# Patient Record
Sex: FEMALE | Race: BLACK OR AFRICAN AMERICAN | NOT HISPANIC OR LATINO | Employment: OTHER | URBAN - METROPOLITAN AREA
[De-identification: names, ages, dates, MRNs, and addresses within clinical notes are randomized per-mention and may not be internally consistent; named-entity substitution may affect disease eponyms.]

---

## 2017-03-20 ENCOUNTER — CLINICAL SUPPORT (OUTPATIENT)
Dept: OPHTHALMOLOGY | Facility: CLINIC | Age: 77
End: 2017-03-20
Payer: MEDICARE

## 2017-03-20 ENCOUNTER — OFFICE VISIT (OUTPATIENT)
Dept: OPHTHALMOLOGY | Facility: CLINIC | Age: 77
End: 2017-03-20
Payer: MEDICARE

## 2017-03-20 DIAGNOSIS — H52.03 HYPEROPIA WITH ASTIGMATISM AND PRESBYOPIA, BILATERAL: ICD-10-CM

## 2017-03-20 DIAGNOSIS — H52.203 HYPEROPIA WITH ASTIGMATISM AND PRESBYOPIA, BILATERAL: ICD-10-CM

## 2017-03-20 DIAGNOSIS — H52.4 HYPEROPIA WITH ASTIGMATISM AND PRESBYOPIA, BILATERAL: ICD-10-CM

## 2017-03-20 DIAGNOSIS — H40.1113 PRIMARY OPEN ANGLE GLAUCOMA OF RIGHT EYE, SEVERE STAGE: Primary | ICD-10-CM

## 2017-03-20 DIAGNOSIS — I10 ESSENTIAL HYPERTENSION: ICD-10-CM

## 2017-03-20 DIAGNOSIS — H40.1122 PRIMARY OPEN ANGLE GLAUCOMA OF LEFT EYE, MODERATE STAGE: ICD-10-CM

## 2017-03-20 DIAGNOSIS — Z96.1 PSEUDOPHAKIA: ICD-10-CM

## 2017-03-20 DIAGNOSIS — H40.1113 PRIMARY OPEN ANGLE GLAUCOMA OF RIGHT EYE, SEVERE STAGE: ICD-10-CM

## 2017-03-20 PROCEDURE — 92012 INTRM OPH EXAM EST PATIENT: CPT | Mod: S$GLB,,, | Performed by: OPHTHALMOLOGY

## 2017-03-20 PROCEDURE — 92134 CPTRZ OPH DX IMG PST SGM RTA: CPT | Mod: S$GLB,,, | Performed by: OPHTHALMOLOGY

## 2017-03-20 PROCEDURE — 99999 PR PBB SHADOW E&M-EST. PATIENT-LVL II: CPT | Mod: PBBFAC,,, | Performed by: OPHTHALMOLOGY
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
## 2017-07-28 ENCOUNTER — OFFICE VISIT (OUTPATIENT)
Dept: OPHTHALMOLOGY | Facility: CLINIC | Age: 77
End: 2017-07-28
Payer: MEDICARE

## 2017-07-28 ENCOUNTER — CLINICAL SUPPORT (OUTPATIENT)
Dept: OPHTHALMOLOGY | Facility: CLINIC | Age: 77
End: 2017-07-28
Payer: MEDICARE

## 2017-07-28 DIAGNOSIS — I10 ESSENTIAL HYPERTENSION: ICD-10-CM

## 2017-07-28 DIAGNOSIS — Z63.79 STRESS DUE TO ILLNESS OF FAMILY MEMBER: ICD-10-CM

## 2017-07-28 DIAGNOSIS — H40.1113 PRIMARY OPEN ANGLE GLAUCOMA OF RIGHT EYE, SEVERE STAGE: ICD-10-CM

## 2017-07-28 DIAGNOSIS — H40.1122 PRIMARY OPEN ANGLE GLAUCOMA OF LEFT EYE, MODERATE STAGE: ICD-10-CM

## 2017-07-28 DIAGNOSIS — Z96.1 PSEUDOPHAKIA: ICD-10-CM

## 2017-07-28 DIAGNOSIS — H40.1113 PRIMARY OPEN ANGLE GLAUCOMA OF RIGHT EYE, SEVERE STAGE: Primary | ICD-10-CM

## 2017-07-28 PROCEDURE — 99999 PR PBB SHADOW E&M-EST. PATIENT-LVL II: CPT | Mod: PBBFAC,,, | Performed by: OPHTHALMOLOGY

## 2017-07-28 PROCEDURE — 92083 EXTENDED VISUAL FIELD XM: CPT | Mod: S$GLB,,, | Performed by: OPHTHALMOLOGY

## 2017-07-28 PROCEDURE — 92133 CPTRZD OPH DX IMG PST SGM ON: CPT | Mod: S$GLB,,, | Performed by: OPHTHALMOLOGY

## 2017-07-28 PROCEDURE — 92014 COMPRE OPH EXAM EST PT 1/>: CPT | Mod: S$GLB,,, | Performed by: OPHTHALMOLOGY
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
## 2017-12-14 DIAGNOSIS — H40.1113 PRIMARY OPEN ANGLE GLAUCOMA OF RIGHT EYE, SEVERE STAGE: ICD-10-CM

## 2017-12-15 RX ORDER — TIMOLOL MALEATE 5 MG/ML
SOLUTION/ DROPS OPHTHALMIC
Qty: 10 ML | Refills: 0 | Status: SHIPPED | OUTPATIENT
Start: 2017-12-15 | End: 2019-05-31 | Stop reason: SDUPTHER

## 2017-12-18 ENCOUNTER — OFFICE VISIT (OUTPATIENT)
Dept: OPHTHALMOLOGY | Facility: CLINIC | Age: 77
End: 2017-12-18
Payer: MEDICARE

## 2017-12-18 ENCOUNTER — OFFICE VISIT (OUTPATIENT)
Dept: INTERNAL MEDICINE | Facility: CLINIC | Age: 77
End: 2017-12-18
Payer: MEDICARE

## 2017-12-18 ENCOUNTER — TELEPHONE (OUTPATIENT)
Dept: INTERNAL MEDICINE | Facility: CLINIC | Age: 77
End: 2017-12-18

## 2017-12-18 VITALS
TEMPERATURE: 98 F | SYSTOLIC BLOOD PRESSURE: 144 MMHG | WEIGHT: 132.19 LBS | DIASTOLIC BLOOD PRESSURE: 70 MMHG | BODY MASS INDEX: 20.71 KG/M2 | HEART RATE: 60 BPM

## 2017-12-18 DIAGNOSIS — H40.1113 PRIMARY OPEN ANGLE GLAUCOMA OF RIGHT EYE, SEVERE STAGE: ICD-10-CM

## 2017-12-18 DIAGNOSIS — I10 ESSENTIAL HYPERTENSION: ICD-10-CM

## 2017-12-18 DIAGNOSIS — Z96.1 PSEUDOPHAKIA: ICD-10-CM

## 2017-12-18 DIAGNOSIS — H40.1122 PRIMARY OPEN ANGLE GLAUCOMA OF LEFT EYE, MODERATE STAGE: Primary | ICD-10-CM

## 2017-12-18 DIAGNOSIS — Z00.00 WELLNESS EXAMINATION: Primary | ICD-10-CM

## 2017-12-18 PROCEDURE — 99999 PR PBB SHADOW E&M-EST. PATIENT-LVL II: CPT | Mod: PBBFAC,,, | Performed by: OPHTHALMOLOGY

## 2017-12-18 PROCEDURE — 99397 PER PM REEVAL EST PAT 65+ YR: CPT | Mod: S$GLB,,, | Performed by: INTERNAL MEDICINE

## 2017-12-18 PROCEDURE — 99499 UNLISTED E&M SERVICE: CPT | Mod: S$GLB,,, | Performed by: OPHTHALMOLOGY

## 2017-12-18 PROCEDURE — 99499 UNLISTED E&M SERVICE: CPT | Mod: S$GLB,,, | Performed by: INTERNAL MEDICINE

## 2017-12-18 PROCEDURE — 99999 PR PBB SHADOW E&M-EST. PATIENT-LVL III: CPT | Mod: PBBFAC,,, | Performed by: INTERNAL MEDICINE

## 2017-12-18 PROCEDURE — 92012 INTRM OPH EXAM EST PATIENT: CPT | Mod: S$GLB,,, | Performed by: OPHTHALMOLOGY

## 2017-12-18 RX ORDER — LATANOPROST 50 UG/ML
1 SOLUTION/ DROPS OPHTHALMIC NIGHTLY
Qty: 2.5 ML | Refills: 12 | Status: SHIPPED | OUTPATIENT
Start: 2017-12-18 | End: 2019-05-31 | Stop reason: SDUPTHER
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
## 2017-12-18 NOTE — PROGRESS NOTES
Subjective:       Patient ID: Home De Dios is a 75 y.o. female.    Chief Complaint: Annual Exam    HPIPleasant lady from [place] here for her annual wellness exam. Overall doing well and had no specific complaints. She is active physically, maintains healthy diet and weight is unchanged. She is compliant with her medications, but did not take her BP med today.  Review of Systems   Constitutional: Negative for activity change, appetite change, fatigue and unexpected weight change.   HENT: Negative.    Eyes: Negative for visual disturbance.   Respiratory: Negative for cough and shortness of breath.    Cardiovascular: Negative for chest pain, palpitations and leg swelling.   Gastrointestinal: Negative for abdominal distention, abdominal pain and blood in stool.   Endocrine: Negative.    Genitourinary: Negative for difficulty urinating.   Musculoskeletal: Negative for arthralgias, back pain and joint swelling.   Neurological: Negative for dizziness, tremors, weakness and headaches.   Hematological: Negative.        Objective:      Physical Exam   Constitutional: She is oriented to person, place, and time. She appears well-developed and well-nourished. No distress.   HENT:   Head: Normocephalic and atraumatic.   Right Ear: External ear normal.   Left Ear: External ear normal.   Mouth/Throat: Oropharynx is clear and moist. No oropharyngeal exudate.   Eyes: Conjunctivae and EOM are normal. Pupils are equal, round, and reactive to light. Right eye exhibits no discharge. Left eye exhibits no discharge. No scleral icterus.   Arcus senilus bilaterally.   Neck: Normal range of motion. Neck supple. No JVD present. No thyromegaly present.   Cardiovascular: Normal rate, regular rhythm and normal heart sounds.    No murmur heard.  Pulmonary/Chest: Effort normal and breath sounds normal. No respiratory distress. She has no wheezes. She exhibits no tenderness.   Abdominal: Soft. Bowel sounds are normal. She exhibits no  distension and no mass. There is no tenderness.   Musculoskeletal: Normal range of motion. She exhibits no edema or tenderness.   Lymphadenopathy:     She has no cervical adenopathy.   Neurological: She is alert and oriented to person, place, and time. No cranial nerve deficit. Coordination normal.   Skin: Skin is warm and dry. No rash noted. No erythema.   Psychiatric: She has a normal mood and affect. Her behavior is normal. Judgment and thought content normal.   Nursing note and vitals reviewed.      Assessment:       1. Wellness examination     2.     Hypertension.  Plan:   1. Continue with current medications.        2. Monitor BP periodically; keep diary.        3. RTC 3 months or sooner if needed

## 2018-04-03 ENCOUNTER — TELEPHONE (OUTPATIENT)
Dept: OPHTHALMOLOGY | Facility: CLINIC | Age: 78
End: 2018-04-03

## 2018-04-20 ENCOUNTER — CLINICAL SUPPORT (OUTPATIENT)
Dept: OPHTHALMOLOGY | Facility: CLINIC | Age: 78
End: 2018-04-20
Attending: OPHTHALMOLOGY
Payer: MEDICARE

## 2018-04-20 DIAGNOSIS — H40.1122 PRIMARY OPEN ANGLE GLAUCOMA OF LEFT EYE, MODERATE STAGE: Primary | ICD-10-CM

## 2018-04-20 DIAGNOSIS — H52.03 HYPEROPIA WITH ASTIGMATISM AND PRESBYOPIA, BILATERAL: ICD-10-CM

## 2018-04-20 DIAGNOSIS — Z63.79 STRESS DUE TO ILLNESS OF FAMILY MEMBER: ICD-10-CM

## 2018-04-20 DIAGNOSIS — H52.4 HYPEROPIA WITH ASTIGMATISM AND PRESBYOPIA, BILATERAL: ICD-10-CM

## 2018-04-20 DIAGNOSIS — Z96.1 PSEUDOPHAKIA: ICD-10-CM

## 2018-04-20 DIAGNOSIS — H40.1113 PRIMARY OPEN ANGLE GLAUCOMA OF RIGHT EYE, SEVERE STAGE: ICD-10-CM

## 2018-04-20 DIAGNOSIS — I10 ESSENTIAL HYPERTENSION: ICD-10-CM

## 2018-04-20 DIAGNOSIS — H52.203 HYPEROPIA WITH ASTIGMATISM AND PRESBYOPIA, BILATERAL: ICD-10-CM

## 2018-04-20 DIAGNOSIS — H40.1122 PRIMARY OPEN ANGLE GLAUCOMA OF LEFT EYE, MODERATE STAGE: ICD-10-CM

## 2018-04-20 PROCEDURE — 92133 CPTRZD OPH DX IMG PST SGM ON: CPT | Mod: S$GLB,,, | Performed by: OPHTHALMOLOGY

## 2018-04-20 PROCEDURE — 99999 PR PBB SHADOW E&M-EST. PATIENT-LVL II: CPT | Mod: PBBFAC,,, | Performed by: OPHTHALMOLOGY

## 2018-04-20 PROCEDURE — 92012 INTRM OPH EXAM EST PATIENT: CPT | Mod: S$GLB,,, | Performed by: OPHTHALMOLOGY

## 2018-04-20 PROCEDURE — 99499 UNLISTED E&M SERVICE: CPT | Mod: S$GLB,,, | Performed by: OPHTHALMOLOGY
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
## 2018-05-07 DIAGNOSIS — I10 ESSENTIAL HYPERTENSION: ICD-10-CM

## 2018-05-07 RX ORDER — HYDROCHLOROTHIAZIDE 12.5 MG/1
12.5 TABLET ORAL DAILY
Qty: 30 TABLET | Refills: 6 | Status: SHIPPED | OUTPATIENT
Start: 2018-05-07 | End: 2019-05-31 | Stop reason: SDUPTHER

## 2018-06-15 ENCOUNTER — PES CALL (OUTPATIENT)
Dept: ADMINISTRATIVE | Facility: CLINIC | Age: 78
End: 2018-06-15

## 2018-09-28 ENCOUNTER — OFFICE VISIT (OUTPATIENT)
Dept: OPHTHALMOLOGY | Facility: CLINIC | Age: 78
End: 2018-09-28
Attending: OPHTHALMOLOGY
Payer: MEDICARE

## 2018-09-28 DIAGNOSIS — H40.1113 PRIMARY OPEN ANGLE GLAUCOMA OF RIGHT EYE, SEVERE STAGE: ICD-10-CM

## 2018-09-28 DIAGNOSIS — H40.1122 PRIMARY OPEN ANGLE GLAUCOMA OF LEFT EYE, MODERATE STAGE: ICD-10-CM

## 2018-09-28 DIAGNOSIS — I10 ESSENTIAL HYPERTENSION: ICD-10-CM

## 2018-09-28 DIAGNOSIS — Z63.79 STRESS DUE TO ILLNESS OF FAMILY MEMBER: ICD-10-CM

## 2018-09-28 DIAGNOSIS — H52.203 HYPEROPIA WITH ASTIGMATISM AND PRESBYOPIA, BILATERAL: ICD-10-CM

## 2018-09-28 DIAGNOSIS — H52.03 HYPEROPIA WITH ASTIGMATISM AND PRESBYOPIA, BILATERAL: ICD-10-CM

## 2018-09-28 DIAGNOSIS — Z96.1 PSEUDOPHAKIA: ICD-10-CM

## 2018-09-28 DIAGNOSIS — H52.4 HYPEROPIA WITH ASTIGMATISM AND PRESBYOPIA, BILATERAL: ICD-10-CM

## 2018-09-28 DIAGNOSIS — H40.1122 PRIMARY OPEN ANGLE GLAUCOMA OF LEFT EYE, MODERATE STAGE: Primary | ICD-10-CM

## 2018-09-28 PROCEDURE — 92250 FUNDUS PHOTOGRAPHY W/I&R: CPT | Mod: PBBFAC | Performed by: OPHTHALMOLOGY

## 2018-09-28 PROCEDURE — 92014 COMPRE OPH EXAM EST PT 1/>: CPT | Mod: S$PBB,,, | Performed by: OPHTHALMOLOGY

## 2018-09-28 PROCEDURE — 99999 PR PBB SHADOW E&M-EST. PATIENT-LVL II: CPT | Mod: PBBFAC,,, | Performed by: OPHTHALMOLOGY

## 2018-09-28 PROCEDURE — 92083 EXTENDED VISUAL FIELD XM: CPT | Mod: PBBFAC

## 2018-09-28 PROCEDURE — 99212 OFFICE O/P EST SF 10 MIN: CPT | Mod: PBBFAC,25 | Performed by: OPHTHALMOLOGY

## 2018-09-28 PROCEDURE — 92083 EXTENDED VISUAL FIELD XM: CPT | Mod: 26,S$PBB,, | Performed by: OPHTHALMOLOGY
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
## 2018-12-13 ENCOUNTER — OFFICE VISIT (OUTPATIENT)
Dept: INTERNAL MEDICINE | Facility: CLINIC | Age: 78
End: 2018-12-13
Payer: MEDICARE

## 2018-12-13 VITALS
BODY MASS INDEX: 19.26 KG/M2 | HEART RATE: 64 BPM | DIASTOLIC BLOOD PRESSURE: 72 MMHG | WEIGHT: 123 LBS | SYSTOLIC BLOOD PRESSURE: 160 MMHG

## 2018-12-13 DIAGNOSIS — R63.4 WEIGHT LOSS: ICD-10-CM

## 2018-12-13 DIAGNOSIS — I10 HYPERTENSION, UNSPECIFIED TYPE: ICD-10-CM

## 2018-12-13 DIAGNOSIS — Z00.00 ROUTINE GENERAL MEDICAL EXAMINATION AT A HEALTH CARE FACILITY: Primary | ICD-10-CM

## 2018-12-13 PROCEDURE — 1101F PT FALLS ASSESS-DOCD LE1/YR: CPT | Mod: CPTII,S$GLB,, | Performed by: INTERNAL MEDICINE

## 2018-12-13 PROCEDURE — 99214 OFFICE O/P EST MOD 30 MIN: CPT | Mod: S$GLB,,, | Performed by: INTERNAL MEDICINE

## 2018-12-13 PROCEDURE — 3077F SYST BP >= 140 MM HG: CPT | Mod: CPTII,S$GLB,, | Performed by: INTERNAL MEDICINE

## 2018-12-13 PROCEDURE — 3078F DIAST BP <80 MM HG: CPT | Mod: CPTII,S$GLB,, | Performed by: INTERNAL MEDICINE

## 2018-12-13 PROCEDURE — 99999 PR PBB SHADOW E&M-EST. PATIENT-LVL III: CPT | Mod: PBBFAC,,, | Performed by: INTERNAL MEDICINE

## 2018-12-13 NOTE — PROGRESS NOTES
"Subjective:       Patient ID: Home De Dios is a 76 y.o. female.    Chief Complaint: Annual Exam    HPIPleasant lady originally from [place] here for her annual exam and follow up of HTN. Overall doing well and had no specific complaints. We discussed however the fact that her weight has been decreasing over the last few visits. She denies any complaints and is not worried about this fact as she has "always been on the thin side". Nevertheless, she has lost about 10# since her last visit last year and this was discussed. She is refusing any tests at present including blood work or other studies as she feels well and does not want them. We discussed her dietary habits and it appears that she does not consume sufficient calories for the energy she expends at her daily chores and activities. She cares for her disabled  and walks for the most part to the store and other areas. She relates how her life changed after the untimely death of her oldest son a few years ago. She realizes that this may have caused some depression, but has a strong vincent base that she is content with. I discussed cancer screening tests that she has never had at her request including MMG, colonoscopies, etc. She does not want any blood work at present or any other studies. She denies any pain, change in bowel habits, cough, fevers, night sweats,, CP, RAZA, LE edema. She is compliant with her HCTZ and does not want additional treatment due to finances. I respect he desires and offered my services at any time she changes her mind.  Review of Systems   Constitutional: Negative for fatigue.   Eyes: Negative for visual disturbance.   Respiratory: Negative for cough, shortness of breath and wheezing.    Cardiovascular: Negative for chest pain, palpitations and leg swelling.   Gastrointestinal: Negative for abdominal distention, abdominal pain, anal bleeding, constipation and diarrhea.   Genitourinary: Negative for difficulty urinating, " hematuria and pelvic pain.   Musculoskeletal: Negative for arthralgias, back pain and joint swelling.   Skin: Negative.    Neurological: Negative for dizziness, tremors, weakness, light-headedness, numbness and headaches.   Hematological: Negative for adenopathy.       Objective:      Physical Exam   Constitutional: She is oriented to person, place, and time. She appears well-developed and well-nourished. No distress.   HENT:   Head: Normocephalic and atraumatic.   Right Ear: External ear normal.   Left Ear: External ear normal.   Mouth/Throat: Oropharynx is clear and moist. No oropharyngeal exudate.   Eyes: Conjunctivae and EOM are normal. Pupils are equal, round, and reactive to light. Right eye exhibits no discharge. Left eye exhibits no discharge. No scleral icterus.   Neck: Normal range of motion. Neck supple. No JVD present. No thyromegaly present.   Cardiovascular: Normal rate, regular rhythm, normal heart sounds and intact distal pulses.   No murmur heard.  Pulmonary/Chest: Effort normal and breath sounds normal. No respiratory distress. She has no wheezes. She exhibits no tenderness.   Abdominal: Soft. Bowel sounds are normal. She exhibits no distension and no mass. There is no tenderness.   Musculoskeletal: Normal range of motion. She exhibits no edema or tenderness.   Lymphadenopathy:     She has no cervical adenopathy.   Neurological: She is alert and oriented to person, place, and time. No cranial nerve deficit.   Skin: Skin is warm and dry. No rash noted. She is not diaphoretic. No erythema.   Psychiatric: She has a normal mood and affect. Her behavior is normal.   Nursing note and vitals reviewed.      Assessment:       1. Routine general medical examination at a health care facility     2.     Hypertension - not at goal.   3.     Weight loss - likely multifactorial. Counseling provided.  Plan:    1. Continue with current medications.         2. Advised to increase caloric intake; specific examples  were discussed.         3. RTC 6 months or sooner if needed.
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
## 2019-05-31 ENCOUNTER — OFFICE VISIT (OUTPATIENT)
Dept: OPHTHALMOLOGY | Facility: CLINIC | Age: 79
End: 2019-05-31
Payer: MEDICARE

## 2019-05-31 ENCOUNTER — OFFICE VISIT (OUTPATIENT)
Dept: INTERNAL MEDICINE | Facility: CLINIC | Age: 79
End: 2019-05-31
Payer: MEDICARE

## 2019-05-31 VITALS
BODY MASS INDEX: 20.08 KG/M2 | WEIGHT: 128.19 LBS | DIASTOLIC BLOOD PRESSURE: 80 MMHG | SYSTOLIC BLOOD PRESSURE: 170 MMHG | HEART RATE: 60 BPM

## 2019-05-31 DIAGNOSIS — H52.203 HYPEROPIA WITH ASTIGMATISM AND PRESBYOPIA, BILATERAL: ICD-10-CM

## 2019-05-31 DIAGNOSIS — Z96.1 PSEUDOPHAKIA: ICD-10-CM

## 2019-05-31 DIAGNOSIS — H52.03 HYPEROPIA WITH ASTIGMATISM AND PRESBYOPIA, BILATERAL: ICD-10-CM

## 2019-05-31 DIAGNOSIS — I10 ESSENTIAL HYPERTENSION: ICD-10-CM

## 2019-05-31 DIAGNOSIS — H52.4 HYPEROPIA WITH ASTIGMATISM AND PRESBYOPIA, BILATERAL: ICD-10-CM

## 2019-05-31 DIAGNOSIS — H40.1122 PRIMARY OPEN ANGLE GLAUCOMA OF LEFT EYE, MODERATE STAGE: Primary | ICD-10-CM

## 2019-05-31 DIAGNOSIS — H40.1113 PRIMARY OPEN ANGLE GLAUCOMA OF RIGHT EYE, SEVERE STAGE: ICD-10-CM

## 2019-05-31 PROCEDURE — 92020 GONIOSCOPY: CPT | Mod: HCNC,S$GLB,, | Performed by: OPHTHALMOLOGY

## 2019-05-31 PROCEDURE — 1101F PR PT FALLS ASSESS DOC 0-1 FALLS W/OUT INJ PAST YR: ICD-10-PCS | Mod: HCNC,CPTII,S$GLB, | Performed by: INTERNAL MEDICINE

## 2019-05-31 PROCEDURE — 99999 PR PBB SHADOW E&M-EST. PATIENT-LVL II: CPT | Mod: PBBFAC,HCNC,, | Performed by: OPHTHALMOLOGY

## 2019-05-31 PROCEDURE — 99213 PR OFFICE/OUTPT VISIT, EST, LEVL III, 20-29 MIN: ICD-10-PCS | Mod: HCNC,S$GLB,, | Performed by: INTERNAL MEDICINE

## 2019-05-31 PROCEDURE — 3079F DIAST BP 80-89 MM HG: CPT | Mod: HCNC,CPTII,S$GLB, | Performed by: INTERNAL MEDICINE

## 2019-05-31 PROCEDURE — 99499 RISK ADDL DX/OHS AUDIT: ICD-10-PCS | Mod: HCNC,S$GLB,, | Performed by: INTERNAL MEDICINE

## 2019-05-31 PROCEDURE — 99999 PR PBB SHADOW E&M-EST. PATIENT-LVL II: ICD-10-PCS | Mod: PBBFAC,HCNC,, | Performed by: OPHTHALMOLOGY

## 2019-05-31 PROCEDURE — 3079F PR MOST RECENT DIASTOLIC BLOOD PRESSURE 80-89 MM HG: ICD-10-PCS | Mod: HCNC,CPTII,S$GLB, | Performed by: INTERNAL MEDICINE

## 2019-05-31 PROCEDURE — 99213 OFFICE O/P EST LOW 20 MIN: CPT | Mod: HCNC,S$GLB,, | Performed by: INTERNAL MEDICINE

## 2019-05-31 PROCEDURE — 99999 PR PBB SHADOW E&M-EST. PATIENT-LVL III: ICD-10-PCS | Mod: PBBFAC,HCNC,, | Performed by: INTERNAL MEDICINE

## 2019-05-31 PROCEDURE — 92012 INTRM OPH EXAM EST PATIENT: CPT | Mod: HCNC,S$GLB,, | Performed by: OPHTHALMOLOGY

## 2019-05-31 PROCEDURE — 99999 PR PBB SHADOW E&M-EST. PATIENT-LVL III: CPT | Mod: PBBFAC,HCNC,, | Performed by: INTERNAL MEDICINE

## 2019-05-31 PROCEDURE — 99499 UNLISTED E&M SERVICE: CPT | Mod: HCNC,S$GLB,, | Performed by: INTERNAL MEDICINE

## 2019-05-31 PROCEDURE — 1101F PT FALLS ASSESS-DOCD LE1/YR: CPT | Mod: HCNC,CPTII,S$GLB, | Performed by: INTERNAL MEDICINE

## 2019-05-31 PROCEDURE — 3077F SYST BP >= 140 MM HG: CPT | Mod: HCNC,CPTII,S$GLB, | Performed by: INTERNAL MEDICINE

## 2019-05-31 PROCEDURE — 3077F PR MOST RECENT SYSTOLIC BLOOD PRESSURE >= 140 MM HG: ICD-10-PCS | Mod: HCNC,CPTII,S$GLB, | Performed by: INTERNAL MEDICINE

## 2019-05-31 PROCEDURE — 92020 PR SPECIAL EYE EVAL,GONIOSCOPY: ICD-10-PCS | Mod: HCNC,S$GLB,, | Performed by: OPHTHALMOLOGY

## 2019-05-31 PROCEDURE — 92012 PR EYE EXAM, EST PATIENT,INTERMED: ICD-10-PCS | Mod: HCNC,S$GLB,, | Performed by: OPHTHALMOLOGY

## 2019-05-31 RX ORDER — LATANOPROST 50 UG/ML
1 SOLUTION/ DROPS OPHTHALMIC NIGHTLY
Qty: 2.5 ML | Refills: 12 | Status: SHIPPED | OUTPATIENT
Start: 2019-05-31 | End: 2021-05-18 | Stop reason: SDUPTHER

## 2019-05-31 RX ORDER — TIMOLOL MALEATE 5 MG/ML
1 SOLUTION/ DROPS OPHTHALMIC 2 TIMES DAILY
Qty: 5 ML | Refills: 12 | Status: SHIPPED | OUTPATIENT
Start: 2019-05-31 | End: 2021-05-18 | Stop reason: SDUPTHER

## 2019-05-31 RX ORDER — HYDROCHLOROTHIAZIDE 12.5 MG/1
12.5 TABLET ORAL DAILY
Qty: 90 TABLET | Refills: 2 | Status: ON HOLD | OUTPATIENT
Start: 2019-05-31 | End: 2020-05-01 | Stop reason: HOSPADM

## 2019-05-31 NOTE — PROGRESS NOTES
Subjective:       Patient ID: Home De Dios is a 76 y.o. female.    Chief Complaint: Follow-up and Hypertension    HPIMijordan Oliver is tila pleasant lady from [place] here for follow up HTN. Overall doing well and has no complaints. She has gained about 5# since our last visit and feels well about that. She has hx of HTN and is on HCTZ. I have recommended adding meds for better BP control, but has declined 2o finances. She is also not interested in blood work as I have recommended, but does not want this done as well. Overall she reports feeling well, exercises daily has no health issues that bother her. She ran out of her BP med a few days ago and these were refilled by me today.  Review of Systems   Constitutional: Negative for activity change, appetite change, fatigue and unexpected weight change.   Eyes: Negative for visual disturbance.   Respiratory: Negative for cough and shortness of breath.    Cardiovascular: Negative for chest pain, palpitations and leg swelling.   Gastrointestinal: Negative for abdominal distention, abdominal pain and blood in stool.   Genitourinary: Negative for difficulty urinating.   Musculoskeletal: Negative for arthralgias, back pain and joint swelling.   Skin: Negative.    Neurological: Negative for dizziness, weakness, light-headedness and headaches.   Hematological: Negative.        Objective:      Physical Exam   Constitutional: She is oriented to person, place, and time. She appears well-developed and well-nourished. No distress.   Neck: Normal range of motion. Neck supple. No JVD present. No thyromegaly present.   Cardiovascular: Normal rate, regular rhythm, normal heart sounds and intact distal pulses.   Pulmonary/Chest: Effort normal and breath sounds normal. No respiratory distress. She has no wheezes.   Musculoskeletal: Normal range of motion. She exhibits no edema.   Lymphadenopathy:     She has no cervical adenopathy.   Neurological: She is alert and oriented to person,  place, and time. No cranial nerve deficit. Coordination normal.   Skin: Skin is warm and dry. She is not diaphoretic.   Psychiatric: She has a normal mood and affect. Her behavior is normal.   Nursing note and vitals reviewed.      Assessment:       1. Essential hypertension        Plan:    1. Refill HCTZ.         2. Monitor BP.         3. RTC 6 months or sooner if needed.

## 2019-06-25 ENCOUNTER — PES CALL (OUTPATIENT)
Dept: ADMINISTRATIVE | Facility: CLINIC | Age: 79
End: 2019-06-25

## 2019-10-30 ENCOUNTER — PATIENT OUTREACH (OUTPATIENT)
Dept: ADMINISTRATIVE | Facility: OTHER | Age: 79
End: 2019-10-30
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
## 2019-11-01 ENCOUNTER — CLINICAL SUPPORT (OUTPATIENT)
Dept: OPHTHALMOLOGY | Facility: CLINIC | Age: 79
End: 2019-11-01
Payer: MEDICARE

## 2019-11-01 ENCOUNTER — OFFICE VISIT (OUTPATIENT)
Dept: OPHTHALMOLOGY | Facility: CLINIC | Age: 79
End: 2019-11-01
Payer: MEDICARE

## 2019-11-01 DIAGNOSIS — H52.203 HYPEROPIA WITH ASTIGMATISM AND PRESBYOPIA, BILATERAL: ICD-10-CM

## 2019-11-01 DIAGNOSIS — H52.03 HYPEROPIA WITH ASTIGMATISM AND PRESBYOPIA, BILATERAL: ICD-10-CM

## 2019-11-01 DIAGNOSIS — Z96.1 PSEUDOPHAKIA: ICD-10-CM

## 2019-11-01 DIAGNOSIS — H40.1122 PRIMARY OPEN ANGLE GLAUCOMA OF LEFT EYE, MODERATE STAGE: Primary | ICD-10-CM

## 2019-11-01 DIAGNOSIS — H40.1113 PRIMARY OPEN ANGLE GLAUCOMA OF RIGHT EYE, SEVERE STAGE: ICD-10-CM

## 2019-11-01 DIAGNOSIS — H21.561 AFFERENT PUPILLARY DEFECT, RIGHT: ICD-10-CM

## 2019-11-01 DIAGNOSIS — H40.1122 PRIMARY OPEN ANGLE GLAUCOMA OF LEFT EYE, MODERATE STAGE: ICD-10-CM

## 2019-11-01 DIAGNOSIS — I10 ESSENTIAL HYPERTENSION: ICD-10-CM

## 2019-11-01 DIAGNOSIS — H52.4 HYPEROPIA WITH ASTIGMATISM AND PRESBYOPIA, BILATERAL: ICD-10-CM

## 2019-11-01 PROCEDURE — 92014 PR EYE EXAM, EST PATIENT,COMPREHESV: ICD-10-PCS | Mod: HCNC,S$GLB,, | Performed by: OPHTHALMOLOGY

## 2019-11-01 PROCEDURE — 92083 HUMPHREY VISUAL FIELD - OU - BOTH EYES: ICD-10-PCS | Mod: HCNC,S$GLB,, | Performed by: OPHTHALMOLOGY

## 2019-11-01 PROCEDURE — 92083 EXTENDED VISUAL FIELD XM: CPT | Mod: HCNC,S$GLB,, | Performed by: OPHTHALMOLOGY

## 2019-11-01 PROCEDURE — 99999 PR PBB SHADOW E&M-EST. PATIENT-LVL II: ICD-10-PCS | Mod: PBBFAC,HCNC,, | Performed by: OPHTHALMOLOGY

## 2019-11-01 PROCEDURE — 99499 UNLISTED E&M SERVICE: CPT | Mod: S$GLB,,, | Performed by: OPHTHALMOLOGY

## 2019-11-01 PROCEDURE — 99499 RISK ADDL DX/OHS AUDIT: ICD-10-PCS | Mod: S$GLB,,, | Performed by: OPHTHALMOLOGY

## 2019-11-01 PROCEDURE — 92133 POSTERIOR SEGMENT OCT OPTIC NERVE(OCULAR COHERENCE TOMOGRAPHY) - OU - BOTH EYES: ICD-10-PCS | Mod: HCNC,S$GLB,, | Performed by: OPHTHALMOLOGY

## 2019-11-01 PROCEDURE — 99999 PR PBB SHADOW E&M-EST. PATIENT-LVL II: CPT | Mod: PBBFAC,HCNC,, | Performed by: OPHTHALMOLOGY

## 2019-11-01 PROCEDURE — 92014 COMPRE OPH EXAM EST PT 1/>: CPT | Mod: HCNC,S$GLB,, | Performed by: OPHTHALMOLOGY

## 2019-11-01 PROCEDURE — 92133 CPTRZD OPH DX IMG PST SGM ON: CPT | Mod: HCNC,S$GLB,, | Performed by: OPHTHALMOLOGY

## 2020-04-08 ENCOUNTER — TELEPHONE (OUTPATIENT)
Dept: PODIATRY | Facility: CLINIC | Age: 80
End: 2020-04-08

## 2020-04-08 NOTE — TELEPHONE ENCOUNTER
Nurse called pt regarding appt request no answer. No voicemail. Nurse scheduled appt and mailed notice out        ----- Message from Belkis Jerry sent at 4/8/2020  2:49 PM CDT -----  Contact: self  Pt having problems with foot. it goes numb. Asking for an appt. Asking for a call back.        contact info [phone]

## 2020-04-18 ENCOUNTER — NURSE TRIAGE (OUTPATIENT)
Dept: ADMINISTRATIVE | Facility: CLINIC | Age: 80
End: 2020-04-18

## 2020-04-18 NOTE — TELEPHONE ENCOUNTER
Soaked her foot last week and afterward it was itching and she was scratching it a lot  Noticed the toenail began to get black  By the toe looks black  Recommended ED now  She is considering it but she is unsure if she will follow that recommendation  Advised again to consider going because the toe could be infected and she doesn't want to wait for it to get worse.      Reason for Disposition   Purple or black skin on toe  (Exception: simple recalled injury with bruise)    Additional Information   Negative: Followed a toe injury   Negative: Wound looks infected   Negative: Caused by an animal bite   Negative: Caused by frostbite   Negative: Caused by an ingrown toenail   Negative: Athlete's Foot suspected (i.e., itchy red rash in web space between toes)   Negative: Foot pain is main symptom   Negative: Foot is cool or blue in comparison to other foot    Protocols used: TOE PAIN-A-AH

## 2020-04-19 ENCOUNTER — HOSPITAL ENCOUNTER (INPATIENT)
Facility: OTHER | Age: 80
LOS: 12 days | Discharge: HOME HEALTH | DRG: 240 | End: 2020-05-01
Attending: EMERGENCY MEDICINE | Admitting: EMERGENCY MEDICINE
Payer: MEDICARE

## 2020-04-19 ENCOUNTER — NURSE TRIAGE (OUTPATIENT)
Dept: ADMINISTRATIVE | Facility: CLINIC | Age: 80
End: 2020-04-19

## 2020-04-19 DIAGNOSIS — I10 ESSENTIAL HYPERTENSION: Chronic | ICD-10-CM

## 2020-04-19 DIAGNOSIS — I96 TOE NECROSIS: ICD-10-CM

## 2020-04-19 DIAGNOSIS — E87.6 HYPOKALEMIA: ICD-10-CM

## 2020-04-19 DIAGNOSIS — I73.9 PVD (PERIPHERAL VASCULAR DISEASE) WITH CLAUDICATION: ICD-10-CM

## 2020-04-19 DIAGNOSIS — N17.9 AKI (ACUTE KIDNEY INJURY): ICD-10-CM

## 2020-04-19 DIAGNOSIS — I96 GANGRENE OF TOE OF RIGHT FOOT: ICD-10-CM

## 2020-04-19 DIAGNOSIS — Z96.1 PSEUDOPHAKIA: ICD-10-CM

## 2020-04-19 DIAGNOSIS — M79.676 TOE PAIN: ICD-10-CM

## 2020-04-19 DIAGNOSIS — I96 GANGRENE OF TOE OF RIGHT FOOT: Primary | ICD-10-CM

## 2020-04-19 DIAGNOSIS — R63.4 WEIGHT LOSS: ICD-10-CM

## 2020-04-19 LAB
ALBUMIN SERPL BCP-MCNC: 3.7 G/DL (ref 3.5–5.2)
ALP SERPL-CCNC: 72 U/L (ref 55–135)
ALT SERPL W/O P-5'-P-CCNC: 5 U/L (ref 10–44)
ANION GAP SERPL CALC-SCNC: 16 MMOL/L (ref 8–16)
APTT BLDCRRT: 30.7 SEC (ref 21–32)
AST SERPL-CCNC: 12 U/L (ref 10–40)
BASOPHILS # BLD AUTO: 0.04 K/UL (ref 0–0.2)
BASOPHILS NFR BLD: 0.5 % (ref 0–1.9)
BILIRUB SERPL-MCNC: 0.3 MG/DL (ref 0.1–1)
BUN SERPL-MCNC: 44 MG/DL (ref 8–23)
CALCIUM SERPL-MCNC: 9.3 MG/DL (ref 8.7–10.5)
CHLORIDE SERPL-SCNC: 105 MMOL/L (ref 95–110)
CO2 SERPL-SCNC: 23 MMOL/L (ref 23–29)
CREAT SERPL-MCNC: 5.5 MG/DL (ref 0.5–1.4)
CRP SERPL-MCNC: 119 MG/L (ref 0–8.2)
DIFFERENTIAL METHOD: ABNORMAL
EOSINOPHIL # BLD AUTO: 0.3 K/UL (ref 0–0.5)
EOSINOPHIL NFR BLD: 3.4 % (ref 0–8)
ERYTHROCYTE [DISTWIDTH] IN BLOOD BY AUTOMATED COUNT: 12.8 % (ref 11.5–14.5)
EST. GFR  (AFRICAN AMERICAN): 8 ML/MIN/1.73 M^2
EST. GFR  (NON AFRICAN AMERICAN): 7 ML/MIN/1.73 M^2
GLUCOSE SERPL-MCNC: 93 MG/DL (ref 70–110)
HCT VFR BLD AUTO: 32.9 % (ref 37–48.5)
HGB BLD-MCNC: 10.8 G/DL (ref 12–16)
IMM GRANULOCYTES # BLD AUTO: 0.02 K/UL (ref 0–0.04)
IMM GRANULOCYTES NFR BLD AUTO: 0.2 % (ref 0–0.5)
INR PPP: 1 (ref 0.8–1.2)
LYMPHOCYTES # BLD AUTO: 2 K/UL (ref 1–4.8)
LYMPHOCYTES NFR BLD: 24.1 % (ref 18–48)
MCH RBC QN AUTO: 26.8 PG (ref 27–31)
MCHC RBC AUTO-ENTMCNC: 32.8 G/DL (ref 32–36)
MCV RBC AUTO: 82 FL (ref 82–98)
MONOCYTES # BLD AUTO: 0.7 K/UL (ref 0.3–1)
MONOCYTES NFR BLD: 8.1 % (ref 4–15)
NEUTROPHILS # BLD AUTO: 5.2 K/UL (ref 1.8–7.7)
NEUTROPHILS NFR BLD: 63.7 % (ref 38–73)
NRBC BLD-RTO: 0 /100 WBC
PLATELET # BLD AUTO: 340 K/UL (ref 150–350)
PMV BLD AUTO: 10.4 FL (ref 9.2–12.9)
POTASSIUM SERPL-SCNC: 3.2 MMOL/L (ref 3.5–5.1)
PROT SERPL-MCNC: 8 G/DL (ref 6–8.4)
PROTHROMBIN TIME: 11 SEC (ref 9–12.5)
RBC # BLD AUTO: 4.03 M/UL (ref 4–5.4)
SARS-COV-2 RDRP RESP QL NAA+PROBE: NEGATIVE
SODIUM SERPL-SCNC: 144 MMOL/L (ref 136–145)
WBC # BLD AUTO: 8.13 K/UL (ref 3.9–12.7)

## 2020-04-19 PROCEDURE — 93005 ELECTROCARDIOGRAM TRACING: CPT | Mod: HCNC

## 2020-04-19 PROCEDURE — U0002 COVID-19 LAB TEST NON-CDC: HCPCS | Mod: HCNC

## 2020-04-19 PROCEDURE — 99223 1ST HOSP IP/OBS HIGH 75: CPT | Mod: HCNC,AI,, | Performed by: PHYSICIAN ASSISTANT

## 2020-04-19 PROCEDURE — 93010 EKG 12-LEAD: ICD-10-PCS | Mod: HCNC,,, | Performed by: INTERNAL MEDICINE

## 2020-04-19 PROCEDURE — 99291 CRITICAL CARE FIRST HOUR: CPT | Mod: HCNC

## 2020-04-19 PROCEDURE — 85025 COMPLETE CBC W/AUTO DIFF WBC: CPT | Mod: HCNC

## 2020-04-19 PROCEDURE — 80053 COMPREHEN METABOLIC PANEL: CPT | Mod: HCNC

## 2020-04-19 PROCEDURE — 86140 C-REACTIVE PROTEIN: CPT | Mod: HCNC

## 2020-04-19 PROCEDURE — 11000001 HC ACUTE MED/SURG PRIVATE ROOM: Mod: HCNC

## 2020-04-19 PROCEDURE — 25000003 PHARM REV CODE 250: Mod: HCNC | Performed by: PHYSICIAN ASSISTANT

## 2020-04-19 PROCEDURE — 85610 PROTHROMBIN TIME: CPT | Mod: HCNC

## 2020-04-19 PROCEDURE — 99223 PR INITIAL HOSPITAL CARE,LEVL III: ICD-10-PCS | Mod: HCNC,AI,, | Performed by: PHYSICIAN ASSISTANT

## 2020-04-19 PROCEDURE — 85730 THROMBOPLASTIN TIME PARTIAL: CPT | Mod: HCNC

## 2020-04-19 PROCEDURE — 94761 N-INVAS EAR/PLS OXIMETRY MLT: CPT | Mod: HCNC

## 2020-04-19 PROCEDURE — 93010 ELECTROCARDIOGRAM REPORT: CPT | Mod: HCNC,,, | Performed by: INTERNAL MEDICINE

## 2020-04-19 RX ORDER — ONDANSETRON 2 MG/ML
4 INJECTION INTRAMUSCULAR; INTRAVENOUS EVERY 8 HOURS PRN
Status: DISCONTINUED | OUTPATIENT
Start: 2020-04-19 | End: 2020-05-01 | Stop reason: HOSPADM

## 2020-04-19 RX ORDER — SODIUM CHLORIDE 0.9 % (FLUSH) 0.9 %
10 SYRINGE (ML) INJECTION
Status: DISCONTINUED | OUTPATIENT
Start: 2020-04-19 | End: 2020-04-20

## 2020-04-19 RX ORDER — ACETAMINOPHEN 325 MG/1
650 TABLET ORAL EVERY 4 HOURS PRN
Status: DISCONTINUED | OUTPATIENT
Start: 2020-04-19 | End: 2020-05-01 | Stop reason: HOSPADM

## 2020-04-19 RX ORDER — HYDRALAZINE HYDROCHLORIDE 25 MG/1
25 TABLET, FILM COATED ORAL ONCE
Status: COMPLETED | OUTPATIENT
Start: 2020-04-19 | End: 2020-04-19

## 2020-04-19 RX ORDER — HYDRALAZINE HYDROCHLORIDE 25 MG/1
25 TABLET, FILM COATED ORAL ONCE
Status: DISCONTINUED | OUTPATIENT
Start: 2020-04-19 | End: 2020-04-19

## 2020-04-19 RX ORDER — SODIUM CHLORIDE 0.9 % (FLUSH) 0.9 %
10 SYRINGE (ML) INJECTION
Status: DISCONTINUED | OUTPATIENT
Start: 2020-04-19 | End: 2020-05-01 | Stop reason: HOSPADM

## 2020-04-19 RX ORDER — LATANOPROST 50 UG/ML
1 SOLUTION/ DROPS OPHTHALMIC NIGHTLY
Status: DISCONTINUED | OUTPATIENT
Start: 2020-04-19 | End: 2020-05-01 | Stop reason: HOSPADM

## 2020-04-19 RX ORDER — TIMOLOL MALEATE 5 MG/ML
1 SOLUTION/ DROPS OPHTHALMIC 2 TIMES DAILY
Status: DISCONTINUED | OUTPATIENT
Start: 2020-04-19 | End: 2020-05-01 | Stop reason: HOSPADM

## 2020-04-19 RX ADMIN — HYDRALAZINE HYDROCHLORIDE 25 MG: 25 TABLET, FILM COATED ORAL at 07:04

## 2020-04-19 RX ADMIN — LATANOPROST 1 DROP: 50 SOLUTION OPHTHALMIC at 10:04

## 2020-04-19 RX ADMIN — TIMOLOL MALEATE 1 DROP: 5 SOLUTION OPHTHALMIC at 10:04

## 2020-04-19 NOTE — ED PROVIDER NOTES
Encounter Date: 4/19/2020       History     Chief Complaint   Patient presents with    Toe Pain     +right big and second toe discoloration x10 days. foot red/swollen compared to left.      Seen by provider at 4:15PM:      Pt is a 78 y/o F who presents to ED with discoloration and discomfort to R toes.  Pt states that approximately 1-2 weeks ago, she was soaking her feet and cutting her nails. She soaked her foot in a mixture of epsom salts and bleach.  She states afterwards her R foot was very itchy and she was constantly scratching her foot. However for the past 4-5 days, she has now developed numbness along the distal aspect of her foot along with black discoloration of her 1st, 2nd, and 4th toe.  She denies any hx of claudication and she states she can ambulate with no issues. She denies any hx of neuropathy or numbness/tingling previously. She denies trauma. She denies f/c. Denies N/V/D, chest pain, SOB.          Review of patient's allergies indicates:  No Known Allergies  Past Medical History:   Diagnosis Date    Glaucoma     Glaucoma (increased eye pressure)     Hypertension      Past Surgical History:   Procedure Laterality Date    APPENDECTOMY      CATARACT EXTRACTION W/  INTRAOCULAR LENS IMPLANT Left 10/16/2013        CATARACT EXTRACTION W/  INTRAOCULAR LENS IMPLANT Right 12/18/2013        HYSTERECTOMY       Family History   Problem Relation Age of Onset    Heart disease Mother     Cancer Sister     Amblyopia Neg Hx     Blindness Neg Hx     Macular degeneration Neg Hx     Retinal detachment Neg Hx     Stroke Neg Hx     Thyroid disease Neg Hx     Cataracts Neg Hx     Glaucoma Neg Hx      Social History     Tobacco Use    Smoking status: Never Smoker    Smokeless tobacco: Never Used   Substance Use Topics    Alcohol use: No    Drug use: Not on file     Review of Systems   Constitutional: Negative for chills and fever.   HENT: Negative for congestion and  rhinorrhea.    Respiratory: Negative for cough and shortness of breath.    Cardiovascular: Negative for chest pain and palpitations.   Gastrointestinal: Negative for abdominal pain, nausea and vomiting.   Genitourinary: Negative for dysuria and flank pain.   Musculoskeletal: Negative for back pain and joint swelling.   Skin: Positive for color change. Negative for wound.   Neurological: Positive for numbness. Negative for weakness.       Physical Exam     Initial Vitals   BP Pulse Resp Temp SpO2   04/19/20 1605 04/19/20 1605 04/19/20 1606 04/19/20 1606 04/19/20 1605   (!) 178/97 104 18 98.9 °F (37.2 °C) 100 %      MAP       --                Physical Exam    Nursing note and vitals reviewed.  Constitutional: She appears well-developed and well-nourished.   HENT:   Head: Normocephalic and atraumatic.   Eyes: Conjunctivae are normal.   Neck: Normal range of motion. Neck supple.   Cardiovascular: Normal rate, regular rhythm and normal heart sounds.   1+ DP/PT pulses bilaterally   Pulmonary/Chest: Breath sounds normal. No respiratory distress. She has no wheezes. She has no rales.   Abdominal: Soft. Bowel sounds are normal. She exhibits no distension. There is no tenderness. There is no rebound.   Musculoskeletal: Normal range of motion. She exhibits no edema.   Neurological: She is alert and oriented to person, place, and time.   Skin: Capillary refill takes less than 2 seconds.   Black necrotic appearance of 1st, 2nd, and 4th R toe.           ED Course   Critical Care  Date/Time: 4/19/2020 6:28 PM  Performed by: Mildred Huff MD  Authorized by: Mildred Huff MD   Direct patient critical care time: 10 minutes  Additional history critical care time: 5 minutes  Ordering / reviewing critical care time: 5 minutes  Documentation critical care time: 5 minutes  Consulting other physicians critical care time: 5 minutes  Total critical care time (exclusive of procedural time) : 30 minutes  Critical care time was exclusive  of separately billable procedures and treating other patients and teaching time.  Critical care was necessary to treat or prevent imminent or life-threatening deterioration of the following conditions: renal failure.  Critical care was time spent personally by me on the following activities: obtaining history from patient or surrogate, ordering and review of laboratory studies, pulse oximetry, review of old charts, re-evaluation of patient's condition, ordering and performing treatments and interventions, examination of patient and development of treatment plan with patient or surrogate.        Labs Reviewed   COMPREHENSIVE METABOLIC PANEL - Abnormal; Notable for the following components:       Result Value    Potassium 3.2 (*)     BUN, Bld 44 (*)     Creatinine 5.5 (*)     ALT 5 (*)     eGFR if  8 (*)     eGFR if non  7 (*)     All other components within normal limits   CBC W/ AUTO DIFFERENTIAL - Abnormal; Notable for the following components:    Hemoglobin 10.8 (*)     Hematocrit 32.9 (*)     Mean Corpuscular Hemoglobin 26.8 (*)     All other components within normal limits   C-REACTIVE PROTEIN - Abnormal; Notable for the following components:    .0 (*)     All other components within normal limits   PROTIME-INR   APTT   SARS-COV-2 RNA AMPLIFICATION, QUAL   URINALYSIS   SODIUM, URINE, RANDOM   PROTEIN / CREATININE RATIO, URINE     EKG Readings: (Independently Interpreted)   4:53PM:  Rate of 77.  NSR.  Normal axis.  Normal intervals.  T wave flattening. No ST or ischemic changes.         Imaging Results          US Lower Extremity Arteries Right (In process)                X-Ray Foot Complete Right (Final result)  Result time 04/19/20 17:10:46    Final result by Efraín Blanc MD (04/19/20 17:10:46)                 Impression:      No evidence of acute fracture or dislocation of the right foot.  No osseous erosions.  Follow-up with MRI of the right foot, as clinically  warranted.    Advanced vascular calcifications.      Electronically signed by: Efraín Blanc MD  Date:    04/19/2020  Time:    17:10             Narrative:    EXAMINATION:  XR FOOT COMPLETE 3 VIEW RIGHT    CLINICAL HISTORY:  Pain in unspecified toe(s)    TECHNIQUE:  AP, lateral, and oblique views of the right foot were performed.    COMPARISON:  None    FINDINGS:  There is diffuse demineralization of the osseous structures.  There is no evidence of periostitis.  No cortical step-off is identified.    The Lisfranc articulation is maintained.  There is joint space narrowing throughout the right foot.  No erosive changes are identified.    There are advanced vascular calcifications throughout the right lower extremity.  There is diffuse soft tissue swelling of the right leg.  No radiopaque foreign body identified.  No soft tissue gas is present.                              X-Rays:   Independently Interpreted Readings:   Other Readings:  Foot xr: no fracture or evidence of air.      Medical Decision Making:   History:   Old Medical Records: I decided to obtain old medical records.  Old Records Summarized: other records and records from clinic visits.  Initial Assessment:   4:15PM:  Pt is a 78 y/o F who presents to ED with R toe discoloration. Pt's toes appear necrotic, though she denies any pain and did not have the classic presentation of acute limb ischemia. Pt may have had an embolic event vs. A more chronic ischemic event. Will plan for labs, imaging, will continue to follow and reassess.    Independently Interpreted Test(s):   I have ordered and independently interpreted X-rays - see prior notes.  I have ordered and independently interpreted EKG Reading(s) - see prior notes  Clinical Tests:   Lab Tests: Ordered and Reviewed  Radiological Study: Ordered and Reviewed  Medical Tests: Ordered and Reviewed  Other:   I have discussed this case with another health care provider.    5:20 PM:  Pt doing well, remains stable.   Her Cr is 5.5, with no previous labs in the past 7 yrs. She denies any known hx of kidney disease.  Given her Cr, will plan for an arterial duplex for further evaluation though she has visible calcification on xray. Given these findings, will plan to admit for further observation and management.  I updated pt regarding results along with plan for admission.  Agreeable to plan and all questions answered.    5:47 PM:  I discussed pt Dr. Waite, hospitalist, who is agreeable to plan for admission and all questions answered.                                   Clinical Impression:     1. MICKEY (acute kidney injury)    2. Toe pain    3. Toe necrosis                ED Disposition Condition    Admit                           Mildred Huff MD  04/19/20 2500

## 2020-04-19 NOTE — Clinical Note
47 ml injected throughout the case. 3 mL total wasted during the case. 50 mL total used in the case.

## 2020-04-19 NOTE — ED NOTES
"Pt c/o R foot pain "off and on" with skin color changes x 10 days, unsure of specific injury. Pt AAOx4 and appropriate at this time. Respirations even and unlabored. No acute distress noted.  Pt updated on POC. Bed is locked and in lowest position with side rails up x2. Call bell within reach and pt oriented to use of call bell. Pt on continuous pulse ox, and continuous BP cuff. Will continue to monitor.     "

## 2020-04-19 NOTE — HPI
Ms. Home De Dios is a 77 y.o. female, with PMH of HTN, POAG, who presented to [facility] ED on 4/19/20 with right toe pain. She states the toes of her right foot began changing color and turning black about 10 days ago, and have been having worsening numbness x 4-5 days. She states this began after soaking her foot in bleach and Epsom salts and then cutting her toe nails. The right foot subsequently became itchy, and then numb, particularly on the 1st, 2nd and 4th toes. She denies neuropathy or prior numbness/tingling., trauma, fever, chills. ED imaging shows no fracture/dislocaton or bony erosion. An US is pending. Labs showed MICKEY without hyperkalemia, and elevated CRP of 119. She was admitted to inpatient status.

## 2020-04-19 NOTE — TELEPHONE ENCOUNTER
Pt called yesterday and was triaged appropriately. Pt denied disposition yesterday and is calling now to ask if she can still be seen. Address provided for nearest ED to pt and verified.     Reason for Disposition   Caller has already spoken with another triager or PCP AND has further questions AND triager able to answer questions.    Additional Information   Negative: Caller is angry or rude (e.g., hangs up, verbally abusive, yelling)   Negative: Caller hangs up   Negative: Caller has already spoken with another triager and has no further questions.   Negative: Caller has already spoken with the PCP and has no further questions.    Protocols used: NO CONTACT OR DUPLICATE CONTACT CALL-A-

## 2020-04-20 PROBLEM — E87.6 HYPOKALEMIA: Status: ACTIVE | Noted: 2020-04-20

## 2020-04-20 LAB
ANION GAP SERPL CALC-SCNC: 14 MMOL/L (ref 8–16)
BACTERIA #/AREA URNS HPF: ABNORMAL /HPF
BASOPHILS # BLD AUTO: 0.03 K/UL (ref 0–0.2)
BASOPHILS NFR BLD: 0.4 % (ref 0–1.9)
BILIRUB UR QL STRIP: NEGATIVE
BUN SERPL-MCNC: 46 MG/DL (ref 8–23)
CALCIUM SERPL-MCNC: 8.8 MG/DL (ref 8.7–10.5)
CHLORIDE SERPL-SCNC: 107 MMOL/L (ref 95–110)
CK SERPL-CCNC: 94 U/L (ref 20–180)
CLARITY UR: CLEAR
CO2 SERPL-SCNC: 23 MMOL/L (ref 23–29)
COLOR UR: YELLOW
CREAT SERPL-MCNC: 5.1 MG/DL (ref 0.5–1.4)
CREAT UR-MCNC: 116.5 MG/DL (ref 15–325)
CREAT UR-MCNC: 119.9 MG/DL (ref 15–325)
DIFFERENTIAL METHOD: ABNORMAL
EOSINOPHIL # BLD AUTO: 0.3 K/UL (ref 0–0.5)
EOSINOPHIL NFR BLD: 4.7 % (ref 0–8)
EOSINOPHIL URNS QL WRIGHT STN: NORMAL
ERYTHROCYTE [DISTWIDTH] IN BLOOD BY AUTOMATED COUNT: 13 % (ref 11.5–14.5)
EST. GFR  (AFRICAN AMERICAN): 9 ML/MIN/1.73 M^2
EST. GFR  (NON AFRICAN AMERICAN): 8 ML/MIN/1.73 M^2
ESTIMATED AVG GLUCOSE: 108 MG/DL (ref 68–131)
GLUCOSE SERPL-MCNC: 97 MG/DL (ref 70–110)
GLUCOSE UR QL STRIP: NEGATIVE
HBA1C MFR BLD HPLC: 5.4 % (ref 4–5.6)
HCT VFR BLD AUTO: 31.5 % (ref 37–48.5)
HGB BLD-MCNC: 10.2 G/DL (ref 12–16)
HGB UR QL STRIP: NEGATIVE
IMM GRANULOCYTES # BLD AUTO: 0.03 K/UL (ref 0–0.04)
IMM GRANULOCYTES NFR BLD AUTO: 0.4 % (ref 0–0.5)
KETONES UR QL STRIP: NEGATIVE
LEUKOCYTE ESTERASE UR QL STRIP: ABNORMAL
LYMPHOCYTES # BLD AUTO: 1.7 K/UL (ref 1–4.8)
LYMPHOCYTES NFR BLD: 24 % (ref 18–48)
MAGNESIUM SERPL-MCNC: 2.1 MG/DL (ref 1.6–2.6)
MCH RBC QN AUTO: 26.5 PG (ref 27–31)
MCHC RBC AUTO-ENTMCNC: 32.4 G/DL (ref 32–36)
MCV RBC AUTO: 82 FL (ref 82–98)
MICROSCOPIC COMMENT: ABNORMAL
MONOCYTES # BLD AUTO: 0.8 K/UL (ref 0.3–1)
MONOCYTES NFR BLD: 10.7 % (ref 4–15)
NEUTROPHILS # BLD AUTO: 4.2 K/UL (ref 1.8–7.7)
NEUTROPHILS NFR BLD: 59.8 % (ref 38–73)
NITRITE UR QL STRIP: NEGATIVE
NRBC BLD-RTO: 0 /100 WBC
OSMOLALITY UR: 365 MOSM/KG (ref 50–1200)
PH UR STRIP: 6 [PH] (ref 5–8)
PHOSPHATE SERPL-MCNC: 4.2 MG/DL (ref 2.7–4.5)
PLATELET # BLD AUTO: 320 K/UL (ref 150–350)
PMV BLD AUTO: 10.4 FL (ref 9.2–12.9)
POTASSIUM SERPL-SCNC: 3.4 MMOL/L (ref 3.5–5.1)
PROT UR QL STRIP: ABNORMAL
PROT UR-MCNC: 32 MG/DL (ref 0–15)
PROT UR-MCNC: 33 MG/DL (ref 0–15)
PROT/CREAT UR: 0.27 MG/G{CREAT} (ref 0–0.2)
PROT/CREAT UR: 0.28 MG/G{CREAT} (ref 0–0.2)
RBC # BLD AUTO: 3.85 M/UL (ref 4–5.4)
SODIUM SERPL-SCNC: 144 MMOL/L (ref 136–145)
SODIUM UR-SCNC: 68 MMOL/L (ref 20–250)
SP GR UR STRIP: 1.01 (ref 1–1.03)
URATE SERPL-MCNC: 7.3 MG/DL (ref 2.4–5.7)
URATE UR-MCNC: 9.3 MG/DL
URN SPEC COLLECT METH UR: ABNORMAL
UROBILINOGEN UR STRIP-ACNC: NEGATIVE EU/DL
WBC # BLD AUTO: 7.08 K/UL (ref 3.9–12.7)
WBC #/AREA URNS HPF: 8 /HPF (ref 0–5)

## 2020-04-20 PROCEDURE — 82550 ASSAY OF CK (CPK): CPT | Mod: HCNC

## 2020-04-20 PROCEDURE — 99223 1ST HOSP IP/OBS HIGH 75: CPT | Mod: HCNC,,, | Performed by: PODIATRIST

## 2020-04-20 PROCEDURE — 99233 PR SUBSEQUENT HOSPITAL CARE,LEVL III: ICD-10-PCS | Mod: HCNC,,, | Performed by: FAMILY MEDICINE

## 2020-04-20 PROCEDURE — 11000001 HC ACUTE MED/SURG PRIVATE ROOM: Mod: HCNC

## 2020-04-20 PROCEDURE — 85025 COMPLETE CBC W/AUTO DIFF WBC: CPT | Mod: HCNC

## 2020-04-20 PROCEDURE — 94761 N-INVAS EAR/PLS OXIMETRY MLT: CPT | Mod: HCNC

## 2020-04-20 PROCEDURE — 83935 ASSAY OF URINE OSMOLALITY: CPT | Mod: HCNC

## 2020-04-20 PROCEDURE — 81000 URINALYSIS NONAUTO W/SCOPE: CPT | Mod: HCNC

## 2020-04-20 PROCEDURE — 63600175 PHARM REV CODE 636 W HCPCS: Mod: HCNC | Performed by: PHYSICIAN ASSISTANT

## 2020-04-20 PROCEDURE — 83735 ASSAY OF MAGNESIUM: CPT | Mod: HCNC

## 2020-04-20 PROCEDURE — 63700000 PHARM REV CODE 250 ALT 637 W/O HCPCS: Mod: HCNC | Performed by: INTERNAL MEDICINE

## 2020-04-20 PROCEDURE — 84156 ASSAY OF PROTEIN URINE: CPT | Mod: HCNC

## 2020-04-20 PROCEDURE — 87205 SMEAR GRAM STAIN: CPT | Mod: HCNC

## 2020-04-20 PROCEDURE — 99223 PR INITIAL HOSPITAL CARE,LEVL III: ICD-10-PCS | Mod: HCNC,,, | Performed by: PODIATRIST

## 2020-04-20 PROCEDURE — 25000003 PHARM REV CODE 250: Mod: HCNC | Performed by: PHYSICIAN ASSISTANT

## 2020-04-20 PROCEDURE — 84100 ASSAY OF PHOSPHORUS: CPT | Mod: HCNC

## 2020-04-20 PROCEDURE — S0030 INJECTION, METRONIDAZOLE: HCPCS | Mod: HCNC | Performed by: PHYSICIAN ASSISTANT

## 2020-04-20 PROCEDURE — 84550 ASSAY OF BLOOD/URIC ACID: CPT | Mod: HCNC

## 2020-04-20 PROCEDURE — 36415 COLL VENOUS BLD VENIPUNCTURE: CPT | Mod: HCNC

## 2020-04-20 PROCEDURE — 99233 SBSQ HOSP IP/OBS HIGH 50: CPT | Mod: HCNC,,, | Performed by: FAMILY MEDICINE

## 2020-04-20 PROCEDURE — 25000003 PHARM REV CODE 250: Mod: HCNC | Performed by: INTERNAL MEDICINE

## 2020-04-20 PROCEDURE — 63600175 PHARM REV CODE 636 W HCPCS: Mod: HCNC | Performed by: INTERNAL MEDICINE

## 2020-04-20 PROCEDURE — 80048 BASIC METABOLIC PNL TOTAL CA: CPT | Mod: HCNC

## 2020-04-20 PROCEDURE — 83036 HEMOGLOBIN GLYCOSYLATED A1C: CPT | Mod: HCNC

## 2020-04-20 PROCEDURE — 82570 ASSAY OF URINE CREATININE: CPT | Mod: 91,HCNC

## 2020-04-20 PROCEDURE — 84300 ASSAY OF URINE SODIUM: CPT | Mod: HCNC

## 2020-04-20 PROCEDURE — 84560 ASSAY OF URINE/URIC ACID: CPT | Mod: HCNC

## 2020-04-20 RX ORDER — POTASSIUM CHLORIDE 20 MEQ/15ML
40 SOLUTION ORAL ONCE
Status: COMPLETED | OUTPATIENT
Start: 2020-04-20 | End: 2020-04-20

## 2020-04-20 RX ORDER — MEROPENEM AND SODIUM CHLORIDE 1 G/50ML
1 INJECTION, SOLUTION INTRAVENOUS ONCE
Status: DISCONTINUED | OUTPATIENT
Start: 2020-04-20 | End: 2020-04-20

## 2020-04-20 RX ORDER — NIFEDIPINE 30 MG/1
30 TABLET, EXTENDED RELEASE ORAL DAILY
Status: DISCONTINUED | OUTPATIENT
Start: 2020-04-20 | End: 2020-04-21

## 2020-04-20 RX ORDER — POTASSIUM CHLORIDE 20 MEQ/15ML
40 SOLUTION ORAL
Status: DISCONTINUED | OUTPATIENT
Start: 2020-04-20 | End: 2020-04-20

## 2020-04-20 RX ORDER — CEFEPIME HYDROCHLORIDE 1 G/50ML
1 INJECTION, SOLUTION INTRAVENOUS
Status: DISCONTINUED | OUTPATIENT
Start: 2020-04-20 | End: 2020-04-30

## 2020-04-20 RX ORDER — HYDRALAZINE HYDROCHLORIDE 25 MG/1
25 TABLET, FILM COATED ORAL EVERY 8 HOURS PRN
Status: DISCONTINUED | OUTPATIENT
Start: 2020-04-20 | End: 2020-05-01 | Stop reason: HOSPADM

## 2020-04-20 RX ORDER — SODIUM CHLORIDE, SODIUM LACTATE, POTASSIUM CHLORIDE, CALCIUM CHLORIDE 600; 310; 30; 20 MG/100ML; MG/100ML; MG/100ML; MG/100ML
INJECTION, SOLUTION INTRAVENOUS CONTINUOUS
Status: DISCONTINUED | OUTPATIENT
Start: 2020-04-20 | End: 2020-04-24

## 2020-04-20 RX ORDER — METRONIDAZOLE 500 MG/100ML
500 INJECTION, SOLUTION INTRAVENOUS
Status: DISCONTINUED | OUTPATIENT
Start: 2020-04-20 | End: 2020-04-30

## 2020-04-20 RX ADMIN — TIMOLOL MALEATE 1 DROP: 5 SOLUTION OPHTHALMIC at 09:04

## 2020-04-20 RX ADMIN — CEFEPIME 1 G: 1 INJECTION, POWDER, FOR SOLUTION INTRAMUSCULAR; INTRAVENOUS at 02:04

## 2020-04-20 RX ADMIN — HYDRALAZINE HYDROCHLORIDE 25 MG: 25 TABLET, FILM COATED ORAL at 03:04

## 2020-04-20 RX ADMIN — METRONIDAZOLE 500 MG: 500 INJECTION, SOLUTION INTRAVENOUS at 01:04

## 2020-04-20 RX ADMIN — ACETAMINOPHEN 650 MG: 325 TABLET ORAL at 01:04

## 2020-04-20 RX ADMIN — TIMOLOL MALEATE 1 DROP: 5 SOLUTION OPHTHALMIC at 10:04

## 2020-04-20 RX ADMIN — METRONIDAZOLE 500 MG: 500 INJECTION, SOLUTION INTRAVENOUS at 08:04

## 2020-04-20 RX ADMIN — ACETAMINOPHEN 650 MG: 325 TABLET ORAL at 03:04

## 2020-04-20 RX ADMIN — HYDRALAZINE HYDROCHLORIDE 25 MG: 25 TABLET, FILM COATED ORAL at 01:04

## 2020-04-20 RX ADMIN — NIFEDIPINE 30 MG: 30 TABLET, FILM COATED, EXTENDED RELEASE ORAL at 05:04

## 2020-04-20 RX ADMIN — METRONIDAZOLE 500 MG: 500 INJECTION, SOLUTION INTRAVENOUS at 05:04

## 2020-04-20 RX ADMIN — LATANOPROST 1 DROP: 50 SOLUTION OPHTHALMIC at 10:04

## 2020-04-20 RX ADMIN — SODIUM CHLORIDE, SODIUM LACTATE, POTASSIUM CHLORIDE, AND CALCIUM CHLORIDE: .6; .31; .03; .02 INJECTION, SOLUTION INTRAVENOUS at 05:04

## 2020-04-20 RX ADMIN — POTASSIUM CHLORIDE 40 MEQ: 40 SOLUTION ORAL at 08:04

## 2020-04-20 NOTE — ASSESSMENT & PLAN NOTE
- Ms. Home De Dios is admitted to inpatient status   - right great and 2nd toes have circumferential necrosis, right 4th toe with dorsal necrosis  - there is some erythema and increased warmth in the foot proximal to the necrotic areas    - initiate antibiotic treatment for early cellulitis   - imaging without overt osteomyelitis  - suspect poor arterial flow in lower extremity, US pending   - NPO at MN  - Podiatry consulted   -check A1c as none on chart

## 2020-04-20 NOTE — NURSING
Patient awake and alert x4. O2 sats >92% on room air. Hypertensive. Medicated as scheduled and prn. Denies acute distress. Purposeful rounding completed. Bed to lowest position, locked. Call light w/in reach. Encouraged to call for assist.

## 2020-04-20 NOTE — ASSESSMENT & PLAN NOTE
- no associated hyperkalemia   - urine lytes pending   - holding home HCTZ   - suspect 2/2 poorly controlled HTN   - avoid nephrotoxins  - renally dose meds   - Nephrology consulted

## 2020-04-20 NOTE — NURSING
Pt AOX4. Pt insists on being very independent. Standby assist provided. Pt went to bathroom as soon as she arrived to floor and hasnt been since. IV medications infused without complication. Pt has been NPO since midnight in preparation for potential surgery this morning. Pt complained of foot pain, tylenol given. Pt BP remained elevated. Obtained order for hydralazine. Given. Denies CP, SOB, NVD. Bed low and locked. Call bell within reach. Will continue to monitor.

## 2020-04-20 NOTE — CONSULTS
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     No urgent toe amputation at this time.  Recommend Vascular Surg consult.  Continue wound care as ordered. ( betadine swab daily to toes.  Keep toes  with gauze.   darco stiff sole shoe)          Subjective:     History of Present Illness: Home De Dios is a 77 y.o. female , per H&P  "about 1 month ago she was soaking her feet in epsom salt and cutting her toe nails. She felt an itch in her right foot and reports scratching her foot until she accidentally broke skin. Then a few days to weeks later patient noticed that her big toe on the right was changing color. Says her great toe turned black and then the other toes began to slowly change color as well. She believes that her toes are black due to "bruising". She thinks that her toes will heal and return to normal."     Patient reports same history with me.          Scheduled Meds:   ceFEPime (MAXIPIME) IVPB  1 g Intravenous Q24H    latanoprost  1 drop Both Eyes QHS    metronidazole  500 mg Intravenous Q8H    timolol maleate 0.5%  1 drop Both Eyes BID     Continuous Infusions:  PRN Meds:acetaminophen, hydrALAZINE, ondansetron, sodium chloride 0.9%      Review of patient's allergies indicates:  No Known Allergies     Past Medical History:   Diagnosis Date    Glaucoma     Glaucoma (increased eye pressure)     Hypertension      Past Surgical History:   Procedure Laterality Date    APPENDECTOMY      CATARACT EXTRACTION W/  INTRAOCULAR LENS " IMPLANT Left 10/16/2013        CATARACT EXTRACTION W/  INTRAOCULAR LENS IMPLANT Right 12/18/2013        HYSTERECTOMY         Family History     Problem Relation (Age of Onset)    Cancer Sister    Heart disease Mother        Tobacco Use    Smoking status: Never Smoker    Smokeless tobacco: Never Used   Substance and Sexual Activity    Alcohol use: No    Drug use: Not on file    Sexual activity: Not on file          Objective:     Vital Signs (Most Recent):  Temp: 98.2 °F (36.8 °C) (04/20/20 1314)  Pulse: 80 (04/20/20 1314)  Resp: 17 (04/20/20 1314)  BP: (!) 180/79 (04/20/20 1314)  SpO2: 99 % (04/20/20 1314) Vital Signs (24h Range):  Temp:  [97.5 °F (36.4 °C)-98.9 °F (37.2 °C)] 98.2 °F (36.8 °C)  Pulse:  [] 80  Resp:  [16-29] 17  SpO2:  [96 %-100 %] 99 %  BP: (172-194)/() 180/79     Weight: 52 kg (114 lb 10.2 oz)  Body mass index is 17.96 kg/m².        Laboratory:      CRP-    CRP   Date Value Ref Range Status   04/19/2020 119.0 (H) 0.0 - 8.2 mg/L Final     ESR- No results found for: SEDRATE  CBC-   WBC   Date Value Ref Range Status   04/20/2020 7.08 3.90 - 12.70 K/uL Final     A1C-   Hemoglobin A1C   Date Value Ref Range Status   04/20/2020 5.4 4.0 - 5.6 % Final     Comment:     ADA Screening Guidelines:  5.7-6.4%  Consistent with prediabetes  >or=6.5%  Consistent with diabetes  High levels of fetal hemoglobin interfere with the HbA1C  assay. Heterozygous hemoglobin variants (HbS, HgC, etc)do  not significantly interfere with this assay.   However, presence of multiple variants may affect accuracy.       MICRO:  No results found for: LABAERO   No results found for: LABANAE      Diagnostic Results:   4/19/2020 Xray report:   Impression       No evidence of acute fracture or dislocation of the right foot.  No osseous erosions.  Follow-up with MRI of the right foot, as clinically warranted.    Advanced vascular calcifications.     4/19/2020 U/S Right lower arteries Report:     Impression       Monophasic flow in the right peroneal artery.  Low velocities.         Clinical Findings:    Vascular:  right foot non-palpable pulses.  Mild edema. No pedal hair.     Derm: Shiny atrophic, hyperpigmented skin.  Dry stable gangrene of the right hallux, right 2nd toe and distal right 4th toe.  No open wounds with drainage evident.  Dry eschar medial right foot.   Toenails x 10 are mycotic.     MSK:  Rigid hammertoe contractures of the right 2-5 toes.  5/5 muscle strength. Reduced range of motion at all joints.  Severe pes planus .     Neuro:  Diminished [place].  Negative Mulders.  Negative Tinels.        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       Recommend Vascular Surg consult. No urgent toe amputation at this time.   Patient would like to monitor.    Continue wound care as ordered. ( Betadine swab daily to toes.  Keep toes  with gauze.   darco stiff sole shoe)    Follow up with Podiatry within a week of discharge.  Home health for wound care or patient may perform at home if she is comfortable.     Thank you for your consult. Please contact us if you have any additional questions.        Lexis Morgan DPM  Podiatry  [facility]

## 2020-04-20 NOTE — ED NOTES
Report called to [facility] on [address]. Pt stable for transport to floor via wheelchair, belonging bag @ side, S, in [facility].

## 2020-04-20 NOTE — ASSESSMENT & PLAN NOTE
- hypertensive at present  - only home med is HCTZ 12.5 mg QD, holding 2/2 MICKEY   - PRN hydralazine   - monitor

## 2020-04-20 NOTE — CONSULTS
Consult Note  Nephrology    Consult Requested By: GREG Waite MD  Reason for Consult: MICEKY    SUBJECTIVE:     History of Present Illness:  Patient is a 77 y.o. female presents with gangrenous right toe/ foot and found to have MICKEY with Cr 5.5 and down to 5.1 this am. She deines any history of CKD and states that she only takes HCTZ for HTN and no other routine meds. She does repots that when her foot started itching and swelling more approximately 8 days ago she began taking Ibuprofen bid. There are no labs since 2013 because she tells me that she does not like to have her blood drawn.    Past Medical History:   Diagnosis Date    Glaucoma     Glaucoma (increased eye pressure)     Hypertension      Past Surgical History:   Procedure Laterality Date    APPENDECTOMY      CATARACT EXTRACTION W/  INTRAOCULAR LENS IMPLANT Left 10/16/2013        CATARACT EXTRACTION W/  INTRAOCULAR LENS IMPLANT Right 12/18/2013        HYSTERECTOMY       Family History   Problem Relation Age of Onset    Heart disease Mother     Cancer Sister     Amblyopia Neg Hx     Blindness Neg Hx     Macular degeneration Neg Hx     Retinal detachment Neg Hx     Stroke Neg Hx     Thyroid disease Neg Hx     Cataracts Neg Hx     Glaucoma Neg Hx      Social History     Tobacco Use    Smoking status: Never Smoker    Smokeless tobacco: Never Used   Substance Use Topics    Alcohol use: No    Drug use: Not on file       Medications Prior to Admission   Medication Sig Dispense Refill Last Dose    latanoprost (XALATAN) 0.005 % ophthalmic solution Place 1 drop into both eyes every evening. 2.5 mL 12 4/19/2020    timolol maleate 0.5% (TIMOPTIC) 0.5 % Drop Place 1 drop into both eyes 2 (two) times daily. 5 mL 12 4/19/2020    hydroCHLOROthiazide (HYDRODIURIL) 12.5 MG Tab Take 1 tablet (12.5 mg total) by mouth once daily. 90 tablet 2 Unknown       Review of patient's allergies indicates:  No Known Allergies     Review  of Systems:  Constitutional: No fever or chills, no weight changes.  Eyes: No visual changes or photophobia  HEENT: No nasal congestion or sore throat  Respiratory: No cough or shorness of breath  Cardiovascular: No chest pain or palpitations  Gastrointestinal: Good appetite, no nausea or vomiting, no change in bowel habits  Genitourinary: No hematuria or dysuria  Skin: No rash or pruritis, black toe/ red foot, itching  Hematologic/lymphatic: No easy bruising, bleeding or lymphadenopathy  Musculoskeletal: No arthralgias or myalgias  Neurological: No seizures or tremors  Endocrine: No heat/cold intolerance.  No polyuria/polydipsia.  Psychiatric:  No depression or anxiety.     OBJECTIVE:     Vital Signs (Most Recent)  Temp: 98.1 °F (36.7 °C) (04/20/20 0824)  Pulse: 70 (04/20/20 1200)  Resp: 18 (04/20/20 0824)  BP: (!) 188/78 (04/20/20 0824)  SpO2: 99 % (04/20/20 0824)    Vital Signs Range (Last 24H):  Temp:  [97.5 °F (36.4 °C)-98.9 °F (37.2 °C)]   Pulse:  []   Resp:  [16-29]   BP: (172-194)/()   SpO2:  [96 %-100 %]     Physical Exam:    General appearance: Well developed, thin  Head: Normocephalic, atraumatic  Eyes:  Conjunctivae nl. Sclera anicteric. PERRL.  HEENT: Lips, mucosa, and tongue normal; teeth and gums normal and oropharynx clear.  Neck: Supple, trachea midline, thyroid not enlarged,   Lungs: Clear to auscultation bilaterally and normal respiratory effort  Heart: Regular rate and rhythm, S1, S2 normal, no murmur, click, rub or gallop  Abdomen: Soft, non-tender non-distended; bowel sounds normal; no masses,  no organomegaly  Extremities: dry gangrene of right 1st and tip of 2nd toes. No edema  Pulses: nonpalpable  Neurologic: Normal strength and tone. No focal numbness or weakness  Psychiatric:  Alert and oriented times 3.       Diagnostic Results:  Labs: Reviewed  X-Ray: Reviewed  US: Reviewed    ASSESSMENT/PLAN:     1. MICKEY likely due to right foot gangrene and Ibuprofen/ Hypotension  -Last know  Cr 2013 was 0.9  -Current 5.1 with mild acidosis and hypokalemia  -CPK is normal  -FeUA is low, thus will try IVFs with LR for now.  -Check renal US and FU on urine eosin and cx.  -K replaced  -Trend Cr.  -Avoid nephrotoxins, NSAIDs/ PPI and renal dose meds.    2. HTN  -on Hydralazine.  -Start Nifedipine  mg daily now     3. Dry Gangrene of right toes/ foot.  -On cefepime and Flagyl, appears dry at present though  -Discussed with Dr. Morgan.  -She recs vascular surgery consult.  -Renal function may limit evaluation at this time.    4. Anemia  -check Fe B12, folate TSH    Thanks for consultation, will follow along with you.

## 2020-04-20 NOTE — PROGRESS NOTES
"[facility] Medicine  Progress Note    Patient Name: Home De Dios  MRN: [id number]  Patient Class: IP- Inpatient   Admission Date: 4/19/2020  Length of Stay: 1 days  Attending Physician: GREG Waite MD  Primary Care Provider: Quintin Cerda MD    Subjective:     Principal Problem:Gangrene of toe of right foot    HPI:  Ms. Home De Dios is a 77 y.o. female, with PMH of HTN, POAG, who presented to [facility] ED on 4/19/20 with right toe pain. She states the toes of her right foot began changing color and turning black about 10 days ago, and have been having worsening numbness x 4-5 days. She states this began after soaking her foot in bleach and Epsom salts and then cutting her toe nails. The right foot subsequently became itchy, and then numb, particularly on the 1st, 2nd and 4th toes. She denies neuropathy or prior numbness/tingling., trauma, fever, chills. ED imaging shows no fracture/dislocaton or bony erosion. An US is pending. Labs showed MICKEY without hyperkalemia, and elevated CRP of 119. She was admitted to inpatient status.     Overview/Hospital Course:  No notes on file    Interval History: Patient overall feeling well. She mentions some discomfort in certain areas of her right foot. She explains that about 1 month ago she was soaking her feet in epsom salt and cutting her toe nails. She felt an itch in her right foot and reports scratching her foot until she accidentally broke skin. Then a few days to weeks later patient noticed that her big toe on the right was changing color. Says her great toe turned black and then the other toes began to slowly change color as well. She believes that her toes are black due to "bruising". She thinks that her toes will heal and return to normal. She describes herself as healthy and having no medical problems other than hypertension.     Review of Systems   Constitutional: Negative for chills and fever.   HENT: Negative for congestion and " sore throat.    Respiratory: Negative for cough and shortness of breath.    Cardiovascular: Negative for chest pain, palpitations and leg swelling.   Gastrointestinal: Negative for abdominal pain, diarrhea, nausea and vomiting.   Musculoskeletal: Negative for back pain and myalgias.   Skin:        + black toes on right foot   Neurological: Positive for numbness. Negative for dizziness and headaches.     Objective:     Vital Signs (Most Recent):  Temp: 98.1 °F (36.7 °C) (04/20/20 0824)  Pulse: 86 (04/20/20 1000)  Resp: 18 (04/20/20 0824)  BP: (!) 188/78 (04/20/20 0824)  SpO2: 99 % (04/20/20 0824) Vital Signs (24h Range):  Temp:  [97.5 °F (36.4 °C)-98.9 °F (37.2 °C)] 98.1 °F (36.7 °C)  Pulse:  [] 86  Resp:  [16-29] 18  SpO2:  [96 %-100 %] 99 %  BP: (172-194)/() 188/78     Weight: 52 kg (114 lb 10.2 oz)  Body mass index is 17.96 kg/m².    Intake/Output Summary (Last 24 hours) at 4/20/2020 1222  Last data filed at 4/20/2020 1200  Gross per 24 hour   Intake --   Output 300 ml   Net -300 ml      Physical Exam   Constitutional: She is oriented to person, place, and time. She appears well-developed and well-nourished. No distress.   HENT:   Head: Normocephalic and atraumatic.   Eyes: Conjunctivae are normal. No scleral icterus.   Neck: Normal range of motion. Neck supple.   Cardiovascular: Normal rate, regular rhythm and normal heart sounds.   Pulmonary/Chest: Effort normal and breath sounds normal. No respiratory distress.   Abdominal: Soft. Bowel sounds are normal. There is no tenderness.   Neurological: She is alert and oriented to person, place, and time.   Skin:   On right foot: necrotic changes present in great toe, 2nd toe and 4th toe. Toes with black color change on dorsal and plantar surfaces. No sensation to those toes.   Toe nails thickened and discolored bilaterally.   Right foot cooler to touch compared to left.   Dorsal aspect of right foot with mild erythema, no warmth.   Two distinct lesions that  appear to be healed abrasions/wounds on medial aspect of right foot   Nursing note and vitals reviewed.      Significant Labs:   BMP:   Recent Labs   Lab 04/20/20  0342   GLU 97      K 3.4*      CO2 23   BUN 46*   CREATININE 5.1*   CALCIUM 8.8   MG 2.1     CBC:   Recent Labs   Lab 04/19/20  1627 04/20/20  0343   WBC 8.13 7.08   HGB 10.8* 10.2*   HCT 32.9* 31.5*    320       Significant Imaging: I have reviewed all pertinent imaging results/findings within the past 24 hours.      Assessment/Plan:      * Gangrene of toes of right foot  - right great and 2nd toes have circumferential necrosis, right 4th toe with dorsal necrosis  - there is some erythema in the foot proximal to the necrotic areas: currently treated with metronidazole and cefepime for possible cellulitis   - imaging without overt osteomyelitis  - suspect PAD. Unable to perform contrast imaging due to renal function. Arterial US of right LE: Monophasic flow in the right peroneal artery.  Low velocities.  - HbA1c 5.4% - no DM  - Podiatry consulted         MICKEY (acute kidney injury)  - Unknown baseline Cr  - holding home HCTZ   - suspect 2/2 poorly controlled HTN   - avoid nephrotoxins  - renally dose meds   - Nephrology consulted     Hypokalemia  - Replaced. Will monitor.       Essential hypertension  - hypertensive at present  - only home med is HCTZ 12.5 mg QD, holding 2/2 MICKEY   - PRN hydralazine   - monitor         VTE Risk Mitigation (From admission, onward)         Ordered     Place sequential compression device  Until discontinued      04/19/20 2118     IP VTE HIGH RISK PATIENT  Once      04/19/20 2118     Reason for No Pharmacological VTE Prophylaxis  Once     Question:  Reasons:  Answer:  Physician Provided (leave comment)    04/19/20 2118              Alexa Almodovar MD  Department of Hospital Medicine   [facility]

## 2020-04-20 NOTE — ASSESSMENT & PLAN NOTE
- Unknown baseline Cr  - holding home HCTZ   - suspect 2/2 poorly controlled HTN   - avoid nephrotoxins  - renally dose meds   - Nephrology consulted

## 2020-04-20 NOTE — SUBJECTIVE & OBJECTIVE
Past Medical History:   Diagnosis Date    Glaucoma     Glaucoma (increased eye pressure)     Hypertension        Past Surgical History:   Procedure Laterality Date    APPENDECTOMY      CATARACT EXTRACTION W/  INTRAOCULAR LENS IMPLANT Left 10/16/2013        CATARACT EXTRACTION W/  INTRAOCULAR LENS IMPLANT Right 12/18/2013        HYSTERECTOMY         Review of patient's allergies indicates:  No Known Allergies    No current facility-administered medications on file prior to encounter.      Current Outpatient Medications on File Prior to Encounter   Medication Sig    latanoprost (XALATAN) 0.005 % ophthalmic solution Place 1 drop into both eyes every evening.    timolol maleate 0.5% (TIMOPTIC) 0.5 % Drop Place 1 drop into both eyes 2 (two) times daily.    hydroCHLOROthiazide (HYDRODIURIL) 12.5 MG Tab Take 1 tablet (12.5 mg total) by mouth once daily.     Family History     Problem Relation (Age of Onset)    Cancer Sister    Heart disease Mother        Tobacco Use    Smoking status: Never Smoker    Smokeless tobacco: Never Used   Substance and Sexual Activity    Alcohol use: No    Drug use: Not on file    Sexual activity: Not on file     Review of Systems   Constitutional: Negative for chills, diaphoresis, fatigue and fever.   Respiratory: Negative for cough, shortness of breath and wheezing.    Cardiovascular: Negative for chest pain and palpitations.   Gastrointestinal: Negative for abdominal pain, diarrhea, nausea and vomiting.   Musculoskeletal: Negative for arthralgias, gait problem, joint swelling and myalgias.   Skin: Positive for color change. Negative for pallor and wound.   Neurological: Positive for numbness. Negative for dizziness, weakness and headaches.   Psychiatric/Behavioral: Negative for confusion and decreased concentration.     Objective:     Vital Signs (Most Recent):  Temp: 97.9 °F (36.6 °C) (04/19/20 1947)  Pulse: 90 (04/19/20 1947)  Resp: 18 (04/19/20  1947)  BP: (!) 189/90 (04/19/20 1947)  SpO2: 96 % (04/19/20 1947) Vital Signs (24h Range):  Temp:  [97.5 °F (36.4 °C)-98.9 °F (37.2 °C)] 97.9 °F (36.6 °C)  Pulse:  [] 90  Resp:  [16-29] 18  SpO2:  [96 %-100 %] 96 %  BP: (175-194)/() 189/90        There is no height or weight on file to calculate BMI.    Physical Exam   Constitutional: She is oriented to person, place, and time. She appears well-developed and well-nourished. No distress.   HENT:   Head: Normocephalic and atraumatic.   Eyes: Pupils are equal, round, and reactive to light. Conjunctivae and EOM are normal. No scleral icterus.   Neck: Normal range of motion. Neck supple. No tracheal deviation present.   Cardiovascular: Normal rate, regular rhythm, normal heart sounds and intact distal pulses. Exam reveals no gallop and no friction rub.   No murmur heard.  Pulmonary/Chest: Effort normal and breath sounds normal. No stridor. No respiratory distress. She has no wheezes. She has no rales.   Abdominal: Soft. Bowel sounds are normal. She exhibits no distension. There is no tenderness. There is no guarding.   Neurological: She is alert and oriented to person, place, and time. No cranial nerve deficit. She exhibits normal muscle tone.   Sensation lost in distal tip of right great toe, 2nd toe and 4th toes in areas of necrosis.    Skin: Skin is warm and dry. Capillary refill takes more than 3 seconds. No rash noted. She is not diaphoretic. No erythema. No pallor.   Right great toe and 2nd to have circumferential necrotic appearance to the MTP joint. Right 4th toe with dorsal necrotic appearance, pedal surface without necrosis at this time. Right foot appears mildly erythematous when compared to left foot.    Psychiatric: She has a normal mood and affect. Her behavior is normal. Judgment and thought content normal.   Nursing note and vitals reviewed.        CRANIAL NERVES     CN III, IV, VI   Pupils are equal, round, and reactive to light.  Extraocular  motions are normal.        Significant Labs:   BMP:   Recent Labs   Lab 04/19/20  1627   GLU 93      K 3.2*      CO2 23   BUN 44*   CREATININE 5.5*   CALCIUM 9.3     CBC:   Recent Labs   Lab 04/19/20  1627   WBC 8.13   HGB 10.8*   HCT 32.9*        CMP:   Recent Labs   Lab 04/19/20  1627      K 3.2*      CO2 23   GLU 93   BUN 44*   CREATININE 5.5*   CALCIUM 9.3   PROT 8.0   ALBUMIN 3.7   BILITOT 0.3   ALKPHOS 72   AST 12   ALT 5*   ANIONGAP 16   EGFRNONAA 7*     Urine Culture: No results for input(s): LABURIN in the last 48 hours.  Urine Studies: No results for input(s): COLORU, APPEARANCEUA, PHUR, SPECGRAV, PROTEINUA, GLUCUA, KETONESU, BILIRUBINUA, OCCULTUA, NITRITE, UROBILINOGEN, LEUKOCYTESUR, RBCUA, WBCUA, BACTERIA, SQUAMEPITHEL, HYALINECASTS in the last 48 hours.    Invalid input(s): WRIGHTSUR  All pertinent labs within the past 24 hours have been reviewed.    Significant Imaging: I have reviewed all pertinent imaging results/findings within the past 24 hours.   Imaging Results           US Lower Extremity Arteries Right (Final result)  Result time 04/19/20 19:22:54    Final result by Angela Saenz MD (04/19/20 19:22:54)                 Impression:      Monophasic flow in the right peroneal artery.  Low velocities.    This report was flagged in Epic as abnormal.      Electronically signed by: Angela Saenz  Date:    04/19/2020  Time:    19:22             Narrative:    EXAMINATION:  US LOWER EXTREMITY ARTERIES RIGHT    CLINICAL HISTORY:  Gangrene, not elsewhere classified    TECHNIQUE:  Duplex and color flow Doppler evaluation and graded compression of the right lower extremity veins was performed.    COMPARISON:  None    FINDINGS:  The right peak systolic velocities are given in cm/sec:    CFA: 79.5, triphasic    Proximal femoral: 62.3, triphasic    Mid femoral: 63.2, triphasic    Distal femoral: 39.6, triphasic    Popliteal: 57.5, biphasic    Peroneal: Not recorded,  monophasic    PTA: 30.4, biphasic    CRISS: 17.6, biphasic    DPA 11.8: Biphasic                               X-Ray Foot Complete Right (Final result)  Result time 04/19/20 17:10:46    Final result by Efraín Blanc MD (04/19/20 17:10:46)                 Impression:      No evidence of acute fracture or dislocation of the right foot.  No osseous erosions.  Follow-up with MRI of the right foot, as clinically warranted.    Advanced vascular calcifications.      Electronically signed by: Efraín Blanc MD  Date:    04/19/2020  Time:    17:10             Narrative:    EXAMINATION:  XR FOOT COMPLETE 3 VIEW RIGHT    CLINICAL HISTORY:  Pain in unspecified toe(s)    TECHNIQUE:  AP, lateral, and oblique views of the right foot were performed.    COMPARISON:  None    FINDINGS:  There is diffuse demineralization of the osseous structures.  There is no evidence of periostitis.  No cortical step-off is identified.    The Lisfranc articulation is maintained.  There is joint space narrowing throughout the right foot.  No erosive changes are identified.    There are advanced vascular calcifications throughout the right lower extremity.  There is diffuse soft tissue swelling of the right leg.  No radiopaque foreign body identified.  No soft tissue gas is present.

## 2020-04-20 NOTE — H&P
[facility] Medicine  History & Physical    Patient Name: Home De Dios  MRN: [id number]  Admission Date: 4/19/2020  Attending Physician: GREG Waite MD   Primary Care Provider: Quintin Cerda MD         Patient information was obtained from patient, past medical records and ER records.     Subjective:     Principal Problem:Gangrene of toe of right foot    Chief Complaint:   Chief Complaint   Patient presents with    Toe Pain     +right big and second toe discoloration x10 days. foot red/swollen compared to left.         HPI: Ms. Home De Dios is a 77 y.o. female, with PMH of HTN, POAG, who [place] [facility] [place]20 with right toe pain. She states the toes of her right foot began changing color and turning black about 10 days ago, and have been having worsening numbness x 4-5 days. She states this began after soaking her foot in bleach and Epsom salts and then cutting her toe nails. The right foot subsequently became itchy, and then numb, particularly on the 1st, 2nd and 4th toes. She denies neuropathy or prior numbness/tingling., trauma, fever, chills. ED imaging shows no fracture/dislocaton or bony erosion. An US is pending. Labs showed MICKEY without hyperkalemia, and elevated CRP of 119. She was admitted to inpatient status.     Past Medical History:   Diagnosis Date    Glaucoma     Glaucoma (increased eye pressure)     Hypertension        Past Surgical History:   Procedure Laterality Date    APPENDECTOMY      CATARACT EXTRACTION W/  INTRAOCULAR LENS IMPLANT Left 10/16/2013        CATARACT EXTRACTION W/  INTRAOCULAR LENS IMPLANT Right 12/18/2013        HYSTERECTOMY         Review of patient's allergies indicates:  No Known Allergies    No current facility-administered medications on file prior to encounter.      Current Outpatient Medications on File Prior to Encounter   Medication Sig    latanoprost (XALATAN) 0.005 % ophthalmic solution  Place 1 drop into both eyes every evening.    timolol maleate 0.5% (TIMOPTIC) 0.5 % Drop Place 1 drop into both eyes 2 (two) times daily.    hydroCHLOROthiazide (HYDRODIURIL) 12.5 MG Tab Take 1 tablet (12.5 mg total) by mouth once daily.     Family History     Problem Relation (Age of Onset)    Cancer Sister    Heart disease Mother        Tobacco Use    Smoking status: Never Smoker    Smokeless tobacco: Never Used   Substance and Sexual Activity    Alcohol use: No    Drug use: Not on file    Sexual activity: Not on file     Review of Systems   Constitutional: Negative for chills, diaphoresis, fatigue and fever.   Respiratory: Negative for cough, shortness of breath and wheezing.    Cardiovascular: Negative for chest pain and palpitations.   Gastrointestinal: Negative for abdominal pain, diarrhea, nausea and vomiting.   Musculoskeletal: Negative for arthralgias, gait problem, joint swelling and myalgias.   Skin: Positive for color change. Negative for pallor and wound.   Neurological: Positive for numbness. Negative for dizziness, weakness and headaches.   Psychiatric/Behavioral: Negative for confusion and decreased concentration.     Objective:     Vital Signs (Most Recent):  Temp: 97.9 °F (36.6 °C) (04/19/20 1947)  Pulse: 90 (04/19/20 1947)  Resp: 18 (04/19/20 1947)  BP: (!) 189/90 (04/19/20 1947)  SpO2: 96 % (04/19/20 1947) Vital Signs (24h Range):  Temp:  [97.5 °F (36.4 °C)-98.9 °F (37.2 °C)] 97.9 °F (36.6 °C)  Pulse:  [] 90  Resp:  [16-29] 18  SpO2:  [96 %-100 %] 96 %  BP: (175-194)/() 189/90        There is no height or weight on file to calculate BMI.    Physical Exam   Constitutional: She is oriented to person, place, and time. She appears well-developed and well-nourished. No distress.   HENT:   Head: Normocephalic and atraumatic.   Eyes: Pupils are equal, round, and reactive to light. Conjunctivae and EOM are normal. No scleral icterus.   Neck: Normal range of motion. Neck supple. No  tracheal deviation present.   Cardiovascular: Normal rate, regular rhythm, normal heart sounds and intact distal pulses. Exam reveals no gallop and no friction rub.   No murmur heard.  Pulmonary/Chest: Effort normal and breath sounds normal. No stridor. No respiratory distress. She has no wheezes. She has no rales.   Abdominal: Soft. Bowel sounds are normal. She exhibits no distension. There is no tenderness. There is no guarding.   Neurological: She is alert and oriented to person, place, and time. No cranial nerve deficit. She exhibits normal muscle tone.   Sensation lost in distal tip of right great toe, 2nd toe and 4th toes in areas of necrosis.    Skin: Skin is warm and dry. Capillary refill takes more than 3 seconds. No rash noted. She is not diaphoretic. No erythema. No pallor.   Right great toe and 2nd to have circumferential necrotic appearance to the MTP joint. Right 4th toe with dorsal necrotic appearance, pedal surface without necrosis at this time. Right foot appears mildly erythematous when compared to left foot.    Psychiatric: She has a normal mood and affect. Her behavior is normal. Judgment and thought content normal.   Nursing note and vitals reviewed.        CRANIAL NERVES     CN III, IV, VI   Pupils are equal, round, and reactive to light.  Extraocular motions are normal.        Significant Labs:   BMP:   Recent Labs   Lab 04/19/20  1627   GLU 93      K 3.2*      CO2 23   BUN 44*   CREATININE 5.5*   CALCIUM 9.3     CBC:   Recent Labs   Lab 04/19/20  1627   WBC 8.13   HGB 10.8*   HCT 32.9*        CMP:   Recent Labs   Lab 04/19/20  1627      K 3.2*      CO2 23   GLU 93   BUN 44*   CREATININE 5.5*   CALCIUM 9.3   PROT 8.0   ALBUMIN 3.7   BILITOT 0.3   ALKPHOS 72   AST 12   ALT 5*   ANIONGAP 16   EGFRNONAA 7*     Urine Culture: No results for input(s): LABURIN in the last 48 hours.  Urine Studies: No results for input(s): COLORU, APPEARANCEUA, PHUR, SPECGRAV,  PROTEINUA, GLUCUA, KETONESU, BILIRUBINUA, OCCULTUA, NITRITE, UROBILINOGEN, LEUKOCYTESUR, RBCUA, WBCUA, BACTERIA, SQUAMEPITHEL, HYALINECASTS in the last 48 hours.    Invalid input(s): DAX  All pertinent labs within the past 24 hours have been reviewed.    Significant Imaging: I have reviewed all pertinent imaging results/findings within the past 24 hours.   Imaging Results           US Lower Extremity Arteries Right (Final result)  Result time 04/19/20 19:22:54    Final result by Anegla Saenz MD (04/19/20 19:22:54)                 Impression:      Monophasic flow in the right peroneal artery.  Low velocities.    This report was flagged in Epic as abnormal.      Electronically signed by: Angela Saenz  Date:    04/19/2020  Time:    19:22             Narrative:    EXAMINATION:  US LOWER EXTREMITY ARTERIES RIGHT    CLINICAL HISTORY:  Gangrene, not elsewhere classified    TECHNIQUE:  Duplex and color flow Doppler evaluation and graded compression of the right lower extremity veins was performed.    COMPARISON:  None    FINDINGS:  The right peak systolic velocities are given in cm/sec:    CFA: 79.5, triphasic    Proximal femoral: 62.3, triphasic    Mid femoral: 63.2, triphasic    Distal femoral: 39.6, triphasic    Popliteal: 57.5, biphasic    Peroneal: Not recorded, monophasic    PTA: 30.4, biphasic    CRISS: 17.6, biphasic    DPA 11.8: Biphasic                               X-Ray Foot Complete Right (Final result)  Result time 04/19/20 17:10:46    Final result by Efraín Blanc MD (04/19/20 17:10:46)                 Impression:      No evidence of acute fracture or dislocation of the right foot.  No osseous erosions.  Follow-up with MRI of the right foot, as clinically warranted.    Advanced vascular calcifications.      Electronically signed by: Efraín Blanc MD  Date:    04/19/2020  Time:    17:10             Narrative:    EXAMINATION:  XR FOOT COMPLETE 3 VIEW RIGHT    CLINICAL HISTORY:  Pain in unspecified  toe(s)    TECHNIQUE:  AP, lateral, and oblique views of the right foot were performed.    COMPARISON:  None    FINDINGS:  There is diffuse demineralization of the osseous structures.  There is no evidence of periostitis.  No cortical step-off is identified.    The Lisfranc articulation is maintained.  There is joint space narrowing throughout the right foot.  No erosive changes are identified.    There are advanced vascular calcifications throughout the right lower extremity.  There is diffuse soft tissue swelling of the right leg.  No radiopaque foreign body identified.  No soft tissue gas is present.                                 Assessment/Plan:     * Gangrene of toes of right foot  - Ms. Home De Dios is admitted to inpatient status   - right great and 2nd toes have circumferential necrosis, right 4th toe with dorsal necrosis  - there is some erythema and increased warmth in the foot proximal to the necrotic areas    - initiate antibiotic treatment for early cellulitis   - imaging without overt osteomyelitis  - suspect poor arterial flow in lower extremity, US pending   - NPO at [facility]  - Podiatry consulted   -check A1c as none on chart       MICKEY (acute kidney injury)  - no associated hyperkalemia   - urine lytes pending   - holding home HCTZ   - suspect 2/2 poorly controlled HTN   - avoid nephrotoxins  - renally dose meds   - Nephrology consulted     HTN (hypertension)  - hypertensive at present  - only home med is HCTZ 12.5 mg QD, holding 2/2 MICKEY   - PRN hydralazine   - monitor       VTE Risk Mitigation (From admission, onward)         Ordered     Place sequential compression device  Until discontinued      04/19/20 2118     IP VTE HIGH RISK PATIENT  Once      04/19/20 2118     Reason for No Pharmacological VTE Prophylaxis  Once     Question:  Reasons:  Answer:  Physician Provided (leave comment)    04/19/20 2118                   Sushila Antony PA-C  Department of Hospital Medicine   [facility]

## 2020-04-20 NOTE — ASSESSMENT & PLAN NOTE
- right great and 2nd toes have circumferential necrosis, right 4th toe with dorsal necrosis  - there is some erythema in the foot proximal to the necrotic areas: currently treated with metronidazole and cefepime for possible cellulitis   - imaging without overt osteomyelitis  - suspect PAD. Unable to perform contrast imaging due to renal function. Arterial US of right LE: Monophasic flow in the right peroneal artery.  Low velocities.  - HbA1c 5.4% - no DM  - Podiatry consulted

## 2020-04-20 NOTE — SUBJECTIVE & OBJECTIVE
"Interval History: Patient overall feeling well. She mentions some discomfort in certain areas of her right foot. She explains that about 1 month ago she was soaking her feet in epsom salt and cutting her toe nails. She felt an itch in her right foot and reports scratching her foot until she accidentally broke skin. Then a few days to weeks later patient noticed that her big toe on the right was changing color. Says her great toe turned black and then the other toes began to slowly change color as well. She believes that her toes are black due to "bruising". She thinks that her toes will heal and return to normal. She describes herself as healthy and having no medical problems other than hypertension.     Review of Systems   Constitutional: Negative for chills and fever.   HENT: Negative for congestion and sore throat.    Respiratory: Negative for cough and shortness of breath.    Cardiovascular: Negative for chest pain, palpitations and leg swelling.        Denies claudication    Gastrointestinal: Negative for abdominal pain, diarrhea, nausea and vomiting.   Musculoskeletal: Negative for back pain and myalgias.   Skin:        + black toes on right foot   Neurological: Positive for numbness. Negative for dizziness and headaches.     Objective:     Vital Signs (Most Recent):  Temp: 98.1 °F (36.7 °C) (04/20/20 0824)  Pulse: 86 (04/20/20 1000)  Resp: 18 (04/20/20 0824)  BP: (!) 188/78 (04/20/20 0824)  SpO2: 99 % (04/20/20 0824) Vital Signs (24h Range):  Temp:  [97.5 °F (36.4 °C)-98.9 °F (37.2 °C)] 98.1 °F (36.7 °C)  Pulse:  [] 86  Resp:  [16-29] 18  SpO2:  [96 %-100 %] 99 %  BP: (172-194)/() 188/78     Weight: 52 kg (114 lb 10.2 oz)  Body mass index is 17.96 kg/m².    Intake/Output Summary (Last 24 hours) at 4/20/2020 1222  Last data filed at 4/20/2020 1200  Gross per 24 hour   Intake --   Output 300 ml   Net -300 ml      Physical Exam   Constitutional: She is oriented to person, place, and time. She appears " well-developed and well-nourished. No distress.   HENT:   Head: Normocephalic and atraumatic.   Eyes: Conjunctivae are normal. No scleral icterus.   Neck: Normal range of motion. Neck supple.   Cardiovascular: Normal rate, regular rhythm and normal heart sounds.   Pulmonary/Chest: Effort normal and breath sounds normal. No respiratory distress.   Abdominal: Soft. Bowel sounds are normal. There is no tenderness.   Neurological: She is alert and oriented to person, place, and time.   Skin:   On right foot: necrotic changes present in great toe, 2nd toe and 4th toe. Toes with black color change on dorsal and plantar surfaces. No sensation to those toes.   Toe nails thickened and discolored bilaterally.   Right foot cooler to touch compared to left.   Dorsal aspect of right foot with mild erythema, no warmth.   Two distinct lesions that appear to be healed abrasions/wounds on medial aspect of right foot   Nursing note and vitals reviewed.      Significant Labs:   BMP:   Recent Labs   Lab 04/20/20  0342   GLU 97      K 3.4*      CO2 23   BUN 46*   CREATININE 5.1*   CALCIUM 8.8   MG 2.1     CBC:   Recent Labs   Lab 04/19/20  1627 04/20/20  0343   WBC 8.13 7.08   HGB 10.8* 10.2*   HCT 32.9* 31.5*    320       Significant Imaging: I have reviewed all pertinent imaging results/findings within the past 24 hours.

## 2020-04-21 LAB
ANION GAP SERPL CALC-SCNC: 11 MMOL/L (ref 8–16)
BASOPHILS # BLD AUTO: 0.04 K/UL (ref 0–0.2)
BASOPHILS NFR BLD: 0.5 % (ref 0–1.9)
BUN SERPL-MCNC: 42 MG/DL (ref 8–23)
CALCIUM SERPL-MCNC: 9 MG/DL (ref 8.7–10.5)
CHLORIDE SERPL-SCNC: 111 MMOL/L (ref 95–110)
CO2 SERPL-SCNC: 21 MMOL/L (ref 23–29)
CREAT SERPL-MCNC: 4.3 MG/DL (ref 0.5–1.4)
DIFFERENTIAL METHOD: ABNORMAL
EOSINOPHIL # BLD AUTO: 0.3 K/UL (ref 0–0.5)
EOSINOPHIL NFR BLD: 3.2 % (ref 0–8)
ERYTHROCYTE [DISTWIDTH] IN BLOOD BY AUTOMATED COUNT: 13 % (ref 11.5–14.5)
EST. GFR  (AFRICAN AMERICAN): 11 ML/MIN/1.73 M^2
EST. GFR  (NON AFRICAN AMERICAN): 9 ML/MIN/1.73 M^2
FERRITIN SERPL-MCNC: 315 NG/ML (ref 20–300)
FOLATE SERPL-MCNC: 10 NG/ML (ref 4–24)
GLUCOSE SERPL-MCNC: 115 MG/DL (ref 70–110)
HCT VFR BLD AUTO: 32 % (ref 37–48.5)
HGB BLD-MCNC: 10.3 G/DL (ref 12–16)
IMM GRANULOCYTES # BLD AUTO: 0.02 K/UL (ref 0–0.04)
IMM GRANULOCYTES NFR BLD AUTO: 0.2 % (ref 0–0.5)
IRON SERPL-MCNC: 19 UG/DL (ref 30–160)
LYMPHOCYTES # BLD AUTO: 1.6 K/UL (ref 1–4.8)
LYMPHOCYTES NFR BLD: 20.3 % (ref 18–48)
MAGNESIUM SERPL-MCNC: 2 MG/DL (ref 1.6–2.6)
MCH RBC QN AUTO: 26.3 PG (ref 27–31)
MCHC RBC AUTO-ENTMCNC: 32.2 G/DL (ref 32–36)
MCV RBC AUTO: 82 FL (ref 82–98)
MONOCYTES # BLD AUTO: 0.8 K/UL (ref 0.3–1)
MONOCYTES NFR BLD: 10.2 % (ref 4–15)
NEUTROPHILS # BLD AUTO: 5.3 K/UL (ref 1.8–7.7)
NEUTROPHILS NFR BLD: 65.6 % (ref 38–73)
NRBC BLD-RTO: 0 /100 WBC
PHOSPHATE SERPL-MCNC: 3.2 MG/DL (ref 2.7–4.5)
PLATELET # BLD AUTO: 342 K/UL (ref 150–350)
PMV BLD AUTO: 10.6 FL (ref 9.2–12.9)
POTASSIUM SERPL-SCNC: 3.8 MMOL/L (ref 3.5–5.1)
RBC # BLD AUTO: 3.91 M/UL (ref 4–5.4)
SATURATED IRON: 9 % (ref 20–50)
SODIUM SERPL-SCNC: 143 MMOL/L (ref 136–145)
TOTAL IRON BINDING CAPACITY: 216 UG/DL (ref 250–450)
TRANSFERRIN SERPL-MCNC: 146 MG/DL (ref 200–375)
TSH SERPL DL<=0.005 MIU/L-ACNC: 0.91 UIU/ML (ref 0.4–4)
VIT B12 SERPL-MCNC: 328 PG/ML (ref 210–950)
WBC # BLD AUTO: 8.04 K/UL (ref 3.9–12.7)

## 2020-04-21 PROCEDURE — 25000003 PHARM REV CODE 250: Mod: HCNC | Performed by: PHYSICIAN ASSISTANT

## 2020-04-21 PROCEDURE — 99233 SBSQ HOSP IP/OBS HIGH 50: CPT | Mod: HCNC,,, | Performed by: FAMILY MEDICINE

## 2020-04-21 PROCEDURE — 82728 ASSAY OF FERRITIN: CPT | Mod: HCNC

## 2020-04-21 PROCEDURE — 97161 PT EVAL LOW COMPLEX 20 MIN: CPT | Mod: HCNC

## 2020-04-21 PROCEDURE — 11000001 HC ACUTE MED/SURG PRIVATE ROOM: Mod: HCNC

## 2020-04-21 PROCEDURE — 25000003 PHARM REV CODE 250: Mod: HCNC | Performed by: INTERNAL MEDICINE

## 2020-04-21 PROCEDURE — 84100 ASSAY OF PHOSPHORUS: CPT | Mod: HCNC

## 2020-04-21 PROCEDURE — 85025 COMPLETE CBC W/AUTO DIFF WBC: CPT | Mod: HCNC

## 2020-04-21 PROCEDURE — 83735 ASSAY OF MAGNESIUM: CPT | Mod: HCNC

## 2020-04-21 PROCEDURE — S0030 INJECTION, METRONIDAZOLE: HCPCS | Mod: HCNC | Performed by: PHYSICIAN ASSISTANT

## 2020-04-21 PROCEDURE — 36415 COLL VENOUS BLD VENIPUNCTURE: CPT | Mod: HCNC

## 2020-04-21 PROCEDURE — 83540 ASSAY OF IRON: CPT | Mod: HCNC

## 2020-04-21 PROCEDURE — 97116 GAIT TRAINING THERAPY: CPT | Mod: HCNC

## 2020-04-21 PROCEDURE — 82607 VITAMIN B-12: CPT | Mod: HCNC

## 2020-04-21 PROCEDURE — 63600175 PHARM REV CODE 636 W HCPCS: Mod: HCNC | Performed by: PHYSICIAN ASSISTANT

## 2020-04-21 PROCEDURE — 84443 ASSAY THYROID STIM HORMONE: CPT | Mod: HCNC

## 2020-04-21 PROCEDURE — 63600175 PHARM REV CODE 636 W HCPCS: Mod: HCNC | Performed by: INTERNAL MEDICINE

## 2020-04-21 PROCEDURE — 97165 OT EVAL LOW COMPLEX 30 MIN: CPT | Mod: HCNC

## 2020-04-21 PROCEDURE — 94761 N-INVAS EAR/PLS OXIMETRY MLT: CPT | Mod: HCNC

## 2020-04-21 PROCEDURE — 99233 PR SUBSEQUENT HOSPITAL CARE,LEVL III: ICD-10-PCS | Mod: HCNC,,, | Performed by: FAMILY MEDICINE

## 2020-04-21 PROCEDURE — 80048 BASIC METABOLIC PNL TOTAL CA: CPT | Mod: HCNC

## 2020-04-21 PROCEDURE — 82746 ASSAY OF FOLIC ACID SERUM: CPT | Mod: HCNC

## 2020-04-21 RX ORDER — UBIDECARENONE 75 MG
500 CAPSULE ORAL DAILY
Status: DISCONTINUED | OUTPATIENT
Start: 2020-04-21 | End: 2020-05-01 | Stop reason: HOSPADM

## 2020-04-21 RX ORDER — NIFEDIPINE 30 MG/1
60 TABLET, EXTENDED RELEASE ORAL DAILY
Status: DISCONTINUED | OUTPATIENT
Start: 2020-04-22 | End: 2020-04-23

## 2020-04-21 RX ORDER — NIFEDIPINE 30 MG/1
30 TABLET, EXTENDED RELEASE ORAL ONCE
Status: COMPLETED | OUTPATIENT
Start: 2020-04-21 | End: 2020-04-21

## 2020-04-21 RX ORDER — FERROUS SULFATE 325(65) MG
325 TABLET, DELAYED RELEASE (ENTERIC COATED) ORAL EVERY OTHER DAY
Status: DISCONTINUED | OUTPATIENT
Start: 2020-04-21 | End: 2020-05-01 | Stop reason: HOSPADM

## 2020-04-21 RX ADMIN — SODIUM CHLORIDE, SODIUM LACTATE, POTASSIUM CHLORIDE, AND CALCIUM CHLORIDE: .6; .31; .03; .02 INJECTION, SOLUTION INTRAVENOUS at 03:04

## 2020-04-21 RX ADMIN — METRONIDAZOLE 500 MG: 500 INJECTION, SOLUTION INTRAVENOUS at 01:04

## 2020-04-21 RX ADMIN — ACETAMINOPHEN 650 MG: 325 TABLET ORAL at 08:04

## 2020-04-21 RX ADMIN — SODIUM CHLORIDE, SODIUM LACTATE, POTASSIUM CHLORIDE, AND CALCIUM CHLORIDE: .6; .31; .03; .02 INJECTION, SOLUTION INTRAVENOUS at 05:04

## 2020-04-21 RX ADMIN — METRONIDAZOLE 500 MG: 500 INJECTION, SOLUTION INTRAVENOUS at 05:04

## 2020-04-21 RX ADMIN — METRONIDAZOLE 500 MG: 500 INJECTION, SOLUTION INTRAVENOUS at 08:04

## 2020-04-21 RX ADMIN — LATANOPROST 1 DROP: 50 SOLUTION OPHTHALMIC at 09:04

## 2020-04-21 RX ADMIN — CEFEPIME 1 G: 1 INJECTION, POWDER, FOR SOLUTION INTRAMUSCULAR; INTRAVENOUS at 02:04

## 2020-04-21 RX ADMIN — FERROUS SULFATE TAB EC 325 MG (65 MG FE EQUIVALENT) 325 MG: 325 (65 FE) TABLET DELAYED RESPONSE at 08:04

## 2020-04-21 RX ADMIN — TIMOLOL MALEATE 1 DROP: 5 SOLUTION OPHTHALMIC at 09:04

## 2020-04-21 RX ADMIN — CYANOCOBALAMIN TAB 500 MCG 500 MCG: 500 TAB at 08:04

## 2020-04-21 RX ADMIN — HYDRALAZINE HYDROCHLORIDE 25 MG: 25 TABLET, FILM COATED ORAL at 08:04

## 2020-04-21 RX ADMIN — NIFEDIPINE 30 MG: 30 TABLET, FILM COATED, EXTENDED RELEASE ORAL at 08:04

## 2020-04-21 RX ADMIN — ACETAMINOPHEN 650 MG: 325 TABLET ORAL at 02:04

## 2020-04-21 NOTE — NURSING
Pt AOX4. Pt insists on being very independent. Was found at start of shift washing her panties in the sink. Offered mesh panties. Standby assist provided.  IV medications infused without complication.  Pt complained of foot pain, tylenol given.  Denies CP, SOB, NVD. During purposeful rounding, pt was found with foot dressing completely off. States it slipped off when she was sleeping. Pt was touching and maneuvering foot. Asked pt to call next time so foot can be redressed immediately and reminded pt to leave dressing alone. Foot redressed and secured with cobain.  Also had to change IV dressing. It was almost off too. Bed low and locked. Call bell within reach. Will continue to monitor.

## 2020-04-21 NOTE — PLAN OF CARE
provided a compassionate presence, reflective listening, prayer support, life review and assessed vincent resources for patient as well as explored patient's concerns.

## 2020-04-21 NOTE — PLAN OF CARE
Problem: Occupational Therapy Goal  Goal: Occupational Therapy Goal  Description  Goals to be met by: 4/28/2020     Patient will increase functional independence with ADLs by performing:    UE Dressing with [place].  LE Dressing with Supervision.  Grooming while standing at sink with Supervision.  Toileting from toilet with Supervision for hygiene and clothing management.   Toilet transfer to toilet with Supervision.     Outcome: Ongoing, Progressing     OT evaluation complete and POC established.  PTA pt reports being (I) with ADLs and mobility.  Home health is recommended upon d/c from acute care to further address deficits and help pt improve overall functional independence.     LOC Bowling  4/21/2020

## 2020-04-21 NOTE — CONSULTS
Vascular surgery consult    SUBJECTIVE:     Chief Complaint/Reason for Admission:  Gangrene right foot    History of Present Illness:  Patient is a 77 y.o. female presents with gangrenous changes of the 1st, 2nd, and 4th toes of the right foot.  Patient has no history of diabetes mellitus or peripheral vascular.  Patient's only previous hospitalizations were for childbirth.  The patient states that in order to treat corns on her feet she routinely soaks them in bleach and Epsom salts.  She then mechanically removes the corns with her fingers.  The patient states that she recently used this routine however subsequently developed discomfort and itching of the foot.  This resulted in substantial scratching.  The patient now has areas of numbness in her distal foot and complains of discoloration of these areas.  Patient presented to the emergency room for evaluation.      PTA Medications   Medication Sig    latanoprost (XALATAN) 0.005 % ophthalmic solution Place 1 drop into both eyes every evening.    timolol maleate 0.5% (TIMOPTIC) 0.5 % Drop Place 1 drop into both eyes 2 (two) times daily.    hydroCHLOROthiazide (HYDRODIURIL) 12.5 MG Tab Take 1 tablet (12.5 mg total) by mouth once daily.       Review of patient's allergies indicates:  No Known Allergies    Past Medical History:   Diagnosis Date    Glaucoma     Glaucoma (increased eye pressure)     Hypertension      Past Surgical History:   Procedure Laterality Date    APPENDECTOMY      CATARACT EXTRACTION W/  INTRAOCULAR LENS IMPLANT Left 10/16/2013        CATARACT EXTRACTION W/  INTRAOCULAR LENS IMPLANT Right 12/18/2013        HYSTERECTOMY       Family History   Problem Relation Age of Onset    Heart disease Mother     Cancer Sister     Amblyopia Neg Hx     Blindness Neg Hx     Macular degeneration Neg Hx     Retinal detachment Neg Hx     Stroke Neg Hx     Thyroid disease Neg Hx     Cataracts Neg Hx     Glaucoma Neg Hx       Social History     Tobacco Use    Smoking status: Never Smoker    Smokeless tobacco: Never Used   Substance Use Topics    Alcohol use: No    Drug use: Not on file        Review of Systems   Constitutional: Negative for appetite change, chills, diaphoresis, fever and unexpected weight change.   HENT: Negative.    Eyes: Negative for pain, discharge and visual disturbance.   Respiratory: Negative for cough, chest tightness, shortness of breath, wheezing and stridor.    Cardiovascular: Negative for chest pain, palpitations and leg swelling.        No history of claudication or rest pain in either lower extremity   Gastrointestinal: Negative for abdominal distention, abdominal pain, constipation, diarrhea, nausea and vomiting.   Endocrine: Negative for cold intolerance, heat intolerance, polydipsia, polyphagia and polyuria.   Genitourinary: Positive for urgency. Negative for difficulty urinating, dysuria, flank pain, frequency and hematuria.   Musculoskeletal: Negative for back pain, gait problem, joint swelling and myalgias.   Skin: Positive for wound. Negative for color change, pallor and rash.   Neurological: Negative for dizziness, tremors, seizures, syncope, facial asymmetry, speech difficulty, weakness and light-headedness.   Psychiatric/Behavioral: Negative for agitation, behavioral problems and confusion.     OBJECTIVE:     Vital Signs (Most Recent)  Temp: 98.4 °F (36.9 °C) (04/21/20 1217)  Pulse: 88 (04/21/20 1217)  Resp: 20 (04/21/20 1217)  BP: (!) 183/79 (04/21/20 1217)  SpO2: 99 % (04/21/20 1217)    Physical Exam   Constitutional: She is oriented to person, place, and time. She appears well-developed and well-nourished. No distress.   HENT:   Head: Normocephalic and atraumatic.   Eyes: EOM are normal. Right eye exhibits no discharge. Left eye exhibits no discharge. No scleral icterus.   Neck: Normal range of motion. Neck supple. No JVD present. No tracheal deviation present.   No carotid bruits    Cardiovascular: Normal rate, regular rhythm and normal heart sounds. Exam reveals no gallop and no friction rub.   No murmur heard.  Pulses palpable in both groins, no palpable pedal pulses either lower extremity however , there are easily audible Doppler pulses   Pulmonary/Chest: Effort normal and breath sounds normal. No respiratory distress. She has no wheezes. She has no rales.   Abdominal: Soft. She exhibits no distension and no mass. There is no tenderness. There is no rebound and no guarding. No hernia.   Musculoskeletal: She exhibits no tenderness.   Right lower extremity with dry gangrenous changes to the 1st, 2nd, and 4th toes.  The proximal foot is pink and warm.   Neurological: She is alert and oriented to person, place, and time. She exhibits normal muscle tone. Coordination normal.   Skin: Skin is warm and dry. Capillary refill takes more than 3 seconds. Rash noted.   Psychiatric: She has a normal mood and affect. Her behavior is normal.     Laboratory  CBC:   Recent Labs   Lab 04/21/20  0354   WBC 8.04   RBC 3.91*   HGB 10.3*   HCT 32.0*      MCV 82   MCH 26.3*   MCHC 32.2     CMP:   Recent Labs   Lab 04/19/20  1627  04/21/20  0354   GLU 93   < > 115*   CALCIUM 9.3   < > 9.0   ALBUMIN 3.7  --   --    PROT 8.0  --   --       < > 143   K 3.2*   < > 3.8   CO2 23   < > 21*      < > 111*   BUN 44*   < > 42*   CREATININE 5.5*   < > 4.3*   ALKPHOS 72  --   --    ALT 5*  --   --    AST 12  --   --    BILITOT 0.3  --   --     < > = values in this interval not displayed.     Recent Labs   Lab 04/20/20  1100   COLORU Yellow   SPECGRAV 1.015   PHUR 6.0   PROTEINUA Trace*   BACTERIA Few*   NITRITE Negative   LEUKOCYTESUR Trace*   UROBILINOGEN Negative       Diagnostic Results:  X-Ray: Reviewed  No evidence of fracture osteomyelitis of the right foot     Arterial ultrasound-significant infrapopliteal PVD    ASSESSMENT/PLAN:     Gangrene of the toes of the right foot with infra inguinal  atherosclerotic vascular disease  Kidney injury    Planned  Patient does not appear to have wet gangrene or sepsis.  The patient will most likely lose her right lower extremity if blood flow cannot be increased.  Discussed with nephrology Dr Wharton, at this time she is not sure of the degree of reversibility of the patient's renal failure.  Aortogram with runoff to fully delineate the patient's vascular status can wait for now until questions regarding renal function resolved.

## 2020-04-21 NOTE — SUBJECTIVE & OBJECTIVE
Interval History: Patient reports still having some right foot pain - overall mild. She is otherwise feeling ok. No interval changes.     Review of Systems   Constitutional: Negative for chills and fever.   HENT: Negative for congestion and rhinorrhea.    Respiratory: Negative for cough and shortness of breath.    Cardiovascular: Negative for chest pain, palpitations and leg swelling.   Gastrointestinal: Negative for abdominal pain, diarrhea, nausea and vomiting.   Musculoskeletal: Negative for arthralgias and myalgias.        + right foot pain   Skin:        + black color change to right toes   Neurological: Negative for dizziness and headaches.     Objective:     Vital Signs (Most Recent):  Temp: 97.2 °F (36.2 °C) (04/21/20 0758)  Pulse: 80 (04/21/20 1000)  Resp: 18 (04/21/20 0758)  BP: (!) 182/83 (04/21/20 0758)  SpO2: 97 % (04/21/20 0758) Vital Signs (24h Range):  Temp:  [97.2 °F (36.2 °C)-98.3 °F (36.8 °C)] 97.2 °F (36.2 °C)  Pulse:  [] 80  Resp:  [14-24] 18  SpO2:  [96 %-99 %] 97 %  BP: (147-190)/(72-97) 182/83     Weight: 52 kg (114 lb 10.2 oz)  Body mass index is 17.96 kg/m².    Intake/Output Summary (Last 24 hours) at 4/21/2020 1207  Last data filed at 4/21/2020 0807  Gross per 24 hour   Intake 720 ml   Output 500 ml   Net 220 ml      Physical Exam   Constitutional: She is oriented to person, place, and time. She appears well-developed and well-nourished. No distress.   HENT:   Head: Normocephalic and atraumatic.   Eyes: Conjunctivae are normal. No scleral icterus.   Neck: Normal range of motion. Neck supple.   Cardiovascular: Normal rate, regular rhythm and normal heart sounds.   Pulmonary/Chest: Effort normal and breath sounds normal. No respiratory distress.   Abdominal: Soft. Bowel sounds are normal. There is no tenderness.   Neurological: She is alert and oriented to person, place, and time.   Skin: Skin is warm and dry.   On right foot: necrotic changes present in great toe, 2nd toe and 4th toe.  Toes with black color change on dorsal and plantar surfaces. No sensation to those toes.   Toe nails thickened and discolored bilaterally, mycotic appearance      Nursing note and vitals reviewed.      Significant Labs:   BMP:   Recent Labs   Lab 04/21/20  0354   *      K 3.8   *   CO2 21*   BUN 42*   CREATININE 4.3*   CALCIUM 9.0   MG 2.0     CBC:   Recent Labs   Lab 04/19/20  1627 04/20/20  0343 04/21/20  0354   WBC 8.13 7.08 8.04   HGB 10.8* 10.2* 10.3*   HCT 32.9* 31.5* 32.0*    320 342

## 2020-04-21 NOTE — PT/OT/SLP EVAL
Occupational Therapy   Evaluation    Name: Home De Dios  MRN: [id number]  Admitting Diagnosis:  Gangrene of toe of right foot      Recommendations:     Discharge Recommendations: home with home health  Discharge Equipment Recommendations:  walker, rolling(pt already owns shower chair)  Barriers to discharge:  Inaccessible home environment; 6 MARY[address].  Decreased caregiver support.      Assessment:     Home De Dios is a 77 y.o. female with a medical diagnosis of Gangrene of toe of right foot.  She presents with pleasant affect and pain in toes on R foot. Performance deficits affecting function: impaired self care skills, impaired functional mobilty, impaired balance, gait instability, pain.  Pt demonstrates strength and ROM in (B) UE needed for ADLs that is WNL, and is able to perform sit <> stand transfer and ambulate with SBA and RW.  Pt displayed one instance of LOB when standing up from toilet requiring CGA to correct.  Pt able to perform toileting from toilet and grooming task standing at sink with SBA and RW with no signs of fatigue noted.  Decreased safety awareness observed when utilizing RW for ADLs and mobility.      PTA pt reports being (I) with ADLs and mobility.  Pt would benefit from skilled OT services to address problems listed above and maximzie independence and safety with ADLs.  Pt is very motivated and wants to be able to resume PLOF as soon as possible.  Home health is recommended upon d/c from acute care to further address deficits and help pt improve overall functional independence.           Rehab Prognosis: Good; patient would benefit from acute skilled OT services to address these deficits and reach maximum level of function.       Plan:     Patient to be seen 4 x/week to address the above listed problems via self-care/home management, therapeutic activities, therapeutic exercises  · Plan of Care Expires: 05/21/20  · Plan of Care Reviewed with: patient    Subjective     Chief Complaint: Pain in  R toes  Patient/Family Comments/goals: Return home; resume PLOF    Occupational Profile:  Living Environment: Pt lives with  in 2S duplex, 6STE, BHR present.  Bathroom has tub/shower with shower chair (for ).  Bedroom is on 1st floor.  Son and grandson from [place] currently staying at pt's home 2* came to [place] during spring break then quarantine orders started.    Previous level of function: Pt reports being (I) with ADLs, IADLs, and mobility  Roles and Routines: Wife, mother, grandmother, does not drive (stopped 2 years ago), writes poetry often, very involved at [employer], enjoys being active and independent, sometimes assists  with bathing  Equipment Used at Home:  none  Assistance upon Discharge: Family able to provide assist    Pain/Comfort:  · Pain Rating 1: 10/10  · Location - Side 1: Right  · Location - Orientation 1: (toes)  · Pain Rating Post-Intervention 1: 10/10    Patients cultural, spiritual, Anabaptism conflicts given the current situation: no    Objective:     Communicated with: RN (Nery) prior to session.  Patient found right sidelying with peripheral IV(bandage on R foot) upon OT entry to room.    General Precautions: Standard, fall   Orthopedic Precautions:No orthopedic precautions noted in orders; cleared for WB with DARCO shoe for R foot.   Braces: N/A     Occupational Performance:    Bed Mobility:    · Patient completed Scooting/Bridging with supervision  · Patient completed Supine to Sit with supervision  · Patient completed Sit to Supine with supervision    Functional Mobility/Transfers:  · Sit <> Stand:  SBA and RW x 1 trial from EOB  · Toilet:  SBA and RW with use of grab bars.    · -When standing up from toilet after toileting 1 instance of posterior LOB noted requiring CGA to correct.  · Functional Mobility: Pt ambulated ~20 ft within room with SBA and RW (bed <> bathroom <> bed).  DARCO shoe not present during time of evaluation so pt ambulated NWB through RLE utilizing  RW.     Activities of Daily Living:  · Grooming: stand by assistance for washing/drying hands while standing at sink with RW.  Cues provided to place RW directly in front of sink.   · Lower Body Dressing: Independent for donning sock on left foot with LLE propped up onto bed.  · Toileting: SBA from toilet.  Pt able to manage underwear and gown during task.    Cognitive/Visual Perceptual:  Cognitive/Psychosocial Skills:    -       Oriented to: Person, Place, Time and Situation   -       Follows Commands/attention:Follows multistep  commands  -       Communication: clear/fluent  -       Memory: No Deficits noted  -       Safety awareness/insight to disability: decreased safety awareness when ambulating with RW and performing ADLs in standing; pt very used to being independent    -       Mood/Affect/Coping skills/emotional control: Pleasant, cooperative, motivated, positive    Physical Exam:  Postural examination/scapula alignment:   -       Rounded shoulders  Skin integrity: Visible skin intact for (B) UE; Stable dry gangrene right hallux, 2nd toe, and 4th toe per podiatry note (MD present during portion of evaluation and discussed status of toes with pt)  Edema:  None noted  Sensation: -       Intact for (B) UE; reports numbness in R foot  Motor Planning: WNL  Dominant hand: Right, but has certain activities she utilizes L for  Upper Extremity Range of Motion:    -       Right Upper Extremity: WNL  -       Left Upper Extremity: WNL  Upper Extremity Strength:   -       Right Upper Extremity: WNL; grossly 5/5 all muscle groups  -       Left Upper Extremity: WNL; grossly 5/5 all muscle groups   Strength: 5/5 both hands  Fine Motor Coordination: Intact  Gross motor coordination: WFL  Balance:  Sitting- Independent; Standing; SBA  Vision: Intact    AMPAC 6 Click ADL:  AMPAC Total Score: 20    Treatment & Education:  *Pt educated on role of OT and POC discussed  Education:    Patient left HOB elevated with all lines  intact, call button in reach, bed alarm on and RN notified    GOALS:   Multidisciplinary Problems     Occupational Therapy Goals        Problem: Occupational Therapy Goal    Goal Priority Disciplines Outcome Interventions   Occupational Therapy Goal     OT, PT/OT Ongoing, Progressing    Description:  Goals to be met by: 4/28/2020     Patient will increase functional independence with ADLs by performing:    UE Dressing with [place].  LE Dressing with Supervision.  Grooming while standing at sink with Supervision.  Toileting from toilet with Supervision for hygiene and clothing management.   Toilet transfer to toilet with Supervision.                      History:     Past Medical History:   Diagnosis Date    Glaucoma     Glaucoma (increased eye pressure)     Hypertension        Past Surgical History:   Procedure Laterality Date    APPENDECTOMY      CATARACT EXTRACTION W/  INTRAOCULAR LENS IMPLANT Left 10/16/2013        CATARACT EXTRACTION W/  INTRAOCULAR LENS IMPLANT Right 12/18/2013        HYSTERECTOMY         Time Tracking:     OT Date of Treatment: 04/21/20  OT Start Time: 1128  OT Stop Time: 1154  OT Total Time (min): 26 min    Billable Minutes:Evaluation 26    LOC Bowling  4/21/2020

## 2020-04-21 NOTE — ASSESSMENT & PLAN NOTE
- BP have been elevated during admission  - only home med is HCTZ 12.5 mg QD, holding 2/2 MICKEY   - Nifedipine 30 mg added yesterday by nephrology   - PRN hydralazine   - monitor

## 2020-04-21 NOTE — PROGRESS NOTES
[facility] Hospital Medicine  Progress Note    Patient Name: Home De Dios  MRN: [id number]  Patient Class: IP- Inpatient   Admission Date: 4/19/2020  Length of Stay: 2 days  Attending Physician: Jeanna Beckman MD  Primary Care Provider: Quintin Cerda MD    Subjective:     Principal Problem:Gangrene of toe of right foot    HPI:  Ms. Home De Dios is a 77 y.o. female, with PMH of HTN, POAG, who presented to [facility] ED on 4/19/20 with right toe pain. She states the toes of her right foot began changing color and turning black about 10 days ago, and have been having worsening numbness x 4-5 days. She states this began after soaking her foot in bleach and Epsom salts and then cutting her toe nails. The right foot subsequently became itchy, and then numb, particularly on the 1st, 2nd and 4th toes. She denies neuropathy or prior numbness/tingling., trauma, fever, chills. ED imaging shows no fracture/dislocaton or bony erosion. An US is pending. Labs showed MICKEY without hyperkalemia, and elevated CRP of 119. She was admitted to inpatient status.     Overview/Hospital Course:  No notes on file    Interval History: Patient reports still having some right foot pain - overall mild. She is otherwise feeling ok. No interval changes.     Review of Systems   Constitutional: Negative for chills and fever.   HENT: Negative for congestion and rhinorrhea.    Respiratory: Negative for cough and shortness of breath.    Cardiovascular: Negative for chest pain, palpitations and leg swelling.   Gastrointestinal: Negative for abdominal pain, diarrhea, nausea and vomiting.   Musculoskeletal: Negative for arthralgias and myalgias.        + right foot pain   Skin:        + black color change to right toes   Neurological: Negative for dizziness and headaches.     Objective:     Vital Signs (Most Recent):  Temp: 97.2 °F (36.2 °C) (04/21/20 0758)  Pulse: 80 (04/21/20 1000)  Resp: 18 (04/21/20 0758)  BP: (!) 182/83  (04/21/20 0758)  SpO2: 97 % (04/21/20 0758) Vital Signs (24h Range):  Temp:  [97.2 °F (36.2 °C)-98.3 °F (36.8 °C)] 97.2 °F (36.2 °C)  Pulse:  [] 80  Resp:  [14-24] 18  SpO2:  [96 %-99 %] 97 %  BP: (147-190)/(72-97) 182/83     Weight: 52 kg (114 lb 10.2 oz)  Body mass index is 17.96 kg/m².    Intake/Output Summary (Last 24 hours) at 4/21/2020 1207  Last data filed at 4/21/2020 0807  Gross per 24 hour   Intake 720 ml   Output 500 ml   Net 220 ml      Physical Exam   Constitutional: She is oriented to person, place, and time. She appears well-developed and well-nourished. No distress.   HENT:   Head: Normocephalic and atraumatic.   Eyes: Conjunctivae are normal. No scleral icterus.   Neck: Normal range of motion. Neck supple.   Cardiovascular: Normal rate, regular rhythm and normal heart sounds.   Pulmonary/Chest: Effort normal and breath sounds normal. No respiratory distress.   Abdominal: Soft. Bowel sounds are normal. There is no tenderness.   Neurological: She is alert and oriented to person, place, and time.   Skin: Skin is warm and dry.   On right foot: necrotic changes present in great toe, 2nd toe and 4th toe. Toes with black color change on dorsal and plantar surfaces. No sensation to those toes.   Toe nails thickened and discolored bilaterally, mycotic appearance      Nursing note and vitals reviewed.      Significant Labs:   BMP:   Recent Labs   Lab 04/21/20  0354   *      K 3.8   *   CO2 21*   BUN 42*   CREATININE 4.3*   CALCIUM 9.0   MG 2.0     CBC:   Recent Labs   Lab 04/19/20  1627 04/20/20  0343 04/21/20  0354   WBC 8.13 7.08 8.04   HGB 10.8* 10.2* 10.3*   HCT 32.9* 31.5* 32.0*    320 342           Assessment/Plan:      * Gangrene of toes of right foot  - right great and 2nd toes have circumferential necrosis, right 4th toe with dorsal necrosis  - there is some erythema in the foot proximal to the necrotic areas: currently treated with metronidazole and cefepime for  possible cellulitis   - imaging without overt osteomyelitis  - suspect PAD. Unable to perform contrast imaging due to renal function. Arterial US of right LE: Monophasic flow in the right peroneal artery.  Low velocities.  - HbA1c 5.4% - no DM  - Appreciate podiatry assistance  - Vascular surgery consulted as well        MICKEY (acute kidney injury)  - Unknown baseline Cr  - holding home HCTZ   - suspect 2/2 poorly controlled HTN vs infection/gangrene   - avoid nephrotoxins  - renally dose meds   - Nephrology consulted   - LR at 100 cc/hr started yesterday     Hypokalemia  - Now resolved. Will monitor.       Essential hypertension  - BP have been elevated during admission  - only home med is HCTZ 12.5 mg QD, holding 2/2 MICKEY   - Nifedipine 30 mg added yesterday by nephrology   - PRN hydralazine   - monitor       VTE Risk Mitigation (From admission, onward)         Ordered     Place sequential compression device  Until discontinued      04/19/20 2118     IP VTE HIGH RISK PATIENT  Once      04/19/20 2118     Reason for No Pharmacological VTE Prophylaxis  Once     Question:  Reasons:  Answer:  Physician Provided (leave comment)    04/19/20 2118              Alexa Almodovar MD  Department of Hospital Medicine   [facility]

## 2020-04-21 NOTE — PLAN OF CARE
Initial Discharge Planning Assessment:    Patient admitted on 4/19/20  PCP updated in Epic: Dr. Cerda   Pharmacy, updated in [facility]:  [facility] bedside Delivery : yes   DME at home: none   Current dispo: Pt states she lives at home with her  and son.   Transportation: Family to drive home  Power of  or Living Will: none  Hospital Readmission: none      Case management  to follow.             04/21/20 0924   Discharge Assessment   Assessment Type Discharge Planning Assessment   Confirmed/corrected address and phone number on facesheet? Yes   Assessment information obtained from? Patient   Prior to hospitilization cognitive status: Alert/Oriented   Prior to hospitalization functional status: Assistive Equipment   Current cognitive status: Alert/Oriented   Current Functional Status: Assistive Equipment   Lives With child(rj), adult;spouse   Able to Return to Prior Arrangements yes   Is patient able to care for self after discharge? No   Patient's perception of discharge disposition home health   Readmission Within the Last 30 Days no previous admission in last 30 days   Patient currently being followed by outpatient case management? No   Patient currently receives any other outside agency services? No   Equipment Currently Used at Home none   Do you have any problems affording any of your prescribed medications? No   Is the patient taking medications as prescribed? yes   Does the patient have transportation home? Yes   Transportation Anticipated family or friend will provide   Does the patient receive services at the [facility]? No   Discharge Plan A Home Health;Home with family   DME Needed Upon Discharge    (TBD)   Patient/Family in Agreement with Plan no

## 2020-04-21 NOTE — PT/OT/SLP EVAL
"Physical Therapy Evaluation and Treatment    Patient Name:  Home De Dios   MRN:  [id number]    Recommendations:     Discharge Recommendations:  home health PT, home health OT   Discharge Equipment Recommendations: walker, rolling   Barriers to discharge: Inaccessible home    Assessment:     Home De Dios is a 77 y.o. female admitted with a medical diagnosis of Gangrene of toe of right foot.  She presents with the following impairments/functional limitations:  impaired sensation, impaired self care skills, impaired functional mobilty, gait instability, decreased safety awareness, pain, impaired skin.    Patient evaluated by PT and goals established. Due to the above impairments, the patient is limited in her ability to independently ambulate, transfer, and participate in her chosen activities, including doing the housekeeping and cooking at her home. Patient most limited in her safety with mobility due to her lack of safety awareness and general misunderstanding of medical condition, reporting to PT her toes were "bruised" and with donning of DARCO she felt they were "healing already."    No DARCO shoe present in room with patient having been ambulating in room WBAT without protective footwear, PT collected open toed DARCO shoe for pt and instructed in donning/doffing. Amb x120 ft with CGA with RW with continual cueing for safe use of RW. PT will continue to follow and progress as tolerated. Rec for d/c to home with HH PT/OT and assistance as needed.    Rehab Prognosis: Good; patient would benefit from acute skilled PT services to address these deficits and reach maximum level of function.    Recent Surgery: * No surgery found *      Plan:     During this hospitalization, patient to be seen 5 x/week to address the identified rehab impairments via gait training, therapeutic activities, therapeutic exercises and progress toward the following goals:    · Plan of Care Expires:  05/05/20    Subjective     Chief " "Complaint: Pain in foot with WB, improved with donning of DARCO  Patient/Family Comments/goals: To return home to her PLOF; Patient agreeable to evaluation.  Pain/Comfort:  · Pain Rating 1: 0/10  · Pain Rating Post-Intervention 1: 0/10    Patients cultural, spiritual, Yazidi conflicts given the current situation: no    Living Environment:  · Pt lives with her  (who uses a w/c) and her son (who is temporarily in [facility] but planning return to [place] after quarantine) in a 2 story home with 6 steps to enter and (B) handrail(s).   · Upon discharge, patient will have assistance from her  who can assist in limited ways due to use of w/c and son for temporary time.  Prior level of function:  · Ambulation: Indep  · ADL's: Indep  · IADLs: Indep  ·  drives  · Falls: Denies  · Equipment used at home: none.  DME owned (not currently used): shower chair.     Objective:     Communicated with MJ Gabriel prior to session.  Patient found seated EOB with peripheral IV  upon PT entry to room.    General Precautions: Standard, fall   Orthopedic Precautions:(Per podiatry, ok for forefoot WB but requires DARCO shoe for mobility)   Braces: (Dressing to R foot, pt has removed bandages from between toes)     Patient donned yellow sock, DARCO shoe, and gait belt for OOB mobility.    Exams:  · Cognition:   · Patient is oriented to person, , place, general situation.  · Pt follows approximately 100% of one step commands.    · Mood: Pleasant and cooperative, eager for mobility.  · Safety Awareness: Impaired, lack of accurate medical understanding of condition (states her toes are "bruised" and will heal soon) despite repeat consults from MDs  · Musculoskeletal:  · Posture:  Forward head, increased thoracic jyphosis  · LE ROM/Strength:   · R ROM: WFL  · Except toes 1,2,4 gangrenous and maintained in extension   · L ROM: WFL  · R Strength:   · Hip flexion: 5/5  · Knee extension: 5/5  · Dorsiflexion: Deferred due to " condition of toes  · L Strength:   · Hip flexion: 5/5  · Knee extension: 5/5  · Dorsiflexion: 5/5   · Neuromuscular:  · Coordination/Tone/Reflexes: No impairments identified with functional mobility. No formal testing performed.   · Balance:   · Static sitting: Normal  · Static standing: Fair, BUE support  · Dynamic standing: Fair, BUE support  · Visual-vestibular: No impairments identified with functional mobility. No formal testing performed.  · Integument: See podiatry notes for decription of gangrene of R 1st,2nd, and 4th toes  · Cardiopulmonary:  · Edema: None noted      Functional Mobility:  · Bed Mobility:     · Supine to Sit: independence  · Sit to Supine: independence  · Transfers:     · Sit to Stand:  stand by assistance with rolling walker  · Gait: x120 ft with RW and CGA.   · Pt with 3-pt gait pattern with step to pattern with reduced stride length of LLE.   · Pt requiring continual cueing for safe RW management and posture with forward lurch of trunk with R stance  · Several LOB due to mismanagement of RW without fall      Therapeutic Activities and Exercises:  ·  Gait training as above  · Demo of how to don/doff DARCO with pt demo-ing for immediate teach-back  · Encouraged pt to always have DARCO shoe on when performing transfers or OOB mobility  PT educated patient re:   PT plan of care/role of PT  Safety with OOB mobility  Use of DARCO shoe and RW  Discharge disposition    Pt verbalized understanding but needs reinforcement      AM-PAC 6 CLICK MOBILITY  Total Score:20     Patient left seated EOB with all lines intact, call button in reach and RN Nery notified.    GOALS:   Multidisciplinary Problems     Physical Therapy Goals        Problem: Physical Therapy Goal    Goal Priority Disciplines Outcome Goal Variances Interventions   Physical Therapy Goal     PT, PT/OT Ongoing, Progressing     Description:  Goals to be met by: 5/5/20    Patient will increase functional independence with mobility by  performin. Don/doff DARCO shoe independently without cueing for need for shoe prior to OOB mobility.   2. Gait x 100 feet with mod(I) with RW with WBAT RLE with DARCO shoe.  3. Ascend/descend 6 step(s) with least restrictive assistive device and unilateral HR with SBA.                     History:     Past Medical History:   Diagnosis Date    Glaucoma     Glaucoma (increased eye pressure)     Hypertension        Past Surgical History:   Procedure Laterality Date    APPENDECTOMY      CATARACT EXTRACTION W/  INTRAOCULAR LENS IMPLANT Left 10/16/2013        CATARACT EXTRACTION W/  INTRAOCULAR LENS IMPLANT Right 2013        HYSTERECTOMY         Time Tracking:     PT Received On: 20  PT Start Time: 1329     PT Stop Time: 1358  PT Total Time (min): 29 min     Billable Minutes: Evaluation 15 and Gait Training 14      Melody Waters, PT  2020

## 2020-04-21 NOTE — ASSESSMENT & PLAN NOTE
- right great and 2nd toes have circumferential necrosis, right 4th toe with dorsal necrosis  - there is some erythema in the foot proximal to the necrotic areas: currently treated with metronidazole and cefepime for possible cellulitis   - imaging without overt osteomyelitis  - suspect PAD. Unable to perform contrast imaging due to renal function. Arterial US of right LE: Monophasic flow in the right peroneal artery.  Low velocities.  - HbA1c 5.4% - no DM  - Appreciate podiatry assistance  - Vascular surgery consulted as well

## 2020-04-21 NOTE — PLAN OF CARE
Problem: Physical Therapy Goal  Goal: Physical Therapy Goal  Description  Goals to be met by: 20    Patient will increase functional independence with mobility by performin. Don/doff DARCO shoe independently without cueing for need for shoe prior to OOB mobility.   2. Gait x 100 feet with mod(I) with RW with WBAT RLE with DARCO shoe.  3. Ascend/descend 6 step(s) with least restrictive assistive device and unilateral HR with SBA.    Outcome: Ongoing, Progressing     Patient evaluated by PT and goals established. No DARCO shoe present in room with patient having been ambulating in room WBAT without protective footwear, PT collected open toed DARCO shoe for pt and instructed in donning/doffing. Amb x120 ft with CGA with RW with continual cueing for safe use of RW. PT will continue to follow and progress as tolerated. Rec for d/c to home with HH PT/OT and assistance as needed. Please see progress note for detailed plan of care and recommendations.

## 2020-04-21 NOTE — ASSESSMENT & PLAN NOTE
- Unknown baseline Cr  - holding home HCTZ   - suspect 2/2 poorly controlled HTN vs infection/gangrene   - avoid nephrotoxins  - renally dose meds   - Nephrology consulted   - LR at 100 cc/hr started yesterday

## 2020-04-21 NOTE — PROGRESS NOTES
Consult Note  Nephrology    Consult Requested By: Jeanna Beckman MD  Reason for Consult: MICKEY    SUBJECTIVE:     History of Present Illness:  Patient is a 77 y.o. female presents with gangrenous right toe/ foot and found to have MICKEY with Cr 5.5 and down to 5.1 this am. She deines any history of CKD and states that she only takes HCTZ for HTN and no other routine meds. She does repots that when her foot started itching and swelling more approximately 8 days ago she began taking Ibuprofen bid. There are no labs since 2013 because she tells me that she does not like to have her blood drawn.  Interval History:  She feels well today. Long discussion about kidney failure and gangrene and that she will loose her foot most likely. Her insight into this is very poor.  Denies CP, SOB, Palps N/V.      Past Medical History:   Diagnosis Date    Glaucoma     Glaucoma (increased eye pressure)     Hypertension      Past Surgical History:   Procedure Laterality Date    APPENDECTOMY      CATARACT EXTRACTION W/  INTRAOCULAR LENS IMPLANT Left 10/16/2013        CATARACT EXTRACTION W/  INTRAOCULAR LENS IMPLANT Right 12/18/2013        HYSTERECTOMY       Family History   Problem Relation Age of Onset    Heart disease Mother     Cancer Sister     Amblyopia Neg Hx     Blindness Neg Hx     Macular degeneration Neg Hx     Retinal detachment Neg Hx     Stroke Neg Hx     Thyroid disease Neg Hx     Cataracts Neg Hx     Glaucoma Neg Hx      Social History     Tobacco Use    Smoking status: Never Smoker    Smokeless tobacco: Never Used   Substance Use Topics    Alcohol use: No    Drug use: Not on file       Medications Prior to Admission   Medication Sig Dispense Refill Last Dose    latanoprost (XALATAN) 0.005 % ophthalmic solution Place 1 drop into both eyes every evening. 2.5 mL 12 4/19/2020    timolol maleate 0.5% (TIMOPTIC) 0.5 % Drop Place 1 drop into both eyes 2 (two) times daily. 5 mL 12  4/19/2020    hydroCHLOROthiazide (HYDRODIURIL) 12.5 MG Tab Take 1 tablet (12.5 mg total) by mouth once daily. 90 tablet 2 Unknown       Review of patient's allergies indicates:  No Known Allergies     Review of Systems:  Constitutional: No fever or chills, no weight changes.  Eyes: No visual changes or photophobia  HEENT: No nasal congestion or sore throat  Respiratory: No cough or shorness of breath  Cardiovascular: No chest pain or palpitations  Gastrointestinal: Good appetite, no nausea or vomiting, no change in bowel habits  Genitourinary: No hematuria or dysuria  Skin: No rash or pruritis, black toe/ red foot, itching  Hematologic/lymphatic: No easy bruising, bleeding or lymphadenopathy  Musculoskeletal: No arthralgias or myalgias  Neurological: No seizures or tremors  Endocrine: No heat/cold intolerance.  No polyuria/polydipsia.  Psychiatric:  No depression or anxiety. +Poor insight    OBJECTIVE:     Vital Signs (Most Recent)  Temp: 98.4 °F (36.9 °C) (04/21/20 1217)  Pulse: 88 (04/21/20 1217)  Resp: 20 (04/21/20 1217)  BP: (!) 183/79 (04/21/20 1217)  SpO2: 99 % (04/21/20 1217)    Vital Signs Range (Last 24H):  Temp:  [97.2 °F (36.2 °C)-98.4 °F (36.9 °C)]   Pulse:  []   Resp:  [14-24]   BP: (147-190)/(72-97)   SpO2:  [96 %-99 %]     Physical Exam:    General appearance: Well developed, thin  Head: Normocephalic, atraumatic  Eyes:  Conjunctivae nl. Sclera anicteric. PERRL.  HEENT: Lips, mucosa, and tongue normal; teeth and gums normal and oropharynx clear.  Neck: Supple, trachea midline, thyroid not enlarged,   Lungs: Clear to auscultation bilaterally and normal respiratory effort  Heart: Regular rate and rhythm, S1, S2 normal, no murmur, click, rub or gallop  Abdomen: Soft, non-tender non-distended; bowel sounds normal; no masses,  no organomegaly  Extremities: dry gangrene of right 1st and tip of 2nd toes and now 4th. No edema  Pulses: nonpalpable  Neurologic: Normal strength and tone. No focal numbness  or weakness  Psychiatric:  Alert and oriented times 3.       Diagnostic Results:  Labs: Reviewed  X-Ray: Reviewed  US: Reviewed    ASSESSMENT/PLAN:     1. MICKEY likely due to right foot gangrene and Ibuprofen/ Hypotension  -Last know Cr 2013 was 0.9  -Current 5.1 with mild acidosis and hypokalemia  -CPK is normal  -FeUA is low, continue IVFs with LR for now.  -Renal US no obstruction, 2 right non-obstructing calculi and neg urine eosin.  -K replaced  -Trend Cr improving  -Avoid nephrotoxins, NSAIDs/ PPI and renal dose meds.    2. HTN  -on Hydralazine.  -Started Nifedipine XL 30 mg daily, but will increase dose to 60 mg daily     3. Dry Gangrene of right toes/ foot.  -On cefepime and Flagyl, appears dry at present though  -Discussed with Dr. Drummond.  -Renal function may limit evaluation at this time.    4. Fe Defic Anemia/ Relative B12 Defic  -Replace Fe po for now  -Start 500mcg daily  B12,  -Normal folate and TSH    Thanks for consultation, will follow along with you.

## 2020-04-21 NOTE — PLAN OF CARE
Patient expressed to RACHEAL that she would like walking aid when discharged.  RACHEAL contacted Dr. Almodovar to put in PT/OT orders. MD will add orders.

## 2020-04-22 LAB
ANION GAP SERPL CALC-SCNC: 9 MMOL/L (ref 8–16)
BASOPHILS # BLD AUTO: 0.04 K/UL (ref 0–0.2)
BASOPHILS NFR BLD: 0.6 % (ref 0–1.9)
BUN SERPL-MCNC: 27 MG/DL (ref 8–23)
CALCIUM SERPL-MCNC: 8.8 MG/DL (ref 8.7–10.5)
CHLORIDE SERPL-SCNC: 113 MMOL/L (ref 95–110)
CO2 SERPL-SCNC: 23 MMOL/L (ref 23–29)
CREAT SERPL-MCNC: 2.6 MG/DL (ref 0.5–1.4)
DIFFERENTIAL METHOD: ABNORMAL
EOSINOPHIL # BLD AUTO: 0.2 K/UL (ref 0–0.5)
EOSINOPHIL NFR BLD: 2.4 % (ref 0–8)
ERYTHROCYTE [DISTWIDTH] IN BLOOD BY AUTOMATED COUNT: 12.9 % (ref 11.5–14.5)
EST. GFR  (AFRICAN AMERICAN): 20 ML/MIN/1.73 M^2
EST. GFR  (NON AFRICAN AMERICAN): 17 ML/MIN/1.73 M^2
GLUCOSE SERPL-MCNC: 103 MG/DL (ref 70–110)
HCT VFR BLD AUTO: 28.9 % (ref 37–48.5)
HGB BLD-MCNC: 9.4 G/DL (ref 12–16)
IMM GRANULOCYTES # BLD AUTO: 0.02 K/UL (ref 0–0.04)
IMM GRANULOCYTES NFR BLD AUTO: 0.3 % (ref 0–0.5)
LYMPHOCYTES # BLD AUTO: 1.5 K/UL (ref 1–4.8)
LYMPHOCYTES NFR BLD: 21.5 % (ref 18–48)
MAGNESIUM SERPL-MCNC: 1.6 MG/DL (ref 1.6–2.6)
MCH RBC QN AUTO: 26.1 PG (ref 27–31)
MCHC RBC AUTO-ENTMCNC: 32.5 G/DL (ref 32–36)
MCV RBC AUTO: 80 FL (ref 82–98)
MONOCYTES # BLD AUTO: 0.6 K/UL (ref 0.3–1)
MONOCYTES NFR BLD: 8.7 % (ref 4–15)
NEUTROPHILS # BLD AUTO: 4.7 K/UL (ref 1.8–7.7)
NEUTROPHILS NFR BLD: 66.5 % (ref 38–73)
NRBC BLD-RTO: 0 /100 WBC
PHOSPHATE SERPL-MCNC: 3.2 MG/DL (ref 2.7–4.5)
PLATELET # BLD AUTO: 305 K/UL (ref 150–350)
PMV BLD AUTO: 10.4 FL (ref 9.2–12.9)
POTASSIUM SERPL-SCNC: 3.2 MMOL/L (ref 3.5–5.1)
RBC # BLD AUTO: 3.6 M/UL (ref 4–5.4)
SODIUM SERPL-SCNC: 145 MMOL/L (ref 136–145)
WBC # BLD AUTO: 7.11 K/UL (ref 3.9–12.7)

## 2020-04-22 PROCEDURE — S0030 INJECTION, METRONIDAZOLE: HCPCS | Mod: HCNC | Performed by: PHYSICIAN ASSISTANT

## 2020-04-22 PROCEDURE — 99233 SBSQ HOSP IP/OBS HIGH 50: CPT | Mod: HCNC,,, | Performed by: FAMILY MEDICINE

## 2020-04-22 PROCEDURE — 85025 COMPLETE CBC W/AUTO DIFF WBC: CPT | Mod: HCNC

## 2020-04-22 PROCEDURE — 63600175 PHARM REV CODE 636 W HCPCS: Mod: HCNC | Performed by: PHYSICIAN ASSISTANT

## 2020-04-22 PROCEDURE — 63600175 PHARM REV CODE 636 W HCPCS: Mod: HCNC | Performed by: INTERNAL MEDICINE

## 2020-04-22 PROCEDURE — 97535 SELF CARE MNGMENT TRAINING: CPT | Mod: HCNC

## 2020-04-22 PROCEDURE — 83735 ASSAY OF MAGNESIUM: CPT | Mod: HCNC

## 2020-04-22 PROCEDURE — 36415 COLL VENOUS BLD VENIPUNCTURE: CPT | Mod: HCNC

## 2020-04-22 PROCEDURE — 80048 BASIC METABOLIC PNL TOTAL CA: CPT | Mod: HCNC

## 2020-04-22 PROCEDURE — 94761 N-INVAS EAR/PLS OXIMETRY MLT: CPT | Mod: HCNC

## 2020-04-22 PROCEDURE — 99233 PR SUBSEQUENT HOSPITAL CARE,LEVL III: ICD-10-PCS | Mod: HCNC,,, | Performed by: FAMILY MEDICINE

## 2020-04-22 PROCEDURE — 25000003 PHARM REV CODE 250: Mod: HCNC | Performed by: INTERNAL MEDICINE

## 2020-04-22 PROCEDURE — 84100 ASSAY OF PHOSPHORUS: CPT | Mod: HCNC

## 2020-04-22 PROCEDURE — 11000001 HC ACUTE MED/SURG PRIVATE ROOM: Mod: HCNC

## 2020-04-22 PROCEDURE — 25000003 PHARM REV CODE 250: Mod: HCNC | Performed by: PHYSICIAN ASSISTANT

## 2020-04-22 RX ORDER — CLOPIDOGREL BISULFATE 75 MG/1
300 TABLET ORAL ONCE
Status: COMPLETED | OUTPATIENT
Start: 2020-04-22 | End: 2020-04-22

## 2020-04-22 RX ORDER — CLOPIDOGREL BISULFATE 75 MG/1
75 TABLET ORAL DAILY
Status: DISCONTINUED | OUTPATIENT
Start: 2020-04-23 | End: 2020-05-01 | Stop reason: HOSPADM

## 2020-04-22 RX ORDER — ASPIRIN 81 MG/1
81 TABLET ORAL DAILY
Status: DISCONTINUED | OUTPATIENT
Start: 2020-04-22 | End: 2020-05-01 | Stop reason: HOSPADM

## 2020-04-22 RX ORDER — MAGNESIUM SULFATE 1 G/100ML
1 INJECTION INTRAVENOUS ONCE
Status: COMPLETED | OUTPATIENT
Start: 2020-04-22 | End: 2020-04-22

## 2020-04-22 RX ORDER — HEPARIN SODIUM 5000 [USP'U]/ML
5000 INJECTION, SOLUTION INTRAVENOUS; SUBCUTANEOUS EVERY 12 HOURS
Status: DISCONTINUED | OUTPATIENT
Start: 2020-04-22 | End: 2020-04-24

## 2020-04-22 RX ORDER — POTASSIUM CHLORIDE 20 MEQ/1
40 TABLET, EXTENDED RELEASE ORAL ONCE
Status: COMPLETED | OUTPATIENT
Start: 2020-04-22 | End: 2020-04-22

## 2020-04-22 RX ADMIN — NIFEDIPINE 60 MG: 30 TABLET, FILM COATED, EXTENDED RELEASE ORAL at 08:04

## 2020-04-22 RX ADMIN — HEPARIN SODIUM 5000 UNITS: 5000 INJECTION, SOLUTION INTRAVENOUS; SUBCUTANEOUS at 05:04

## 2020-04-22 RX ADMIN — MAGNESIUM SULFATE 1 G: 1 INJECTION INTRAVENOUS at 08:04

## 2020-04-22 RX ADMIN — TIMOLOL MALEATE 1 DROP: 5 SOLUTION OPHTHALMIC at 09:04

## 2020-04-22 RX ADMIN — ASPIRIN 81 MG: 81 TABLET, COATED ORAL at 05:04

## 2020-04-22 RX ADMIN — TIMOLOL MALEATE 1 DROP: 5 SOLUTION OPHTHALMIC at 08:04

## 2020-04-22 RX ADMIN — METRONIDAZOLE 500 MG: 500 INJECTION, SOLUTION INTRAVENOUS at 12:04

## 2020-04-22 RX ADMIN — CYANOCOBALAMIN TAB 500 MCG 500 MCG: 500 TAB at 08:04

## 2020-04-22 RX ADMIN — CLOPIDOGREL BISULFATE 300 MG: 75 TABLET ORAL at 05:04

## 2020-04-22 RX ADMIN — CEFEPIME 1 G: 1 INJECTION, POWDER, FOR SOLUTION INTRAMUSCULAR; INTRAVENOUS at 02:04

## 2020-04-22 RX ADMIN — METRONIDAZOLE 500 MG: 500 INJECTION, SOLUTION INTRAVENOUS at 05:04

## 2020-04-22 RX ADMIN — METRONIDAZOLE 500 MG: 500 INJECTION, SOLUTION INTRAVENOUS at 10:04

## 2020-04-22 RX ADMIN — SODIUM CHLORIDE, SODIUM LACTATE, POTASSIUM CHLORIDE, AND CALCIUM CHLORIDE: .6; .31; .03; .02 INJECTION, SOLUTION INTRAVENOUS at 08:04

## 2020-04-22 RX ADMIN — POTASSIUM CHLORIDE 40 MEQ: 20 TABLET, EXTENDED RELEASE ORAL at 08:04

## 2020-04-22 RX ADMIN — LATANOPROST 1 DROP: 50 SOLUTION OPHTHALMIC at 08:04

## 2020-04-22 NOTE — PLAN OF CARE
Problem: Fall Injury Risk  Goal: Absence of Fall and Fall-Related Injury  Outcome: Ongoing, Progressing     Pt safety education provided to pt. Bed in low position. Wheels locked. Call bell within reach. Pt can walk to BR.     Problem: Adult Inpatient Plan of Care  Goal: Plan of Care Review  Outcome: Ongoing, Progressing    Pt able to verbalize some material of POC, other needs to be reinforced. Pt believes toes are getting better.      Problem: Electrolyte Imbalance (Acute Kidney Injury/Impairment)  Goal: Serum Electrolyte Balance  Outcome: Ongoing, Progressing    LR at 100 mL/hr continues to run. Nephrotoxic meds held.       Problem: Oral Intake Inadequate (Acute Kidney Injury/Impairment)  Goal: Optimal Nutrition Intake  Outcome: Ongoing, Progressing     PO intake encouraged.     Problem: Renal Function Impairment (Acute Kidney Injury/Impairment)  Goal: Effective Renal Function  Outcome: Ongoing, Progressing  VSS during shift. 's-160's. BP medication administered per MAR.      Problem: Skin Injury Risk Increased  Goal: Skin Health and Integrity  Outcome: Ongoing, Progressing     Pt able to turn self in bed. Encouragement provided.     Pt A&Ox 4, NSR tele with one time bigeminal PVC's reported per tele - please see chart. Pt able to sleep minimal. Pt changed own dressing prior to RN being able to return. Pt verbalized understanding of cleaning and wrapping wound, demonstrated correctly.

## 2020-04-22 NOTE — NURSING
Patient alert and oriented x4. O2 sats >92% on room air. Up w/ standby assist. Safety maintained. Redressed right foot wound. Pt continues to pick at dressing. Educated to leave dressing be.

## 2020-04-22 NOTE — PROGRESS NOTES
HD 4.  Diagnosis-dry gangrene right foot, peripheral vascular disease, acute renal failure    Subjective  Less pain right foot/toes  Tolerating diet  Voiding well    PE  Afebrile  Vital signs stable  HEENT-anicteric, atraumatic  Neck-supple, trachea midline, no bruits  Heart-regular rate and rhythm  Chest-clear  Abdomen-soft, nontender, no masses, no guarding  Extremities-left lower extremity pink and warm, right lower extremity with gangrenous changes of 1st through 4 toes right foot-dry gangrene    Imaging     Contains abnormal data US Lower Extremity Arteries Right   Order: [id number]   Status:  Final result   Visible to patient:  No (Not Released) Next appt:  04/27/2020 at 09:45 AM in Podiatry (Angela Huizar DPM) Dx:  Toe necrosis   Details     Reading Physician Reading Date Result Priority   Angela Saenz MD 4/19/2020 STAT      Narrative     EXAMINATION:  US LOWER EXTREMITY ARTERIES RIGHT    CLINICAL HISTORY:  Gangrene, not elsewhere classified    TECHNIQUE:  Duplex and color flow Doppler evaluation and graded compression of the right lower extremity veins was performed.    COMPARISON:  None    FINDINGS:  The right peak systolic velocities are given in cm/sec:    CFA: 79.5, triphasic    Proximal femoral: 62.3, triphasic    Mid femoral: 63.2, triphasic    Distal femoral: 39.6, triphasic    Popliteal: 57.5, biphasic    Peroneal: Not recorded, monophasic    PTA: 30.4, biphasic    CRISS: 17.6, biphasic    DPA 11.8: Biphasic      Impression       Monophasic flow in the right peroneal artery.  Low velocities.    This report was flagged in Epic as abnormal.      Electronically signed by: Angela Saenz  Date: 04/19/2020  Time: 19:22             Last Resulted: 04/19/20 19:22 Order Details View Encounter Lab and Collection Details            Lab  Serum creatinine down to 2.6, markedly improved  White blood cell count normal    Impression  Peripheral vascular disease with gangrenous changes of the right foot,  significant peripheral vascular disease of the right lower extremity, patient will likely lose lower extremity if not revascularized  Acute renal failure, markedly improved    Plan  Discussed with Dr. Wharton, patient continues to improve and will likely return to her normal baseline.  Will ask Cardiology to evaluate for aortogram with runoff during this admission

## 2020-04-22 NOTE — ASSESSMENT & PLAN NOTE
- BP have been elevated during admission, but better today  - only home med is HCTZ 12.5 mg QD, holding 2/2 MICKEY   - Nifedipine increased to 60 mg daily  - PRN hydralazine   - monitor

## 2020-04-22 NOTE — PROGRESS NOTES
"[facility] Hospital Medicine  Progress Note    Patient Name: Home De Dios  MRN: [id number]  Patient Class: IP- Inpatient   Admission Date: 4/19/2020  Length of Stay: 3 days  Attending Physician: Jeanna Beckman MD  Primary Care Provider: Quintin Cerda MD    Subjective:     Principal Problem:Gangrene of toe of right foot    HPI:  Ms. Home De Dios is a 77 y.o. female, with PMH of HTN, POAG, who presented to [facility] ED on 4/19/20 with right toe pain. She states the toes of her right foot began changing color and turning black about 10 days ago, and have been having worsening numbness x 4-5 days. She states this began after soaking her foot in bleach and Epsom salts and then cutting her toe nails. The right foot subsequently became itchy, and then numb, particularly on the 1st, 2nd and 4th toes. She denies neuropathy or prior numbness/tingling., trauma, fever, chills. ED imaging shows no fracture/dislocaton or bony erosion. An US is pending. Labs showed MICKEY without hyperkalemia, and elevated CRP of 119. She was admitted to inpatient status.     Overview/Hospital Course:  No notes on file    Interval History: Patient reports that she is overall feeling well. She has been taking off the dressing for her R foot. Today she did not have any dressing on - her nurse was informed. Also, patient has poor insight into her condition. She continues to say that her foot is the way it is because she scratched too hard. She believes that her toes are getting better and that the black is due to "bruising". She has been informed of her current illness multiple times, but she does not seem to grasp it.     Review of Systems   Constitutional: Negative for chills and fever.   HENT: Negative for congestion and rhinorrhea.    Respiratory: Negative for cough and shortness of breath.    Cardiovascular: Negative for chest pain, palpitations and leg swelling.   Gastrointestinal: Negative for abdominal pain, diarrhea, " nausea and vomiting.   Musculoskeletal: Negative for arthralgias and myalgias.        + right foot pain   Skin:        + black color change to right toes   Neurological: Negative for dizziness and headaches.     Objective:     Vital Signs (Most Recent):  Temp: 98.8 °F (37.1 °C) (04/22/20 0850)  Pulse: 93 (04/22/20 0850)  Resp: 18 (04/22/20 0850)  BP: 137/67 (04/22/20 0850)  SpO2: 97 % (04/22/20 0850) Vital Signs (24h Range):  Temp:  [98.1 °F (36.7 °C)-99.4 °F (37.4 °C)] 98.8 °F (37.1 °C)  Pulse:  [] 93  Resp:  [16-20] 18  SpO2:  [97 %-99 %] 97 %  BP: (137-184)/(67-81) 137/67     Weight: 52 kg (114 lb 10.2 oz)  Body mass index is 17.96 kg/m².    Intake/Output Summary (Last 24 hours) at 4/22/2020 1054  Last data filed at 4/22/2020 0500  Gross per 24 hour   Intake 4388.33 ml   Output 200 ml   Net 4188.33 ml      Physical Exam   Constitutional: She is oriented to person, place, and time. She appears well-developed and well-nourished. No distress.   HENT:   Head: Normocephalic and atraumatic.   Eyes: Conjunctivae are normal. No scleral icterus.   Neck: Normal range of motion. Neck supple.   Cardiovascular: Normal rate, regular rhythm and normal heart sounds.   Pulmonary/Chest: Effort normal and breath sounds normal. No respiratory distress.   Abdominal: Soft. Bowel sounds are normal. There is no tenderness.   Neurological: She is alert and oriented to person, place, and time.   Skin: Skin is warm and dry.   On right foot: necrotic changes present in great toe, 2nd toe and 4th toe. Toes with black color change on dorsal and plantar surfaces. No sensation to those toes.   Toe nails thickened and discolored bilaterally, mycotic appearance      Nursing note and vitals reviewed.      Significant Labs:   BMP:   Recent Labs   Lab 04/22/20  0344         K 3.2*   *   CO2 23   BUN 27*   CREATININE 2.6*   CALCIUM 8.8   MG 1.6     CBC:   Recent Labs   Lab 04/21/20  0354 04/22/20  0344   WBC 8.04 7.11   HGB  10.3* 9.4*   HCT 32.0* 28.9*    305       Assessment/Plan:      * Gangrene of toes of right foot  - right great and 2nd toes have circumferential necrosis, right 4th toe with dorsal necrosis  - there is some erythema in the foot proximal to the necrotic areas: currently treated with metronidazole and cefepime for possible cellulitis   - imaging without overt osteomyelitis  - suspect PAD. Unable to perform contrast imaging due to renal function. Arterial US of right LE: Monophasic flow in the right peroneal artery.  Low velocities.  - HbA1c 5.4% - no DM  - Appreciate podiatry assistance  - Vascular surgery consulted as well  - Continue dressings per podiatry.   - waiting for renal function to improve to perform further imaging of vessels         MICKEY (acute kidney injury)  - Unknown baseline Cr. Cr is slowly trending down.   - holding home HCTZ   - suspect 2/2 poorly controlled HTN vs infection/gangrene   - avoid nephrotoxins  - renally dose meds   - Nephrology consulted   - Continue IVF - LR at 100 cc/hr     Hypokalemia  - Replaced today.  - Will monitor       Essential hypertension  - BP have been elevated during admission, but better today  - only home med is HCTZ 12.5 mg QD, holding 2/2 MICKEY   - Nifedipine increased to 60 mg daily  - PRN hydralazine   - monitor       VTE Risk Mitigation (From admission, onward)         Ordered     Place sequential compression device  Until discontinued      04/19/20 2118     IP VTE HIGH RISK PATIENT  Once      04/19/20 2118     Reason for No Pharmacological VTE Prophylaxis  Once     Question:  Reasons:  Answer:  Physician Provided (leave comment)    04/19/20 2118              Alexa Almodovar MD  Department of Hospital Medicine   [facility]

## 2020-04-22 NOTE — PT/OT/SLP PROGRESS
Occupational Therapy   Treatment    Name: Home De Dios  MRN: [id number]  Admitting Diagnosis:  Gangrene of toe of right foot       Recommendations:     Discharge Recommendations: home health PT, home health OT  Discharge Equipment Recommendations:  walker, rolling  Barriers to discharge:  None    Assessment:     Home De Dios is a 77 y.o. female with a medical diagnosis of Gangrene of toe of right foot.  She presents in bed with nurse just finishing re-dressing right foot wound.  Pt receptive to teaching and training in safety strategies to reduce fall risk during ADLs and ADL mobility adhering to right foot precautions and safe use of RW.  Pt needing assist to adhere to precautions and safety strategies, including safe navigation of RW. Performance deficits affecting function are impaired sensation, impaired self care skills, impaired functional mobilty, impaired balance, gait instability, decreased safety awareness, impaired skin.  Recommend discharge to home with assist from family and Home Health OT and PT.     Rehab Prognosis:  Good; patient would benefit from acute skilled OT services to address these deficits and reach maximum level of function.       Plan:     Patient to be seen 4 x/week to address the above listed problems via self-care/home management, therapeutic activities, therapeutic exercises  · Plan of Care Expires: 05/21/20  · Plan of Care Reviewed with: patient    Subjective     Pain/Comfort:  · Pain Rating 1: 0/10  · Pain Rating Post-Intervention 1: 0/10    Objective:     Communicated with: nurse prior to session.  Patient found HOB elevated with peripheral IV(Dressing to right foot ) upon OT entry to room.    General Precautions: Standard, fall   Orthopedic Precautions:(Right forefoot weight bearing only with Darco shoe donned.)   Braces: N/A     Occupational Performance:     Bed Mobility:    · Patient completed Supine to Sit with supervision  · Patient completed Sit to Supine with supervision      Functional Mobility/Transfers:  · Patient completed Sit <> Stand Transfer with CGA using RW with verbal and tactile cues for correct hand placement using RW.  · Patient completed Bed <> Toilet Transfer with CGA/Min A using RW with verbal and tactile cues for safety, navigation of RW and turning technique while adhering to right foot precautions.  · Functional Mobility: Decreased safety awareness, assist for safe navigation of RW and to slow pace to decrease fall risk.    Activities of Daily Living:  · Grooming: CGA/Min A for balance while grooming standing at sink with RW.    · Bathing: Sponge bathing - UB: Set up LB: Min A  · Upper Body Dressing: minimum assistance    · Lower Body Dressing: minimum assistance and education on correct donning of Darco shoe    · Toileting: minimum assistance for balance in standing for clothing managment and hygiene.      [facility] 6 Click ADL: 19    Treatment & Education:  Role of OT, POC, safety with ADLs and ADL mobility, use of RW during ADLs, correct donning of Darco shoe to prepare for ADL transfers    Patient left HOB elevated with all lines intact, call button in reach, bed alarm on and nuse notifiedEducation:      GOALS:   Multidisciplinary Problems     Occupational Therapy Goals        Problem: Occupational Therapy Goal    Goal Priority Disciplines Outcome Interventions   Occupational Therapy Goal     OT, PT/OT Ongoing, Progressing    Description:  Goals to be met by: 4/28/2020     Patient will increase functional independence with ADLs by performing:    UE Dressing with Braxton.  LE Dressing with Supervision.  Grooming while standing at sink with Supervision.  Toileting from toilet with Supervision for hygiene and clothing management.   Toilet transfer to toilet with Supervision.                      Time Tracking:     OT Date of Treatment: 04/22/20  OT Start Time: 1400  OT Stop Time: 1438  OT Total Time (min): 38 min    Billable Minutes:Self Care/Home Management  38    Mindy Soria, LOTR  4/22/2020

## 2020-04-22 NOTE — PLAN OF CARE
Problem: Occupational Therapy Goal  Goal: Occupational Therapy Goal  Description  Goals to be met by: 4/28/2020     Patient will increase functional independence with ADLs by performing:    UE Dressing with Barton.  LE Dressing with Supervision.  Grooming while standing at sink with Supervision.  Toileting from toilet with Supervision for hygiene and clothing management.   Toilet transfer to toilet with Supervision.     Outcome: Ongoing, Progressing   Progressing towards goals.  Pt to benefit from continued acute care OT services to increase independence in self-care/functional transfers.  Continue POC.  Recommend discharge to home with assist and Home Health OT and PT.

## 2020-04-22 NOTE — ASSESSMENT & PLAN NOTE
- right great and 2nd toes have circumferential necrosis, right 4th toe with dorsal necrosis  - there is some erythema in the foot proximal to the necrotic areas: currently treated with metronidazole and cefepime for possible cellulitis   - imaging without overt osteomyelitis  - suspect PAD. Unable to perform contrast imaging due to renal function. Arterial US of right LE: Monophasic flow in the right peroneal artery.  Low velocities.  - HbA1c 5.4% - no DM  - Appreciate podiatry assistance  - Vascular surgery consulted as well  - Continue dressings per podiatry.   - waiting for renal function to improve to perform further imaging of vessels

## 2020-04-22 NOTE — PROGRESS NOTES
Consult Note  Nephrology    Consult Requested By: Jeanna Beckman MD  Reason for Consult: MICKEY    SUBJECTIVE:     History of Present Illness:  Patient is a 77 y.o. female presents with gangrenous right toe/ foot and found to have MICKEY with Cr 5.5 and down to 5.1 this am. She deines any history of CKD and states that she only takes HCTZ for HTN and no other routine meds. She does repots that when her foot started itching and swelling more approximately 8 days ago she began taking Ibuprofen bid. There are no labs since 2013 because she tells me that she does not like to have her blood drawn.    Interval History:  She feels well today. Her insight into this is remains very poor.  Denies CP, SOB, Palps N/V.      Past Medical History:   Diagnosis Date    Glaucoma     Glaucoma (increased eye pressure)     Hypertension      Past Surgical History:   Procedure Laterality Date    APPENDECTOMY      CATARACT EXTRACTION W/  INTRAOCULAR LENS IMPLANT Left 10/16/2013        CATARACT EXTRACTION W/  INTRAOCULAR LENS IMPLANT Right 12/18/2013        HYSTERECTOMY       Family History   Problem Relation Age of Onset    Heart disease Mother     Cancer Sister     Amblyopia Neg Hx     Blindness Neg Hx     Macular degeneration Neg Hx     Retinal detachment Neg Hx     Stroke Neg Hx     Thyroid disease Neg Hx     Cataracts Neg Hx     Glaucoma Neg Hx      Social History     Tobacco Use    Smoking status: Never Smoker    Smokeless tobacco: Never Used   Substance Use Topics    Alcohol use: No    Drug use: Not on file       Medications Prior to Admission   Medication Sig Dispense Refill Last Dose    latanoprost (XALATAN) 0.005 % ophthalmic solution Place 1 drop into both eyes every evening. 2.5 mL 12 4/19/2020    timolol maleate 0.5% (TIMOPTIC) 0.5 % Drop Place 1 drop into both eyes 2 (two) times daily. 5 mL 12 4/19/2020    hydroCHLOROthiazide (HYDRODIURIL) 12.5 MG Tab Take 1 tablet (12.5 mg total) by  mouth once daily. 90 tablet 2 Unknown       Review of patient's allergies indicates:  No Known Allergies     Review of Systems:  Constitutional: No fever or chills, no weight changes.  Eyes: No visual changes or photophobia  HEENT: No nasal congestion or sore throat  Respiratory: No cough or shorness of breath  Cardiovascular: No chest pain or palpitations  Gastrointestinal: Good appetite, no nausea or vomiting, no change in bowel habits  Genitourinary: No hematuria or dysuria  Skin: No rash or pruritis, black toe/ red foot, itching  Hematologic/lymphatic: No easy bruising, bleeding or lymphadenopathy  Musculoskeletal: No arthralgias or myalgias  Neurological: No seizures or tremors  Endocrine: No heat/cold intolerance.  No polyuria/polydipsia.  Psychiatric:  No depression or anxiety. +Poor insight    OBJECTIVE:     Vital Signs (Most Recent)  Temp: 99.1 °F (37.3 °C) (04/22/20 1132)  Pulse: 78 (04/22/20 1132)  Resp: 20 (04/22/20 1132)  BP: (!) 177/77 (04/22/20 1132)  SpO2: 99 % (04/22/20 1132)    Vital Signs Range (Last 24H):  Temp:  [98.1 °F (36.7 °C)-99.4 °F (37.4 °C)]   Pulse:  []   Resp:  [16-20]   BP: (137-184)/(67-81)   SpO2:  [97 %-99 %]     Physical Exam:    General appearance: Well developed, thin  Head: Normocephalic, atraumatic  Eyes:  Conjunctivae nl. Sclera anicteric. PERRL.  HEENT: Lips, mucosa, and tongue normal; teeth and gums normal and oropharynx clear.  Neck: Supple, trachea midline, thyroid not enlarged,   Lungs: Clear to auscultation bilaterally and normal respiratory effort  Heart: Regular rate and rhythm, S1, S2 normal, no murmur, click, rub or gallop  Abdomen: Soft, non-tender non-distended; bowel sounds normal; no masses,  no organomegaly  Extremities: dry gangrene of right 1st and tip of 2nd toes and now 4th. No edema  Pulses: nonpalpable  Neurologic: Normal strength and tone. No focal numbness or weakness  Psychiatric:  Alert and oriented times 3.       Diagnostic Results:  Labs:  Reviewed  X-Ray: Reviewed  US: Reviewed    ASSESSMENT/PLAN:     1. MICKEY likely due to right foot gangrene and Ibuprofen/ Hypotension/ Hypokalemia  -Last know Cr 2013 was 0.9  -Current Cr 2.6 with mild acidosis and hypokalemia  -CPK is normal  -FeUA is low  -Renal US no obstruction, 2 right non-obstructing calculi and neg urine eosin.  -K replaced  -Trend Cr improving  -Continued current IVFs, but will decrease rate to 75.  -Avoid nephrotoxins, NSAIDs/ PPI and renal dose meds.    2. HTN  -on Hydralazine.  -Increased Nifedipine XL to 60 mg daily 4/21, watch trend    3. Dry Gangrene of right toes/ foot.  -On cefepime and Flagyl, appears dry at present though  -Discussed with Dr. Drummond.  -Renal function improving and may be better candidate for evaluation in a few days.    4. Fe Defic Anemia/ Relative B12 Defic  -Started oral Fe for now  -Started 500mcg daily  B12,  -Normal folate and TSH    Thanks for consultation, will follow along with you.

## 2020-04-22 NOTE — SUBJECTIVE & OBJECTIVE
"Interval History: Patient reports that she is overall feeling well. She has been taking off the dressing for her R foot. Today she did not have any dressing on - her nurse was informed. Also, patient has poor insight into her condition. She continues to say that her foot is the way it is because she scratched too hard. She believes that her toes are getting better and that the black is due to "bruising". She has been informed of her current illness multiple times, but she does not seem to grasp it.     Review of Systems   Constitutional: Negative for chills and fever.   HENT: Negative for congestion and rhinorrhea.    Respiratory: Negative for cough and shortness of breath.    Cardiovascular: Negative for chest pain, palpitations and leg swelling.   Gastrointestinal: Negative for abdominal pain, diarrhea, nausea and vomiting.   Musculoskeletal: Negative for arthralgias and myalgias.        + right foot pain   Skin:        + black color change to right toes   Neurological: Negative for dizziness and headaches.     Objective:     Vital Signs (Most Recent):  Temp: 98.8 °F (37.1 °C) (04/22/20 0850)  Pulse: 93 (04/22/20 0850)  Resp: 18 (04/22/20 0850)  BP: 137/67 (04/22/20 0850)  SpO2: 97 % (04/22/20 0850) Vital Signs (24h Range):  Temp:  [98.1 °F (36.7 °C)-99.4 °F (37.4 °C)] 98.8 °F (37.1 °C)  Pulse:  [] 93  Resp:  [16-20] 18  SpO2:  [97 %-99 %] 97 %  BP: (137-184)/(67-81) 137/67     Weight: 52 kg (114 lb 10.2 oz)  Body mass index is 17.96 kg/m².    Intake/Output Summary (Last 24 hours) at 4/22/2020 1054  Last data filed at 4/22/2020 0500  Gross per 24 hour   Intake 4388.33 ml   Output 200 ml   Net 4188.33 ml      Physical Exam   Constitutional: She is oriented to person, place, and time. She appears well-developed and well-nourished. No distress.   HENT:   Head: Normocephalic and atraumatic.   Eyes: Conjunctivae are normal. No scleral icterus.   Neck: Normal range of motion. Neck supple.   Cardiovascular: Normal " rate, regular rhythm and normal heart sounds.   Pulmonary/Chest: Effort normal and breath sounds normal. No respiratory distress.   Abdominal: Soft. Bowel sounds are normal. There is no tenderness.   Neurological: She is alert and oriented to person, place, and time.   Skin: Skin is warm and dry.   On right foot: necrotic changes present in great toe, 2nd toe and 4th toe. Toes with black color change on dorsal and plantar surfaces. No sensation to those toes.   Toe nails thickened and discolored bilaterally, mycotic appearance      Nursing note and vitals reviewed.      Significant Labs:   BMP:   Recent Labs   Lab 04/22/20  0344         K 3.2*   *   CO2 23   BUN 27*   CREATININE 2.6*   CALCIUM 8.8   MG 1.6     CBC:   Recent Labs   Lab 04/21/20  0354 04/22/20  0344   WBC 8.04 7.11   HGB 10.3* 9.4*   HCT 32.0* 28.9*    305

## 2020-04-22 NOTE — CONSULTS
[facility]  Cardiology  Consult Note    Patient Name: Home De Dios  MRN: [id number]  Admission Date: 4/19/2020  Hospital Length of Stay: 3 days  Code Status: Full Code   Attending Provider: Jeanna Beckman MD   Consulting Provider: Coral Baron MD  Primary Care Physician: Quintin Cerda MD  Principal Problem:Gangrene of toe of right foot    Patient information was obtained from patient, past medical records and ER records.     Inpatient consult to Cardiology  Consult performed by: Coral Baron MD  Consult ordered by: Lewis Drummond Jr., MD  Reason for consult: Gangrene of toes right foot         Subjective:     Chief Complaint:  Gangrene of toes right foot.     HPI:    Home De Dios is a 77 y.o.female with hypertension. She had pain in the left foot and soaked her feet in water on about 4/1/2020. She noted discoloration of several toes on the right foot. She developed rest pain. She was afraid of going to the hospital because of the COVID situation. She presented on 4/19/2020 with gangrenous toes and acute renal failure. She was admitted.        Past Medical History:   Diagnosis Date    Glaucoma     Glaucoma (increased eye pressure)     Hypertension        Past Surgical History:   Procedure Laterality Date    APPENDECTOMY      CATARACT EXTRACTION W/  INTRAOCULAR LENS IMPLANT Left 10/16/2013        CATARACT EXTRACTION W/  INTRAOCULAR LENS IMPLANT Right 12/18/2013        HYSTERECTOMY         Review of patient's allergies indicates:  No Known Allergies    No current facility-administered medications on file prior to encounter.      Current Outpatient Medications on File Prior to Encounter   Medication Sig    latanoprost (XALATAN) 0.005 % ophthalmic solution Place 1 drop into both eyes every evening.    timolol maleate 0.5% (TIMOPTIC) 0.5 % Drop Place 1 drop into both eyes 2 (two) times daily.    hydroCHLOROthiazide (HYDRODIURIL) 12.5 MG Tab Take 1 tablet  (12.5 mg total) by mouth once daily.     Family History     Problem Relation (Age of Onset)    Cancer Sister    Heart disease Mother        Tobacco Use    Smoking status: Never Smoker    Smokeless tobacco: Never Used   Substance and Sexual Activity    Alcohol use: No    Drug use: Not on file    Sexual activity: Not on file     Review of Systems   Constitution: Negative for chills, fever and malaise/fatigue.   HENT: Negative for nosebleeds.    Eyes: Negative for vision loss in left eye and vision loss in right eye.   Cardiovascular: Negative for chest pain, leg swelling, orthopnea, palpitations and paroxysmal nocturnal dyspnea.   Respiratory: Negative for cough, hemoptysis, shortness of breath, sputum production and wheezing.    Hematologic/Lymphatic: Negative for bleeding problem.   Skin: Positive for color change (Gangrenous toes right foot). Negative for rash.   Musculoskeletal: Negative for myalgias.   Gastrointestinal: Negative for abdominal pain, heartburn, hematemesis, hematochezia, jaundice, melena, nausea and vomiting.   Genitourinary: Negative for hematuria.   Neurological: Negative for dizziness, headaches, light-headedness, vertigo and weakness.   Psychiatric/Behavioral: Negative for altered mental status. The patient is not nervous/anxious.    Allergic/Immunologic: Negative for persistent infections.     Objective:     Vital Signs (Most Recent):  Temp: 98.8 °F (37.1 °C) (04/22/20 1606)  Pulse: 85 (04/22/20 1606)  Resp: 20 (04/22/20 1606)  BP: 128/60 (04/22/20 1606)  SpO2: 98 % (04/22/20 1606) Vital Signs (24h Range):  Temp:  [98.7 °F (37.1 °C)-99.4 °F (37.4 °C)] 98.8 °F (37.1 °C)  Pulse:  [] 85  Resp:  [16-20] 20  SpO2:  [97 %-99 %] 98 %  BP: (128-177)/(60-79) 128/60     Weight: 52 kg (114 lb 10.2 oz)  Body mass index is 17.96 kg/m².    SpO2: 98 %  O2 Device (Oxygen Therapy): room air      Intake/Output Summary (Last 24 hours) at 4/22/2020 1716  Last data filed at 4/22/2020 1400  Gross per 24  hour   Intake 1628.33 ml   Output --   Net 1628.33 ml       Lines/Drains/Airways     Peripheral Intravenous Line                 Peripheral IV - Single Lumen 04/19/20 1613 20 G Left Forearm 3 days                Physical Exam   Constitutional: She is oriented to person, place, and time. She appears well-developed and well-nourished.  Non-toxic appearance. No distress.   HENT:   Head: Normocephalic and atraumatic.   Nose: Nose normal.   Eyes: Right eye exhibits no discharge. Left eye exhibits no discharge. Right conjunctiva is not injected. Left conjunctiva is not injected. Right pupil is round. Left pupil is round. Pupils are equal.   Neck: Neck supple. No JVD present. Carotid bruit is not present. No thyromegaly present.   Cardiovascular: Normal rate, regular rhythm, S1 normal and S2 normal.  No extrasystoles are present. PMI is not displaced. Exam reveals gallop and S4.   Pulses:       Radial pulses are 2+ on the right side, and 2+ on the left side.        Femoral pulses are 2+ on the right side, and 2+ on the left side.       Popliteal pulses are 1+ on the right side, and 1+ on the left side.        Dorsalis pedis pulses are 0 on the right side, and 0 on the left side.        Posterior tibial pulses are 0 on the right side, and 0 on the left side.   Complete gangrene of toes 1-4 of right foot.   Pulmonary/Chest: Effort normal and breath sounds normal.   Abdominal: Soft. Normal appearance. There is no hepatosplenomegaly. There is no tenderness.   Musculoskeletal:        Right ankle: She exhibits no swelling, no ecchymosis and no deformity.        Left ankle: She exhibits no swelling, no ecchymosis and no deformity.   Lymphadenopathy:        Head (right side): No submandibular adenopathy present.        Head (left side): No submandibular adenopathy present.     She has no cervical adenopathy.   Neurological: She is alert and oriented to person, place, and time. She is not disoriented. No cranial nerve deficit.    Skin: Skin is warm, dry and intact. No rash noted. She is not diaphoretic. No cyanosis. Nails show no clubbing.   Psychiatric: She has a normal mood and affect. Her speech is normal and behavior is normal. Judgment and thought content normal. Cognition and memory are normal.       Current Medications:     ceFEPime (MAXIPIME) IVPB  1 g Intravenous Q24H    cyanocobalamin  500 mcg Oral Daily    ferrous sulfate  325 mg Oral Every other day    latanoprost  1 drop Both Eyes QHS    metronidazole  500 mg Intravenous Q8H    NIFEdipine  60 mg Oral Daily    timolol maleate 0.5%  1 drop Both Eyes BID     Current Laboratory Results:    Recent Results (from the past 24 hour(s))   Basic Metabolic Panel (BMP)    Collection Time: 04/22/20  3:44 AM   Result Value Ref Range    Sodium 145 136 - 145 mmol/L    Potassium 3.2 (L) 3.5 - 5.1 mmol/L    Chloride 113 (H) 95 - 110 mmol/L    CO2 23 23 - 29 mmol/L    Glucose 103 70 - 110 mg/dL    BUN, Bld 27 (H) 8 - 23 mg/dL    Creatinine 2.6 (H) 0.5 - 1.4 mg/dL    Calcium 8.8 8.7 - 10.5 mg/dL    Anion Gap 9 8 - 16 mmol/L    eGFR if African American 20 (A) >60 mL/min/1.73 m^2    eGFR if non African American 17 (A) >60 mL/min/1.73 m^2   Magnesium    Collection Time: 04/22/20  3:44 AM   Result Value Ref Range    Magnesium 1.6 1.6 - 2.6 mg/dL   Phosphorus    Collection Time: 04/22/20  3:44 AM   Result Value Ref Range    Phosphorus 3.2 2.7 - 4.5 mg/dL   CBC with Automated Differential    Collection Time: 04/22/20  3:44 AM   Result Value Ref Range    WBC 7.11 3.90 - 12.70 K/uL    RBC 3.60 (L) 4.00 - 5.40 M/uL    Hemoglobin 9.4 (L) 12.0 - 16.0 g/dL    Hematocrit 28.9 (L) 37.0 - 48.5 %    Mean Corpuscular Volume 80 (L) 82 - 98 fL    Mean Corpuscular Hemoglobin 26.1 (L) 27.0 - 31.0 pg    Mean Corpuscular Hemoglobin Conc 32.5 32.0 - 36.0 g/dL    RDW 12.9 11.5 - 14.5 %    Platelets 305 150 - 350 K/uL    MPV 10.4 9.2 - 12.9 fL    Immature Granulocytes 0.3 0.0 - 0.5 %    Gran # (ANC) 4.7 1.8 - 7.7  K/uL    Immature Grans (Abs) 0.02 0.00 - 0.04 K/uL    Lymph # 1.5 1.0 - 4.8 K/uL    Mono # 0.6 0.3 - 1.0 K/uL    Eos # 0.2 0.0 - 0.5 K/uL    Baso # 0.04 0.00 - 0.20 K/uL    nRBC 0 0 /100 WBC    Gran% 66.5 38.0 - 73.0 %    Lymph% 21.5 18.0 - 48.0 %    Mono% 8.7 4.0 - 15.0 %    Eosinophil% 2.4 0.0 - 8.0 %    Basophil% 0.6 0.0 - 1.9 %    Differential Method Automated      Current Imaging Results:    US Retroperitoneal Complete (Kidney and   Final Result      Findings suggestive of medical renal disease.      Two nonobstructing renal stones in the right kidney, both measuring 3 mm.         Electronically signed by: Johnny Sams MD   Date:    04/20/2020   Time:    15:58      US Lower Extremity Arteries Right   Final Result   Abnormal      Monophasic flow in the right peroneal artery.  Low velocities.      This report was flagged in Epic as abnormal.         Electronically signed by: Angela Saenz   Date:    04/19/2020   Time:    19:22      X-Ray Foot Complete Right   Final Result      No evidence of acute fracture or dislocation of the right foot.  No osseous erosions.  Follow-up with MRI of the right foot, as clinically warranted.      Advanced vascular calcifications.         Electronically signed by: Efraín Blanc MD   Date:    04/19/2020   Time:    17:10        4/19/2020: Arterial Duplex:    EXAMINATION:  US LOWER EXTREMITY ARTERIES RIGHT    CLINICAL HISTORY:  Gangrene, not elsewhere classified    TECHNIQUE:  Duplex and color flow Doppler evaluation and graded compression of the right lower extremity veins was performed.    COMPARISON:  None    FINDINGS:  The right peak systolic velocities are given in cm/sec:    CFA: 79.5, triphasic    Proximal femoral: 62.3, triphasic    Mid femoral: 63.2, triphasic    Distal femoral: 39.6, triphasic    Popliteal: 57.5, biphasic    Peroneal: Not recorded, monophasic    PTA: 30.4, biphasic    CRISS: 17.6, biphasic    DPA 11.8: Biphasic      Impression       Monophasic flow in the  right peroneal artery.  Low velocities.    This report was flagged in Epic as abnormal.      Electronically signed by: Angela Saenz  Date: 04/19/2020  Time: 19:22         Assessment and Plan:     Active Diagnoses:    Diagnosis Date Noted POA    PRINCIPAL PROBLEM:  Gangrene of toes of right foot [I96] 04/19/2020 Yes    Hypokalemia [E87.6] 04/20/2020 Yes    MICKEY (acute kidney injury) [N17.9] 04/19/2020 Yes    Essential hypertension [I10] 06/18/2013 Yes     Chronic      Problems Resolved During this Admission:     Assessment and Plan:    1. Peripheral Artery Disease   4/1/2020: Began noticing discoloration of toes right foot.   4/19/2020: Presents with gangrene of toes right foot.   4/19/2020: Arterial Duplex: Slow distal runoff.   Probably has severe distal disease. Fairly unlikely she can be helped by revascularization.   Appears she will need TMA or BKA.   4/24/2020: Plan selective angio of right leg. Wait another day for renal function to improve.    2. Hypertension    3. Acute Kidney Disease   4/19/2020: BUN/crea 44/5.5.   4/22/2020: BUN/crea 27/2.6.   Dr. Gisela Wharton.   Hydration.   Angiogram obviously of concern with AKD. Selective raghav, hydration and minimize dye load.          VTE Risk Mitigation (From admission, onward)         Ordered     Place sequential compression device  Until discontinued      04/19/20 2118     IP VTE HIGH RISK PATIENT  Once      04/19/20 2118     Reason for No Pharmacological VTE Prophylaxis  Once     Question:  Reasons:  Answer:  Physician Provided (leave comment)    04/19/20 2118                Thank you for your consult.    I will follow-up with patient. Please contact us if you have any additional questions.    Coral Baron MD  Cardiology   [facility]

## 2020-04-22 NOTE — ASSESSMENT & PLAN NOTE
- Unknown baseline Cr. Cr is slowly trending down.   - holding home HCTZ   - suspect 2/2 poorly controlled HTN vs infection/gangrene   - avoid nephrotoxins  - renally dose meds   - Nephrology consulted   - Continue IVF - LR at 100 cc/hr

## 2020-04-23 ENCOUNTER — PATIENT OUTREACH (OUTPATIENT)
Dept: ADMINISTRATIVE | Facility: OTHER | Age: 80
End: 2020-04-23

## 2020-04-23 LAB
ANION GAP SERPL CALC-SCNC: 9 MMOL/L (ref 8–16)
BUN SERPL-MCNC: 18 MG/DL (ref 8–23)
CALCIUM SERPL-MCNC: 8.8 MG/DL (ref 8.7–10.5)
CHLORIDE SERPL-SCNC: 111 MMOL/L (ref 95–110)
CO2 SERPL-SCNC: 25 MMOL/L (ref 23–29)
CREAT SERPL-MCNC: 1.9 MG/DL (ref 0.5–1.4)
ERYTHROCYTE [DISTWIDTH] IN BLOOD BY AUTOMATED COUNT: 13 % (ref 11.5–14.5)
EST. GFR  (AFRICAN AMERICAN): 29 ML/MIN/1.73 M^2
EST. GFR  (NON AFRICAN AMERICAN): 25 ML/MIN/1.73 M^2
GLUCOSE SERPL-MCNC: 100 MG/DL (ref 70–110)
HCT VFR BLD AUTO: 29.3 % (ref 37–48.5)
HGB BLD-MCNC: 9.4 G/DL (ref 12–16)
MAGNESIUM SERPL-MCNC: 1.6 MG/DL (ref 1.6–2.6)
MCH RBC QN AUTO: 26.1 PG (ref 27–31)
MCHC RBC AUTO-ENTMCNC: 32.1 G/DL (ref 32–36)
MCV RBC AUTO: 81 FL (ref 82–98)
PHOSPHATE SERPL-MCNC: 2.3 MG/DL (ref 2.7–4.5)
PLATELET # BLD AUTO: 322 K/UL (ref 150–350)
PMV BLD AUTO: 10.6 FL (ref 9.2–12.9)
POTASSIUM SERPL-SCNC: 3.5 MMOL/L (ref 3.5–5.1)
RBC # BLD AUTO: 3.6 M/UL (ref 4–5.4)
SODIUM SERPL-SCNC: 145 MMOL/L (ref 136–145)
WBC # BLD AUTO: 7.36 K/UL (ref 3.9–12.7)

## 2020-04-23 PROCEDURE — S0030 INJECTION, METRONIDAZOLE: HCPCS | Mod: HCNC | Performed by: PHYSICIAN ASSISTANT

## 2020-04-23 PROCEDURE — 25000003 PHARM REV CODE 250: Mod: HCNC | Performed by: PHYSICIAN ASSISTANT

## 2020-04-23 PROCEDURE — 63600175 PHARM REV CODE 636 W HCPCS: Mod: HCNC | Performed by: INTERNAL MEDICINE

## 2020-04-23 PROCEDURE — 25000003 PHARM REV CODE 250: Mod: HCNC | Performed by: INTERNAL MEDICINE

## 2020-04-23 PROCEDURE — 99233 PR SUBSEQUENT HOSPITAL CARE,LEVL III: ICD-10-PCS | Mod: HCNC,,, | Performed by: FAMILY MEDICINE

## 2020-04-23 PROCEDURE — 97530 THERAPEUTIC ACTIVITIES: CPT | Mod: HCNC,CQ

## 2020-04-23 PROCEDURE — 97530 THERAPEUTIC ACTIVITIES: CPT | Mod: HCNC

## 2020-04-23 PROCEDURE — 80048 BASIC METABOLIC PNL TOTAL CA: CPT | Mod: HCNC

## 2020-04-23 PROCEDURE — 83735 ASSAY OF MAGNESIUM: CPT | Mod: HCNC

## 2020-04-23 PROCEDURE — 99232 SBSQ HOSP IP/OBS MODERATE 35: CPT | Mod: HCNC,,, | Performed by: PODIATRIST

## 2020-04-23 PROCEDURE — 85027 COMPLETE CBC AUTOMATED: CPT | Mod: HCNC

## 2020-04-23 PROCEDURE — 63600175 PHARM REV CODE 636 W HCPCS: Mod: HCNC | Performed by: FAMILY MEDICINE

## 2020-04-23 PROCEDURE — 63600175 PHARM REV CODE 636 W HCPCS: Mod: HCNC | Performed by: PHYSICIAN ASSISTANT

## 2020-04-23 PROCEDURE — 36415 COLL VENOUS BLD VENIPUNCTURE: CPT | Mod: HCNC

## 2020-04-23 PROCEDURE — 97116 GAIT TRAINING THERAPY: CPT | Mod: HCNC,CQ

## 2020-04-23 PROCEDURE — 84100 ASSAY OF PHOSPHORUS: CPT | Mod: HCNC

## 2020-04-23 PROCEDURE — 25000003 PHARM REV CODE 250: Mod: HCNC | Performed by: FAMILY MEDICINE

## 2020-04-23 PROCEDURE — 11000001 HC ACUTE MED/SURG PRIVATE ROOM: Mod: HCNC

## 2020-04-23 PROCEDURE — 99232 PR SUBSEQUENT HOSPITAL CARE,LEVL II: ICD-10-PCS | Mod: HCNC,,, | Performed by: PODIATRIST

## 2020-04-23 PROCEDURE — 99233 SBSQ HOSP IP/OBS HIGH 50: CPT | Mod: HCNC,,, | Performed by: FAMILY MEDICINE

## 2020-04-23 PROCEDURE — 94761 N-INVAS EAR/PLS OXIMETRY MLT: CPT | Mod: HCNC

## 2020-04-23 RX ORDER — NIFEDIPINE 30 MG/1
30 TABLET, EXTENDED RELEASE ORAL ONCE
Status: COMPLETED | OUTPATIENT
Start: 2020-04-23 | End: 2020-04-23

## 2020-04-23 RX ORDER — NIFEDIPINE 30 MG/1
90 TABLET, EXTENDED RELEASE ORAL DAILY
Status: DISCONTINUED | OUTPATIENT
Start: 2020-04-24 | End: 2020-04-30

## 2020-04-23 RX ORDER — SODIUM,POTASSIUM PHOSPHATES 280-250MG
1 POWDER IN PACKET (EA) ORAL ONCE
Status: COMPLETED | OUTPATIENT
Start: 2020-04-23 | End: 2020-04-23

## 2020-04-23 RX ORDER — HYDROCODONE BITARTRATE AND ACETAMINOPHEN 5; 325 MG/1; MG/1
1 TABLET ORAL EVERY 6 HOURS PRN
Status: DISCONTINUED | OUTPATIENT
Start: 2020-04-23 | End: 2020-04-30

## 2020-04-23 RX ORDER — TRAMADOL HYDROCHLORIDE 50 MG/1
50 TABLET ORAL EVERY 12 HOURS PRN
Status: DISCONTINUED | OUTPATIENT
Start: 2020-04-23 | End: 2020-05-01 | Stop reason: HOSPADM

## 2020-04-23 RX ADMIN — TIMOLOL MALEATE 1 DROP: 5 SOLUTION OPHTHALMIC at 10:04

## 2020-04-23 RX ADMIN — POTASSIUM & SODIUM PHOSPHATES POWDER PACK 280-160-250 MG 1 PACKET: 280-160-250 PACK at 01:04

## 2020-04-23 RX ADMIN — NIFEDIPINE 60 MG: 30 TABLET, FILM COATED, EXTENDED RELEASE ORAL at 08:04

## 2020-04-23 RX ADMIN — METRONIDAZOLE 500 MG: 500 INJECTION, SOLUTION INTRAVENOUS at 06:04

## 2020-04-23 RX ADMIN — LATANOPROST 1 DROP: 50 SOLUTION OPHTHALMIC at 08:04

## 2020-04-23 RX ADMIN — METRONIDAZOLE 500 MG: 500 INJECTION, SOLUTION INTRAVENOUS at 03:04

## 2020-04-23 RX ADMIN — ASPIRIN 81 MG: 81 TABLET, COATED ORAL at 08:04

## 2020-04-23 RX ADMIN — NIFEDIPINE 30 MG: 30 TABLET, FILM COATED, EXTENDED RELEASE ORAL at 01:04

## 2020-04-23 RX ADMIN — CEFEPIME 1 G: 1 INJECTION, POWDER, FOR SOLUTION INTRAMUSCULAR; INTRAVENOUS at 02:04

## 2020-04-23 RX ADMIN — TIMOLOL MALEATE 1 DROP: 5 SOLUTION OPHTHALMIC at 08:04

## 2020-04-23 RX ADMIN — HYDRALAZINE HYDROCHLORIDE 25 MG: 25 TABLET, FILM COATED ORAL at 08:04

## 2020-04-23 RX ADMIN — SODIUM CHLORIDE, SODIUM LACTATE, POTASSIUM CHLORIDE, AND CALCIUM CHLORIDE: .6; .31; .03; .02 INJECTION, SOLUTION INTRAVENOUS at 02:04

## 2020-04-23 RX ADMIN — FERROUS SULFATE TAB EC 325 MG (65 MG FE EQUIVALENT) 325 MG: 325 (65 FE) TABLET DELAYED RESPONSE at 08:04

## 2020-04-23 RX ADMIN — HEPARIN SODIUM 5000 UNITS: 5000 INJECTION, SOLUTION INTRAVENOUS; SUBCUTANEOUS at 08:04

## 2020-04-23 RX ADMIN — ACETAMINOPHEN 650 MG: 325 TABLET ORAL at 05:04

## 2020-04-23 RX ADMIN — METRONIDAZOLE 500 MG: 500 INJECTION, SOLUTION INTRAVENOUS at 10:04

## 2020-04-23 RX ADMIN — HYDROCODONE BITARTRATE AND ACETAMINOPHEN 1 TABLET: 5; 325 TABLET ORAL at 01:04

## 2020-04-23 RX ADMIN — HYDROCODONE BITARTRATE AND ACETAMINOPHEN 1 TABLET: 5; 325 TABLET ORAL at 08:04

## 2020-04-23 RX ADMIN — CYANOCOBALAMIN TAB 500 MCG 500 MCG: 500 TAB at 08:04

## 2020-04-23 RX ADMIN — CLOPIDOGREL BISULFATE 75 MG: 75 TABLET ORAL at 08:04

## 2020-04-23 NOTE — PLAN OF CARE
Pt in room watching tv at moment. AAO x 4. VSS. PRN pain medications given as needed. Pt tolerated

## 2020-04-23 NOTE — PT/OT/SLP PROGRESS
Occupational Therapy   Treatment    Name: Home De Dios  MRN: [id number]  Admitting Diagnosis:  Gangrene of toe of right foot       Recommendations:     Discharge Recommendations: home health OT, home health PT  Discharge Equipment Recommendations:  walker, rolling  Barriers to discharge:  None    Assessment:     Home De Dios is a 77 y.o. female with a medical diagnosis of Gangrene of toe of right foot.  She presents with significant pain in toes on R foot. Performance deficits affecting function are weakness, impaired endurance, impaired functional mobilty, impaired self care skills, impaired balance, decreased coordination, decreased lower extremity function, decreased upper extremity function, decreased ROM, pain, decreased safety awareness.  Pt agreeable to participating in therapy upon arrival to room.  Pt able to ambulate with RW and perform grooming task while standing at sink with SBA.  Cues required for RW management during transfers to promote increased safety and stability 2* poor safety awareness noted.  Pt would continue to benefit from skilled OT services to address problems listed above and increase independence with ADLs.  Home health is recommended upon d/c from acute care to further address deficits and help pt improve overall functional independence.     Rehab Prognosis:  Good; patient would benefit from acute skilled OT services to address these deficits and reach maximum level of function.       Plan:     Patient to be seen 4 x/week to address the above listed problems via self-care/home management, therapeutic exercises, therapeutic activities  · Plan of Care Expires: 05/21/20  · Plan of Care Reviewed with: patient    Subjective     Pain/Comfort:  · Pain Rating 1: 10/10  · Location - Side 1: Right  · Location - Orientation 1: (toes)  · Pain Addressed 2: Cessation of Activity, Nurse notified  · Pain Rating Post-Intervention 2: 10/10    Objective:     Communicated with: RN Clemente) prior to  session.  Patient found up in chair with peripheral IV upon OT entry to room.    General Precautions: Standard, fall   Orthopedic Precautions:(OK for weight bearing while wearing DARCO shoe on R foot)   Braces: N/A     Occupational Performance:     Bed Mobility:    · Patient completed Scooting/Bridging with modified independence  · Patient completed Sit to Supine with modified independence     Functional Mobility/Transfers:  · Sit <> Stand:  SBA and RW x 2 trials from chair.  · Step transfer from chair <> bed: SBA and RW; poor safety awareness observed with pt placing right foot outside RW and trying to balance herself with one hand on bed and other hand on armrest of chair.  Cues required for proper positioning of both feet inside RW and for hand placement.  · Functional Mobility: Pt ambulated ~30 ft within room with SBA and RW.  Pt able to maneuver RW safely during task.    Activities of Daily Living:  · Grooming: stand by assistance for standing at sink with RW and washing hands.  Pt remained in hunched position during task leaning over RW and resting both forearms on sink.  · Lower Body Dressing: Supervision for doffing DARCO shoe on right foot while seated on EOB.      [facility] 6 Click ADL: 20    Treatment & Education:  *Pt ambulated to bathroom to address functional mobility and to perform grooming task  *Pt performed 2 UB exercises to promote increased endurance needed for ADLs:   -Forward circular rows: 2 sets x 10 reps seated up in chair  -Horizontal shoulder abduction/adduction: 2 sets x 10 reps while seated at EOB.  Pt lifted both feet off ground during exercise  *POC reviewed with pt    Patient left HOB elevated with all lines intact, call button in reach, bed alarm on and RN notifiedEducation:      GOALS:   Multidisciplinary Problems     Occupational Therapy Goals        Problem: Occupational Therapy Goal    Goal Priority Disciplines Outcome Interventions   Occupational Therapy Goal     OT, PT/OT Ongoing,  Progressing    Description:  Goals to be met by: 4/28/2020     Patient will increase functional independence with ADLs by performing:    UE Dressing with [place].  LE Dressing with Supervision.  Grooming while standing at sink with Supervision.  Toileting from toilet with Supervision for hygiene and clothing management.   Toilet transfer to toilet with Supervision.                      Time Tracking:     OT Date of Treatment: 04/23/20  OT Start Time: 1237  OT Stop Time: 1252  OT Total Time (min): 15 min    Billable Minutes:Therapeutic Activity 15    LOC Bowling  4/23/2020

## 2020-04-23 NOTE — PT/OT/SLP PROGRESS
Physical Therapy Treatment    Patient Name:  Home De Dios   MRN:  [id number]    Recommendations:     Discharge Recommendations:  home health PT, home health OT   Discharge Equipment Recommendations: walker, rolling   Barriers to discharge: None (pt will have assistance from  and son (temporarily))    Assessment:     Home De Dios is a 77 y.o. female admitted with a medical diagnosis of Gangrene of toe of right foot.  She presents with the following impairments/functional limitations:  weakness, impaired endurance, impaired self care skills, impaired functional mobilty, gait instability, impaired balance, decreased coordination, decreased lower extremity function, decreased safety awareness, pain, edema, decreased ROM, impaired skin ;pt with fair mobility today, initially keeping RLE NWB and having some balance issues, but pt eventually placed foot on floor and able to amb. Better. Req'd lots of cueing for safety throughout session, pt tends to be impulsive..    Rehab Prognosis: Good; patient would benefit from acute skilled PT services to address these deficits and reach maximum level of function.    Recent Surgery: Procedure(s) (LRB):  AORTOGRAM WITH RUNOFF (Right)      Plan:     During this hospitalization, patient to be seen 5 x/week to address the identified rehab impairments via gait training, therapeutic activities, therapeutic exercises and progress toward the following goals:    · Plan of Care Expires:  05/05/20    Subjective     Chief Complaint: foot pain  Patient/Family Comments/goals: pt agreeable to session  Pain/Comfort:  · Pain Rating 1: 10/10(at rest)  · Location - Side 1: Right  · Location 1: foot(toes)  · Pain Addressed 1: Reposition, Distraction  · Pain Rating Post-Intervention 1: 10/10(with amb)      Objective:     Communicated with nurse prior to session.  Patient found supine with peripheral IV upon PT entry to room.     General Precautions: Standard, fall   Orthopedic Precautions:(OK to  be weight bearing in DARCO shoe)   Braces: N/A     Functional Mobility:  · Bed Mobility:     · Supine to Sit: supervision  · Transfers:     · Sit to Stand:  stand by assistance with rolling walker  · Gait: amb'd 150' with RW and CGA,  WBAT on RLE in DARCO shoe.   · Stairs: pt asc/desc 4 steps with 2 rails and CGA, cueing for technique.      AM-PAC 6 CLICK MOBILITY  Turning over in bed (including adjusting bedclothes, sheets and blankets)?: 4  Sitting down on and standing up from a chair with arms (e.g., wheelchair, bedside commode, etc.): 3  Moving from lying on back to sitting on the side of the bed?: 4  Moving to and from a bed to a chair (including a wheelchair)?: 3  Need to walk in hospital room?: 3  Climbing 3-5 steps with a railing?: 3  Basic Mobility Total Score: 20       Therapeutic Activities and Exercises:   pt donned sock and DARCO shoe with SBA,  perf'd commode t/f's in bathroom with CGA/SBA and cueing for safety, perf'd hand hygiene at sink with SBA.     Patient left up in chair with all lines intact, call button in reach, nurse notified and lunch tray present..    GOALS:   Multidisciplinary Problems     Physical Therapy Goals        Problem: Physical Therapy Goal    Goal Priority Disciplines Outcome Goal Variances Interventions   Physical Therapy Goal     PT, PT/OT Ongoing, Progressing     Description:  Goals to be met by: 20    Patient will increase functional independence with mobility by performin. Don/doff DARCO shoe independently without cueing for need for shoe prior to OOB mobility.   2. Gait x 100 feet with mod(I) with RW with WBAT RLE with DARCO shoe.  3. Ascend/descend 6 step(s) with least restrictive assistive device and unilateral HR with SBA.                     Time Tracking:     PT Received On: 20  PT Start Time: 1155     PT Stop Time: 1223  PT Total Time (min): 28 min     Billable Minutes: Gait Training 18 and Therapeutic Activity 10    Treatment Type:  Treatment  PT/PTA: PTA     PTA Visit Number: 1     Darcy Sullivan, PTA  04/23/2020

## 2020-04-23 NOTE — PLAN OF CARE
Problem: Occupational Therapy Goal  Goal: Occupational Therapy Goal  Description  Goals to be met by: 4/28/2020     Patient will increase functional independence with ADLs by performing:    UE Dressing with [place].  LE Dressing with Supervision.  Grooming while standing at sink with Supervision.  Toileting from toilet with Supervision for hygiene and clothing management.   Toilet transfer to toilet with Supervision.     Outcome: Ongoing, Progressing     Pt is making progress towards goals.  Home health is recommended upon d/c from acute care to further address deficits and help pt improve overall functional independence.     LOC Bowling  4/23/2020

## 2020-04-23 NOTE — PLAN OF CARE
Pt's v/s were monitored throughout the shift. The pt ambulated to the restroom with walker. The pt remained free from falls and trauma. The pt denied any dizziness,n/v, or SOB. The pt c/o pain to her right foot and received PO tylenol. The pt's foot was redressed after old dressing came loose. The pt received IV antibiotics and tolerated well. Purposeful rounding was performed. The pt's bed remained in the lowest position with the bed wheels locked. Call light within reach. NAD noted. Will continue to monitor.

## 2020-04-23 NOTE — PROGRESS NOTES
[facility] Hospital Medicine  Progress Note    Patient Name: Home De Dios  MRN: [id number]  Patient Class: IP- Inpatient   Admission Date: 4/19/2020  Length of Stay: 4 days  Attending Physician: Jeanna Beckman MD  Primary Care Provider: Quintin Cerda MD    Subjective:     Principal Problem:Gangrene of toe of right foot    HPI:  Ms. Home De Dios is a 77 y.o. female, with PMH of HTN, POAG, who presented to [facility] ED on 4/19/20 with right toe pain. She states the toes of her right foot began changing color and turning black about 10 days ago, and have been having worsening numbness x 4-5 days. She states this began after soaking her foot in bleach and Epsom salts and then cutting her toe nails. The right foot subsequently became itchy, and then numb, particularly on the 1st, 2nd and 4th toes. She denies neuropathy or prior numbness/tingling., trauma, fever, chills. ED imaging shows no fracture/dislocaton or bony erosion. An US is pending. Labs showed MICKEY without hyperkalemia, and elevated CRP of 119. She was admitted to inpatient status.     Overview/Hospital Course:  No notes on file    Interval History: Patient overall feeling well this morning. She reported R foot pain last night. She was given Tylenol, which helped.     Review of Systems   Constitutional: Negative for chills and fever.   HENT: Negative for congestion and rhinorrhea.    Respiratory: Negative for cough and shortness of breath.    Cardiovascular: Negative for chest pain, palpitations and leg swelling.   Gastrointestinal: Negative for abdominal pain, diarrhea, nausea and vomiting.   Musculoskeletal: Negative for arthralgias and myalgias.        + right foot pain   Skin:        + black color change to right toes   Neurological: Negative for dizziness and headaches.     Objective:     Vital Signs (Most Recent):  Temp: 98.7 °F (37.1 °C) (04/23/20 0831)  Pulse: 91 (04/23/20 0831)  Resp: 18 (04/23/20 0831)  BP: (!) 185/84  (04/23/20 0831)  SpO2: 98 % (04/23/20 0831) Vital Signs (24h Range):  Temp:  [98.1 °F (36.7 °C)-99.4 °F (37.4 °C)] 98.7 °F (37.1 °C)  Pulse:  [] 91  Resp:  [17-20] 18  SpO2:  [96 %-100 %] 98 %  BP: (128-185)/(60-84) 185/84     Weight: 52 kg (114 lb 10.2 oz)  Body mass index is 17.96 kg/m².    Intake/Output Summary (Last 24 hours) at 4/23/2020 1026  Last data filed at 4/23/2020 0500  Gross per 24 hour   Intake 600 ml   Output 350 ml   Net 250 ml      Physical Exam   Constitutional: She is oriented to person, place, and time. She appears well-developed and well-nourished. No distress.   HENT:   Head: Normocephalic and atraumatic.   Eyes: Conjunctivae are normal. No scleral icterus.   Neck: Normal range of motion. Neck supple.   Cardiovascular: Normal rate, regular rhythm and normal heart sounds.   Pulmonary/Chest: Effort normal and breath sounds normal. No respiratory distress.   Abdominal: Soft. Bowel sounds are normal. There is no tenderness.   Neurological: She is alert and oriented to person, place, and time.   Skin: Skin is warm and dry.   R foot wrapped in gauze today. On right foot: necrotic changes present in great toe, 2nd toe and 4th toe. Toes with black color change on dorsal and plantar surfaces. No sensation to those toes.   Toe nails thickened and discolored bilaterally, mycotic appearance      Nursing note and vitals reviewed.      Significant Labs:   BMP:   Recent Labs   Lab 04/23/20  0330         K 3.5   *   CO2 25   BUN 18   CREATININE 1.9*   CALCIUM 8.8   MG 1.6     CBC:   Recent Labs   Lab 04/22/20  0344 04/23/20  0330   WBC 7.11 7.36   HGB 9.4* 9.4*   HCT 28.9* 29.3*    322     Lab Results   Component Value Date    CALCIUM 8.8 04/23/2020    PHOS 2.3 (L) 04/23/2020         Assessment/Plan:      * Gangrene of toes of right foot  - right great and 2nd toes have circumferential necrosis, right 4th toe with dorsal necrosis  - there was some erythema in the foot proximal  to the necrotic areas: currently treated with metronidazole and cefepime for possible cellulitis   - imaging without overt osteomyelitis  - suspect PAD. Unable to perform contrast imaging due to renal function. Arterial US of right LE: Monophasic flow in the right peroneal artery.  Low velocities.  - HbA1c 5.4% - no DM  - Appreciate podiatry assistance  - Vascular surgery and cardiology consulted as well  - Continue dressings per podiatry.   - waiting for renal function to improve to perform further imaging of vessels         MICKEY (acute kidney injury)  - Unknown baseline Cr. Cr is slowly trending down.   - holding home HCTZ   - suspect 2/2 poorly controlled HTN vs infection/gangrene   - avoid nephrotoxins  - renally dose meds   - Nephrology consulted   - Continue IVF - LR at 100 cc/hr     Hypokalemia  - Resolved   - Will monitor       Essential hypertension  - BP have been elevated during admission  - only home med is HCTZ 12.5 mg QD, holding 2/2 MICKEY   - Continue nifedipine 60 mg daily  - PRN hydralazine   - monitor         VTE Risk Mitigation (From admission, onward)         Ordered     heparin (porcine) injection 5,000 Units  Every 12 hours      04/22/20 1735     Place sequential compression device  Until discontinued      04/19/20 2118     IP VTE HIGH RISK PATIENT  Once      04/19/20 2118     Reason for No Pharmacological VTE Prophylaxis  Once     Question:  Reasons:  Answer:  Physician Provided (leave comment)    04/19/20 2118              Alexa Almodovar MD  Department of Hospital Medicine   [facility]

## 2020-04-23 NOTE — ASSESSMENT & PLAN NOTE
- BP have been elevated during admission  - only home med is HCTZ 12.5 mg QD, holding 2/2 MICKEY   - Continue nifedipine 60 mg daily  - PRN hydralazine   - monitor

## 2020-04-23 NOTE — ASSESSMENT & PLAN NOTE
- right great and 2nd toes have circumferential necrosis, right 4th toe with dorsal necrosis  - there was some erythema in the foot proximal to the necrotic areas: currently treated with metronidazole and cefepime for possible cellulitis   - imaging without overt osteomyelitis  - suspect PAD. Unable to perform contrast imaging due to renal function. Arterial US of right LE: Monophasic flow in the right peroneal artery.  Low velocities.  - HbA1c 5.4% - no DM  - Appreciate podiatry assistance  - Vascular surgery and cardiology consulted as well  - Continue dressings per podiatry.   - waiting for renal function to improve to perform further imaging of vessels

## 2020-04-23 NOTE — PROGRESS NOTES
[facility]  Podiatry  Progress Note    Patient Name: Home De Dios  MRN: [id number]  Admission Date: 4/19/2020  Hospital Length of Stay: 4 days  Attending Physician: Jeanna Beckman MD  Primary Care Provider: Quintin Cerda MD     Subjective:     Interval History: black dry gangrene toes 1-5 right foot with pain same.  Dressing CDI.  Patient pain managed on current meds.  Not ambulatory presently.  Awaiting revascularization RLE which she says may happen Monday.  No change in foot status according to patient.    Follow-up For: Procedure(s) (LRB):  AORTOGRAM WITH RUNOFF (Right)    Post-Operative Day:      Scheduled Meds:   aspirin  81 mg Oral Daily    ceFEPime (MAXIPIME) IVPB  1 g Intravenous Q24H    clopidogreL  75 mg Oral Daily    cyanocobalamin  500 mcg Oral Daily    ferrous sulfate  325 mg Oral Every other day    heparin (porcine)  5,000 Units Subcutaneous Q12H    latanoprost  1 drop Both Eyes QHS    metronidazole  500 mg Intravenous Q8H    [START ON 4/24/2020] NIFEdipine  90 mg Oral Daily    timolol maleate 0.5%  1 drop Both Eyes BID     Continuous Infusions:   lactated ringers 75 mL/hr at 04/23/20 0200     PRN Meds:acetaminophen, hydrALAZINE, HYDROcodone-acetaminophen, ondansetron, sodium chloride 0.9%, traMADoL    Review of Systems  Objective:     Vital Signs (Most Recent):  Temp: 98.5 °F (36.9 °C) (04/23/20 1531)  Pulse: 73 (04/23/20 1531)  Resp: 20 (04/23/20 1531)  BP: 129/67 (04/23/20 1531)  SpO2: 97 % (04/23/20 1531) Vital Signs (24h Range):  Temp:  [98.1 °F (36.7 °C)-99.4 °F (37.4 °C)] 98.5 °F (36.9 °C)  Pulse:  [] 73  Resp:  [17-20] 20  SpO2:  [96 %-100 %] 97 %  BP: (128-185)/(60-84) 129/67     Weight: 52 kg (114 lb 10.2 oz)  Body mass index is 17.96 kg/m².    Foot Exam    Laboratory:  A1C:   Recent Labs   Lab 04/20/20  0342   HGBA1C 5.4       Diagnostic Results:  I have reviewed all pertinent imaging results/findings within the past 24 hours. - none.    Clinical  Findings:  Dry stable gangrene of all toes right foot in varrying degrees with ulceration of uncertain/unstageable depth  without drainage, pus, tracking, fluctuance, malodor, or cardinal signs infection.     Otherwise, Skin thin, shiny, atrophic, with decreased density and distribution of pedal hair bilateral, but without hyperpigmentation, zully discoloration,  ulcers, masses, nodules or cords palpated bilateral feet and legs.     Pulses not palpable RLE, LLE trace pulses at DP and PT.  Toes tepid left, cold right, cap fill about 5 seconds left toes, none right toes.    Negative tinel sign to percussion sural, superficial peroneal, deep peroneal, saphenous, and posterior tibial nerves right and left ankles and feet.    Decreased/absent vibratory sensation bilateral feet to 128Hz tuning fork.  Diminished/loss of protective sensation all toes right (not left) to 10 gram monofilament.    No ambulation due to painful CLI. Otherwise, No pain to palpation bilateral lower extremities.  Range of motion, stability, muscle strength, and muscle tone normal bilateral feet and legs.       Assessment/Plan:     Active Diagnoses:    Diagnosis Date Noted POA    PRINCIPAL PROBLEM:  Gangrene of toes of right foot [I96] 04/19/2020 Yes    Hypokalemia [E87.6] 04/20/2020 Yes    MICKEY (acute kidney injury) [N17.9] 04/19/2020 Yes    Essential hypertension [I10] 06/18/2013 Yes     Chronic      Problems Resolved During this Admission:       Patient expresses understanding of condition well.    Will await renal recovery, revascularization, and then demarcation.  After that, we can choose appropriate amputation level right foot/ankle/leg.    Continue current dressings of beadine swab, dry kerlix changed every day or every other day.  No pressure or ambulation RLE.    Will follow.      Appoint in clinic 1 week after discharge if discharged prior to foot amputation procedure.    Ha Munguia DPM  Podiatry  [facility]

## 2020-04-23 NOTE — PROGRESS NOTES
HD #5  Dx-gangrene Rt 1-4 toes  ARF    Subjective  Denies pain Rt foot    PE  T-max--99.4  vss  Heent--anicteric  Neck--supple, no JVD, no bruits  Chest--clear  Cor--rrr  Abd--soft, no tenderness, no masses  Ext--gangrenous changes 1-4 toes RLE    Lab  hct--29  Cr--1.9    Imaging    US LOWER EXTREMITY ARTERIES RIGHT    CLINICAL HISTORY:  Gangrene, not elsewhere classified    TECHNIQUE:  Duplex and color flow Doppler evaluation and graded compression of the right lower extremity veins was performed.    COMPARISON:  None    FINDINGS:  The right peak systolic velocities are given in cm/sec:    CFA: 79.5, triphasic    Proximal femoral: 62.3, triphasic    Mid femoral: 63.2, triphasic    Distal femoral: 39.6, triphasic    Popliteal: 57.5, biphasic    Peroneal: Not recorded, monophasic    PTA: 30.4, biphasic    CRISS: 17.6, biphasic    DPA 11.8: Biphasic       Impression         Monophasic flow in the right peroneal artery.  Low velocities.    This report was flagged in Epic as abnormal.      Electronically signed by: Angela Saenz  Date: 04/19/2020  Time: 19:22                Last Resulted: 04/19/20 19:22 Order Details View Encounter Lab and         IMP  Severe PVD with gangrene toes  ARF--improved    Plan  AFRO p resolution ARF  D/w Cardiology Dr Baron

## 2020-04-23 NOTE — PLAN OF CARE
Problem: Physical Therapy Goal  Goal: Physical Therapy Goal  Description  Goals to be met by: 20    Patient will increase functional independence with mobility by performin. Don/doff DARCO shoe independently without cueing for need for shoe prior to OOB mobility.   2. Gait x 100 feet with mod(I) with RW with WBAT RLE with DARCO shoe.  3. Ascend/descend 6 step(s) with least restrictive assistive device and unilateral HR with SBA.    Outcome: Ongoing, Progressing   Pt donned shock and DARCO shoe with SBA, amb'd 150' with RW and CGA, WBAT on RLE in DARCO shoe, asc/desc 4 steps with 2 rails and CGA. Recommend HHPT

## 2020-04-23 NOTE — PROGRESS NOTES
Progress Note  Nephrology    Consult Requested By: Jeanna Beckman MD  Reason for Consult: MICKEY    SUBJECTIVE:     History of Present Illness:  Patient is a 77 y.o. female presents with gangrenous right toe/ foot and found to have MICKEY with Cr 5.5. She deines any history of CKD and states that she only takes HCTZ for HTN and no other routine meds. She does repots that when her foot started itching and swelling more approximately 8 days ago she began taking Ibuprofen bid. There are no labs since 2013 because she tells me that she does not like to have her blood drawn.    Interval History:  She feels well today. Her insight into this is remains very poor.  Denies CP, SOB, Palps N/V.      Past Medical History:   Diagnosis Date    Glaucoma     Glaucoma (increased eye pressure)     Hypertension      Past Surgical History:   Procedure Laterality Date    APPENDECTOMY      CATARACT EXTRACTION W/  INTRAOCULAR LENS IMPLANT Left 10/16/2013        CATARACT EXTRACTION W/  INTRAOCULAR LENS IMPLANT Right 12/18/2013        HYSTERECTOMY       Family History   Problem Relation Age of Onset    Heart disease Mother     Cancer Sister     Amblyopia Neg Hx     Blindness Neg Hx     Macular degeneration Neg Hx     Retinal detachment Neg Hx     Stroke Neg Hx     Thyroid disease Neg Hx     Cataracts Neg Hx     Glaucoma Neg Hx      Social History     Tobacco Use    Smoking status: Never Smoker    Smokeless tobacco: Never Used   Substance Use Topics    Alcohol use: No    Drug use: Not on file       Medications Prior to Admission   Medication Sig Dispense Refill Last Dose    latanoprost (XALATAN) 0.005 % ophthalmic solution Place 1 drop into both eyes every evening. 2.5 mL 12 4/19/2020    timolol maleate 0.5% (TIMOPTIC) 0.5 % Drop Place 1 drop into both eyes 2 (two) times daily. 5 mL 12 4/19/2020    hydroCHLOROthiazide (HYDRODIURIL) 12.5 MG Tab Take 1 tablet (12.5 mg total) by mouth once daily. 90  tablet 2 Unknown       Review of patient's allergies indicates:  No Known Allergies     Review of Systems:  Constitutional: No fever or chills, no weight changes.  Eyes: No visual changes or photophobia  HEENT: No nasal congestion or sore throat  Respiratory: No cough or shorness of breath  Cardiovascular: No chest pain or palpitations  Gastrointestinal: Good appetite, no nausea or vomiting, no change in bowel habits  Genitourinary: No hematuria or dysuria  Skin: No rash or pruritis, black toe/ red foot, itching  Hematologic/lymphatic: No easy bruising, bleeding or lymphadenopathy  Musculoskeletal: No arthralgias or myalgias  Neurological: No seizures or tremors  Endocrine: No heat/cold intolerance.  No polyuria/polydipsia.  Psychiatric:  No depression or anxiety. +Poor insight    OBJECTIVE:     Vital Signs (Most Recent)  Temp: 98.7 °F (37.1 °C) (04/23/20 0831)  Pulse: (!) 57 (04/23/20 1000)  Resp: 18 (04/23/20 0831)  BP: (!) 185/84 (04/23/20 0831)  SpO2: 98 % (04/23/20 0831)    Vital Signs Range (Last 24H):  Temp:  [98.1 °F (36.7 °C)-99.4 °F (37.4 °C)]   Pulse:  []   Resp:  [17-20]   BP: (128-185)/(60-84)   SpO2:  [96 %-100 %]     Physical Exam:    General appearance: Well developed, thin  Head: Normocephalic, atraumatic  Eyes:  Conjunctivae nl. Sclera anicteric. PERRL.  HEENT: Lips, mucosa, and tongue normal; teeth and gums normal and oropharynx clear.  Neck: Supple, trachea midline, thyroid not enlarged,   Lungs: Clear to auscultation bilaterally and normal respiratory effort  Heart: Regular rate and rhythm, S1, S2 normal, no murmur, click, rub or gallop  Abdomen: Soft, non-tender non-distended; bowel sounds normal; no masses,  no organomegaly  Extremities: dry gangrene of right 1st and tip of 2nd toes and now 4th. No edema  Pulses: nonpalpable  Neurologic: Normal strength and tone. No focal numbness or weakness  Psychiatric:  Alert and oriented times 3.       Diagnostic Results:  Labs: Reviewed  X-Ray:  Reviewed  US: Reviewed    ASSESSMENT/PLAN:     1. MICKEY likely due to right foot gangrene and Ibuprofen/ Hypotension/ Hypokalemia  -Last known Cr 2013 was 0.9  -Minimal proteinuria 280 by ratio  -Current Cr down to 1.9 with resolution of acidosis and hypokalemia  -600 in only 350 UOP reported and I suspect not accurate   -CPK is normal  -FeUA is low  -Renal US no obstruction, 2 right non-obstructing calculi and neg urine eosin.  -K replaced  -Trend Cr improving  -Continued current IVFs  -Avoid nephrotoxins, NSAIDs/ PPI and renal dose meds.    2. HTN  -Increased Nifedipine XL to 60 mg daily 4/21 but still poorly controlled  -Will increase Nifedipine XL to 90 mg daily and supplement today's dose  -Continue the prn hydralazine  -Was on HCTZ monotherapy at home but no diuretics at present.    3. Dry Gangrene of right toes/ foot, severe PAD  -On cefepime and Flagyl, appears dry at present though  -Dr. Baron planning selective angio assuming renal function continues to improve  -He suspects she will need BKA or TMA  -Renal function improving and may be better candidate for evaluation in a few days.    4. Fe Defic Anemia/ Relative B12 Defic  -Started oral Fe for now  -Started 500mcg daily  B12,  -Normal folate and TSH  -H/H fairly stable    Thanks for consultation, will follow along with you.

## 2020-04-23 NOTE — PROGRESS NOTES
[facility]  Cardiology  Progress Note    Patient Name: Home De Dios  MRN: [id number]  Admission Date: 4/19/2020  Hospital Length of Stay: 4 days  Code Status: Full Code   Attending Physician: Jeanna Beckman MD   Primary Care Physician: Quintin Cerda MD  Expected Discharge Date:   Principal Problem:Gangrene of toe of right foot    Subjective:     Brief HPI:    Home De Dios is a 77 y.o.female with hypertension. She had pain in the left foot and soaked her feet in water on about 4/1/2020. She noted discoloration of several toes on the right foot. She developed rest pain. She was afraid of going to the hospital because of the COVID situation. She presented on 4/19/2020 with gangrenous toes and acute renal failure. She was admitted.       Hospital Course:    Hydration.    Interval History:    Rest pain in foot. No CP or SOB.    Review of Systems   Constitution: Positive for malaise/fatigue. Negative for chills and fever.   HENT: Negative for nosebleeds.    Eyes: Negative for vision loss in left eye and vision loss in right eye.   Cardiovascular: Negative for chest pain, leg swelling, orthopnea, palpitations and paroxysmal nocturnal dyspnea.   Respiratory: Negative for cough, hemoptysis, shortness of breath, sputum production and wheezing.    Hematologic/Lymphatic: Negative for bleeding problem.   Skin:        Right toes gangrenous.   Musculoskeletal: Negative for myalgias.   Gastrointestinal: Negative for abdominal pain, heartburn, hematemesis, hematochezia, jaundice, melena, nausea and vomiting.   Genitourinary: Negative for hematuria.   Neurological: Negative for dizziness, headaches, light-headedness, vertigo and weakness.   Psychiatric/Behavioral: Negative for altered mental status. The patient is not nervous/anxious.    Allergic/Immunologic: Negative for persistent infections.     Objective:     Vital Signs (Most Recent):  Temp: 98.5 °F (36.9 °C) (04/23/20 1531)  Pulse: 87 (04/23/20  1800)  Resp: 20 (04/23/20 1531)  BP: 129/67 (04/23/20 1531)  SpO2: 97 % (04/23/20 1531) Vital Signs (24h Range):  Temp:  [98.1 °F (36.7 °C)-99.4 °F (37.4 °C)] 98.5 °F (36.9 °C)  Pulse:  [] 87  Resp:  [17-20] 20  SpO2:  [96 %-100 %] 97 %  BP: (129-185)/(67-84) 129/67     Weight: 52 kg (114 lb 10.2 oz)  Body mass index is 17.96 kg/m².    SpO2: 97 %  O2 Device (Oxygen Therapy): room air      Intake/Output Summary (Last 24 hours) at 4/23/2020 1828  Last data filed at 4/23/2020 0500  Gross per 24 hour   Intake --   Output 350 ml   Net -350 ml       Lines/Drains/Airways     Peripheral Intravenous Line                 Peripheral IV - Single Lumen 04/22/20 1100 20 G Right Forearm 1 day                Physical Exam   Constitutional: She appears well-developed and well-nourished.   Cardiovascular: Normal rate, regular rhythm, S1 normal and S2 normal.   Pulses:       Femoral pulses are 2+ on the right side, and 2+ on the left side.       Popliteal pulses are 1+ on the right side, and 1+ on the left side.        Dorsalis pedis pulses are 0 on the right side, and 0 on the left side.        Posterior tibial pulses are 0 on the right side, and 0 on the left side.   Gangrene of right toes.   Pulmonary/Chest: Effort normal and breath sounds normal.       Current Medications:     aspirin  81 mg Oral Daily    ceFEPime (MAXIPIME) IVPB  1 g Intravenous Q24H    clopidogreL  75 mg Oral Daily    cyanocobalamin  500 mcg Oral Daily    ferrous sulfate  325 mg Oral Every other day    heparin (porcine)  5,000 Units Subcutaneous Q12H    latanoprost  1 drop Both Eyes QHS    metronidazole  500 mg Intravenous Q8H    [START ON 4/24/2020] NIFEdipine  90 mg Oral Daily    timolol maleate 0.5%  1 drop Both Eyes BID     Current Laboratory Results:    Recent Results (from the past 24 hour(s))   Basic Metabolic Panel (BMP)    Collection Time: 04/23/20  3:30 AM   Result Value Ref Range    Sodium 145 136 - 145 mmol/L    Potassium 3.5 3.5 -  5.1 mmol/L    Chloride 111 (H) 95 - 110 mmol/L    CO2 25 23 - 29 mmol/L    Glucose 100 70 - 110 mg/dL    BUN, Bld 18 8 - 23 mg/dL    Creatinine 1.9 (H) 0.5 - 1.4 mg/dL    Calcium 8.8 8.7 - 10.5 mg/dL    Anion Gap 9 8 - 16 mmol/L    eGFR if African American 29 (A) >60 mL/min/1.73 m^2    eGFR if non African American 25 (A) >60 mL/min/1.73 m^2   Magnesium    Collection Time: 04/23/20  3:30 AM   Result Value Ref Range    Magnesium 1.6 1.6 - 2.6 mg/dL   Phosphorus    Collection Time: 04/23/20  3:30 AM   Result Value Ref Range    Phosphorus 2.3 (L) 2.7 - 4.5 mg/dL   CBC Without Differential    Collection Time: 04/23/20  3:30 AM   Result Value Ref Range    WBC 7.36 3.90 - 12.70 K/uL    RBC 3.60 (L) 4.00 - 5.40 M/uL    Hemoglobin 9.4 (L) 12.0 - 16.0 g/dL    Hematocrit 29.3 (L) 37.0 - 48.5 %    Mean Corpuscular Volume 81 (L) 82 - 98 fL    Mean Corpuscular Hemoglobin 26.1 (L) 27.0 - 31.0 pg    Mean Corpuscular Hemoglobin Conc 32.1 32.0 - 36.0 g/dL    RDW 13.0 11.5 - 14.5 %    Platelets 322 150 - 350 K/uL    MPV 10.6 9.2 - 12.9 fL     Current Imaging Results:    US Retroperitoneal Complete (Kidney and   Final Result      Findings suggestive of medical renal disease.      Two nonobstructing renal stones in the right kidney, both measuring 3 mm.         Electronically signed by: Johnny Sams MD   Date:    04/20/2020   Time:    15:58      US Lower Extremity Arteries Right   Final Result   Abnormal      Monophasic flow in the right peroneal artery.  Low velocities.      This report was flagged in Epic as abnormal.         Electronically signed by: Angela Saenz   Date:    04/19/2020   Time:    19:22      X-Ray Foot Complete Right   Final Result      No evidence of acute fracture or dislocation of the right foot.  No osseous erosions.  Follow-up with MRI of the right foot, as clinically warranted.      Advanced vascular calcifications.         Electronically signed by: Efraín Blanc MD   Date:    04/19/2020   Time:    17:10           Assessment and Plan:     Problem List:    Active Diagnoses:    Diagnosis Date Noted POA    PRINCIPAL PROBLEM:  Gangrene of toes of right foot [I96] 04/19/2020 Yes    Hypokalemia [E87.6] 04/20/2020 Yes    MICKEY (acute kidney injury) [N17.9] 04/19/2020 Yes    Essential hypertension [I10] 06/18/2013 Yes     Chronic      Problems Resolved During this Admission:     Assessment and Plan:     1. Peripheral Artery Disease              4/1/2020: Began noticing discoloration of toes right foot.              4/19/2020: Presents with gangrene of toes right foot.              4/19/2020: Arterial Duplex: Slow distal runoff.              Probably has severe distal disease. Fairly unlikely she can be helped by revascularization.              Appears she will need TMA or BKA.              4/24/2020: Plan selective angio of right leg. Wait another day for renal function to improve.     2. Hypertension     3. Acute Kidney Disease              4/19/2020: BUN/crea 44/5.5.              4/22/2020: BUN/crea 27/2.6.   4/23/2020: BUN/crea 18/1.9.              Dr. Gisela Wharton.              Hydration.              Angiogram obviously of concern with AKD. Selective angio, hydration and minimize dye load.    The planned procedure was discussed in detail with the patient and the family members present. Risk, benefit and alternatives were reviewed. All questions answered. Consent was then signed.    If further questions or concerns arise the patient was encouraged to contact me prior to the planned procedure.       VTE Risk Mitigation (From admission, onward)         Ordered     heparin (porcine) injection 5,000 Units  Every 12 hours      04/22/20 1735     Place sequential compression device  Until discontinued      04/19/20 2118     IP VTE HIGH RISK PATIENT  Once      04/19/20 2118     Reason for No Pharmacological VTE Prophylaxis  Once     Question:  Reasons:  Answer:  Physician Provided (leave comment)    04/19/20 2118                 Coral Baron MD  Cardiology  [facility]

## 2020-04-23 NOTE — SUBJECTIVE & OBJECTIVE
Interval History: Patient overall feeling well this morning. She reported R foot pain last night. She was given Tylenol, which helped.     Review of Systems   Constitutional: Negative for chills and fever.   HENT: Negative for congestion and rhinorrhea.    Respiratory: Negative for cough and shortness of breath.    Cardiovascular: Negative for chest pain, palpitations and leg swelling.   Gastrointestinal: Negative for abdominal pain, diarrhea, nausea and vomiting.   Musculoskeletal: Negative for arthralgias and myalgias.        + right foot pain   Skin:        + black color change to right toes   Neurological: Negative for dizziness and headaches.     Objective:     Vital Signs (Most Recent):  Temp: 98.7 °F (37.1 °C) (04/23/20 0831)  Pulse: 91 (04/23/20 0831)  Resp: 18 (04/23/20 0831)  BP: (!) 185/84 (04/23/20 0831)  SpO2: 98 % (04/23/20 0831) Vital Signs (24h Range):  Temp:  [98.1 °F (36.7 °C)-99.4 °F (37.4 °C)] 98.7 °F (37.1 °C)  Pulse:  [] 91  Resp:  [17-20] 18  SpO2:  [96 %-100 %] 98 %  BP: (128-185)/(60-84) 185/84     Weight: 52 kg (114 lb 10.2 oz)  Body mass index is 17.96 kg/m².    Intake/Output Summary (Last 24 hours) at 4/23/2020 1026  Last data filed at 4/23/2020 0500  Gross per 24 hour   Intake 600 ml   Output 350 ml   Net 250 ml      Physical Exam   Constitutional: She is oriented to person, place, and time. She appears well-developed and well-nourished. No distress.   HENT:   Head: Normocephalic and atraumatic.   Eyes: Conjunctivae are normal. No scleral icterus.   Neck: Normal range of motion. Neck supple.   Cardiovascular: Normal rate, regular rhythm and normal heart sounds.   Pulmonary/Chest: Effort normal and breath sounds normal. No respiratory distress.   Abdominal: Soft. Bowel sounds are normal. There is no tenderness.   Neurological: She is alert and oriented to person, place, and time.   Skin: Skin is warm and dry.   R foot wrapped in gauze today. On right foot: necrotic changes present  in great toe, 2nd toe and 4th toe. Toes with black color change on dorsal and plantar surfaces. No sensation to those toes.   Toe nails thickened and discolored bilaterally, mycotic appearance      Nursing note and vitals reviewed.      Significant Labs:   BMP:   Recent Labs   Lab 04/23/20  0330         K 3.5   *   CO2 25   BUN 18   CREATININE 1.9*   CALCIUM 8.8   MG 1.6     CBC:   Recent Labs   Lab 04/22/20  0344 04/23/20  0330   WBC 7.11 7.36   HGB 9.4* 9.4*   HCT 28.9* 29.3*    322     Lab Results   Component Value Date    CALCIUM 8.8 04/23/2020    PHOS 2.3 (L) 04/23/2020

## 2020-04-24 LAB
ANION GAP SERPL CALC-SCNC: 9 MMOL/L (ref 8–16)
BASOPHILS # BLD AUTO: 0.04 K/UL (ref 0–0.2)
BASOPHILS NFR BLD: 0.5 % (ref 0–1.9)
BUN SERPL-MCNC: 14 MG/DL (ref 8–23)
CALCIUM SERPL-MCNC: 8.1 MG/DL (ref 8.7–10.5)
CHLORIDE SERPL-SCNC: 111 MMOL/L (ref 95–110)
CO2 SERPL-SCNC: 25 MMOL/L (ref 23–29)
CREAT SERPL-MCNC: 1.8 MG/DL (ref 0.5–1.4)
DIFFERENTIAL METHOD: ABNORMAL
EOSINOPHIL # BLD AUTO: 0.3 K/UL (ref 0–0.5)
EOSINOPHIL NFR BLD: 3.7 % (ref 0–8)
ERYTHROCYTE [DISTWIDTH] IN BLOOD BY AUTOMATED COUNT: 13.2 % (ref 11.5–14.5)
ERYTHROCYTE [DISTWIDTH] IN BLOOD BY AUTOMATED COUNT: 13.2 % (ref 11.5–14.5)
EST. GFR  (AFRICAN AMERICAN): 31 ML/MIN/1.73 M^2
EST. GFR  (NON AFRICAN AMERICAN): 27 ML/MIN/1.73 M^2
GLUCOSE SERPL-MCNC: 97 MG/DL (ref 70–110)
HCT VFR BLD AUTO: 26.6 % (ref 37–48.5)
HCT VFR BLD AUTO: 28.7 % (ref 37–48.5)
HGB BLD-MCNC: 8.7 G/DL (ref 12–16)
HGB BLD-MCNC: 9.3 G/DL (ref 12–16)
IMM GRANULOCYTES # BLD AUTO: 0.03 K/UL (ref 0–0.04)
IMM GRANULOCYTES NFR BLD AUTO: 0.4 % (ref 0–0.5)
LYMPHOCYTES # BLD AUTO: 1.4 K/UL (ref 1–4.8)
LYMPHOCYTES NFR BLD: 18.5 % (ref 18–48)
MAGNESIUM SERPL-MCNC: 1.4 MG/DL (ref 1.6–2.6)
MCH RBC QN AUTO: 26.4 PG (ref 27–31)
MCH RBC QN AUTO: 26.6 PG (ref 27–31)
MCHC RBC AUTO-ENTMCNC: 32.4 G/DL (ref 32–36)
MCHC RBC AUTO-ENTMCNC: 32.7 G/DL (ref 32–36)
MCV RBC AUTO: 81 FL (ref 82–98)
MCV RBC AUTO: 82 FL (ref 82–98)
MONOCYTES # BLD AUTO: 0.6 K/UL (ref 0.3–1)
MONOCYTES NFR BLD: 8.5 % (ref 4–15)
NEUTROPHILS # BLD AUTO: 5 K/UL (ref 1.8–7.7)
NEUTROPHILS NFR BLD: 68.4 % (ref 38–73)
NRBC BLD-RTO: 0 /100 WBC
PHOSPHATE SERPL-MCNC: 3 MG/DL (ref 2.7–4.5)
PLATELET # BLD AUTO: 289 K/UL (ref 150–350)
PLATELET # BLD AUTO: 296 K/UL (ref 150–350)
PMV BLD AUTO: 10 FL (ref 9.2–12.9)
PMV BLD AUTO: 9.9 FL (ref 9.2–12.9)
POC ACTIVATED CLOTTING TIME K: 142 SEC (ref 74–137)
POTASSIUM SERPL-SCNC: 3.5 MMOL/L (ref 3.5–5.1)
RBC # BLD AUTO: 3.3 M/UL (ref 4–5.4)
RBC # BLD AUTO: 3.5 M/UL (ref 4–5.4)
SAMPLE: ABNORMAL
SODIUM SERPL-SCNC: 145 MMOL/L (ref 136–145)
WBC # BLD AUTO: 6.31 K/UL (ref 3.9–12.7)
WBC # BLD AUTO: 7.3 K/UL (ref 3.9–12.7)

## 2020-04-24 PROCEDURE — 99232 SBSQ HOSP IP/OBS MODERATE 35: CPT | Mod: HCNC,,, | Performed by: PODIATRIST

## 2020-04-24 PROCEDURE — 20000000 HC ICU ROOM: Mod: HCNC

## 2020-04-24 PROCEDURE — 85027 COMPLETE CBC AUTOMATED: CPT | Mod: HCNC

## 2020-04-24 PROCEDURE — 84100 ASSAY OF PHOSPHORUS: CPT | Mod: HCNC

## 2020-04-24 PROCEDURE — 25000003 PHARM REV CODE 250: Mod: HCNC | Performed by: INTERNAL MEDICINE

## 2020-04-24 PROCEDURE — 99233 PR SUBSEQUENT HOSPITAL CARE,LEVL III: ICD-10-PCS | Mod: HCNC,,, | Performed by: FAMILY MEDICINE

## 2020-04-24 PROCEDURE — 37224 HC FEM/POPL REVAS W/TLA: CPT | Mod: HCNC,RT | Performed by: INTERNAL MEDICINE

## 2020-04-24 PROCEDURE — 99152 MOD SED SAME PHYS/QHP 5/>YRS: CPT | Mod: HCNC | Performed by: INTERNAL MEDICINE

## 2020-04-24 PROCEDURE — C1887 CATHETER, GUIDING: HCPCS | Mod: HCNC | Performed by: INTERNAL MEDICINE

## 2020-04-24 PROCEDURE — 63600175 PHARM REV CODE 636 W HCPCS: Mod: HCNC | Performed by: INTERNAL MEDICINE

## 2020-04-24 PROCEDURE — 99153 MOD SED SAME PHYS/QHP EA: CPT | Mod: HCNC | Performed by: INTERNAL MEDICINE

## 2020-04-24 PROCEDURE — S0030 INJECTION, METRONIDAZOLE: HCPCS | Mod: HCNC | Performed by: PHYSICIAN ASSISTANT

## 2020-04-24 PROCEDURE — 36415 COLL VENOUS BLD VENIPUNCTURE: CPT | Mod: HCNC

## 2020-04-24 PROCEDURE — C1894 INTRO/SHEATH, NON-LASER: HCPCS | Mod: HCNC | Performed by: INTERNAL MEDICINE

## 2020-04-24 PROCEDURE — 80048 BASIC METABOLIC PNL TOTAL CA: CPT | Mod: HCNC

## 2020-04-24 PROCEDURE — 99233 SBSQ HOSP IP/OBS HIGH 50: CPT | Mod: HCNC,,, | Performed by: FAMILY MEDICINE

## 2020-04-24 PROCEDURE — 63600175 PHARM REV CODE 636 W HCPCS: Mod: HCNC | Performed by: FAMILY MEDICINE

## 2020-04-24 PROCEDURE — 85025 COMPLETE CBC W/AUTO DIFF WBC: CPT | Mod: HCNC

## 2020-04-24 PROCEDURE — 63600175 PHARM REV CODE 636 W HCPCS: Mod: HCNC | Performed by: PHYSICIAN ASSISTANT

## 2020-04-24 PROCEDURE — 25500020 PHARM REV CODE 255: Mod: HCNC | Performed by: INTERNAL MEDICINE

## 2020-04-24 PROCEDURE — 25000003 PHARM REV CODE 250: Mod: HCNC | Performed by: FAMILY MEDICINE

## 2020-04-24 PROCEDURE — 25000003 PHARM REV CODE 250: Mod: HCNC | Performed by: PHYSICIAN ASSISTANT

## 2020-04-24 PROCEDURE — C1769 GUIDE WIRE: HCPCS | Mod: HCNC | Performed by: INTERNAL MEDICINE

## 2020-04-24 PROCEDURE — C1725 CATH, TRANSLUMIN NON-LASER: HCPCS | Mod: HCNC | Performed by: INTERNAL MEDICINE

## 2020-04-24 PROCEDURE — 99232 PR SUBSEQUENT HOSPITAL CARE,LEVL II: ICD-10-PCS | Mod: HCNC,,, | Performed by: PODIATRIST

## 2020-04-24 PROCEDURE — 83735 ASSAY OF MAGNESIUM: CPT | Mod: HCNC

## 2020-04-24 PROCEDURE — 37228 HC TIB/PER REVASC W/TLA: CPT | Mod: HCNC,RT | Performed by: INTERNAL MEDICINE

## 2020-04-24 PROCEDURE — 75710 ARTERY X-RAYS ARM/LEG: CPT | Mod: 59,HCNC,RT | Performed by: INTERNAL MEDICINE

## 2020-04-24 RX ORDER — HYDROCODONE BITARTRATE AND ACETAMINOPHEN 5; 325 MG/1; MG/1
1 TABLET ORAL EVERY 4 HOURS PRN
Status: DISCONTINUED | OUTPATIENT
Start: 2020-04-24 | End: 2020-04-25

## 2020-04-24 RX ORDER — DIPHENHYDRAMINE HCL 25 MG
25 CAPSULE ORAL
Status: DISCONTINUED | OUTPATIENT
Start: 2020-04-24 | End: 2020-04-24 | Stop reason: HOSPADM

## 2020-04-24 RX ORDER — SODIUM CHLORIDE 9 MG/ML
INJECTION, SOLUTION INTRAVENOUS CONTINUOUS
Status: DISCONTINUED | OUTPATIENT
Start: 2020-04-24 | End: 2020-04-24

## 2020-04-24 RX ORDER — LABETALOL HYDROCHLORIDE 5 MG/ML
10 INJECTION, SOLUTION INTRAVENOUS EVERY 4 HOURS PRN
Status: DISCONTINUED | OUTPATIENT
Start: 2020-04-24 | End: 2020-04-26

## 2020-04-24 RX ORDER — HEPARIN SOD,PORCINE/0.9 % NACL 1000/500ML
INTRAVENOUS SOLUTION INTRAVENOUS
Status: DISCONTINUED | OUTPATIENT
Start: 2020-04-24 | End: 2020-04-24 | Stop reason: HOSPADM

## 2020-04-24 RX ORDER — ACETAMINOPHEN 325 MG/1
650 TABLET ORAL EVERY 4 HOURS PRN
Status: DISCONTINUED | OUTPATIENT
Start: 2020-04-24 | End: 2020-04-25

## 2020-04-24 RX ORDER — ATROPINE SULFATE 0.1 MG/ML
0.5 INJECTION INTRAVENOUS
Status: DISCONTINUED | OUTPATIENT
Start: 2020-04-24 | End: 2020-04-26

## 2020-04-24 RX ORDER — LIDOCAINE HYDROCHLORIDE 10 MG/ML
INJECTION INFILTRATION; PERINEURAL
Status: DISCONTINUED | OUTPATIENT
Start: 2020-04-24 | End: 2020-04-24 | Stop reason: HOSPADM

## 2020-04-24 RX ORDER — MIDAZOLAM HYDROCHLORIDE 1 MG/ML
INJECTION, SOLUTION INTRAMUSCULAR; INTRAVENOUS
Status: DISCONTINUED | OUTPATIENT
Start: 2020-04-24 | End: 2020-04-24 | Stop reason: HOSPADM

## 2020-04-24 RX ORDER — HEPARIN SODIUM 5000 [USP'U]/ML
5000 INJECTION, SOLUTION INTRAVENOUS; SUBCUTANEOUS EVERY 12 HOURS
Status: COMPLETED | OUTPATIENT
Start: 2020-04-25 | End: 2020-04-27

## 2020-04-24 RX ORDER — HEPARIN SODIUM 1000 [USP'U]/ML
INJECTION, SOLUTION INTRAVENOUS; SUBCUTANEOUS
Status: DISCONTINUED | OUTPATIENT
Start: 2020-04-24 | End: 2020-04-24 | Stop reason: HOSPADM

## 2020-04-24 RX ORDER — SODIUM CHLORIDE 9 MG/ML
INJECTION, SOLUTION INTRAVENOUS CONTINUOUS
Status: ACTIVE | OUTPATIENT
Start: 2020-04-24 | End: 2020-04-24

## 2020-04-24 RX ORDER — FENTANYL CITRATE 50 UG/ML
INJECTION, SOLUTION INTRAMUSCULAR; INTRAVENOUS
Status: DISCONTINUED | OUTPATIENT
Start: 2020-04-24 | End: 2020-04-24 | Stop reason: HOSPADM

## 2020-04-24 RX ORDER — SODIUM CHLORIDE, SODIUM LACTATE, POTASSIUM CHLORIDE, CALCIUM CHLORIDE 600; 310; 30; 20 MG/100ML; MG/100ML; MG/100ML; MG/100ML
INJECTION, SOLUTION INTRAVENOUS CONTINUOUS
Status: DISCONTINUED | OUTPATIENT
Start: 2020-04-24 | End: 2020-04-25

## 2020-04-24 RX ADMIN — METRONIDAZOLE 500 MG: 500 INJECTION, SOLUTION INTRAVENOUS at 03:04

## 2020-04-24 RX ADMIN — SODIUM CHLORIDE: 0.9 INJECTION, SOLUTION INTRAVENOUS at 06:04

## 2020-04-24 RX ADMIN — TIMOLOL MALEATE 1 DROP: 5 SOLUTION OPHTHALMIC at 08:04

## 2020-04-24 RX ADMIN — METRONIDAZOLE 500 MG: 500 INJECTION, SOLUTION INTRAVENOUS at 05:04

## 2020-04-24 RX ADMIN — SODIUM CHLORIDE, SODIUM LACTATE, POTASSIUM CHLORIDE, AND CALCIUM CHLORIDE: .6; .31; .03; .02 INJECTION, SOLUTION INTRAVENOUS at 05:04

## 2020-04-24 RX ADMIN — TIMOLOL MALEATE 1 DROP: 5 SOLUTION OPHTHALMIC at 10:04

## 2020-04-24 RX ADMIN — ASPIRIN 81 MG: 81 TABLET, COATED ORAL at 10:04

## 2020-04-24 RX ADMIN — LABETALOL HYDROCHLORIDE 10 MG: 5 INJECTION INTRAVENOUS at 02:04

## 2020-04-24 RX ADMIN — NIFEDIPINE 90 MG: 30 TABLET, FILM COATED, EXTENDED RELEASE ORAL at 10:04

## 2020-04-24 RX ADMIN — CLOPIDOGREL BISULFATE 75 MG: 75 TABLET ORAL at 10:04

## 2020-04-24 RX ADMIN — HYDROCODONE BITARTRATE AND ACETAMINOPHEN 1 TABLET: 5; 325 TABLET ORAL at 02:04

## 2020-04-24 RX ADMIN — CEFEPIME 1 G: 1 INJECTION, POWDER, FOR SOLUTION INTRAMUSCULAR; INTRAVENOUS at 01:04

## 2020-04-24 RX ADMIN — LABETALOL HYDROCHLORIDE 10 MG: 5 INJECTION INTRAVENOUS at 08:04

## 2020-04-24 RX ADMIN — DIPHENHYDRAMINE HYDROCHLORIDE 25 MG: 25 CAPSULE ORAL at 06:04

## 2020-04-24 RX ADMIN — HYDROCODONE BITARTRATE AND ACETAMINOPHEN 1 TABLET: 5; 325 TABLET ORAL at 03:04

## 2020-04-24 RX ADMIN — HYDROCODONE BITARTRATE AND ACETAMINOPHEN 1 TABLET: 5; 325 TABLET ORAL at 10:04

## 2020-04-24 RX ADMIN — HYDROCODONE BITARTRATE AND ACETAMINOPHEN 1 TABLET: 5; 325 TABLET ORAL at 09:04

## 2020-04-24 RX ADMIN — CYANOCOBALAMIN TAB 500 MCG 500 MCG: 500 TAB at 10:04

## 2020-04-24 RX ADMIN — LATANOPROST 1 DROP: 50 SOLUTION OPHTHALMIC at 08:04

## 2020-04-24 RX ADMIN — SODIUM CHLORIDE: 0.9 INJECTION, SOLUTION INTRAVENOUS at 10:04

## 2020-04-24 RX ADMIN — METRONIDAZOLE 500 MG: 500 INJECTION, SOLUTION INTRAVENOUS at 10:04

## 2020-04-24 NOTE — PROGRESS NOTES
"[facility] Hospital Medicine  Progress Note    Patient Name: Home De Dios  MRN: [id number]  Patient Class: IP- Inpatient   Admission Date: 4/19/2020  Length of Stay: 5 days  Attending Physician: Jeanna Beckman MD  Primary Care Provider: Quintin Cerda MD    Subjective:     Principal Problem:Gangrene of toe of right foot    HPI:  Ms. Home De Dios is a 77 y.o. female, with PMH of HTN, POAG, who presented to [facility] ED on 4/19/20 with right toe pain. She states the toes of her right foot began changing color and turning black about 10 days ago, and have been having worsening numbness x 4-5 days. She states this began after soaking her foot in bleach and Epsom salts and then cutting her toe nails. The right foot subsequently became itchy, and then numb, particularly on the 1st, 2nd and 4th toes. She denies neuropathy or prior numbness/tingling., trauma, fever, chills. ED imaging shows no fracture/dislocaton or bony erosion. An US is pending. Labs showed MICKEY without hyperkalemia, and elevated CRP of 119. She was admitted to inpatient status.     Overview/Hospital Course:  Patient admitted with gangrene of R toes and MICKEY. IV antibiotics were initiated for possible cellulitis of R foot. She was started on IV fluids and renal function improved each day. Nephrology following. Xray of R foot did not suggest osteomyelitis. US arteries R LE revealed, "Monophasic flow in the right peroneal artery.  Low velocities." Podiatry, vascular surgery, and cardiology have been following the patient. On 4/24/2020 patient underwent successful angioplasty of distal superficial femoral and popliteal arteries.     Interval History: Patient seen in ICU, as she is s/p angioplasty of distal superficial femoral and popliteal arteries. She denies any specific concerns at this time.     Review of Systems   Constitutional: Negative for chills and fever.   HENT: Negative for congestion and rhinorrhea.    Respiratory: Negative " for cough and shortness of breath.    Cardiovascular: Negative for chest pain, palpitations and leg swelling.   Gastrointestinal: Negative for abdominal pain, diarrhea, nausea and vomiting.   Musculoskeletal: Negative for arthralgias and myalgias.        + right foot pain   Skin:        + black color change to right toes   Neurological: Negative for dizziness and headaches.     Objective:     Vital Signs (Most Recent):  Temp: 98 °F (36.7 °C) (04/24/20 1502)  Pulse: 60 (04/24/20 1502)  Resp: 20 (04/24/20 1502)  BP: (!) 156/70 (04/24/20 1502)  SpO2: 96 % (04/24/20 1502) Vital Signs (24h Range):  Temp:  [97.5 °F (36.4 °C)-98.5 °F (36.9 °C)] 98 °F (36.7 °C)  Pulse:  [59-96] 60  Resp:  [17-28] 20  SpO2:  [95 %-99 %] 96 %  BP: (129-191)/(61-89) 156/70  Arterial Line BP: (178-200)/(74-82) 184/76     Weight: 52 kg (114 lb 10.2 oz)  Body mass index is 17.96 kg/m².    Intake/Output Summary (Last 24 hours) at 4/24/2020 1519  Last data filed at 4/24/2020 1400  Gross per 24 hour   Intake 840 ml   Output 960 ml   Net -120 ml      Physical Exam   Constitutional: She is oriented to person, place, and time. She appears well-developed and well-nourished. No distress.   HENT:   Head: Normocephalic and atraumatic.   Eyes: Conjunctivae are normal. No scleral icterus.   Neck: Normal range of motion. Neck supple.   Cardiovascular: Normal rate, regular rhythm and normal heart sounds.   Pulmonary/Chest: Effort normal and breath sounds normal. No respiratory distress.   Abdominal: Soft. Bowel sounds are normal. There is no tenderness.   Neurological: She is alert and oriented to person, place, and time.   Skin: Skin is warm and dry.   R foot wrapped in gauze - did not remove to examine toes today. No LE edema bilaterally.      Nursing note and vitals reviewed.      Significant Labs:   BMP:   Recent Labs   Lab 04/24/20  0325   GLU 97      K 3.5   *   CO2 25   BUN 14   CREATININE 1.8*   CALCIUM 8.1*   MG 1.4*     CBC:   Recent Labs    Lab 04/23/20  0330 04/24/20  0325   WBC 7.36 6.31   HGB 9.4* 8.7*   HCT 29.3* 26.6*    296           Assessment/Plan:      * Gangrene of toes of right foot  - All toes of R foot involved.   - Initially, there was some erythema in the foot proximal to the necrotic areas: continue treatment with metronidazole and cefepime for possible cellulitis   - Imaging without overt osteomyelitis  - HbA1c 5.4% - no DM  - Arterial US of right LE: Monophasic flow in the right peroneal artery.  Low velocities.  - Vascular surgery and cardiology consulted. Now s/p R lower extremity angioplasty of distal superficial femoral and popliteal arteries.   - Clopidogrel and heparin started per cardiology   - Appreciate podiatry assistance - plan for TMA next week  - Continue dressings per podiatry.           MICKEY (acute kidney injury)  - Unknown baseline Cr. Renal function improving each day.    - Holding home HCTZ   - suspect 2/2 poorly controlled HTN vs infection/gangrene   - avoid nephrotoxins  - renally dose meds   - Continue IVF - LR at 100 cc/hr   - Appreciate nephrology assistance    Hypokalemia  - Resolved   - Will monitor       Essential hypertension  - BP have been elevated during admission  - only home med is HCTZ 12.5 mg QD, holding 2/2 MICKEY   - Continue nifedipine 90 mg daily  - PRN hydralazine   - monitor       VTE Risk Mitigation (From admission, onward)         Ordered     heparin (porcine) injection 5,000 Units  Every 12 hours      04/22/20 1735     Place sequential compression device  Until discontinued      04/19/20 2118     IP VTE HIGH RISK PATIENT  Once      04/19/20 2118     Reason for No Pharmacological VTE Prophylaxis  Once     Question:  Reasons:  Answer:  Physician Provided (leave comment)    04/19/20 2118              Alexa Almodovar MD  Department of Hospital Medicine   [facility]

## 2020-04-24 NOTE — SUBJECTIVE & OBJECTIVE
Interval History: Patient seen in ICU, as she is s/p angioplasty of distal superficial femoral and popliteal arteries. She denies any specific concerns at this time.     Review of Systems   Constitutional: Negative for chills and fever.   HENT: Negative for congestion and rhinorrhea.    Respiratory: Negative for cough and shortness of breath.    Cardiovascular: Negative for chest pain, palpitations and leg swelling.   Gastrointestinal: Negative for abdominal pain, diarrhea, nausea and vomiting.   Musculoskeletal: Negative for arthralgias and myalgias.        + right foot pain   Skin:        + black color change to right toes   Neurological: Negative for dizziness and headaches.     Objective:     Vital Signs (Most Recent):  Temp: 98 °F (36.7 °C) (04/24/20 1502)  Pulse: 60 (04/24/20 1502)  Resp: 20 (04/24/20 1502)  BP: (!) 156/70 (04/24/20 1502)  SpO2: 96 % (04/24/20 1502) Vital Signs (24h Range):  Temp:  [97.5 °F (36.4 °C)-98.5 °F (36.9 °C)] 98 °F (36.7 °C)  Pulse:  [59-96] 60  Resp:  [17-28] 20  SpO2:  [95 %-99 %] 96 %  BP: (129-191)/(61-89) 156/70  Arterial Line BP: (178-200)/(74-82) 184/76     Weight: 52 kg (114 lb 10.2 oz)  Body mass index is 17.96 kg/m².    Intake/Output Summary (Last 24 hours) at 4/24/2020 1519  Last data filed at 4/24/2020 1400  Gross per 24 hour   Intake 840 ml   Output 960 ml   Net -120 ml      Physical Exam   Constitutional: She is oriented to person, place, and time. She appears well-developed and well-nourished. No distress.   HENT:   Head: Normocephalic and atraumatic.   Eyes: Conjunctivae are normal. No scleral icterus.   Neck: Normal range of motion. Neck supple.   Cardiovascular: Normal rate, regular rhythm and normal heart sounds.   Pulmonary/Chest: Effort normal and breath sounds normal. No respiratory distress.   Abdominal: Soft. Bowel sounds are normal. There is no tenderness.   Neurological: She is alert and oriented to person, place, and time.   Skin: Skin is warm and dry.   R  foot wrapped in gauze - did not remove to examine toes today. No LE edema bilaterally.      Nursing note and vitals reviewed.      Significant Labs:   BMP:   Recent Labs   Lab 04/24/20  0325   GLU 97      K 3.5   *   CO2 25   BUN 14   CREATININE 1.8*   CALCIUM 8.1*   MG 1.4*     CBC:   Recent Labs   Lab 04/23/20  0330 04/24/20  0325   WBC 7.36 6.31   HGB 9.4* 8.7*   HCT 29.3* 26.6*    296

## 2020-04-24 NOTE — ASSESSMENT & PLAN NOTE
- All toes of R foot involved.   - Initially, there was some erythema in the foot proximal to the necrotic areas: continue treatment with metronidazole and cefepime for possible cellulitis   - Imaging without overt osteomyelitis  - HbA1c 5.4% - no DM  - Arterial US of right LE: Monophasic flow in the right peroneal artery.  Low velocities.  - Vascular surgery and cardiology consulted. Now s/p R lower extremity angioplasty of distal superficial femoral and popliteal arteries.   - Clopidogrel and heparin started per cardiology   - Appreciate podiatry assistance - plan for TMA next week  - Continue dressings per podiatry.

## 2020-04-24 NOTE — PROGRESS NOTES
Progress Note  Nephrology    Consult Requested By: Jeanna Beckman MD  Reason for Consult: MICKEY    SUBJECTIVE:     History of Present Illness:  Patient is a 77 y.o. female presents with gangrenous right toe/ foot and found to have MICKEY with Cr 5.5. She deines any history of CKD and states that she only takes HCTZ for HTN and no other routine meds. She does repots that when her foot started itching and swelling more approximately 8 days ago she began taking Ibuprofen bid. There are no labs since 2013 because she tells me that she does not like to have her blood drawn.    Interval History:  She feels well today. S/P R leg 4/24/2020: O[facility]BC: Right leg: SFA: Distal 80%. Popliteal: 100%. AT & PT: Occluded. PER: Mid and distal occlusions. SFA: & POP: PTA 6 mm balloon. Mild residual. PER: Mid PTA.      Past Medical History:   Diagnosis Date    Glaucoma     Glaucoma (increased eye pressure)     Hypertension      Past Surgical History:   Procedure Laterality Date    APPENDECTOMY      CATARACT EXTRACTION W/  INTRAOCULAR LENS IMPLANT Left 10/16/2013        CATARACT EXTRACTION W/  INTRAOCULAR LENS IMPLANT Right 12/18/2013        HYSTERECTOMY       Family History   Problem Relation Age of Onset    Heart disease Mother     Cancer Sister     Amblyopia Neg Hx     Blindness Neg Hx     Macular degeneration Neg Hx     Retinal detachment Neg Hx     Stroke Neg Hx     Thyroid disease Neg Hx     Cataracts Neg Hx     Glaucoma Neg Hx      Social History     Tobacco Use    Smoking status: Never Smoker    Smokeless tobacco: Never Used   Substance Use Topics    Alcohol use: No    Drug use: Not on file       Medications Prior to Admission   Medication Sig Dispense Refill Last Dose    latanoprost (XALATAN) 0.005 % ophthalmic solution Place 1 drop into both eyes every evening. 2.5 mL 12 4/19/2020    timolol maleate 0.5% (TIMOPTIC) 0.5 % Drop Place 1 drop into both eyes 2 (two) times daily. 5 mL 12  4/19/2020    hydroCHLOROthiazide (HYDRODIURIL) 12.5 MG Tab Take 1 tablet (12.5 mg total) by mouth once daily. 90 tablet 2 Unknown       Review of patient's allergies indicates:  No Known Allergies     Review of Systems:  Constitutional: No fever or chills, no weight changes.  Eyes: No visual changes or photophobia  HEENT: No nasal congestion or sore throat  Respiratory: No cough or shorness of breath  Cardiovascular: No chest pain or palpitations  Gastrointestinal: Good appetite, no nausea or vomiting, no change in bowel habits  Genitourinary: No hematuria or dysuria  Skin: No rash or pruritis, black toe/ red foot, itching  Hematologic/lymphatic: No easy bruising, bleeding or lymphadenopathy  Musculoskeletal: No arthralgias or myalgias  Neurological: No seizures or tremors  Endocrine: No heat/cold intolerance.  No polyuria/polydipsia.  Psychiatric:  No depression or anxiety. +Poor insight    OBJECTIVE:     Vital Signs (Most Recent)  Temp: 98 °F (36.7 °C) (04/24/20 1130)  Pulse: 92 (04/24/20 1100)  Resp: (!) 26 (04/24/20 1100)  BP: (!) 165/81 (04/24/20 1100)  SpO2: 95 % (04/24/20 1100)    Vital Signs Range (Last 24H):  Temp:  [97.5 °F (36.4 °C)-98.5 °F (36.9 °C)]   Pulse:  [57-96]   Resp:  [17-28]   BP: (129-191)/(61-89)   SpO2:  [95 %-99 %]   Arterial Line BP: (186-200)/(74-82)     Physical Exam:    General appearance: Well developed, thin  Head: Normocephalic, atraumatic  Eyes:  Conjunctivae nl. Sclera anicteric. PERRL.  HEENT: Lips, mucosa, and tongue normal; teeth and gums normal and oropharynx clear.  Neck: Supple, trachea midline, thyroid not enlarged,   Lungs: Clear to auscultation bilaterally and normal respiratory effort  Heart: Regular rate and rhythm, S1, S2 normal, no murmur, click, rub or gallop  Abdomen: Soft, non-tender non-distended; bowel sounds normal; no masses,  no organomegaly  Extremities: dry gangrene of right 1st and tip of 2nd toes and now 4th. No edema  Pulses: nonpalpable  Neurologic:  Normal strength and tone. No focal numbness or weakness  Psychiatric:  Alert and oriented times 3.       Diagnostic Results:  Labs: Reviewed  X-Ray: Reviewed  US: Reviewed    ASSESSMENT/PLAN:     1. MICKEY likely due to right foot gangrene and Ibuprofen/ Hypotension/ Hypokalemia  -Last known Cr 2013 was 0.9  -Minimal proteinuria 280 by ratio  -Current Cr down to 1.8 with resolution of acidosis and hypokalemia  -700 in only 360 UOP reported and I suspect not accurate   -CPK is normal  -FeUA is low  -Renal US no obstruction, 2 right non-obstructing calculi and neg urine eosin.  -K replaced  -Trend Cr improving  -Continued current IVFs  -Avoid nephrotoxins, NSAIDs/ PPI and renal dose meds.    2. HTN  -Increased Nifedipine XL to 60 mg daily 4/21 but still poorly controlled  -Increased Nifedipine XL to 90 mg daily   -Continue the prn hydralazine  -Was on HCTZ monotherapy at home but no diuretics at present.    3. Dry Gangrene of right toes/ foot, severe PAD  -On cefepime and Flagyl, appears dry at present though  -Dr. Baron performed angio 4/24/2020: O[facility]BC: Right leg: SFA: Distal 80%. Popliteal: 100%. AT & PT: Occluded. PER: Mid and distal occlusions. SFA: & POP: PTA 6 mm balloon. Mild residual. PER: Mid PTA.  -He suspects she will need BKA or TMA    4. Fe Defic Anemia/ Relative B12 Defic  -Started oral Fe for now  -Started 500mcg daily  B12  -Normal folate and TSH  -H/H trending down monitor

## 2020-04-24 NOTE — PT/OT/SLP PROGRESS
Physical Therapy      Patient Name:  Home De Dios   MRN:  [id number]    Patient not seen today secondary to transfer to ICU & bed rest orders MD hold (Comment). Will follow-up after bed rest orders  & as schedule permits.    Louisa Foley, PTA

## 2020-04-24 NOTE — PROGRESS NOTES
[facility]  Cardiology  Progress Note    Patient Name: Home De Dios  MRN: [id number]  Admission Date: 4/19/2020  Hospital Length of Stay: 5 days  Code Status: Full Code   Attending Physician: Jeanna Beckman MD   Primary Care Physician: Quintin Cerda MD  Expected Discharge Date:   Principal Problem:Gangrene of toe of right foot    Subjective:     Brief HPI:    Home De Dios is a 77 y.o.female with hypertension. She had pain in the left foot and soaked her feet in water on about 4/1/2020. She noted discoloration of several toes on the right foot. She developed rest pain. She was afraid of going to the hospital because of the COVID situation. She presented on 4/19/2020 with gangrenous toes and acute renal failure. She was admitted.       Hospital Course:    Hydration.    4/24/2020: OMCBC: Right leg: SFA: Distal 80%. Popliteal: 100%. AT & PT: Occluded. PER: Mid and distal occlusions. SFA: & POP: PTA 6 mm balloon. Mild residual. PER: Mid PTA.    Interval History:    Right ankle now warm. No CP or SOB.    Review of Systems   Constitution: Positive for malaise/fatigue. Negative for chills and fever.   HENT: Negative for nosebleeds.    Eyes: Negative for vision loss in left eye and vision loss in right eye.   Cardiovascular: Negative for chest pain, leg swelling, orthopnea, palpitations and paroxysmal nocturnal dyspnea.   Respiratory: Negative for cough, hemoptysis, shortness of breath, sputum production and wheezing.    Hematologic/Lymphatic: Negative for bleeding problem.   Skin:        Right toes gangrenous.   Musculoskeletal: Negative for myalgias.   Gastrointestinal: Negative for abdominal pain, heartburn, hematemesis, hematochezia, jaundice, melena, nausea and vomiting.   Genitourinary: Negative for hematuria.   Neurological: Negative for dizziness, headaches, light-headedness, vertigo and weakness.   Psychiatric/Behavioral: Negative for altered mental status. The patient is not  nervous/anxious.    Allergic/Immunologic: Negative for persistent infections.     Objective:     Vital Signs (Most Recent):  Temp: 98 °F (36.7 °C) (04/24/20 1502)  Pulse: 60 (04/24/20 1502)  Resp: 20 (04/24/20 1502)  BP: (!) 156/70 (04/24/20 1502)  SpO2: 96 % (04/24/20 1502) Vital Signs (24h Range):  Temp:  [97.5 °F (36.4 °C)-98.5 °F (36.9 °C)] 98 °F (36.7 °C)  Pulse:  [60-96] 60  Resp:  [17-28] 20  SpO2:  [95 %-99 %] 96 %  BP: (130-191)/(61-89) 156/70  Arterial Line BP: (178-200)/(74-82) 184/76     Weight: 52 kg (114 lb 10.2 oz)  Body mass index is 17.96 kg/m².    SpO2: 96 %  O2 Device (Oxygen Therapy): room air      Intake/Output Summary (Last 24 hours) at 4/24/2020 1655  Last data filed at 4/24/2020 1400  Gross per 24 hour   Intake 840 ml   Output 960 ml   Net -120 ml       Lines/Drains/Airways     Drain                 Sheath 04/24/20 1112 Left less than 1 day          Peripheral Intravenous Line                 Peripheral IV - Single Lumen 04/22/20 1100 20 G Right Forearm 2 days                Physical Exam   Constitutional: She appears well-developed and well-nourished.   Cardiovascular: Normal rate, regular rhythm, S1 normal and S2 normal.   Pulses:       Femoral pulses are 2+ on the right side, and 2+ on the left side.       Popliteal pulses are 1+ on the right side, and 1+ on the left side.        Dorsalis pedis pulses are 0 on the right side, and 0 on the left side.        Posterior tibial pulses are 0 on the right side, and 0 on the left side.   Gangrene of right toes.   Pulmonary/Chest: Effort normal and breath sounds normal.       Current Medications:     aspirin  81 mg Oral Daily    ceFEPime (MAXIPIME) IVPB  1 g Intravenous Q24H    clopidogreL  75 mg Oral Daily    cyanocobalamin  500 mcg Oral Daily    ferrous sulfate  325 mg Oral Every other day    [START ON 4/25/2020] heparin (porcine)  5,000 Units Subcutaneous Q12H    latanoprost  1 drop Both Eyes QHS    metronidazole  500 mg Intravenous Q8H     NIFEdipine  90 mg Oral Daily    timolol maleate 0.5%  1 drop Both Eyes BID     Current Laboratory Results:    Recent Results (from the past 24 hour(s))   Magnesium    Collection Time: 04/24/20  3:25 AM   Result Value Ref Range    Magnesium 1.4 (L) 1.6 - 2.6 mg/dL   Phosphorus    Collection Time: 04/24/20  3:25 AM   Result Value Ref Range    Phosphorus 3.0 2.7 - 4.5 mg/dL   CBC Without Differential    Collection Time: 04/24/20  3:25 AM   Result Value Ref Range    WBC 6.31 3.90 - 12.70 K/uL    RBC 3.30 (L) 4.00 - 5.40 M/uL    Hemoglobin 8.7 (L) 12.0 - 16.0 g/dL    Hematocrit 26.6 (L) 37.0 - 48.5 %    Mean Corpuscular Volume 81 (L) 82 - 98 fL    Mean Corpuscular Hemoglobin 26.4 (L) 27.0 - 31.0 pg    Mean Corpuscular Hemoglobin Conc 32.7 32.0 - 36.0 g/dL    RDW 13.2 11.5 - 14.5 %    Platelets 296 150 - 350 K/uL    MPV 9.9 9.2 - 12.9 fL   Basic Metabolic Panel (BMP)    Collection Time: 04/24/20  3:25 AM   Result Value Ref Range    Sodium 145 136 - 145 mmol/L    Potassium 3.5 3.5 - 5.1 mmol/L    Chloride 111 (H) 95 - 110 mmol/L    CO2 25 23 - 29 mmol/L    Glucose 97 70 - 110 mg/dL    BUN, Bld 14 8 - 23 mg/dL    Creatinine 1.8 (H) 0.5 - 1.4 mg/dL    Calcium 8.1 (L) 8.7 - 10.5 mg/dL    Anion Gap 9 8 - 16 mmol/L    eGFR if African American 31 (A) >60 mL/min/1.73 m^2    eGFR if non African American 27 (A) >60 mL/min/1.73 m^2   ISTAT ACT-K    Collection Time: 04/24/20  1:58 PM   Result Value Ref Range    POC ACTIVATED CLOTTING TIME K 142 (H) 74 - 137 sec    Sample unknown      Current Imaging Results:    US Retroperitoneal Complete (Kidney and   Final Result      Findings suggestive of medical renal disease.      Two nonobstructing renal stones in the right kidney, both measuring 3 mm.         Electronically signed by: Johnny Sams MD   Date:    04/20/2020   Time:    15:58      US Lower Extremity Arteries Right   Final Result   Abnormal      Monophasic flow in the right peroneal artery.  Low velocities.      This  report was flagged in Epic as abnormal.         Electronically signed by: Angela Saenz   Date:    04/19/2020   Time:    19:22      X-Ray Foot Complete Right   Final Result      No evidence of acute fracture or dislocation of the right foot.  No osseous erosions.  Follow-up with MRI of the right foot, as clinically warranted.      Advanced vascular calcifications.         Electronically signed by: Efraín Blanc MD   Date:    04/19/2020   Time:    17:10          Assessment and Plan:     Problem List:    Active Diagnoses:    Diagnosis Date Noted POA    PRINCIPAL PROBLEM:  Gangrene of toes of right foot [I96] 04/19/2020 Yes    Hypokalemia [E87.6] 04/20/2020 Yes    MICKEY (acute kidney injury) [N17.9] 04/19/2020 Yes    Essential hypertension [I10] 06/18/2013 Yes     Chronic      Problems Resolved During this Admission:     Assessment and Plan:     1. Peripheral Artery Disease              4/1/2020: Began noticing discoloration of toes right foot.              4/19/2020: Presents with gangrene of toes right foot.              4/19/2020: Arterial Duplex: Slow distal runoff.   4/24/2020: O[facility]BC: Right leg: SFA: Distal 80%. Popliteal: 100%. AT & PT: Occluded. PER: Mid and distal occlusions. SFA: & POP: PTA 6 mm balloon. Mild residual. PER: Mid PTA.              Foot much warmer after recanalization of popliteal.   On aspirin 81 mg Q24 and clopidogrel 75 mg Q24.              Some hope she may heal a right TMA.               2. Hypertension     3. Acute Kidney Disease              4/19/2020: BUN/crea 44/5.5.              4/22/2020: BUN/crea 27/2.6.   4/23/2020: BUN/crea 18/1.9.              Dr. Gisela Wharton.              Hydration.                 VTE Risk Mitigation (From admission, onward)         Ordered     heparin (porcine) injection 5,000 Units  Every 12 hours      04/24/20 1651     Place sequential compression device  Until discontinued      04/19/20 2118     IP VTE HIGH RISK PATIENT  Once      04/19/20 2118      Reason for No Pharmacological VTE Prophylaxis  Once     Question:  Reasons:  Answer:  Physician Provided (leave comment)    04/19/20 9161                Coral Baron MD  Cardiology  [facility]

## 2020-04-24 NOTE — ASSESSMENT & PLAN NOTE
- BP have been elevated during admission  - only home med is HCTZ 12.5 mg QD, holding 2/2 MICKEY   - Continue nifedipine 90 mg daily  - PRN hydralazine   - monitor

## 2020-04-24 NOTE — PLAN OF CARE
Provided f/u visit. Pt relying on her vincent for strength.  provided reflective listening, prayer support upon request, and spiritual assessment. Pt is [employer] and mentioned involvement in her community and with her vincent group. No further spiritual needs expressed. Pt aware of 's presence as needed.

## 2020-04-24 NOTE — PROGRESS NOTES
0700 - Night nurse, KAREN Barcenas, RN prepared pt for transfer to cath lab procedural area.  0935 - Received call from Gabrielle in cath lab stating that pt would be transferred to ICU Bed 7. Pt handoff report called to receiving nurse, MJ Garner in ICU. Verbalized understanding and denies any further questions regarding pt. Pt specific bin medications sent to ICU and pt belongings brought to ICU by Dorene SWIFT.

## 2020-04-24 NOTE — PROGRESS NOTES
[facility]  Podiatry  Progress Note    Patient Name: Home De Dios  MRN: [id number]  Admission Date: 4/19/2020  Hospital Length of Stay: 5 days  Attending Physician: Jeanna Beckman MD  Primary Care Provider: Quintin Cerda MD     Subjective:     Interval History: dry gangrene toes right forefoot.  Dressing dry, clean, intact.  Resting well in bed.  S/p aortogram right with revascularization.      Follow-up For: Procedure(s) (LRB):  AORTOGRAM WITH RUNOFF (Right)    Post-Operative Day: Day of Surgery    Scheduled Meds:   aspirin  81 mg Oral Daily    ceFEPime (MAXIPIME) IVPB  1 g Intravenous Q24H    clopidogreL  75 mg Oral Daily    cyanocobalamin  500 mcg Oral Daily    ferrous sulfate  325 mg Oral Every other day    heparin (porcine)  5,000 Units Subcutaneous Q12H    latanoprost  1 drop Both Eyes QHS    metronidazole  500 mg Intravenous Q8H    NIFEdipine  90 mg Oral Daily    timolol maleate 0.5%  1 drop Both Eyes BID     Continuous Infusions:   sodium chloride 0.9% 150 mL/hr (04/24/20 0737)    sodium chloride 0.9% 100 mL/hr at 04/24/20 1042    lactated ringers 75 mL/hr at 04/23/20 0200     PRN Meds:acetaminophen, acetaminophen, atropine, hydrALAZINE, HYDROcodone-acetaminophen, HYDROcodone-acetaminophen, labetalol, ondansetron, sodium chloride 0.9%, traMADoL    Review of Systems  Objective:     Vital Signs (Most Recent):  Temp: 98 °F (36.7 °C) (04/24/20 1130)  Pulse: 68 (04/24/20 1131)  Resp: (!) 25 (04/24/20 1131)  BP: (!) 175/77 (04/24/20 1131)  SpO2: 98 % (04/24/20 1131) Vital Signs (24h Range):  Temp:  [97.5 °F (36.4 °C)-98.5 °F (36.9 °C)] 98 °F (36.7 °C)  Pulse:  [59-96] 68  Resp:  [17-28] 25  SpO2:  [95 %-99 %] 98 %  BP: (129-191)/(61-89) 175/77  Arterial Line BP: (178-200)/(74-82) 184/76     Weight: 52 kg (114 lb 10.2 oz)  Body mass index is 17.96 kg/m².    Foot Exam    Laboratory:  A1C:   Recent Labs   Lab 04/20/20  0342   HGBA1C 5.4     Blood Cultures: No results for  input(s): LABBLOO in the last 48 hours.  BMP:   Recent Labs   Lab 04/24/20  0325   GLU 97      K 3.5   *   CO2 25   BUN 14   CREATININE 1.8*   CALCIUM 8.1*   MG 1.4*     CBC:   Recent Labs   Lab 04/24/20  0325   WBC 6.31   RBC 3.30*   HGB 8.7*   HCT 26.6*      MCV 81*   MCH 26.4*   MCHC 32.7     CMP:   Recent Labs   Lab 04/19/20  1627  04/24/20  0325   GLU 93   < > 97   CALCIUM 9.3   < > 8.1*   ALBUMIN 3.7  --   --    PROT 8.0  --   --       < > 145   K 3.2*   < > 3.5   CO2 23   < > 25      < > 111*   BUN 44*   < > 14   CREATININE 5.5*   < > 1.8*   ALKPHOS 72  --   --    ALT 5*  --   --    AST 12  --   --    BILITOT 0.3  --   --     < > = values in this interval not displayed.     Coagulation:   Recent Labs   Lab 04/19/20  1627   INR 1.0   APTT 30.7     CRP:   Recent Labs   Lab 04/19/20  1627   .0*     ESR: No results for input(s): SEDRATE in the last 168 hours.  Prealbumin: No results for input(s): PREALBUMIN in the last 48 hours.  Wound Cultures: No results for input(s): LABAERO in the last 4320 hours.  Microbiology Results (last 7 days)     ** No results found for the last 168 hours. **        Specimen (12h ago, onward)    None          Diagnostic Results:  I have reviewed all pertinent imaging results/findings within the past 24 hours.    Clinical Findings:  Black stable dry gangrene with ulceration all toes right forefoot  without drainage, pus, tracking, fluctuance, malodor, or cardinal signs infection.     Otherwise, Skin thin, shiny, atrophic, with decreased density and distribution of pedal hair bilateral, but without hyperpigmentation, zully discoloration,  ulcers, masses, nodules or cords palpated bilateral feet and legs.    Pulse palpable DP/PT 1/4 weak  All toes dry, hard, cold right        Assessment/Plan:     Active Diagnoses:    Diagnosis Date Noted POA    PRINCIPAL PROBLEM:  Gangrene of toes of right foot [I96] 04/19/2020 Yes    Hypokalemia [E87.6] 04/20/2020  Yes    MICKEY (acute kidney injury) [N17.9] 04/19/2020 Yes    Essential hypertension [I10] 06/18/2013 Yes     Chronic      Problems Resolved During this Admission:       Plan TMA Tuesday afternoon 2/28/2020 following 1pm case.    COVID-19 test Sunday, pre op requirement.     Hold heparin Monday afternoon dose until after surgery.    Case request in.      JYOTI BucioM  Podiatry  [facility]

## 2020-04-24 NOTE — BRIEF OP NOTE
4/24/2020: OMCBC: Right leg: SFA: Distal 80%. Popliteal: 100%. AT & PT: Occluded. PER: Mid and distal occlusions. SFA: & POP: PTA 6 mm balloon. Mild residual. PER: Mid PTA.                Coral Baron M.D.

## 2020-04-24 NOTE — ASSESSMENT & PLAN NOTE
- Unknown baseline Cr. Renal function improving each day.    - Holding home HCTZ   - suspect 2/2 poorly controlled HTN vs infection/gangrene   - avoid nephrotoxins  - renally dose meds   - Continue IVF - LR at 100 cc/hr   - Appreciate nephrology assistance

## 2020-04-24 NOTE — HOSPITAL COURSE
"Patient admitted with gangrene of R toes and MICKEY. IV antibiotics were initiated for possible cellulitis of R foot. She was started on IV fluids and renal function improved each day. Nephrology following. Xray of R foot did not suggest osteomyelitis. US arteries R LE revealed, "Monophasic flow in the right peroneal artery.  Low velocities." Podiatry, vascular surgery, and cardiology have been following the patient. On 4/24/2020 patient underwent successful angioplasty of distal superficial femoral and popliteal arteries. She is scheduled for TMA on 4/28/20  "

## 2020-04-24 NOTE — PT/OT/SLP PROGRESS
Occupational Therapy      Patient Name:  Home De Dios   MRN:  [id number]    10:10 AM Patient not seen today secondary to (Nsg hold; Pt has bed rest orders and will need to remain flat at this time.  Bed Rest orders  9:45 AM 2020). OT orders reconciled as needed. WB clarification sent to MDs via secure chat, will update if needed. OT will follow-up as medically appropriate.    10:35 AM  Per secure chat with Dr. Allen, NWB RLE per podiatry note entered 2020.     Jyoti Flynn, OT  2020

## 2020-04-24 NOTE — PROGRESS NOTES
HD 6  Diagnosis-PVD/are LE with dry gangrene 1st through 4th toes  SP angioplasty distal SFA and popliteal artery this a.m.    Subjective  No complaints  Denies pain right lower extremity      PE  HEENT-atraumatic/anicteric  Neck-supple  Chest-clear  Heart-regular rate and rhythm  Extremities-right lower extremity with dry gangrene of 1st through 4th toes, no evidence cellulitis, biphasic Doppler of dorsalis pedis artery, audible Doppler posterior tibial artery.  Motor and sensation intact both lower extremities    Imaging  Angiogram of right lower extremity reviewed, apparent excellent result from superficial femoral and popliteal angioplasties.  Poor runoff to foot    Lab  White blood cell count 6.31    Impression/plan  Successful angioplasty of distal superficial femoral and popliteal arteries.  Dry gangrene of the right foot, no apparent need for amputation at this time  Audible Dopplers but no palpable pulses of the right foot at this time, rule of thumb being digital and transmetatarsal amputations need pulsatile flow to heal  Will need follow-up arterial Dopplers to more accurately a assess healing potential

## 2020-04-24 NOTE — PLAN OF CARE
Pt's v/s were monitored throughout the shift. The pt used bedside commode due to foot hurting. The pt remained free from falls and trauma. The pt denied any dizziness,n/v, or SOB. The pt c/o pain to her right foot and received Hydrocodone .The pt's dressing to right foot remained clean, dry, and intact. The pt received IV antibiotics and tolerated well. Purposeful rounding was performed. The pt's bed remained in the lowest position with the bed wheels locked. Call light within reach. NAD noted. Will continue to monitor.

## 2020-04-25 LAB
ANION GAP SERPL CALC-SCNC: 11 MMOL/L (ref 8–16)
ANION GAP SERPL CALC-SCNC: 11 MMOL/L (ref 8–16)
BUN SERPL-MCNC: 10 MG/DL (ref 8–23)
BUN SERPL-MCNC: 10 MG/DL (ref 8–23)
CALCIUM SERPL-MCNC: 7.7 MG/DL (ref 8.7–10.5)
CALCIUM SERPL-MCNC: 7.7 MG/DL (ref 8.7–10.5)
CHLORIDE SERPL-SCNC: 109 MMOL/L (ref 95–110)
CHLORIDE SERPL-SCNC: 109 MMOL/L (ref 95–110)
CO2 SERPL-SCNC: 26 MMOL/L (ref 23–29)
CO2 SERPL-SCNC: 26 MMOL/L (ref 23–29)
CREAT SERPL-MCNC: 1.5 MG/DL (ref 0.5–1.4)
CREAT SERPL-MCNC: 1.5 MG/DL (ref 0.5–1.4)
ERYTHROCYTE [DISTWIDTH] IN BLOOD BY AUTOMATED COUNT: 13.2 % (ref 11.5–14.5)
EST. GFR  (AFRICAN AMERICAN): 38 ML/MIN/1.73 M^2
EST. GFR  (AFRICAN AMERICAN): 38 ML/MIN/1.73 M^2
EST. GFR  (NON AFRICAN AMERICAN): 33 ML/MIN/1.73 M^2
EST. GFR  (NON AFRICAN AMERICAN): 33 ML/MIN/1.73 M^2
GLUCOSE SERPL-MCNC: 95 MG/DL (ref 70–110)
GLUCOSE SERPL-MCNC: 95 MG/DL (ref 70–110)
HCT VFR BLD AUTO: 27.1 % (ref 37–48.5)
HGB BLD-MCNC: 8.7 G/DL (ref 12–16)
MCH RBC QN AUTO: 26.1 PG (ref 27–31)
MCHC RBC AUTO-ENTMCNC: 32.1 G/DL (ref 32–36)
MCV RBC AUTO: 81 FL (ref 82–98)
PLATELET # BLD AUTO: 289 K/UL (ref 150–350)
PMV BLD AUTO: 9.8 FL (ref 9.2–12.9)
POTASSIUM SERPL-SCNC: 3.1 MMOL/L (ref 3.5–5.1)
POTASSIUM SERPL-SCNC: 3.1 MMOL/L (ref 3.5–5.1)
RBC # BLD AUTO: 3.33 M/UL (ref 4–5.4)
SODIUM SERPL-SCNC: 146 MMOL/L (ref 136–145)
SODIUM SERPL-SCNC: 146 MMOL/L (ref 136–145)
WBC # BLD AUTO: 7.43 K/UL (ref 3.9–12.7)

## 2020-04-25 PROCEDURE — 63600175 PHARM REV CODE 636 W HCPCS: Mod: HCNC | Performed by: INTERNAL MEDICINE

## 2020-04-25 PROCEDURE — 25000003 PHARM REV CODE 250: Mod: HCNC | Performed by: INTERNAL MEDICINE

## 2020-04-25 PROCEDURE — 85027 COMPLETE CBC AUTOMATED: CPT | Mod: HCNC

## 2020-04-25 PROCEDURE — S0030 INJECTION, METRONIDAZOLE: HCPCS | Mod: HCNC | Performed by: INTERNAL MEDICINE

## 2020-04-25 PROCEDURE — 99232 PR SUBSEQUENT HOSPITAL CARE,LEVL II: ICD-10-PCS | Mod: HCNC,,, | Performed by: INTERNAL MEDICINE

## 2020-04-25 PROCEDURE — 11000001 HC ACUTE MED/SURG PRIVATE ROOM: Mod: HCNC

## 2020-04-25 PROCEDURE — 94761 N-INVAS EAR/PLS OXIMETRY MLT: CPT | Mod: HCNC

## 2020-04-25 PROCEDURE — 99232 SBSQ HOSP IP/OBS MODERATE 35: CPT | Mod: HCNC,,, | Performed by: INTERNAL MEDICINE

## 2020-04-25 PROCEDURE — 36415 COLL VENOUS BLD VENIPUNCTURE: CPT | Mod: HCNC

## 2020-04-25 PROCEDURE — 80048 BASIC METABOLIC PNL TOTAL CA: CPT | Mod: HCNC

## 2020-04-25 PROCEDURE — 97164 PT RE-EVAL EST PLAN CARE: CPT | Mod: HCNC

## 2020-04-25 PROCEDURE — 97530 THERAPEUTIC ACTIVITIES: CPT | Mod: HCNC

## 2020-04-25 PROCEDURE — 63600175 PHARM REV CODE 636 W HCPCS: Mod: HCNC | Performed by: FAMILY MEDICINE

## 2020-04-25 RX ORDER — POTASSIUM CHLORIDE 20 MEQ/1
40 TABLET, EXTENDED RELEASE ORAL ONCE
Status: COMPLETED | OUTPATIENT
Start: 2020-04-25 | End: 2020-04-25

## 2020-04-25 RX ORDER — DEXTROSE MONOHYDRATE 50 MG/ML
INJECTION, SOLUTION INTRAVENOUS CONTINUOUS
Status: DISCONTINUED | OUTPATIENT
Start: 2020-04-25 | End: 2020-04-26

## 2020-04-25 RX ADMIN — HEPARIN SODIUM 5000 UNITS: 5000 INJECTION, SOLUTION INTRAVENOUS; SUBCUTANEOUS at 08:04

## 2020-04-25 RX ADMIN — FERROUS SULFATE TAB EC 325 MG (65 MG FE EQUIVALENT) 325 MG: 325 (65 FE) TABLET DELAYED RESPONSE at 08:04

## 2020-04-25 RX ADMIN — METRONIDAZOLE 500 MG: 500 INJECTION, SOLUTION INTRAVENOUS at 09:04

## 2020-04-25 RX ADMIN — METRONIDAZOLE 500 MG: 500 INJECTION, SOLUTION INTRAVENOUS at 02:04

## 2020-04-25 RX ADMIN — HYDROCODONE BITARTRATE AND ACETAMINOPHEN 1 TABLET: 5; 325 TABLET ORAL at 09:04

## 2020-04-25 RX ADMIN — CYANOCOBALAMIN TAB 500 MCG 500 MCG: 500 TAB at 08:04

## 2020-04-25 RX ADMIN — TIMOLOL MALEATE 1 DROP: 5 SOLUTION OPHTHALMIC at 08:04

## 2020-04-25 RX ADMIN — SODIUM CHLORIDE, SODIUM LACTATE, POTASSIUM CHLORIDE, AND CALCIUM CHLORIDE: .6; .31; .03; .02 INJECTION, SOLUTION INTRAVENOUS at 08:04

## 2020-04-25 RX ADMIN — HYDROCODONE BITARTRATE AND ACETAMINOPHEN 1 TABLET: 5; 325 TABLET ORAL at 05:04

## 2020-04-25 RX ADMIN — CEFEPIME 1 G: 1 INJECTION, POWDER, FOR SOLUTION INTRAMUSCULAR; INTRAVENOUS at 01:04

## 2020-04-25 RX ADMIN — NIFEDIPINE 90 MG: 30 TABLET, FILM COATED, EXTENDED RELEASE ORAL at 08:04

## 2020-04-25 RX ADMIN — ASPIRIN 81 MG: 81 TABLET, COATED ORAL at 08:04

## 2020-04-25 RX ADMIN — DEXTROSE: 5 SOLUTION INTRAVENOUS at 04:04

## 2020-04-25 RX ADMIN — METRONIDAZOLE 500 MG: 500 INJECTION, SOLUTION INTRAVENOUS at 08:04

## 2020-04-25 RX ADMIN — POTASSIUM CHLORIDE 40 MEQ: 1500 TABLET, EXTENDED RELEASE ORAL at 08:04

## 2020-04-25 RX ADMIN — LATANOPROST 1 DROP: 50 SOLUTION OPHTHALMIC at 08:04

## 2020-04-25 RX ADMIN — CLOPIDOGREL BISULFATE 75 MG: 75 TABLET ORAL at 08:04

## 2020-04-25 NOTE — PT/OT/SLP RE-EVAL
"Physical Therapy Re-evaluation    Patient Name:  Home De Dios   MRN:  [id number]    Recommendations:     Discharge Recommendations:  home, home health PT   Discharge Equipment Recommendations: walker, rolling, wheelchair   Barriers to discharge: None    Assessment:     Home De Dios is a 77 y.o. female admitted with a medical diagnosis of Gangrene of toe of right foot.  She presents with the following impairments/functional limitations:  weakness, impaired endurance, impaired self care skills, impaired functional mobilty, gait instability, impaired balance, decreased lower extremity function, edema, impaired cardiopulmonary response to activity, orthopedic precautions.    Pt underwent re-vascularization of RLE, then transferred to ICU on 4/24. PT orders reconciled s/p procedure and transfer. Re-eval performed this date. Per podiatry note 4/23 "no pressure or ambulation RLE." Maintained NWB RLE throughout re-evaluation. Pt tolerated re-evaluation well with no adverse reactions. Pt performed supine <> sit with SBA, sit <> stand with CGA and rolling walker. Pt able to ambulate 5 ft with RW and CGA with NWB RLE. Pt will benefit from skilled PT services to address impairments and functional limitations. Recommend discharge to home with HHPT.     Rehab Prognosis:  Good; patient would benefit from acute skilled PT services to address these deficits and reach maximum level of function.      Recent Surgery: Procedure(s) (LRB):  AORTOGRAM WITH RUNOFF (Right) 1 Day Post-Op    Plan:     During this hospitalization, patient to be seen 5 x/week to address the above listed problems via gait training, therapeutic activities, therapeutic exercises  · Plan of Care Expires:  05/05/20   Plan of Care Reviewed with: patient    Subjective     Communicated with RN (Tess) prior to session.  Patient found supine with bed alarm, blood pressure cuff, peripheral IV, pulse ox (continuous), telemetry upon PT entry to room, agreeable to " re-evaluation.      Chief Complaint: R foot pain  Patient comments/goals: None stated.  Pain/Comfort:  · Pain Rating 1: 5/10  · Location - Side 1: Right  · Location 1: foot  · Pain Addressed 1: Reposition, Distraction, Cessation of Activity  · Pain Rating Post-Intervention 1: 6/10    Patients cultural, spiritual, Temple conflicts given the current situation: (None stated)      Objective:     Patient found with: bed alarm, blood pressure cuff, peripheral IV, pulse ox (continuous), telemetry.    General Precautions: Standard, fall   Orthopedic Precautions:RLE non weight bearing(Per podiatry note 4/23/2020. Darco shoe RLE )   Braces: N/A     Exams:  · Cognition:   · Patient is oriented to person, place, time, and situation.  · Pt follows approximately 100% of multiple step commands.    · Mood: Pleasant and cooperative.  · Safety Awareness: Fair  · Musculoskeletal:  · Posture:  WNL  · LE ROM/Strength:   · R ROM: WNL  · L ROM: WNL  · R Strength:   · Hip flexion: 5/5  · Knee extension: 5/5  · Dorsiflexion: 5/5   · L Strength:   · Hip flexion: 5/5  · Knee extension: 5/5  · Dorsiflexion: 5/5   · Neuromuscular:  · Sensation: Intact to light touch bilateral LEs.   · Tone/Reflexes: No impairments identified with functional mobility. No formal testing performed.   · Coordination:  · Toe tapping: intact  · Balance:   · Static sitting: SBA  · Dynamic sitting: SBA  · Static standing: CGA  · Dynamic standing: CGA  · Visual-vestibular: No impairments identified with functional mobility. No formal testing performed.  · Integument: Visible skin intact  · Cardiopulmonary:  · Vital signs: While supine at rest: /71, HR 92 bpm, SpO2 92%  · Edema: None noted.    Functional Mobility:  · Bed Mobility:     · Supine to Sit: stand by assistance  · Sit to Supine: stand by assistance  · Transfers:     · Sit to Stand:  contact guard assistance with rolling walker  · Gait: 5 ft with rolling walker and contact guard assistance.   · Used  swing-to pattern.   · Maintained RLE in NWB.    AM-PAC 6 CLICK MOBILITY  Total Score:20       Therapeutic Activities and Exercises:   Bed mobility, transfer, and gait training as described above.    PT educated patient re:   · PT plan of care/role of PT  · Use of rolling walker  · NWB status  · Fall and safety precautions  · Gait deviations  · Discharge recommendations   · Use of call light (don't get up without assistance)  Pt verbalized understanding     The patient is safe to transfer with RN assist, whiteboard updated.   Patient encouraged to sit up in chair throughout the day to prevent deconditioning.     Patient left supine with all lines intact and call button in reach.    GOALS:   Multidisciplinary Problems     Physical Therapy Goals        Problem: Physical Therapy Goal    Goal Priority Disciplines Outcome Goal Variances Interventions   Physical Therapy Goal     PT, PT/OT Ongoing, Progressing     Description:  Goals to be met by: 20    Patient will increase functional independence with mobility by performin. Don/doff DARCO shoe independently without cueing for need for shoe prior to OOB mobility.   2. Gait x 100 feet with mod(I) with RW with NWB RLE with DARCO shoe.  3. Bed <> chair transfer with mod I and least restrictive assistive device with NWB RLE.                     History:     Past Medical History:   Diagnosis Date    Glaucoma     Glaucoma (increased eye pressure)     Hypertension        Past Surgical History:   Procedure Laterality Date    APPENDECTOMY      CATARACT EXTRACTION W/  INTRAOCULAR LENS IMPLANT Left 10/16/2013        CATARACT EXTRACTION W/  INTRAOCULAR LENS IMPLANT Right 2013        HYSTERECTOMY         Time Tracking:     PT Received On: 20  PT Start Time: 1100     PT Stop Time: 1124  PT Total Time (min): 24 min     Billable Minutes: Re-eval 14 and Therapeutic Activity 10      Mireille Morales, PT  2020

## 2020-04-25 NOTE — PROGRESS NOTES
"[facility] Hospital Medicine  Progress Note    Patient Name: Home De Dios  MRN: [id number]  Patient Class: IP- Inpatient   Admission Date: 4/19/2020  Length of Stay: 6 days  Attending Physician: Jeanna Beckman MD  Primary Care Provider: Quintin Cerda MD        Subjective:     Principal Problem:Gangrene of toe of right foot        HPI:  Ms. Home De Dios is a 77 y.o. female, with PMH of HTN, POAG, who [place] [facility] [place] with right toe pain. She states the toes of her right foot began changing color and turning black about 10 days ago, and have been having worsening numbness x 4-5 days. She states this began after soaking her foot in bleach and Epsom salts and then cutting her toe nails. The right foot subsequently became itchy, and then numb, particularly on the 1st, 2nd and 4th toes. She denies neuropathy or prior numbness/tingling., trauma, fever, chills. ED imaging shows no fracture/dislocaton or bony erosion. An US is pending. Labs showed MICKEY without hyperkalemia, and elevated CRP of 119. She was admitted to inpatient status.     Overview/Hospital Course:  Patient admitted with gangrene of R toes and MICKEY. IV antibiotics were initiated for possible cellulitis of R foot. She was started on IV fluids and renal function improved each day. Nephrology following. Xray of R foot did not suggest osteomyelitis. US arteries R LE revealed, "Monophasic flow in the right peroneal artery.  Low velocities." Podiatry, vascular surgery, and cardiology have been following the patient. On 4/24/2020 patient underwent successful angioplasty of distal superficial femoral and popliteal arteries.     Interval History: Patient seen in ICU, as she is s/p angioplasty of distal superficial femoral and popliteal arteries. She denies any specific concerns at this time.     Review of Systems   Constitutional: Negative for chills and fever.   Respiratory: Negative for cough and shortness of breath.  "   Cardiovascular: Negative for chest pain and leg swelling.   Gastrointestinal: Negative for abdominal pain, nausea and vomiting.   Musculoskeletal:        + right foot pain   Skin:        + black color change to right toes     Objective:     Vital Signs (Most Recent):  Temp: 99.5 °F (37.5 °C) (04/25/20 0715)  Pulse: 78 (04/25/20 0900)  Resp: 20 (04/25/20 0900)  BP: (!) 144/70 (04/25/20 0900)  SpO2: 97 % (04/25/20 0900) Vital Signs (24h Range):  Temp:  [97.5 °F (36.4 °C)-99.5 °F (37.5 °C)] 99.5 °F (37.5 °C)  Pulse:  [60-92] 78  Resp:  [7-45] 20  SpO2:  [66 %-100 %] 97 %  BP: (115-191)/(56-89) 144/70  Arterial Line BP: (178-200)/(70-82) 180/72     Weight: 52 kg (114 lb 10.2 oz)  Body mass index is 17.96 kg/m².    Intake/Output Summary (Last 24 hours) at 4/25/2020 0935  Last data filed at 4/25/2020 0558  Gross per 24 hour   Intake 2173.75 ml   Output 1800 ml   Net 373.75 ml      Physical Exam   Constitutional: She is oriented to person, place, and time. She appears well-developed and well-nourished. No distress.   HENT:   Head: Normocephalic and atraumatic.   Eyes: Conjunctivae are normal. No scleral icterus.   Neck: Normal range of motion. Neck supple.   Cardiovascular: Normal rate, regular rhythm and normal heart sounds.   Pulmonary/Chest: Effort normal and breath sounds normal. No respiratory distress.   Abdominal: Soft. Bowel sounds are normal. There is no tenderness.   Neurological: She is alert and oriented to person, place, and time.   Skin: Skin is warm and dry.   R foot wrapped in gauze - did not remove to examine toes today. No LE edema bilaterally.      Nursing note and vitals reviewed.      Significant Labs:   BMP:   Recent Labs   Lab 04/24/20  0325 04/25/20  0333   GLU 97 95  95    146*  146*   K 3.5 3.1*  3.1*   * 109  109   CO2 25 26  26   BUN 14 10  10   CREATININE 1.8* 1.5*  1.5*   CALCIUM 8.1* 7.7*  7.7*   MG 1.4*  --      CBC:   Recent Labs   Lab 04/24/20  0325 04/24/20  8242  04/25/20  0333   WBC 6.31 7.30 7.43   HGB 8.7* 9.3* 8.7*   HCT 26.6* 28.7* 27.1*    289 289           Assessment/Plan:      * Gangrene of toes of right foot  - All toes of R foot involved.   - Initially, there was some erythema in the foot proximal to the necrotic areas: continue treatment with metronidazole and cefepime for possible cellulitis   - Imaging without overt osteomyelitis  - HbA1c 5.4% - no DM  - Arterial US of right LE: Monophasic flow in the right peroneal artery.  Low velocities.  - Vascular surgery and cardiology following   - s/p R lower extremity angioplasty of distal superficial femoral and popliteal arteries on 4/24  - Continue Clopidogrel and Aspirin  - Appreciate podiatry assistance - plan for TMA on 4/28  - Continue dressings per podiatry.           Hypokalemia  - K 3.1. Given KCl 40 meq PO x 1  - Recheck in am      MICKEY (acute kidney injury)  - Unknown baseline Cr. Renal function continues to improve  - Holding home HCTZ   - suspect 2/2 poorly controlled HTN vs infection/gangrene   - avoid nephrotoxins  - renally dose meds   - Continue IVF - LR at 100 cc/hr   - Appreciate nephrology assistance    Essential hypertension  - Adequately controlled  - only home med is HCTZ 12.5 mg QD, holding 2/2 MICKEY   - Continue nifedipine 90 mg daily  - PRN hydralazine   - monitor         VTE Risk Mitigation (From admission, onward)         Ordered     heparin (porcine) injection 5,000 Units  Every 12 hours      04/24/20 1651     Place sequential compression device  Until discontinued      04/19/20 2118     IP VTE HIGH RISK PATIENT  Once      04/19/20 2118     Reason for No Pharmacological VTE Prophylaxis  Once     Question:  Reasons:  Answer:  Physician Provided (leave comment)    04/19/20 2118                      Jeanna Beckman MD  Department of Hospital Medicine   [facility]

## 2020-04-25 NOTE — PROGRESS NOTES
Progress Note  Nephrology    Consult Requested By: Jeanna Beckman MD  Reason for Consult: MICKEY    SUBJECTIVE:     History of Present Illness:  Patient is a 77 y.o. female presents with gangrenous right toe/ foot and found to have MICKEY with Cr 5.5. She deines any history of CKD and states that she only takes HCTZ for HTN and no other routine meds. She does repots that when her foot started itching and swelling more approximately 8 days ago she began taking Ibuprofen bid. There are no labs since 2013 because she tells me that she does not like to have her blood drawn.    Interval History:  She feels well today but no appetite.. S/P R leg 4/24/2020: [facility]: Right leg: SFA: Distal 80%. Popliteal: 100%. AT & PT: Occluded. PER: Mid and distal occlusions. SFA: & POP: PTA 6 mm balloon. Mild residual. PER: Mid PTA. Being transferred to the floor this afternoon.        Past Medical History:   Diagnosis Date    Glaucoma     Glaucoma (increased eye pressure)     Hypertension      Past Surgical History:   Procedure Laterality Date    APPENDECTOMY      CATARACT EXTRACTION W/  INTRAOCULAR LENS IMPLANT Left 10/16/2013        CATARACT EXTRACTION W/  INTRAOCULAR LENS IMPLANT Right 12/18/2013        HYSTERECTOMY       Family History   Problem Relation Age of Onset    Heart disease Mother     Cancer Sister     Amblyopia Neg Hx     Blindness Neg Hx     Macular degeneration Neg Hx     Retinal detachment Neg Hx     Stroke Neg Hx     Thyroid disease Neg Hx     Cataracts Neg Hx     Glaucoma Neg Hx      Social History     Tobacco Use    Smoking status: Never Smoker    Smokeless tobacco: Never Used   Substance Use Topics    Alcohol use: No    Drug use: Not on file       Medications Prior to Admission   Medication Sig Dispense Refill Last Dose    latanoprost (XALATAN) 0.005 % ophthalmic solution Place 1 drop into both eyes every evening. 2.5 mL 12 4/19/2020    timolol maleate 0.5% (TIMOPTIC) 0.5 %  Drop Place 1 drop into both eyes 2 (two) times daily. 5 mL 12 4/19/2020    hydroCHLOROthiazide (HYDRODIURIL) 12.5 MG Tab Take 1 tablet (12.5 mg total) by mouth once daily. 90 tablet 2 Unknown       Review of patient's allergies indicates:  No Known Allergies     Review of Systems:  Constitutional: No fever or chills, no weight changes.  Eyes: No visual changes or photophobia  HEENT: No nasal congestion or sore throat  Respiratory: No cough or shorness of breath  Cardiovascular: No chest pain or palpitations  Gastrointestinal: Poor appetite, no nausea or vomiting, no change in bowel habits  Genitourinary: No hematuria or dysuria  Skin: No rash or pruritis, black toe/ red foot, itching  Hematologic/lymphatic: No easy bruising, bleeding or lymphadenopathy  Musculoskeletal: No arthralgias or myalgias  Neurological: No seizures or tremors  Endocrine: No heat/cold intolerance.  No polyuria/polydipsia.  Psychiatric:  No depression or anxiety. +Poor insight    OBJECTIVE:     Vital Signs (Most Recent)  Temp: 99.2 °F (37.3 °C) (04/25/20 1105)  Pulse: 77 (04/25/20 1214)  Resp: 20 (04/25/20 1105)  BP: (!) 141/66 (04/25/20 1214)  SpO2: 98 % (04/25/20 1214)    Vital Signs Range (Last 24H):  Temp:  [98 °F (36.7 °C)-99.5 °F (37.5 °C)]   Pulse:  [60-90]   Resp:  [7-45]   BP: (115-182)/(56-80)   SpO2:  [66 %-100 %]     Physical Exam:    General appearance: Well developed, thin  Head: Normocephalic, atraumatic  Eyes:  Conjunctivae nl. Sclera anicteric. PERRL.  HEENT: Lips, mucosa, and tongue normal; teeth and gums normal and oropharynx clear.  Neck: Supple, trachea midline, thyroid not enlarged,   Lungs: Clear to auscultation bilaterally and normal respiratory effort  Heart: Regular rate and rhythm, S1, S2 normal, no murmur, click, rub or gallop  Abdomen: Soft, non-tender non-distended; bowel sounds normal; no masses,  no organomegaly  Extremities: dry gangrene of right 1st and tip of 2nd toes and now 4th. No edema  Pulses:  nonpalpable  Neurologic: Normal strength and tone. No focal numbness or weakness  Psychiatric:  Alert and oriented times 3.       Diagnostic Results:  Labs: Reviewed  X-Ray: Reviewed  US: Reviewed    ASSESSMENT/PLAN:     1. MICKEY likely due to right foot gangrene and Ibuprofen/ Hypotension/ Hypokalemia  -Last known Cr 2013 was 0.9  -Minimal proteinuria 280 by ratio  -Current Cr down to 1.5 with resolution of acidosis   -2170  in only 1800 UOP   -Replace K again today  -CPK is normal  -FeUA is low  -Renal US no obstruction, 2 right non-obstructing calculi and neg urine eosin.  -Continue IVF's but switch to D5W given hypernatremia  -Avoid nephrotoxins, NSAIDs/ PPI and renal dose meds.    2. HTN  -Increased Nifedipine XL to 60 mg daily 4/21 but still poorly controlled  -Increased Nifedipine XL to 90 mg daily yesterday  -Continue the prn hydralazine  -Was on HCTZ monotherapy at home but no diuretics at present.    3. Dry Gangrene of right toes/ foot, severe PAD  -On cefepime and Flagyl, appears dry at present though  -Dr. Baron performed angio 4/24/2020: [facility]: Right leg: SFA: Distal 80%. Popliteal: 100%. AT & PT: Occluded. PER: Mid and distal occlusions. SFA: & POP: PTA 6 mm balloon. Mild residual. PER: Mid PTA.  -He suspects she will need BKA or TMA    4. Fe Defic Anemia/ Relative B12 Defic  -Started oral Fe for now  -Started 500mcg daily  B12  -Normal folate and TSH  -H/H trending down monitor

## 2020-04-25 NOTE — SUBJECTIVE & OBJECTIVE
Interval History: Patient seen in ICU, as she is s/p angioplasty of distal superficial femoral and popliteal arteries. She denies any specific concerns at this time.     Review of Systems   Constitutional: Negative for chills and fever.   Respiratory: Negative for cough and shortness of breath.    Cardiovascular: Negative for chest pain and leg swelling.   Gastrointestinal: Negative for abdominal pain, nausea and vomiting.   Musculoskeletal:        + right foot pain   Skin:        + black color change to right toes     Objective:     Vital Signs (Most Recent):  Temp: 99.5 °F (37.5 °C) (04/25/20 0715)  Pulse: 78 (04/25/20 0900)  Resp: 20 (04/25/20 0900)  BP: (!) 144/70 (04/25/20 0900)  SpO2: 97 % (04/25/20 0900) Vital Signs (24h Range):  Temp:  [97.5 °F (36.4 °C)-99.5 °F (37.5 °C)] 99.5 °F (37.5 °C)  Pulse:  [60-92] 78  Resp:  [7-45] 20  SpO2:  [66 %-100 %] 97 %  BP: (115-191)/(56-89) 144/70  Arterial Line BP: (178-200)/(70-82) 180/72     Weight: 52 kg (114 lb 10.2 oz)  Body mass index is 17.96 kg/m².    Intake/Output Summary (Last 24 hours) at 4/25/2020 0935  Last data filed at 4/25/2020 0558  Gross per 24 hour   Intake 2173.75 ml   Output 1800 ml   Net 373.75 ml      Physical Exam   Constitutional: She is oriented to person, place, and time. She appears well-developed and well-nourished. No distress.   HENT:   Head: Normocephalic and atraumatic.   Eyes: Conjunctivae are normal. No scleral icterus.   Neck: Normal range of motion. Neck supple.   Cardiovascular: Normal rate, regular rhythm and normal heart sounds.   Pulmonary/Chest: Effort normal and breath sounds normal. No respiratory distress.   Abdominal: Soft. Bowel sounds are normal. There is no tenderness.   Neurological: She is alert and oriented to person, place, and time.   Skin: Skin is warm and dry.   R foot wrapped in gauze - did not remove to examine toes today. No LE edema bilaterally.      Nursing note and vitals reviewed.      Significant Labs:   BMP:    Recent Labs   Lab 04/24/20  0325 04/25/20  0333   GLU 97 95  95    146*  146*   K 3.5 3.1*  3.1*   * 109  109   CO2 25 26  26   BUN 14 10  10   CREATININE 1.8* 1.5*  1.5*   CALCIUM 8.1* 7.7*  7.7*   MG 1.4*  --      CBC:   Recent Labs   Lab 04/24/20  0325 04/24/20  2221 04/25/20  0333   WBC 6.31 7.30 7.43   HGB 8.7* 9.3* 8.7*   HCT 26.6* 28.7* 27.1*    289 289

## 2020-04-25 NOTE — ASSESSMENT & PLAN NOTE
- All toes of R foot involved.   - Initially, there was some erythema in the foot proximal to the necrotic areas: continue treatment with metronidazole and cefepime for possible cellulitis   - Imaging without overt osteomyelitis  - HbA1c 5.4% - no DM  - Arterial US of right LE: Monophasic flow in the right peroneal artery.  Low velocities.  - Vascular surgery and cardiology following   - s/p R lower extremity angioplasty of distal superficial femoral and popliteal arteries on 4/24  - Continue Clopidogrel and Aspirin  - Appreciate podiatry assistance - plan for TMA on 4/28  - Continue dressings per podiatry.

## 2020-04-25 NOTE — PLAN OF CARE
"  Problem: Physical Therapy Goal  Goal: Physical Therapy Goal  Description  Goals to be met by: 20    Patient will increase functional independence with mobility by performin. Don/doff DARCO shoe independently without cueing for need for shoe prior to OOB mobility.   2. Gait x 100 feet with mod(I) with RW with NWB RLE with DARCO shoe.  3. Bed <> chair transfer with mod I and least restrictive assistive device with NWB RLE.    Outcome: Ongoing, Progressing     Pt underwent re-vascularization of RLE, then transferred to ICU on . PT orders reconciled s/p procedure and transfer. Re-eval performed this date. Per podiatry note  "no pressure or ambulation RLE." Maintained NWB RLE throughout re-evaluation. Pt tolerated re-evaluation well with no adverse reactions. Pt performed supine <> sit with SBA, sit <> stand with CGA and rolling walker. Pt able to ambulate 5 ft with RW and CGA with NWB RLE. Pt will benefit from skilled PT services to address impairments and functional limitations. Recommend discharge to home with HHPT.   "

## 2020-04-25 NOTE — NURSING TRANSFER
Nursing Transfer Note      4/25/2020     Transfer To: Rm [id number]    Transfer via bed    Transfer with cardiac monitoring    Transported by RN and transport staff    Medicines sent: Yes    Chart send with patient: Yes    Notified: spouse per pt    Patient reassessed at: 1135 4/25/2020    Upon arrival to floor: cardiac monitor applied, patient oriented to room, call bell in reach and bed in lowest position    Dr Baron removed R foot bandage prior to transfer, said it was OK to leave open to air. Pedal pulses audible via doppler. VSS, oriented x4, on room air. Poor appetite, MD aware. Safely transferred to new room. Nurse at bedside for handoff.

## 2020-04-25 NOTE — ASSESSMENT & PLAN NOTE
- Unknown baseline Cr. Renal function continues to improve  - Holding home HCTZ   - suspect 2/2 poorly controlled HTN vs infection/gangrene   - avoid nephrotoxins  - renally dose meds   - Continue IVF - LR at 100 cc/hr   - Appreciate nephrology assistance

## 2020-04-25 NOTE — PROGRESS NOTES
[facility]  Cardiology  Progress Note    Patient Name: Home De Dios  MRN: [id number]  Admission Date: 4/19/2020  Hospital Length of Stay: 6 days  Code Status: Full Code   Attending Physician: Jeanna Beckman MD   Primary Care Physician: Quintin Cerda MD  Expected Discharge Date:   Principal Problem:Gangrene of toe of right foot    Subjective:     Brief HPI:    Home De Dios is a 77 y.o.female with hypertension. She had pain in the left foot and soaked her feet in water on about 4/1/2020. She noted discoloration of several toes on the right foot. She developed rest pain. She was afraid of going to the hospital because of the COVID situation. She presented on 4/19/2020 with gangrenous toes and acute renal failure. She was admitted.       Hospital Course:    Hydration.    4/24/2020: [facility]: Right leg: SFA: Distal 80%. Popliteal: 100%. AT & PT: Occluded. PER: Mid and distal occlusions. SFA: & POP: PTA 6 mm balloon. Mild residual. PER: Mid PTA.    Interval History:    Right ankle warm. No CP or SOB. Not eating much.    Review of Systems   Constitution: Positive for malaise/fatigue. Negative for chills and fever.   HENT: Negative for nosebleeds.    Eyes: Negative for vision loss in left eye and vision loss in right eye.   Cardiovascular: Negative for chest pain, leg swelling, orthopnea, palpitations and paroxysmal nocturnal dyspnea.   Respiratory: Negative for cough, hemoptysis, shortness of breath, sputum production and wheezing.    Hematologic/Lymphatic: Negative for bleeding problem.   Skin:        Right toes gangrenous.   Musculoskeletal: Negative for myalgias.   Gastrointestinal: Negative for abdominal pain, heartburn, hematemesis, hematochezia, jaundice, melena, nausea and vomiting.   Genitourinary: Negative for hematuria.   Neurological: Negative for dizziness, headaches, light-headedness, vertigo and weakness.   Psychiatric/Behavioral: Negative for altered mental status. The patient is  not nervous/anxious.    Allergic/Immunologic: Negative for persistent infections.     Objective:     Vital Signs (Most Recent):  Temp: 99.5 °F (37.5 °C) (04/25/20 0715)  Pulse: 78 (04/25/20 1000)  Resp: 18 (04/25/20 1000)  BP: (!) 149/67 (04/25/20 1000)  SpO2: 100 % (04/25/20 1000) Vital Signs (24h Range):  Temp:  [98 °F (36.7 °C)-99.5 °F (37.5 °C)] 99.5 °F (37.5 °C)  Pulse:  [60-90] 78  Resp:  [7-45] 18  SpO2:  [66 %-100 %] 100 %  BP: (115-182)/(56-80) 149/67  Arterial Line BP: (178-184)/(70-76) 180/72     Weight: 52 kg (114 lb 10.2 oz)  Body mass index is 17.96 kg/m².    SpO2: 100 %  O2 Device (Oxygen Therapy): room air      Intake/Output Summary (Last 24 hours) at 4/25/2020 1109  Last data filed at 4/25/2020 1000  Gross per 24 hour   Intake 1473.75 ml   Output 2200 ml   Net -726.25 ml       Lines/Drains/Airways     Drain                 Sheath 04/24/20 1112 Left less than 1 day          Peripheral Intravenous Line                 Peripheral IV - Single Lumen 04/22/20 1100 20 G Right Forearm 3 days                Physical Exam   Constitutional: She appears well-developed and well-nourished.   Cardiovascular: Normal rate, regular rhythm, S1 normal and S2 normal.   Pulses:       Femoral pulses are 2+ on the right side, and 2+ on the left side.       Popliteal pulses are 1+ on the right side, and 1+ on the left side.        Dorsalis pedis pulses are 0 on the right side, and 0 on the left side.        Posterior tibial pulses are 0 on the right side, and 0 on the left side.   Dry gangrene of right toes.  Foot warm.   Pulmonary/Chest: Effort normal and breath sounds normal.       Current Medications:     aspirin  81 mg Oral Daily    ceFEPime (MAXIPIME) IVPB  1 g Intravenous Q24H    clopidogreL  75 mg Oral Daily    cyanocobalamin  500 mcg Oral Daily    ferrous sulfate  325 mg Oral Every other day    heparin (porcine)  5,000 Units Subcutaneous Q12H    latanoprost  1 drop Both Eyes QHS    metronidazole  500 mg  Intravenous Q8H    NIFEdipine  90 mg Oral Daily    timolol maleate 0.5%  1 drop Both Eyes BID     Current Laboratory Results:    Recent Results (from the past 24 hour(s))   ISTAT ACT-K    Collection Time: 04/24/20  1:58 PM   Result Value Ref Range    POC ACTIVATED CLOTTING TIME K 142 (H) 74 - 137 sec    Sample unknown    CBC auto differential    Collection Time: 04/24/20 10:21 PM   Result Value Ref Range    WBC 7.30 3.90 - 12.70 K/uL    RBC 3.50 (L) 4.00 - 5.40 M/uL    Hemoglobin 9.3 (L) 12.0 - 16.0 g/dL    Hematocrit 28.7 (L) 37.0 - 48.5 %    Mean Corpuscular Volume 82 82 - 98 fL    Mean Corpuscular Hemoglobin 26.6 (L) 27.0 - 31.0 pg    Mean Corpuscular Hemoglobin Conc 32.4 32.0 - 36.0 g/dL    RDW 13.2 11.5 - 14.5 %    Platelets 289 150 - 350 K/uL    MPV 10.0 9.2 - 12.9 fL    Immature Granulocytes 0.4 0.0 - 0.5 %    Gran # (ANC) 5.0 1.8 - 7.7 K/uL    Immature Grans (Abs) 0.03 0.00 - 0.04 K/uL    Lymph # 1.4 1.0 - 4.8 K/uL    Mono # 0.6 0.3 - 1.0 K/uL    Eos # 0.3 0.0 - 0.5 K/uL    Baso # 0.04 0.00 - 0.20 K/uL    nRBC 0 0 /100 WBC    Gran% 68.4 38.0 - 73.0 %    Lymph% 18.5 18.0 - 48.0 %    Mono% 8.5 4.0 - 15.0 %    Eosinophil% 3.7 0.0 - 8.0 %    Basophil% 0.5 0.0 - 1.9 %    Differential Method Automated    CBC Without Differential    Collection Time: 04/25/20  3:33 AM   Result Value Ref Range    WBC 7.43 3.90 - 12.70 K/uL    RBC 3.33 (L) 4.00 - 5.40 M/uL    Hemoglobin 8.7 (L) 12.0 - 16.0 g/dL    Hematocrit 27.1 (L) 37.0 - 48.5 %    Mean Corpuscular Volume 81 (L) 82 - 98 fL    Mean Corpuscular Hemoglobin 26.1 (L) 27.0 - 31.0 pg    Mean Corpuscular Hemoglobin Conc 32.1 32.0 - 36.0 g/dL    RDW 13.2 11.5 - 14.5 %    Platelets 289 150 - 350 K/uL    MPV 9.8 9.2 - 12.9 fL   Basic Metabolic Panel (BMP)    Collection Time: 04/25/20  3:33 AM   Result Value Ref Range    Sodium 146 (H) 136 - 145 mmol/L    Potassium 3.1 (L) 3.5 - 5.1 mmol/L    Chloride 109 95 - 110 mmol/L    CO2 26 23 - 29 mmol/L    Glucose 95 70 - 110 mg/dL     BUN, Bld 10 8 - 23 mg/dL    Creatinine 1.5 (H) 0.5 - 1.4 mg/dL    Calcium 7.7 (L) 8.7 - 10.5 mg/dL    Anion Gap 11 8 - 16 mmol/L    eGFR if African American 38 (A) >60 mL/min/1.73 m^2    eGFR if non African American 33 (A) >60 mL/min/1.73 m^2   Basic metabolic panel    Collection Time: 04/25/20  3:33 AM   Result Value Ref Range    Sodium 146 (H) 136 - 145 mmol/L    Potassium 3.1 (L) 3.5 - 5.1 mmol/L    Chloride 109 95 - 110 mmol/L    CO2 26 23 - 29 mmol/L    Glucose 95 70 - 110 mg/dL    BUN, Bld 10 8 - 23 mg/dL    Creatinine 1.5 (H) 0.5 - 1.4 mg/dL    Calcium 7.7 (L) 8.7 - 10.5 mg/dL    Anion Gap 11 8 - 16 mmol/L    eGFR if African American 38 (A) >60 mL/min/1.73 m^2    eGFR if non African American 33 (A) >60 mL/min/1.73 m^2     Current Imaging Results:    US Retroperitoneal Complete (Kidney and   Final Result      Findings suggestive of medical renal disease.      Two nonobstructing renal stones in the right kidney, both measuring 3 mm.         Electronically signed by: Johnny Sams MD   Date:    04/20/2020   Time:    15:58      US Lower Extremity Arteries Right   Final Result   Abnormal      Monophasic flow in the right peroneal artery.  Low velocities.      This report was flagged in Epic as abnormal.         Electronically signed by: Angela Saenz   Date:    04/19/2020   Time:    19:22      X-Ray Foot Complete Right   Final Result      No evidence of acute fracture or dislocation of the right foot.  No osseous erosions.  Follow-up with MRI of the right foot, as clinically warranted.      Advanced vascular calcifications.         Electronically signed by: Efraín Blanc MD   Date:    04/19/2020   Time:    17:10          Assessment and Plan:     Problem List:    Active Diagnoses:    Diagnosis Date Noted POA    PRINCIPAL PROBLEM:  Gangrene of toes of right foot [I96] 04/19/2020 Yes    Hypokalemia [E87.6] 04/20/2020 Yes    MICKEY (acute kidney injury) [N17.9] 04/19/2020 Yes    Essential hypertension [I10]  06/18/2013 Yes     Chronic      Problems Resolved During this Admission:     Assessment and Plan:     1. Peripheral Artery Disease              4/1/2020: Began noticing discoloration of toes right foot.              4/19/2020: Presents with gangrene of toes right foot.              4/19/2020: Arterial Duplex: Slow distal runoff.   4/24/2020: [facility]: Right leg: SFA: Distal 80%. Popliteal: 100%. AT & PT: Occluded. PER: Mid and distal occlusions. SFA: & POP: PTA 6 mm balloon. Mild residual. PER: Mid PTA.   Dry gangrene of toes right foot.              Foot much warmer after recanalization of popliteal.   On aspirin 81 mg Q24 and clopidogrel 75 mg Q24.              Some hope she may only lose forefoot.   She needs to eat.               2. Hypertension   On nifedipine 90 mg Q24.      3. Acute Kidney Disease              4/19/2020: BUN/crea 44/5.5.              4/22/2020: BUN/crea 27/2.6.   4/23/2020: BUN/crea 18/1.9.   4/25/2020: BUN/crea 10/1.5. CrCl 38.              Dr. Gisela Wharton/Dr. Jessi Nguyen.             VTE Risk Mitigation (From admission, onward)         Ordered     heparin (porcine) injection 5,000 Units  Every 12 hours      04/24/20 1651     Place sequential compression device  Until discontinued      04/19/20 2118     IP VTE HIGH RISK PATIENT  Once      04/19/20 2118     Reason for No Pharmacological VTE Prophylaxis  Once     Question:  Reasons:  Answer:  Physician Provided (leave comment)    04/19/20 2118                Coral Baron MD  Cardiology  [facility]

## 2020-04-25 NOTE — ASSESSMENT & PLAN NOTE
- Adequately controlled  - only home med is HCTZ 12.5 mg QD, holding 2/2 MICKEY   - Continue nifedipine 90 mg daily  - PRN hydralazine   - monitor

## 2020-04-25 NOTE — NURSING TRANSFER
Nursing Transfer Note      4/25/2020     Transfer From: ICU    Transfer via bed    Transfer with cardiac monitoring    Transported by Tess Weaver RN    Medicines sent: latanoprost and timolol eye drops    Chart send with patient: Yes    Patient reassessed at:  4/25/20 @ 1230    I agree with the assessment as charted by FIDEL Weaver RN on 4/25/20 @ 1376.

## 2020-04-25 NOTE — NURSING
Patient stable overnight, Left groin without hematoma or drainage, with Tegaderm intact. Up to BSC and voiding without any difficulty. Bed alarm on, as patient gets out of bed by herself without calling, re-instruced to call first. Vocalizes understanding.  C/O pain to right foot controlled with prn Hydrocodone.  Toes painted with Betadine and wrapped with gauze, gauze between necrotic toes.Redness noted to foot and shin, pulses Dopplered.  Labetalol given x1 for SBP > 160.

## 2020-04-26 PROBLEM — D64.9 ANEMIA: Status: ACTIVE | Noted: 2020-04-26

## 2020-04-26 PROBLEM — E44.1 MALNUTRITION OF MILD DEGREE: Status: ACTIVE | Noted: 2020-04-26

## 2020-04-26 LAB
ANION GAP SERPL CALC-SCNC: 11 MMOL/L (ref 8–16)
BUN SERPL-MCNC: 9 MG/DL (ref 8–23)
CALCIUM SERPL-MCNC: 7.7 MG/DL (ref 8.7–10.5)
CHLORIDE SERPL-SCNC: 107 MMOL/L (ref 95–110)
CO2 SERPL-SCNC: 25 MMOL/L (ref 23–29)
CREAT SERPL-MCNC: 1.5 MG/DL (ref 0.5–1.4)
ERYTHROCYTE [DISTWIDTH] IN BLOOD BY AUTOMATED COUNT: 13.1 % (ref 11.5–14.5)
EST. GFR  (AFRICAN AMERICAN): 38 ML/MIN/1.73 M^2
EST. GFR  (NON AFRICAN AMERICAN): 33 ML/MIN/1.73 M^2
GLUCOSE SERPL-MCNC: 110 MG/DL (ref 70–110)
HCT VFR BLD AUTO: 28.3 % (ref 37–48.5)
HGB BLD-MCNC: 8.9 G/DL (ref 12–16)
MAGNESIUM SERPL-MCNC: 1.3 MG/DL (ref 1.6–2.6)
MCH RBC QN AUTO: 25.9 PG (ref 27–31)
MCHC RBC AUTO-ENTMCNC: 31.4 G/DL (ref 32–36)
MCV RBC AUTO: 83 FL (ref 82–98)
PLATELET # BLD AUTO: 323 K/UL (ref 150–350)
PMV BLD AUTO: 10.2 FL (ref 9.2–12.9)
POTASSIUM SERPL-SCNC: 3.1 MMOL/L (ref 3.5–5.1)
RBC # BLD AUTO: 3.43 M/UL (ref 4–5.4)
SODIUM SERPL-SCNC: 143 MMOL/L (ref 136–145)
WBC # BLD AUTO: 7.73 K/UL (ref 3.9–12.7)

## 2020-04-26 PROCEDURE — 25000003 PHARM REV CODE 250: Mod: HCNC | Performed by: INTERNAL MEDICINE

## 2020-04-26 PROCEDURE — 99232 PR SUBSEQUENT HOSPITAL CARE,LEVL II: ICD-10-PCS | Mod: HCNC,,, | Performed by: INTERNAL MEDICINE

## 2020-04-26 PROCEDURE — 11000001 HC ACUTE MED/SURG PRIVATE ROOM: Mod: HCNC

## 2020-04-26 PROCEDURE — 99232 SBSQ HOSP IP/OBS MODERATE 35: CPT | Mod: HCNC,,, | Performed by: INTERNAL MEDICINE

## 2020-04-26 PROCEDURE — S0030 INJECTION, METRONIDAZOLE: HCPCS | Mod: HCNC | Performed by: INTERNAL MEDICINE

## 2020-04-26 PROCEDURE — 36415 COLL VENOUS BLD VENIPUNCTURE: CPT | Mod: HCNC

## 2020-04-26 PROCEDURE — 63600175 PHARM REV CODE 636 W HCPCS: Mod: HCNC | Performed by: INTERNAL MEDICINE

## 2020-04-26 PROCEDURE — 85027 COMPLETE CBC AUTOMATED: CPT | Mod: HCNC

## 2020-04-26 PROCEDURE — 80048 BASIC METABOLIC PNL TOTAL CA: CPT | Mod: HCNC

## 2020-04-26 PROCEDURE — 83735 ASSAY OF MAGNESIUM: CPT | Mod: HCNC

## 2020-04-26 RX ORDER — POTASSIUM CHLORIDE 20 MEQ/1
40 TABLET, EXTENDED RELEASE ORAL 2 TIMES DAILY
Status: COMPLETED | OUTPATIENT
Start: 2020-04-26 | End: 2020-04-26

## 2020-04-26 RX ORDER — POTASSIUM CHLORIDE 20 MEQ/1
40 TABLET, EXTENDED RELEASE ORAL 2 TIMES DAILY
Status: DISCONTINUED | OUTPATIENT
Start: 2020-04-26 | End: 2020-04-26

## 2020-04-26 RX ORDER — MAGNESIUM SULFATE 1 G/100ML
1 INJECTION INTRAVENOUS ONCE
Status: COMPLETED | OUTPATIENT
Start: 2020-04-26 | End: 2020-04-26

## 2020-04-26 RX ORDER — SODIUM CHLORIDE, SODIUM LACTATE, POTASSIUM CHLORIDE, CALCIUM CHLORIDE 600; 310; 30; 20 MG/100ML; MG/100ML; MG/100ML; MG/100ML
INJECTION, SOLUTION INTRAVENOUS CONTINUOUS
Status: DISCONTINUED | OUTPATIENT
Start: 2020-04-26 | End: 2020-05-01

## 2020-04-26 RX ADMIN — HEPARIN SODIUM 5000 UNITS: 5000 INJECTION, SOLUTION INTRAVENOUS; SUBCUTANEOUS at 08:04

## 2020-04-26 RX ADMIN — CEFEPIME 1 G: 1 INJECTION, POWDER, FOR SOLUTION INTRAMUSCULAR; INTRAVENOUS at 01:04

## 2020-04-26 RX ADMIN — POTASSIUM CHLORIDE 40 MEQ: 1500 TABLET, EXTENDED RELEASE ORAL at 08:04

## 2020-04-26 RX ADMIN — TIMOLOL MALEATE 1 DROP: 5 SOLUTION OPHTHALMIC at 08:04

## 2020-04-26 RX ADMIN — MAGNESIUM SULFATE 1 G: 1 INJECTION INTRAVENOUS at 09:04

## 2020-04-26 RX ADMIN — NIFEDIPINE 90 MG: 30 TABLET, FILM COATED, EXTENDED RELEASE ORAL at 08:04

## 2020-04-26 RX ADMIN — HYDROCODONE BITARTRATE AND ACETAMINOPHEN 1 TABLET: 5; 325 TABLET ORAL at 07:04

## 2020-04-26 RX ADMIN — ASPIRIN 81 MG: 81 TABLET, COATED ORAL at 08:04

## 2020-04-26 RX ADMIN — SODIUM CHLORIDE, SODIUM LACTATE, POTASSIUM CHLORIDE, AND CALCIUM CHLORIDE: .6; .31; .03; .02 INJECTION, SOLUTION INTRAVENOUS at 02:04

## 2020-04-26 RX ADMIN — METRONIDAZOLE 500 MG: 500 INJECTION, SOLUTION INTRAVENOUS at 05:04

## 2020-04-26 RX ADMIN — LATANOPROST 1 DROP: 50 SOLUTION OPHTHALMIC at 08:04

## 2020-04-26 RX ADMIN — METRONIDAZOLE 500 MG: 500 INJECTION, SOLUTION INTRAVENOUS at 02:04

## 2020-04-26 RX ADMIN — CYANOCOBALAMIN TAB 500 MCG 500 MCG: 500 TAB at 08:04

## 2020-04-26 RX ADMIN — TRAMADOL HYDROCHLORIDE 50 MG: 50 TABLET, FILM COATED ORAL at 08:04

## 2020-04-26 RX ADMIN — DEXTROSE: 5 SOLUTION INTRAVENOUS at 09:04

## 2020-04-26 RX ADMIN — METRONIDAZOLE 500 MG: 500 INJECTION, SOLUTION INTRAVENOUS at 12:04

## 2020-04-26 RX ADMIN — CLOPIDOGREL BISULFATE 75 MG: 75 TABLET ORAL at 08:04

## 2020-04-26 NOTE — PLAN OF CARE
Patient stable. VSS. Pain managed with PRN medications. Fluids infusing. Rounding completed. Denied needs. Call bell in reach. Side rails up X2. Bed locked, lowered. Bed alarm set. Safety maintained.       Increased intake encouraged with meals.     Room air

## 2020-04-26 NOTE — ASSESSMENT & PLAN NOTE
- Initially, there was some erythema in the foot proximal to the necrotic areas  - Continue treatment with metronidazole and cefepime for possible cellulitis   - Imaging without overt osteomyelitis  - Arterial US of right LE: Monophasic flow in the right peroneal artery.  Low velocities.  - Vascular surgery and cardiology following   - s/p R lower extremity angioplasty of distal superficial femoral and popliteal arteries on 4/24  - Continue Clopidogrel and Aspirin  - Appreciate podiatry assistance - plan for TMA on 4/28  - Continue dressings per podiatry.

## 2020-04-26 NOTE — PROGRESS NOTES
[facility]  Cardiology  Progress Note    Patient Name: Home De Dios  MRN: [id number]  Admission Date: 4/19/2020  Hospital Length of Stay: 7 days  Code Status: Full Code   Attending Physician: Jeanna Beckman MD   Primary Care Physician: Quintin Cerda MD  Expected Discharge Date:   Principal Problem:Gangrene of toe of right foot    Subjective:     Brief HPI:    Home De Dios is a 77 y.o.female with hypertension. She had pain in the left foot and soaked her feet in water on about 4/1/2020. She noted discoloration of several toes on the right foot. She developed rest pain. She was afraid of going to the hospital because of the COVID situation. She presented on 4/19/2020 with gangrenous toes and acute renal failure. She was admitted.       Hospital Course:    Hydration.    4/24/2020: [facility]: Right leg: SFA: Distal 80%. Popliteal: 100%. AT & PT: Occluded. PER: Mid and distal occlusions. SFA: & POP: PTA 6 mm balloon. Mild residual. PER: Mid PTA.    Interval History:    Right ankle warm. No CP or SOB. Not eating much - stressed importance of reasonable calorie intake.    Review of Systems   Constitution: Positive for malaise/fatigue. Negative for chills and fever.   HENT: Negative for nosebleeds.    Eyes: Negative for vision loss in left eye and vision loss in right eye.   Cardiovascular: Negative for chest pain, leg swelling, orthopnea, palpitations and paroxysmal nocturnal dyspnea.   Respiratory: Negative for cough, hemoptysis, shortness of breath, sputum production and wheezing.    Hematologic/Lymphatic: Negative for bleeding problem.   Skin:        Right toes gangrenous.   Musculoskeletal: Negative for myalgias.   Gastrointestinal: Negative for abdominal pain, heartburn, hematemesis, hematochezia, jaundice, melena, nausea and vomiting.   Genitourinary: Negative for hematuria.   Neurological: Negative for dizziness, headaches, light-headedness, vertigo and weakness.   Psychiatric/Behavioral:  Negative for altered mental status. The patient is not nervous/anxious.    Allergic/Immunologic: Negative for persistent infections.     Objective:     Vital Signs (Most Recent):  Temp: 98.6 °F (37 °C) (04/26/20 0841)  Pulse: 79 (04/26/20 0841)  Resp: 16 (04/26/20 0841)  BP: (!) 152/71 (04/26/20 0841)  SpO2: 96 % (04/26/20 0841) Vital Signs (24h Range):  Temp:  [98 °F (36.7 °C)-99 °F (37.2 °C)] 98.6 °F (37 °C)  Pulse:  [68-94] 79  Resp:  [16-20] 16  SpO2:  [96 %-98 %] 96 %  BP: (134-152)/(64-72) 152/71     Weight: 52 kg (114 lb 10.2 oz)  Body mass index is 17.96 kg/m².    SpO2: 96 %  O2 Device (Oxygen Therapy): room air      Intake/Output Summary (Last 24 hours) at 4/26/2020 1148  Last data filed at 4/26/2020 0800  Gross per 24 hour   Intake 2233.75 ml   Output 875 ml   Net 1358.75 ml       Lines/Drains/Airways     Drain                 Sheath 04/24/20 1112 Left 2 days          Peripheral Intravenous Line                 Peripheral IV - Single Lumen 04/25/20 2100 20 G Anterior;Left Forearm less than 1 day                Physical Exam   Constitutional: She appears well-developed and well-nourished.   Cardiovascular: Normal rate, regular rhythm, S1 normal and S2 normal.   Pulses:       Femoral pulses are 2+ on the right side, and 2+ on the left side.       Popliteal pulses are 1+ on the right side, and 1+ on the left side.        Dorsalis pedis pulses are 0 on the right side, and 0 on the left side.        Posterior tibial pulses are 0 on the right side, and 0 on the left side.   Dry gangrene of right toes.  Foot warm.   Pulmonary/Chest: Effort normal and breath sounds normal.       Current Medications:     aspirin  81 mg Oral Daily    ceFEPime (MAXIPIME) IVPB  1 g Intravenous Q24H    clopidogreL  75 mg Oral Daily    cyanocobalamin  500 mcg Oral Daily    ferrous sulfate  325 mg Oral Every other day    heparin (porcine)  5,000 Units Subcutaneous Q12H    latanoprost  1 drop Both Eyes QHS    metronidazole  500 mg  Intravenous Q8H    NIFEdipine  90 mg Oral Daily    potassium chloride  40 mEq Oral BID    timolol maleate 0.5%  1 drop Both Eyes BID     Current Laboratory Results:    Recent Results (from the past 24 hour(s))   CBC Without Differential    Collection Time: 04/26/20  4:00 AM   Result Value Ref Range    WBC 7.73 3.90 - 12.70 K/uL    RBC 3.43 (L) 4.00 - 5.40 M/uL    Hemoglobin 8.9 (L) 12.0 - 16.0 g/dL    Hematocrit 28.3 (L) 37.0 - 48.5 %    Mean Corpuscular Volume 83 82 - 98 fL    Mean Corpuscular Hemoglobin 25.9 (L) 27.0 - 31.0 pg    Mean Corpuscular Hemoglobin Conc 31.4 (L) 32.0 - 36.0 g/dL    RDW 13.1 11.5 - 14.5 %    Platelets 323 150 - 350 K/uL    MPV 10.2 9.2 - 12.9 fL   Basic Metabolic Panel (BMP)    Collection Time: 04/26/20  4:00 AM   Result Value Ref Range    Sodium 143 136 - 145 mmol/L    Potassium 3.1 (L) 3.5 - 5.1 mmol/L    Chloride 107 95 - 110 mmol/L    CO2 25 23 - 29 mmol/L    Glucose 110 70 - 110 mg/dL    BUN, Bld 9 8 - 23 mg/dL    Creatinine 1.5 (H) 0.5 - 1.4 mg/dL    Calcium 7.7 (L) 8.7 - 10.5 mg/dL    Anion Gap 11 8 - 16 mmol/L    eGFR if African American 38 (A) >60 mL/min/1.73 m^2    eGFR if non African American 33 (A) >60 mL/min/1.73 m^2   Magnesium    Collection Time: 04/26/20  4:00 AM   Result Value Ref Range    Magnesium 1.3 (L) 1.6 - 2.6 mg/dL     Current Imaging Results:    US Retroperitoneal Complete (Kidney and   Final Result      Findings suggestive of medical renal disease.      Two nonobstructing renal stones in the right kidney, both measuring 3 mm.         Electronically signed by: Johnny Sams MD   Date:    04/20/2020   Time:    15:58      US Lower Extremity Arteries Right   Final Result   Abnormal      Monophasic flow in the right peroneal artery.  Low velocities.      This report was flagged in Epic as abnormal.         Electronically signed by: Angela Saenz   Date:    04/19/2020   Time:    19:22      X-Ray Foot Complete Right   Final Result      No evidence of acute  fracture or dislocation of the right foot.  No osseous erosions.  Follow-up with MRI of the right foot, as clinically warranted.      Advanced vascular calcifications.         Electronically signed by: Efraín Blanc MD   Date:    04/19/2020   Time:    17:10          Assessment and Plan:     Problem List:    Active Diagnoses:    Diagnosis Date Noted POA    PRINCIPAL PROBLEM:  Gangrene of toes of right foot [I96] 04/19/2020 Yes    Hypokalemia [E87.6] 04/20/2020 Yes    MICKEY (acute kidney injury) [N17.9] 04/19/2020 Yes    Essential hypertension [I10] 06/18/2013 Yes     Chronic      Problems Resolved During this Admission:     Assessment and Plan:     1. Peripheral Artery Disease              4/1/2020: Began noticing discoloration of toes right foot.              4/19/2020: Presents with gangrene of toes right foot.              4/19/2020: Arterial Duplex: Slow distal runoff.   4/24/2020: [facility]: Right leg: SFA: Distal 80%. Popliteal: 100%. AT & PT: Occluded. PER: Mid and distal occlusions. SFA: & POP: PTA 6 mm balloon. Mild residual. PER: Mid PTA.   Dry gangrene of toes right foot.              Foot much warmer after recanalization of popliteal.   On aspirin 81 mg Q24 and clopidogrel 75 mg Q24.              Some hope she may only lose forefoot.   She needs to eat.               2. Hypertension   On nifedipine 90 mg Q24.      3. Acute Kidney Disease              4/19/2020: BUN/crea 44/5.5.              4/22/2020: BUN/crea 27/2.6.   4/23/2020: BUN/crea 18/1.9.   4/25/2020: BUN/crea 10/1.5. CrCl 38.              Dr. Gisela Wharton/Dr. Jessi Nguyen.    4. Underweight   4/26/2020: Weight 52 kg. BMI 18.   She needs to improve her nutritional status and gain weight to have improve her changes to heal a needed amputation.               VTE Risk Mitigation (From admission, onward)         Ordered     heparin (porcine) injection 5,000 Units  Every 12 hours      04/24/20 1651     Place sequential compression device  Until  discontinued      04/19/20 2118     IP VTE HIGH RISK PATIENT  Once      04/19/20 2118     Reason for No Pharmacological VTE Prophylaxis  Once     Question:  Reasons:  Answer:  Physician Provided (leave comment)    04/19/20 2118                Coral Baron MD  Cardiology  [facility]

## 2020-04-26 NOTE — PROGRESS NOTES
"[facility] Hospital Medicine  Progress Note    Patient Name: Home De Dios  MRN: [id number]  Patient Class: IP- Inpatient   Admission Date: 4/19/2020  Length of Stay: 7 days  Attending Physician: Jeanna Beckman MD  Primary Care Provider: Quintin Cerda MD        Subjective:     Principal Problem:Gangrene of toe of right foot        HPI:  Ms. Home De Dios is a 77 y.o. female, with PMH of HTN, POAG, who presented to [facility] ED on 4/19/20 with right toe pain. She states the toes of her right foot began changing color and turning black about 10 days ago, and have been having worsening numbness x 4-5 days. She states this began after soaking her foot in bleach and Epsom salts and then cutting her toe nails. The right foot subsequently became itchy, and then numb, particularly on the 1st, 2nd and 4th toes. She denies neuropathy or prior numbness/tingling., trauma, fever, chills. ED imaging shows no fracture/dislocaton or bony erosion. An US is pending. Labs showed MICKEY without hyperkalemia, and elevated CRP of 119. She was admitted to inpatient status.     Overview/Hospital Course:  Patient admitted with gangrene of R toes and MICKEY. IV antibiotics were initiated for possible cellulitis of R foot. She was started on IV fluids and renal function improved each day. Nephrology following. Xray of R foot did not suggest osteomyelitis. US arteries R LE revealed, "Monophasic flow in the right peroneal artery.  Low velocities." Podiatry, vascular surgery, and cardiology have been following the patient. On 4/24/2020 patient underwent successful angioplasty of distal superficial femoral and popliteal arteries.     Interval History: Patient doing well today. No specific complaints. Foot pain controlled. Agreeable to try boost plus with meals.    Review of Systems   Constitutional: Negative for chills and fever.   Respiratory: Negative for cough and shortness of breath.    Cardiovascular: Negative for chest pain " and leg swelling.   Gastrointestinal: Negative for abdominal pain, nausea and vomiting.   Musculoskeletal:        + right foot pain   Skin:        + black color change to right toes     Objective:     Vital Signs (Most Recent):  Temp: 98.6 °F (37 °C) (04/26/20 0841)  Pulse: 79 (04/26/20 0841)  Resp: 16 (04/26/20 0841)  BP: (!) 152/71 (04/26/20 0841)  SpO2: 96 % (04/26/20 0841) Vital Signs (24h Range):  Temp:  [98 °F (36.7 °C)-99 °F (37.2 °C)] 98.6 °F (37 °C)  Pulse:  [68-94] 79  Resp:  [16-20] 16  SpO2:  [96 %-98 %] 96 %  BP: (134-152)/(64-72) 152/71     Weight: 52 kg (114 lb 10.2 oz)  Body mass index is 17.96 kg/m².    Intake/Output Summary (Last 24 hours) at 4/26/2020 1159  Last data filed at 4/26/2020 0800  Gross per 24 hour   Intake 2233.75 ml   Output 875 ml   Net 1358.75 ml      Physical Exam   Constitutional: She is oriented to person, place, and time. She appears well-developed and well-nourished. No distress.   HENT:   Head: Normocephalic and atraumatic.   Eyes: Conjunctivae are normal. No scleral icterus.   Neck: Normal range of motion. Neck supple.   Cardiovascular: Normal rate, regular rhythm and normal heart sounds.   Pulmonary/Chest: Effort normal and breath sounds normal. No respiratory distress.   Abdominal: Soft. Bowel sounds are normal. There is no tenderness.   Musculoskeletal: She exhibits no edema.   Neurological: She is alert and oriented to person, place, and time.   Skin: Skin is warm and dry.   R foot necrosis at toes 1-4. Foot otherwise warm   Psychiatric: She has a normal mood and affect.   Nursing note and vitals reviewed.      Significant Labs:   BMP:   Recent Labs   Lab 04/26/20  0400         K 3.1*      CO2 25   BUN 9   CREATININE 1.5*   CALCIUM 7.7*   MG 1.3*     CBC:   Recent Labs   Lab 04/24/20  2221 04/25/20  0333 04/26/20  0400   WBC 7.30 7.43 7.73   HGB 9.3* 8.7* 8.9*   HCT 28.7* 27.1* 28.3*    289 323     Imaging:  No new imaging  today      Assessment/Plan:      * Gangrene of toes of right foot  - Initially, there was some erythema in the foot proximal to the necrotic areas  - Continue treatment with metronidazole and cefepime for possible cellulitis   - Imaging without overt osteomyelitis  - Arterial US of right LE: Monophasic flow in the right peroneal artery.  Low velocities.  - Vascular surgery and cardiology following   - s/p R lower extremity angioplasty of distal superficial femoral and popliteal arteries on 4/24  - Continue Clopidogrel and Aspirin  - Appreciate podiatry assistance - plan for TMA on 4/28  - Continue dressings per podiatry.           Malnutrition of mild degree  Encourage PO intake  Add Boost plus with meals      Anemia  Continue ferrous sulfate QOD   B12 328. Continue cyanocobalamin daily  Hb down trending but stable overnight  Monitor      Hypokalemia  - K 3.1. Mg 1.3. Given KCl 40 meq PO x 2 and replace Mg  - Recheck in am      MICKEY (acute kidney injury)  - Unknown baseline Cr. Renal function continues to improve  - Cr stabilizing at 1.5  - Holding home HCTZ   - suspect 2/2 poorly controlled HTN vs infection/gangrene   - avoid nephrotoxins  - renally dose meds   - Continue IVF - D5 at 75 cc/hr   - Appreciate nephrology assistance    Essential hypertension  - Adequately controlled  - only home med is HCTZ 12.5 mg QD, holding 2/2 MICKEY   - Continue nifedipine 90 mg daily  - PRN hydralazine   - monitor       VTE Risk Mitigation (From admission, onward)         Ordered     heparin (porcine) injection 5,000 Units  Every 12 hours      04/24/20 1651     Place sequential compression device  Until discontinued      04/19/20 2118     IP VTE HIGH RISK PATIENT  Once      04/19/20 2118     Reason for No Pharmacological VTE Prophylaxis  Once     Question:  Reasons:  Answer:  Physician Provided (leave comment)    04/19/20 2118                      Jeanna Beckman MD  Department of Hospital Medicine   [facility]

## 2020-04-26 NOTE — ASSESSMENT & PLAN NOTE
- Unknown baseline Cr. Renal function continues to improve  - Cr stabilizing at 1.5  - Holding home HCTZ   - suspect 2/2 poorly controlled HTN vs infection/gangrene   - avoid nephrotoxins  - renally dose meds   - Continue IVF - D5 at 75 cc/hr   - Appreciate nephrology assistance

## 2020-04-26 NOTE — PLAN OF CARE
Patient had a 12 beat run of SVT around 2200. Patient was asymptomatic and charge nurse was notified. Around 0300 patient had a 26 beat run of SVT. Notified charge nurse and Vasyl Pruitt NP. Sinus rhythm on telemetry. No new orders received at this time. Left forearm 22 gauge IV access intact. Call light within reach. Encouraged patient to call for any and all needs. Patient verbalized understanding of instructions. Will continue to monitor patient.

## 2020-04-26 NOTE — ASSESSMENT & PLAN NOTE
Continue ferrous sulfate QOD   B12 328. Continue cyanocobalamin daily  Hb down trending but stable overnight  Monitor

## 2020-04-26 NOTE — PROGRESS NOTES
Progress Note  Nephrology    Consult Requested By: Jeanna Beckman MD  Reason for Consult: MICKEY    SUBJECTIVE:     History of Present Illness:  Patient is a 77 y.o. female presents with gangrenous right toe/ foot and found to have MICKEY with Cr 5.5. She deines any history of CKD and states that she only takes HCTZ for HTN and no other routine meds. She does repots that when her foot started itching and swelling more approximately 8 days ago she began taking Ibuprofen bid. There are no labs since 2013 because she tells me that she does not like to have her blood drawn.    Interval History:  She feels well today but still poor appetite. S/P R leg 4/24/2020 angioplasty Right leg: SFA: Distal 80%. Popliteal: 100%. AT & PT: Occluded. PER: Mid and distal occlusions. SFA: & POP: PTA 6 mm balloon. Mild residual. PER: Mid PTA. Foot pain well controlled.      Past Medical History:   Diagnosis Date    Glaucoma     Glaucoma (increased eye pressure)     Hypertension      Past Surgical History:   Procedure Laterality Date    APPENDECTOMY      CATARACT EXTRACTION W/  INTRAOCULAR LENS IMPLANT Left 10/16/2013        CATARACT EXTRACTION W/  INTRAOCULAR LENS IMPLANT Right 12/18/2013        HYSTERECTOMY       Family History   Problem Relation Age of Onset    Heart disease Mother     Cancer Sister     Amblyopia Neg Hx     Blindness Neg Hx     Macular degeneration Neg Hx     Retinal detachment Neg Hx     Stroke Neg Hx     Thyroid disease Neg Hx     Cataracts Neg Hx     Glaucoma Neg Hx      Social History     Tobacco Use    Smoking status: Never Smoker    Smokeless tobacco: Never Used   Substance Use Topics    Alcohol use: No    Drug use: Not on file       Medications Prior to Admission   Medication Sig Dispense Refill Last Dose    latanoprost (XALATAN) 0.005 % ophthalmic solution Place 1 drop into both eyes every evening. 2.5 mL 12 4/19/2020    timolol maleate 0.5% (TIMOPTIC) 0.5 % Drop Place 1  drop into both eyes 2 (two) times daily. 5 mL 12 4/19/2020    hydroCHLOROthiazide (HYDRODIURIL) 12.5 MG Tab Take 1 tablet (12.5 mg total) by mouth once daily. 90 tablet 2 Unknown       Review of patient's allergies indicates:  No Known Allergies     Review of Systems:  Constitutional: No fever or chills, no weight changes.  Eyes: No visual changes or photophobia  HEENT: No nasal congestion or sore throat  Respiratory: No cough or shorness of breath  Cardiovascular: No chest pain or palpitations  Gastrointestinal: Poor appetite, no nausea or vomiting, no change in bowel habits  Genitourinary: No hematuria or dysuria  Skin: No rash or pruritis, black toe/ red foot, itching  Hematologic/lymphatic: No easy bruising, bleeding or lymphadenopathy  Musculoskeletal: No arthralgias or myalgias  Neurological: No seizures or tremors  Endocrine: No heat/cold intolerance.  No polyuria/polydipsia.  Psychiatric:  No depression or anxiety. +Poor insight    OBJECTIVE:     Vital Signs (Most Recent)  Temp: 98.6 °F (37 °C) (04/26/20 0841)  Pulse: 61 (04/26/20 1200)  Resp: 16 (04/26/20 0841)  BP: (!) 152/71 (04/26/20 0841)  SpO2: 96 % (04/26/20 0841)    Vital Signs Range (Last 24H):  Temp:  [98 °F (36.7 °C)-99 °F (37.2 °C)]   Pulse:  [60-94]   Resp:  [16-20]   BP: (134-152)/(64-72)   SpO2:  [96 %-98 %]     Physical Exam:    General appearance: Well developed, thin  Head: Normocephalic, atraumatic  Eyes:  Conjunctivae nl. Sclera anicteric. PERRL.  HEENT: Lips, mucosa, and tongue normal; teeth and gums normal and oropharynx clear.  Neck: Supple, trachea midline, thyroid not enlarged,   Lungs: Clear to auscultation bilaterally and normal respiratory effort  Heart: Regular rate and rhythm, S1, S2 normal, no murmur, click, rub or gallop  Abdomen: Soft, non-tender non-distended; bowel sounds normal; no masses,  no organomegaly  Extremities: dry gangrene of right 1st and tip of 2nd toes and now 4th. No edema  Pulses: nonpalpable  Neurologic:  Normal strength and tone. No focal numbness or weakness  Psychiatric:  Alert and oriented times 3.       Diagnostic Results:  Labs: Reviewed  X-Ray: Reviewed  US: Reviewed    ASSESSMENT/PLAN:     1. MICKEY likely due to right foot gangrene and Ibuprofen/ Hypotension/ Hypokalemia  -Last known Cr 2013 was 0.9  -Minimal proteinuria 280 by ratio  -Current Cr down to 1.5 with resolution of acidosis   -2433 in 975 UOP  -Replace K again today  -CPK is normal  -Renal US no obstruction, 2 right non-obstructing calculi and neg urine eosin.  -Continue IVF's but switch to LR now that hyponatremia resolved.  -Avoid nephrotoxins, NSAIDs/ PPI and renal dose meds.    2. HTN  -Increased Nifedipine XL to 60 mg daily 4/21 but still poorly controlled  -Increased Nifedipine XL to 90 mg daily yesterday  -Continue the prn hydralazine  -Was on HCTZ monotherapy at home but no diuretics at present.    3. Dry Gangrene of right toes/ foot, severe PAD  -On cefepime and Flagyl, appears dry at present though  -Dr. Baron performed angio 4/24/2020: [facility]: Right leg: SFA: Distal 80%. Popliteal: 100%. AT & PT: Occluded. PER: Mid and distal occlusions. SFA: & POP: PTA 6 mm balloon. Mild residual. PER: Mid PTA.  -He suspects she will need BKA or TMA    4. Fe Defic Anemia/ Relative B12 Defic  -Started oral Fe for now  -Started 500mcg daily  B12  -Normal folate and TSH  -H/H trending down overall but up a little to 8.9 today    5.  Hypernatremia  -Resolved after D5W  -Would switch to LR     6.  Hypomag  -Replace again today.

## 2020-04-27 ENCOUNTER — TELEPHONE (OUTPATIENT)
Dept: PODIATRY | Facility: CLINIC | Age: 80
End: 2020-04-27

## 2020-04-27 LAB
ANION GAP SERPL CALC-SCNC: 8 MMOL/L (ref 8–16)
BUN SERPL-MCNC: 9 MG/DL (ref 8–23)
CALCIUM SERPL-MCNC: 7.6 MG/DL (ref 8.7–10.5)
CHLORIDE SERPL-SCNC: 110 MMOL/L (ref 95–110)
CO2 SERPL-SCNC: 25 MMOL/L (ref 23–29)
CREAT SERPL-MCNC: 1.5 MG/DL (ref 0.5–1.4)
ERYTHROCYTE [DISTWIDTH] IN BLOOD BY AUTOMATED COUNT: 13.2 % (ref 11.5–14.5)
EST. GFR  (AFRICAN AMERICAN): 38 ML/MIN/1.73 M^2
EST. GFR  (NON AFRICAN AMERICAN): 33 ML/MIN/1.73 M^2
GLUCOSE SERPL-MCNC: 100 MG/DL (ref 70–110)
HCT VFR BLD AUTO: 25 % (ref 37–48.5)
HGB BLD-MCNC: 7.8 G/DL (ref 12–16)
MAGNESIUM SERPL-MCNC: 1.5 MG/DL (ref 1.6–2.6)
MCH RBC QN AUTO: 25.8 PG (ref 27–31)
MCHC RBC AUTO-ENTMCNC: 31.2 G/DL (ref 32–36)
MCV RBC AUTO: 83 FL (ref 82–98)
PLATELET # BLD AUTO: 292 K/UL (ref 150–350)
PMV BLD AUTO: 10.4 FL (ref 9.2–12.9)
POTASSIUM SERPL-SCNC: 4.3 MMOL/L (ref 3.5–5.1)
RBC # BLD AUTO: 3.02 M/UL (ref 4–5.4)
SODIUM SERPL-SCNC: 143 MMOL/L (ref 136–145)
WBC # BLD AUTO: 6.35 K/UL (ref 3.9–12.7)

## 2020-04-27 PROCEDURE — S0030 INJECTION, METRONIDAZOLE: HCPCS | Mod: HCNC | Performed by: INTERNAL MEDICINE

## 2020-04-27 PROCEDURE — 11000001 HC ACUTE MED/SURG PRIVATE ROOM: Mod: HCNC

## 2020-04-27 PROCEDURE — 80048 BASIC METABOLIC PNL TOTAL CA: CPT | Mod: HCNC

## 2020-04-27 PROCEDURE — 97802 MEDICAL NUTRITION INDIV IN: CPT | Mod: HCNC

## 2020-04-27 PROCEDURE — 63600175 PHARM REV CODE 636 W HCPCS: Mod: HCNC | Performed by: INTERNAL MEDICINE

## 2020-04-27 PROCEDURE — 97535 SELF CARE MNGMENT TRAINING: CPT | Mod: HCNC

## 2020-04-27 PROCEDURE — 99232 PR SUBSEQUENT HOSPITAL CARE,LEVL II: ICD-10-PCS | Mod: HCNC,,, | Performed by: INTERNAL MEDICINE

## 2020-04-27 PROCEDURE — 25000003 PHARM REV CODE 250: Mod: HCNC | Performed by: INTERNAL MEDICINE

## 2020-04-27 PROCEDURE — 36415 COLL VENOUS BLD VENIPUNCTURE: CPT | Mod: HCNC

## 2020-04-27 PROCEDURE — 94761 N-INVAS EAR/PLS OXIMETRY MLT: CPT | Mod: HCNC

## 2020-04-27 PROCEDURE — 83735 ASSAY OF MAGNESIUM: CPT | Mod: HCNC

## 2020-04-27 PROCEDURE — 99232 SBSQ HOSP IP/OBS MODERATE 35: CPT | Mod: HCNC,,, | Performed by: INTERNAL MEDICINE

## 2020-04-27 PROCEDURE — U0002 COVID-19 LAB TEST NON-CDC: HCPCS | Mod: HCNC

## 2020-04-27 PROCEDURE — 85027 COMPLETE CBC AUTOMATED: CPT | Mod: HCNC

## 2020-04-27 PROCEDURE — 97110 THERAPEUTIC EXERCISES: CPT | Mod: HCNC,CQ

## 2020-04-27 PROCEDURE — 97530 THERAPEUTIC ACTIVITIES: CPT | Mod: HCNC,CQ

## 2020-04-27 RX ORDER — LANOLIN ALCOHOL/MO/W.PET/CERES
400 CREAM (GRAM) TOPICAL ONCE
Status: COMPLETED | OUTPATIENT
Start: 2020-04-27 | End: 2020-04-27

## 2020-04-27 RX ADMIN — TIMOLOL MALEATE 1 DROP: 5 SOLUTION OPHTHALMIC at 08:04

## 2020-04-27 RX ADMIN — Medication 400 MG: at 02:04

## 2020-04-27 RX ADMIN — CYANOCOBALAMIN TAB 500 MCG 500 MCG: 500 TAB at 08:04

## 2020-04-27 RX ADMIN — METRONIDAZOLE 500 MG: 500 INJECTION, SOLUTION INTRAVENOUS at 06:04

## 2020-04-27 RX ADMIN — METRONIDAZOLE 500 MG: 500 INJECTION, SOLUTION INTRAVENOUS at 01:04

## 2020-04-27 RX ADMIN — ASPIRIN 81 MG: 81 TABLET, COATED ORAL at 08:04

## 2020-04-27 RX ADMIN — CEFEPIME 1 G: 1 INJECTION, POWDER, FOR SOLUTION INTRAMUSCULAR; INTRAVENOUS at 01:04

## 2020-04-27 RX ADMIN — METRONIDAZOLE 500 MG: 500 INJECTION, SOLUTION INTRAVENOUS at 12:04

## 2020-04-27 RX ADMIN — LATANOPROST 1 DROP: 50 SOLUTION OPHTHALMIC at 08:04

## 2020-04-27 RX ADMIN — HEPARIN SODIUM 5000 UNITS: 5000 INJECTION, SOLUTION INTRAVENOUS; SUBCUTANEOUS at 08:04

## 2020-04-27 RX ADMIN — FERROUS SULFATE TAB EC 325 MG (65 MG FE EQUIVALENT) 325 MG: 325 (65 FE) TABLET DELAYED RESPONSE at 08:04

## 2020-04-27 RX ADMIN — HYDROCODONE BITARTRATE AND ACETAMINOPHEN 1 TABLET: 5; 325 TABLET ORAL at 01:04

## 2020-04-27 RX ADMIN — SODIUM CHLORIDE, SODIUM LACTATE, POTASSIUM CHLORIDE, AND CALCIUM CHLORIDE: .6; .31; .03; .02 INJECTION, SOLUTION INTRAVENOUS at 12:04

## 2020-04-27 RX ADMIN — NIFEDIPINE 90 MG: 30 TABLET, FILM COATED, EXTENDED RELEASE ORAL at 08:04

## 2020-04-27 RX ADMIN — CLOPIDOGREL BISULFATE 75 MG: 75 TABLET ORAL at 08:04

## 2020-04-27 NOTE — NURSING
Patient had a 24 beat run of SVT around 0035. Patient asymptomatic, vitals stable. Sinus rhythm on telemetry. Charge nurse notified as well as Vasyl Pruitt NP. No new orders received at this time. Left FA 22 gauge IV access intact. Call light and personal items within reach. Bed in lowest position and locked. Patient denies any needs at this time. Will continue to monitor.

## 2020-04-27 NOTE — PROGRESS NOTES
"[facility] Hospital Medicine  Progress Note    Patient Name: Home De Dios  MRN: [id number]  Patient Class: IP- Inpatient   Admission Date: 4/19/2020  Length of Stay: 8 days  Attending Physician: Jeanna Beckman MD  Primary Care Provider: Quintin Cerda MD        Subjective:     Principal Problem:Gangrene of toe of right foot        HPI:  Ms. Home De Dios is a 77 y.o. female, with PMH of HTN, POAG, who presented to [facility] ED on 4/19/20 with right toe pain. She states the toes of her right foot began changing color and turning black about 10 days ago, and have been having worsening numbness x 4-5 days. She states this began after soaking her foot in bleach and Epsom salts and then cutting her toe nails. The right foot subsequently became itchy, and then numb, particularly on the 1st, 2nd and 4th toes. She denies neuropathy or prior numbness/tingling., trauma, fever, chills. ED imaging shows no fracture/dislocaton or bony erosion. An US is pending. Labs showed MICKEY without hyperkalemia, and elevated CRP of 119. She was admitted to inpatient status.     Overview/Hospital Course:  Patient admitted with gangrene of R toes and MICKEY. IV antibiotics were initiated for possible cellulitis of R foot. She was started on IV fluids and renal function improved each day. Nephrology following. Xray of R foot did not suggest osteomyelitis. US arteries R LE revealed, "Monophasic flow in the right peroneal artery.  Low velocities." Podiatry, vascular surgery, and cardiology have been following the patient. On 4/24/2020 patient underwent successful angioplasty of distal superficial femoral and popliteal arteries. She is scheduled for TMA on 4/28/20    Interval History: Patient doing well today. No specific complaints. Foot pain controlled. Trying to eat more.    Review of Systems   Constitutional: Negative for chills and fever.   Respiratory: Negative for cough and shortness of breath.    Cardiovascular: Negative " for chest pain and leg swelling.   Gastrointestinal: Negative for abdominal pain, nausea and vomiting.   Musculoskeletal:        + right foot pain   Skin:        + black color change to right toes     Objective:     Vital Signs (Most Recent):  Temp: 99.2 °F (37.3 °C) (04/27/20 0805)  Pulse: 68 (04/27/20 1200)  Resp: 18 (04/27/20 0805)  BP: (!) 165/78 (04/27/20 0805)  SpO2: (!) 94 % (04/27/20 0805) Vital Signs (24h Range):  Temp:  [98.5 °F (36.9 °C)-99.6 °F (37.6 °C)] 99.2 °F (37.3 °C)  Pulse:  [] 68  Resp:  [17-20] 18  SpO2:  [94 %-96 %] 94 %  BP: (123-165)/(60-83) 165/78     Weight: 52 kg (114 lb 10.2 oz)  Body mass index is 17.96 kg/m².    Intake/Output Summary (Last 24 hours) at 4/27/2020 1226  Last data filed at 4/27/2020 0900  Gross per 24 hour   Intake 1537 ml   Output 500 ml   Net 1037 ml      Physical Exam   Constitutional: She is oriented to person, place, and time. She appears well-developed and well-nourished. No distress.   Eyes: No scleral icterus.   Cardiovascular: Normal rate, regular rhythm and normal heart sounds.   Pulmonary/Chest: Effort normal and breath sounds normal. No respiratory distress.   Abdominal: Soft. Bowel sounds are normal. There is no tenderness.   Musculoskeletal: She exhibits no edema.   Neurological: She is alert and oriented to person, place, and time.   Skin: Skin is warm and dry.   R foot necrosis at toes 1-4. Foot otherwise warm   Psychiatric: She has a normal mood and affect.   Nursing note and vitals reviewed.      Significant Labs:   BMP:   Recent Labs   Lab 04/27/20  0410         K 4.3      CO2 25   BUN 9   CREATININE 1.5*   CALCIUM 7.6*   MG 1.5*     CBC:   Recent Labs   Lab 04/26/20  0400 04/27/20  0411   WBC 7.73 6.35   HGB 8.9* 7.8*   HCT 28.3* 25.0*    292     Imaging:  No new imaging today      Assessment/Plan:      * Gangrene of toes of right foot  - Initially, there was some erythema in the foot proximal to the necrotic areas  -  Continue treatment with metronidazole and cefepime for possible cellulitis   - Imaging without overt osteomyelitis  - Arterial US of right LE: Monophasic flow in the right peroneal artery.  Low velocities.  - Vascular surgery and cardiology following   - s/p R lower extremity angioplasty of distal superficial femoral and popliteal arteries on 4/24  - Continue Clopidogrel and Aspirin  - Appreciate podiatry assistance. NPO after midnight for TMA on 4/28  - Check rapid COVID screen (required for OR)            Malnutrition of mild degree  Encourage PO intake  Nutritional supplements added per nutrition recs      Anemia  Continue ferrous sulfate QOD   B12 328. Continue cyanocobalamin daily  Monitor      Hypokalemia  - K improved. Mg still low. Replace with Mag ox PO      MICKEY (acute kidney injury)  - Unknown baseline Cr. Renal function continues to improve  - Cr stabilizing at 1.5  - Holding home HCTZ   - suspect 2/2 poorly controlled HTN vs infection/gangrene   - avoid nephrotoxins  - renally dose meds   - Continue IVF per Nephrology  - Appreciate nephrology assistance    Essential hypertension  - Adequately controlled  - only home med is HCTZ 12.5 mg QD, holding 2/2 MICKEY   - Continue nifedipine 90 mg daily  - PRN hydralazine   - monitor         VTE Risk Mitigation (From admission, onward)         Ordered     Place sequential compression device  Until discontinued      04/19/20 2118     IP VTE HIGH RISK PATIENT  Once      04/19/20 2118     Reason for No Pharmacological VTE Prophylaxis  Once     Question:  Reasons:  Answer:  Physician Provided (leave comment)    04/19/20 2118                      Jeanna Beckman MD  Department of Hospital Medicine   [facility]

## 2020-04-27 NOTE — ASSESSMENT & PLAN NOTE
- Initially, there was some erythema in the foot proximal to the necrotic areas  - Continue treatment with metronidazole and cefepime for possible cellulitis   - Imaging without overt osteomyelitis  - Arterial US of right LE: Monophasic flow in the right peroneal artery.  Low velocities.  - Vascular surgery and cardiology following   - s/p R lower extremity angioplasty of distal superficial femoral and popliteal arteries on 4/24  - Continue Clopidogrel and Aspirin  - Appreciate podiatry assistance. NPO after midnight for TMA on 4/28  - Check rapid COVID screen (required for OR)

## 2020-04-27 NOTE — PLAN OF CARE
Problem: Physical Therapy Goal  Goal: Physical Therapy Goal  Description  Goals to be met by: 20    Patient will increase functional independence with mobility by performin. Don/doff DARCO shoe independently without cueing for need for shoe prior to OOB mobility.   2. Gait x 100 feet with mod(I) with RW with NWB RLE with DARCO shoe.  3. Bed <> chair transfer with mod I and least restrictive assistive device with NWB RLE.    Outcome: Ongoing, Progressing   DARCO shoe not available in room- nursing stating pt will receive one after surgery tomorrow. NWB on R LE maintained for all OOB mobility via pt using hip & knee flexion to clear floor during transfer.  Supine to sit Independent. Pt performed stand pivot transfer from EOB to bedside chair with CGA for safety/steadying. Pt performed LAQ, HF, hip abd/add, SLR x10 B LE in bedside chair.

## 2020-04-27 NOTE — PROGRESS NOTES
[facility]  Cardiology  Progress Note    Patient Name: Home De Dios  MRN: [id number]  Admission Date: 4/19/2020  Hospital Length of Stay: 8 days  Code Status: Full Code   Attending Physician: Jeanna Beckman MD   Primary Care Physician: Quintin Cerda MD  Expected Discharge Date:   Principal Problem:Gangrene of toe of right foot    Subjective:     Brief HPI:    Home De Dios is a 77 y.o.female with hypertension. She had pain in the left foot and soaked her feet in water on about 4/1/2020. She noted discoloration of several toes on the right foot. She developed rest pain. She was afraid of going to the hospital because of the COVID situation. She presented on 4/19/2020 with gangrenous toes and acute renal failure. She was admitted.       Hospital Course:    Hydration.    4/24/2020: [facility]: Right leg: SFA: Distal 80%. Popliteal: 100%. AT & PT: Occluded. PER: Mid and distal occlusions. SFA: & POP: PTA 6 mm balloon. Mild residual. PER: Mid PTA.    Interval History:    Right ankle warm. No CP or SOB. Eating a bit more.    Review of Systems   Constitution: Positive for malaise/fatigue. Negative for chills and fever.   HENT: Negative for nosebleeds.    Eyes: Negative for vision loss in left eye and vision loss in right eye.   Cardiovascular: Negative for chest pain, leg swelling, orthopnea, palpitations and paroxysmal nocturnal dyspnea.   Respiratory: Negative for cough, hemoptysis, shortness of breath, sputum production and wheezing.    Hematologic/Lymphatic: Negative for bleeding problem.   Skin:        Right toes gangrenous.   Musculoskeletal: Negative for myalgias.   Gastrointestinal: Negative for abdominal pain, heartburn, hematemesis, hematochezia, jaundice, melena, nausea and vomiting.   Genitourinary: Negative for hematuria.   Neurological: Negative for dizziness, headaches, light-headedness, vertigo and weakness.   Psychiatric/Behavioral: Negative for altered mental status. The patient is  not nervous/anxious.    Allergic/Immunologic: Negative for persistent infections.     Objective:     Vital Signs (Most Recent):  Temp: 98 °F (36.7 °C) (04/27/20 1233)  Pulse: 76 (04/27/20 1233)  Resp: 16 (04/27/20 1233)  BP: (!) 144/67 (04/27/20 1233)  SpO2: 100 % (04/27/20 1233) Vital Signs (24h Range):  Temp:  [98 °F (36.7 °C)-99.6 °F (37.6 °C)] 98 °F (36.7 °C)  Pulse:  [] 76  Resp:  [16-20] 16  SpO2:  [94 %-100 %] 100 %  BP: (123-165)/(60-83) 144/67     Weight: 52 kg (114 lb 10.2 oz)  Body mass index is 17.96 kg/m².    SpO2: 100 %  O2 Device (Oxygen Therapy): room air      Intake/Output Summary (Last 24 hours) at 4/27/2020 1502  Last data filed at 4/27/2020 1215  Gross per 24 hour   Intake 1664 ml   Output 500 ml   Net 1164 ml       Lines/Drains/Airways     Drain                 Sheath 04/24/20 1112 Left 3 days          Peripheral Intravenous Line                 Peripheral IV - Single Lumen 04/25/20 2100 20 G Anterior;Left Forearm 1 day                Physical Exam   Constitutional: She appears well-developed and well-nourished.   Cardiovascular: Normal rate, regular rhythm, S1 normal and S2 normal.   Pulses:       Femoral pulses are 2+ on the right side, and 2+ on the left side.       Popliteal pulses are 1+ on the right side, and 1+ on the left side.        Dorsalis pedis pulses are 0 on the right side, and 0 on the left side.        Posterior tibial pulses are 0 on the right side, and 0 on the left side.   Dry gangrene of right toes.  Foot warm.   Pulmonary/Chest: Effort normal and breath sounds normal.       Current Medications:     aspirin  81 mg Oral Daily    ceFEPime (MAXIPIME) IVPB  1 g Intravenous Q24H    clopidogreL  75 mg Oral Daily    cyanocobalamin  500 mcg Oral Daily    ferrous sulfate  325 mg Oral Every other day    latanoprost  1 drop Both Eyes QHS    metronidazole  500 mg Intravenous Q8H    NIFEdipine  90 mg Oral Daily    timolol maleate 0.5%  1 drop Both Eyes BID     Current  Laboratory Results:    Recent Results (from the past 24 hour(s))   Basic Metabolic Panel (BMP)    Collection Time: 04/27/20  4:10 AM   Result Value Ref Range    Sodium 143 136 - 145 mmol/L    Potassium 4.3 3.5 - 5.1 mmol/L    Chloride 110 95 - 110 mmol/L    CO2 25 23 - 29 mmol/L    Glucose 100 70 - 110 mg/dL    BUN, Bld 9 8 - 23 mg/dL    Creatinine 1.5 (H) 0.5 - 1.4 mg/dL    Calcium 7.6 (L) 8.7 - 10.5 mg/dL    Anion Gap 8 8 - 16 mmol/L    eGFR if African American 38 (A) >60 mL/min/1.73 m^2    eGFR if non African American 33 (A) >60 mL/min/1.73 m^2   Magnesium    Collection Time: 04/27/20  4:10 AM   Result Value Ref Range    Magnesium 1.5 (L) 1.6 - 2.6 mg/dL   CBC Without Differential    Collection Time: 04/27/20  4:11 AM   Result Value Ref Range    WBC 6.35 3.90 - 12.70 K/uL    RBC 3.02 (L) 4.00 - 5.40 M/uL    Hemoglobin 7.8 (L) 12.0 - 16.0 g/dL    Hematocrit 25.0 (L) 37.0 - 48.5 %    Mean Corpuscular Volume 83 82 - 98 fL    Mean Corpuscular Hemoglobin 25.8 (L) 27.0 - 31.0 pg    Mean Corpuscular Hemoglobin Conc 31.2 (L) 32.0 - 36.0 g/dL    RDW 13.2 11.5 - 14.5 %    Platelets 292 150 - 350 K/uL    MPV 10.4 9.2 - 12.9 fL     Current Imaging Results:    US Retroperitoneal Complete (Kidney and   Final Result      Findings suggestive of medical renal disease.      Two nonobstructing renal stones in the right kidney, both measuring 3 mm.         Electronically signed by: Johnny Sams MD   Date:    04/20/2020   Time:    15:58      US Lower Extremity Arteries Right   Final Result   Abnormal      Monophasic flow in the right peroneal artery.  Low velocities.      This report was flagged in Epic as abnormal.         Electronically signed by: Angela Saenz   Date:    04/19/2020   Time:    19:22      X-Ray Foot Complete Right   Final Result      No evidence of acute fracture or dislocation of the right foot.  No osseous erosions.  Follow-up with MRI of the right foot, as clinically warranted.      Advanced vascular  calcifications.         Electronically signed by: Efraín Blanc MD   Date:    04/19/2020   Time:    17:10          Assessment and Plan:     Problem List:    Active Diagnoses:    Diagnosis Date Noted POA    PRINCIPAL PROBLEM:  Gangrene of toes of right foot [I96] 04/19/2020 Yes    Anemia [D64.9] 04/26/2020 Yes    Malnutrition of mild degree [E44.1] 04/26/2020 Yes    Hypokalemia [E87.6] 04/20/2020 Yes    MICKEY (acute kidney injury) [N17.9] 04/19/2020 Yes    Essential hypertension [I10] 06/18/2013 Yes     Chronic      Problems Resolved During this Admission:     Assessment and Plan:     1. Peripheral Artery Disease              4/1/2020: Began noticing discoloration of toes right foot.              4/19/2020: Presents with gangrene of toes right foot.              4/19/2020: Arterial Duplex: Slow distal runoff.   4/24/2020: [facility]: Right leg: SFA: Distal 80%. Popliteal: 100%. AT & PT: Occluded. PER: Mid and distal occlusions. SFA: & POP: PTA 6 mm balloon. Mild residual. PER: Mid PTA.   Dry gangrene of toes right foot.              Foot much warmer after recanalization of popliteal.   On aspirin 81 mg Q24 and clopidogrel 75 mg Q24.              Small hope she may only lose forefoot.   She needs to eat.               2. Hypertension   On nifedipine 90 mg Q24.      3. Acute Kidney Disease              4/19/2020: BUN/crea 44/5.5.              4/22/2020: BUN/crea 27/2.6.   4/23/2020: BUN/crea 18/1.9.   4/25/2020: BUN/crea 10/1.5. CrCl 38.              Dr. Gisela Wharton/Dr. Jessi Nguyen.    4. Underweight   4/26/2020: Weight 52 kg. BMI 18.   She needs to improve her nutritional status and gain weight to have improve her changes to heal a needed amputation.    I have discussed this with her several times and she has been seen by dietitian.             VTE Risk Mitigation (From admission, onward)         Ordered     Place sequential compression device  Until discontinued      04/19/20 2118     IP VTE HIGH RISK PATIENT  Once       04/19/20 2118     Reason for No Pharmacological VTE Prophylaxis  Once     Question:  Reasons:  Answer:  Physician Provided (leave comment)    04/19/20 2118                Coral Baron MD  Cardiology  [facility]

## 2020-04-27 NOTE — TELEPHONE ENCOUNTER
----- Message from Belkis Jerry sent at 4/27/2020 12:05 PM CDT -----  Contact: Prabha with [facility]  Calling they need a stat order. Asking for a call back.      Contact jasen Prabha [phone]

## 2020-04-27 NOTE — SUBJECTIVE & OBJECTIVE
Interval History: Patient doing well today. No specific complaints. Foot pain controlled. Trying to eat more.    Review of Systems   Constitutional: Negative for chills and fever.   Respiratory: Negative for cough and shortness of breath.    Cardiovascular: Negative for chest pain and leg swelling.   Gastrointestinal: Negative for abdominal pain, nausea and vomiting.   Musculoskeletal:        + right foot pain   Skin:        + black color change to right toes     Objective:     Vital Signs (Most Recent):  Temp: 99.2 °F (37.3 °C) (04/27/20 0805)  Pulse: 68 (04/27/20 1200)  Resp: 18 (04/27/20 0805)  BP: (!) 165/78 (04/27/20 0805)  SpO2: (!) 94 % (04/27/20 0805) Vital Signs (24h Range):  Temp:  [98.5 °F (36.9 °C)-99.6 °F (37.6 °C)] 99.2 °F (37.3 °C)  Pulse:  [] 68  Resp:  [17-20] 18  SpO2:  [94 %-96 %] 94 %  BP: (123-165)/(60-83) 165/78     Weight: 52 kg (114 lb 10.2 oz)  Body mass index is 17.96 kg/m².    Intake/Output Summary (Last 24 hours) at 4/27/2020 1226  Last data filed at 4/27/2020 0900  Gross per 24 hour   Intake 1537 ml   Output 500 ml   Net 1037 ml      Physical Exam   Constitutional: She is oriented to person, place, and time. She appears well-developed and well-nourished. No distress.   Eyes: No scleral icterus.   Cardiovascular: Normal rate, regular rhythm and normal heart sounds.   Pulmonary/Chest: Effort normal and breath sounds normal. No respiratory distress.   Abdominal: Soft. Bowel sounds are normal. There is no tenderness.   Musculoskeletal: She exhibits no edema.   Neurological: She is alert and oriented to person, place, and time.   Skin: Skin is warm and dry.   R foot necrosis at toes 1-4. Foot otherwise warm   Psychiatric: She has a normal mood and affect.   Nursing note and vitals reviewed.      Significant Labs:   BMP:   Recent Labs   Lab 04/27/20  0410         K 4.3      CO2 25   BUN 9   CREATININE 1.5*   CALCIUM 7.6*   MG 1.5*     CBC:   Recent Labs   Lab  04/26/20  0400 04/27/20  0411   WBC 7.73 6.35   HGB 8.9* 7.8*   HCT 28.3* 25.0*    292     Imaging:  No new imaging today

## 2020-04-27 NOTE — PROGRESS NOTES
Progress Note  Nephrology    Consult Requested By: Jeanna Beckman MD  Reason for Consult: MICKEY    SUBJECTIVE:     History of Present Illness:  Patient is a 77 y.o. female presents with gangrenous right toe/ foot and found to have MICKEY with Cr 5.5. She deines any history of CKD and states that she only takes HCTZ for HTN and no other routine meds. She does repots that when her foot started itching and swelling more approximately 8 days ago she began taking Ibuprofen bid. There are no labs since 2013 because she tells me that she does not like to have her blood drawn.    Interval History:  She feels well today with slightly improved appetite. Foot pain well controlled.      Past Medical History:   Diagnosis Date    Glaucoma     Glaucoma (increased eye pressure)     Hypertension      Past Surgical History:   Procedure Laterality Date    APPENDECTOMY      CATARACT EXTRACTION W/  INTRAOCULAR LENS IMPLANT Left 10/16/2013        CATARACT EXTRACTION W/  INTRAOCULAR LENS IMPLANT Right 12/18/2013        HYSTERECTOMY       Family History   Problem Relation Age of Onset    Heart disease Mother     Cancer Sister     Amblyopia Neg Hx     Blindness Neg Hx     Macular degeneration Neg Hx     Retinal detachment Neg Hx     Stroke Neg Hx     Thyroid disease Neg Hx     Cataracts Neg Hx     Glaucoma Neg Hx      Social History     Tobacco Use    Smoking status: Never Smoker    Smokeless tobacco: Never Used   Substance Use Topics    Alcohol use: No    Drug use: Not on file       Medications Prior to Admission   Medication Sig Dispense Refill Last Dose    latanoprost (XALATAN) 0.005 % ophthalmic solution Place 1 drop into both eyes every evening. 2.5 mL 12 4/19/2020    timolol maleate 0.5% (TIMOPTIC) 0.5 % Drop Place 1 drop into both eyes 2 (two) times daily. 5 mL 12 4/19/2020    hydroCHLOROthiazide (HYDRODIURIL) 12.5 MG Tab Take 1 tablet (12.5 mg total) by mouth once daily. 90 tablet 2  Unknown       Review of patient's allergies indicates:  No Known Allergies     Review of Systems:  Constitutional: No fever or chills, no weight changes.  Eyes: No visual changes or photophobia  HEENT: No nasal congestion or sore throat  Respiratory: No cough or shorness of breath  Cardiovascular: No chest pain or palpitations  Gastrointestinal: Poor appetite, no nausea or vomiting, no change in bowel habits  Genitourinary: No hematuria or dysuria  Skin: No rash or pruritis, black toe/ red foot, itching  Hematologic/lymphatic: No easy bruising, bleeding or lymphadenopathy  Musculoskeletal: No arthralgias or myalgias  Neurological: No seizures or tremors  Endocrine: No heat/cold intolerance.  No polyuria/polydipsia.  Psychiatric:  No depression or anxiety. +Poor insight    OBJECTIVE:     Vital Signs (Most Recent)  Temp: 98 °F (36.7 °C) (04/27/20 1233)  Pulse: 76 (04/27/20 1233)  Resp: 16 (04/27/20 1233)  BP: (!) 144/67 (04/27/20 1233)  SpO2: 100 % (04/27/20 1233)    Vital Signs Range (Last 24H):  Temp:  [98 °F (36.7 °C)-99.6 °F (37.6 °C)]   Pulse:  []   Resp:  [16-20]   BP: (123-165)/(60-83)   SpO2:  [94 %-100 %]     Physical Exam:    General appearance: Well developed, thin  Head: Normocephalic, atraumatic  Eyes:  Conjunctivae nl. Sclera anicteric. PERRL.  HEENT: Lips, mucosa, and tongue normal; teeth and gums normal and oropharynx clear.  Neck: Supple, trachea midline, thyroid not enlarged,   Lungs: Clear to auscultation bilaterally and normal respiratory effort  Heart: Regular rate and rhythm, S1, S2 normal, no murmur, click, rub or gallop  Abdomen: Soft, non-tender non-distended; bowel sounds normal; no masses,  no organomegaly  Extremities: dry gangrene of right toes No edema  Pulses: nonpalpable  Neurologic: Normal strength and tone. No focal numbness or weakness  Psychiatric:  Alert and oriented times 3.       Diagnostic Results:  Labs: Reviewed  X-Ray: Reviewed  US: Reviewed    ASSESSMENT/PLAN:      1. MICKEY likely due to right foot gangrene and Ibuprofen/ Hypotension/ Hypokalemia  -Last known Cr 2013 was 0.9  -Minimal proteinuria 280 by ratio  -Current Cr down to 1.5 and stable with resolution of acidosis even after angiogram 4/24.  -good UOP  -Replace K prn  -CPK is normal  -Renal US no obstruction, 2 right non-obstructing calculi and neg urine eosin.  -Continue gentle IVF's with LR.  -Avoid nephrotoxins, NSAIDs/ PPI and renal dose meds.    2. HTN  -Continue Nifedipine XL to 90 mg daily.  -Continue the prn hydralazine  -Was on HCTZ monotherapy at home but no diuretics at present.    3. Dry Gangrene of right toes/ foot, severe PAD  -On cefepime and Flagyl, appears dry at present though  -Dr. Baron performed angio 4/24/2020: [facility]: Right leg: SFA: Distal 80%. Popliteal: 100%. AT & PT: Occluded. PER: Mid and distal occlusions. SFA: & POP: PTA 6 mm balloon. Mild residual. PER: Mid PTA.  -He suspects she will need BKA or TMA    4. Fe Defic Anemia/ Relative B12 Defic  -Started oral Fe for now  -Started 500mcg daily  B12  -Normal folate and TSH  -H/H trending down overall but up a little to 7.8 today    5.  Hypernatremia  -Resolved after D5W  -Now LR     6.  Hypomag  -Replace prn.

## 2020-04-27 NOTE — PROGRESS NOTES
This RN got a call back from [facility], .  [facility] will email main campus and bring covid test to their attention in order to have test run in a timely fashion.

## 2020-04-27 NOTE — CONSULTS
"  [facility]  Adult Nutrition  Consult Note    SUMMARY     Recommendations    1. Recommend Babatunde BID.   2. Encourage PO intake of meals and commercial beverage.   3. Weekly weights.     Goals: 1. >75% of EEN, EPN by RD follow up.   Nutrition Goal Status: new  Communication of RD Recs: other (comment)(POC)    Reason for Assessment    Reason For Assessment: consult  Diagnosis: (Gangrene of toes of right foot)  Relevant Medical History: HTN  Interdisciplinary Rounds: did not attend  General Information Comments:   4..20- Pt is a 77 year old female with Gangrene of toes of right foot. Pt currenlty on Regular diet with boost plus TID. PO intake 0-50% of meals. Pt states she is full. Pt with decreased appetite. UBW 128lbs, - 14 lbs(10.5%) x 1 year. Pt with gangrene to right foot toes and incision to left groin. Boost Plus TID to provide 1080 kcals(84% EEN) and 42 g protein (68% EPN).Due to recent hospital wide restrictions to limit the transfer of COVID-19, we are not performing any physical exams at this time. All S/S will be observational; NFPE to be performed at a future date.  Nutrition Discharge Planning: Adequate nutrition to meet needs via Regular diet and commercial beverage.     Nutrition Risk Screen    Nutrition Risk Screen: large or nonhealing wound, burn or pressure injury    Nutrition/Diet History    Spiritual, Cultural Beliefs, Yazidi Practices, Values that Affect Care: no    Anthropometrics    Temp: 99.2 °F (37.3 °C)  Height: 5' 7" (170.2 cm)  Height (inches): 67 in  Weight Method: Bed Scale  Weight: 52 kg (114 lb 10.2 oz)  Weight (lb): 114.64 lb  Ideal Body Weight (IBW), Female: 135 lb  % Ideal Body Weight, Female (lb): 84.92 %  BMI (Calculated): 18  BMI Grade: 18.5-24.9 - normal  Usual Body Weight (UBW), k.1 kg  % Usual Body Weight: 89.69  % Weight Change From Usual Weight: -10.5 %     Lab/Procedures/Meds    Pertinent Labs Reviewed: reviewed  Pertinent Labs Comments: Cr 1.5, " Mg 1.5  Pertinent Medications Reviewed: reviewed  Pertinent Medications Comments: Heparin    Estimated/Assessed Needs    Weight Used For Calorie Calculations: 52 kg (114 lb 10.2 oz)  Energy Calorie Requirements (kcal): 1287 kcals/day(x 1.25)  Energy Need Method: [place]  Protein Requirements: 62.53-78.16 g/day(1.2-1.5 g/kg)  Weight Used For Protein Calculations: 52 kg (114 lb 10.2 oz)  Fluid Requirements (mL): 1mL/kcal or per MD  Estimated Fluid Requirement Method: RDA Method  RDA Method (mL): 1287     Nutrition Prescription Ordered    Current Diet Order: Regular    Evaluation of Received Nutrient/Fluid Intake    Energy Calories Required: not meeting needs  Protein Required: not meeting needs  Fluid Required: not meeting needs  Comments: LBM 4.23.20  % Intake of Estimated Energy Needs: 0 - 25 %  % Meal Intake: 0 - 25 %    Nutrition Risk    Level of Risk/Frequency of Follow-up: moderate     Assessment and Plan    Nutrition Problem  Inadequate energy intake    Related to (etiology):   Decreased appetite    Signs and Symptoms (as evidenced by):   PO intake 0-50%     Interventions(treatment strategy):  Collaboration with other providers  Commercial Beverage    Nutrition Diagnosis Status:   New    Monitor and Evaluation    Food and Nutrient Intake: food and beverage intake  Food and Nutrient Adminstration: diet order  Knowledge/Beliefs/Attitudes: food and nutrition knowledge/skill  Physical Activity and Function: nutrition-related ADLs and IADLs  Anthropometric Measurements: weight  Biochemical Data, Medical Tests and Procedures: lipid profile  Nutrition-Focused Physical Findings: overall appearance     Malnutrition Assessment     Tony Score: 15  Weight Loss (Malnutrition): (-10.5% x 1 year)  Energy Intake (Malnutrition): less than 75% for greater than or equal to 1 month   Edema (Fluid Accumulation): 0-->no edema present     Nutrition Follow-Up    RD Follow-up?: Yes

## 2020-04-27 NOTE — PT/OT/SLP PROGRESS
Physical Therapy Treatment    Patient Name:  Home De Dios   MRN:  [id number]    Recommendations:     Discharge Recommendations:  home, home health PT   Discharge Equipment Recommendations: walker, rolling, wheelchair   Barriers to discharge: None    Assessment:     Home De Dios is a 77 y.o. female admitted with a medical diagnosis of Gangrene of toe of right foot.  She presents with the following impairments/functional limitations:  weakness, impaired functional mobilty, impaired coordination, impaired joint extensibility, impaired skin, orthopedic precautions, impaired self care skills, decreased lower extremity function, gait instability, impaired balance .     DARCO shoe not available in room- nursing stating pt will receive one after surgery tomorrow. NWB on R LE maintained for all OOB mobility via pt using hip & knee flexion to clear floor during transfer.  Supine to sit Independent. Pt performed stand pivot transfer from EOB to bedside chair with CGA for safety/steadying. Pt performed LAQ, HF, hip abd/add, SLR x10 B LE in bedside chair.    Rehab Prognosis: Good; patient would benefit from acute skilled PT services to address these deficits and reach maximum level of function.    Recent Surgery: Procedure(s) (LRB):  AORTOGRAM WITH RUNOFF (Right) 3 Days Post-Op    Plan:     During this hospitalization, patient to be seen 5 x/week to address the identified rehab impairments via gait training, therapeutic activities, therapeutic exercises and progress toward the following goals:    · Plan of Care Expires:  05/05/20    Subjective     Chief Complaint: R foot pain  Patient/Family Comments/goals: none stated  Pain/Comfort:  · Pain Rating 1: 4/10  · Location - Side 1: Right  · Location 1: foot  · Pain Addressed 1: Reposition, Distraction      Objective:     Communicated with nurse Zazueta prior to session.  Patient found supine with bed alarm, peripheral IV, telemetry upon PT entry to room.     General Precautions:  Standard, fall   Orthopedic Precautions:RLE non weight bearing   Braces:       Functional Mobility:  Bed mobility- Supine to sit Independent.   Transfer-Pt performed stand pivot transfer from EOB to bedside chair with CGA for safety/steadying.   DARCO shoe not available in room- nursing stating pt will receive one after surgery tomorrow. NWB on R LE maintained for all OOB mobility via pt using hip & knee flexion to clear floor during transfer.    AM-PAC 6 CLICK MOBILITY  Turning over in bed (including adjusting bedclothes, sheets and blankets)?: 4  Sitting down on and standing up from a chair with arms (e.g., wheelchair, bedside commode, etc.): 3  Moving from lying on back to sitting on the side of the bed?: 4  Moving to and from a bed to a chair (including a wheelchair)?: 3  Need to walk in hospital room?: 3  Climbing 3-5 steps with a railing?: 2  Basic Mobility Total Score: 19       Therapeutic Activities and Exercises:   Pt performed LAQ, HF, hip abd/add, SLR x10 B LE in bedside chair.    Patient left up in chair with all lines intact and call button in reach..    GOALS:   Multidisciplinary Problems     Physical Therapy Goals        Problem: Physical Therapy Goal    Goal Priority Disciplines Outcome Goal Variances Interventions   Physical Therapy Goal     PT, PT/OT Ongoing, Progressing     Description:  Goals to be met by: 20    Patient will increase functional independence with mobility by performin. Don/doff DARCO shoe independently without cueing for need for shoe prior to OOB mobility.   2. Gait x 100 feet with mod(I) with RW with NWB RLE with DARCO shoe.  3. Bed <> chair transfer with mod I and least restrictive assistive device with NWB RLE.                     Time Tracking:     PT Received On: 20  PT Start Time: 0948     PT Stop Time: 1012  PT Total Time (min): 24 min     Billable Minutes: Therapeutic Activity 10 and Therapeutic Exercise 14    Treatment Type: Treatment  PT/PTA: PTA      PTA Visit Number: 1     Louisa Foley, REINALDO  04/27/2020

## 2020-04-27 NOTE — PROGRESS NOTES
Patient is status post angioplasty of right SFA and popliteal artery last week.  The foot is warm and there are dry gangrenous changes of 4 toes.  Patient however, does not have a palpable pulse id the dorsalis or posterior tibial positions.  Because of this I am not certain that a transmetatarsal amputation will be a viable option.

## 2020-04-27 NOTE — PROGRESS NOTES
This RN spoke to Farhat in the lab and emphasized that we need the covid test run stat, not a rapid covid test.  Results are not guaranteed to be available by tomorrow.

## 2020-04-27 NOTE — PLAN OF CARE
Problem: Oral Intake Inadequate  Goal: Improved Oral Intake  Outcome: Ongoing, Progressing     Recommendations    1. Recommend Babatunde BID.   2. Encourage PO intake of meals and commercial beverage.   3. Weekly weights.     Goals: 1. >75% of EEN, EPN by RD follow up.   Nutrition Goal Status: new  Communication of RD Recs: other (comment)(POC)

## 2020-04-27 NOTE — PROGRESS NOTES
Covid test collected and taken to lab with notation that test must be run stat as pt has surgery tomorrow.

## 2020-04-27 NOTE — ASSESSMENT & PLAN NOTE
- Unknown baseline Cr. Renal function continues to improve  - Cr stabilizing at 1.5  - Holding home HCTZ   - suspect 2/2 poorly controlled HTN vs infection/gangrene   - avoid nephrotoxins  - renally dose meds   - Continue IVF per Nephrology  - Appreciate nephrology assistance

## 2020-04-27 NOTE — PLAN OF CARE
Problem: Fall Injury Risk  Goal: Absence of Fall and Fall-Related Injury  Outcome: Ongoing, Progressing     Problem: Adult Inpatient Plan of Care  Goal: Plan of Care Review  Outcome: Ongoing, Progressing  Goal: Patient-Specific Goal (Individualization)  Outcome: Ongoing, Progressing  Goal: Absence of Hospital-Acquired Illness or Injury  Outcome: Ongoing, Progressing  Goal: Optimal Comfort and Wellbeing  Outcome: Ongoing, Progressing  Goal: Readiness for Transition of Care  Outcome: Ongoing, Progressing  Goal: Rounds/Family Conference  Outcome: Ongoing, Progressing     Problem: Electrolyte Imbalance (Acute Kidney Injury/Impairment)  Goal: Serum Electrolyte Balance  Outcome: Ongoing, Progressing     Problem: Fluid Imbalance (Acute Kidney Injury/Impairment)  Goal: Optimal Fluid Balance  Outcome: Ongoing, Progressing     Problem: Hematologic Alteration (Acute Kidney Injury/Impairment)  Goal: Hemoglobin, Hematocrit and Platelets Within Normal Range  Outcome: Ongoing, Progressing     Problem: Oral Intake Inadequate (Acute Kidney Injury/Impairment)  Goal: Optimal Nutrition Intake  Outcome: Ongoing, Progressing     Problem: Renal Function Impairment (Acute Kidney Injury/Impairment)  Goal: Effective Renal Function  Outcome: Ongoing, Progressing     Problem: Skin Injury Risk Increased  Goal: Skin Health and Integrity  Outcome: Ongoing, Progressing     Problem: Infection  Goal: Infection Symptom Resolution  Outcome: Ongoing, Progressing     Problem: Spiritual Distress Risk or Actual  Goal: Spiritual Wellbeing  Outcome: Ongoing, Progressing     Problem: Wound  Goal: Optimal Wound Healing  Outcome: Ongoing, Progressing

## 2020-04-27 NOTE — NURSING
Patient continues to refuse food despite being educated on need for nutrition. States she is still full from lunch.

## 2020-04-28 ENCOUNTER — ANESTHESIA EVENT (OUTPATIENT)
Dept: SURGERY | Facility: OTHER | Age: 80
DRG: 240 | End: 2020-04-28
Payer: MEDICARE

## 2020-04-28 ENCOUNTER — ANESTHESIA (OUTPATIENT)
Dept: SURGERY | Facility: OTHER | Age: 80
DRG: 240 | End: 2020-04-28
Payer: MEDICARE

## 2020-04-28 PROBLEM — E83.42 HYPOMAGNESEMIA: Status: ACTIVE | Noted: 2020-04-28

## 2020-04-28 LAB
ANION GAP SERPL CALC-SCNC: 10 MMOL/L (ref 8–16)
BUN SERPL-MCNC: 8 MG/DL (ref 8–23)
CALCIUM SERPL-MCNC: 7.9 MG/DL (ref 8.7–10.5)
CHLORIDE SERPL-SCNC: 108 MMOL/L (ref 95–110)
CO2 SERPL-SCNC: 24 MMOL/L (ref 23–29)
CREAT SERPL-MCNC: 1.3 MG/DL (ref 0.5–1.4)
ERYTHROCYTE [DISTWIDTH] IN BLOOD BY AUTOMATED COUNT: 13.2 % (ref 11.5–14.5)
EST. GFR  (AFRICAN AMERICAN): 45 ML/MIN/1.73 M^2
EST. GFR  (NON AFRICAN AMERICAN): 39 ML/MIN/1.73 M^2
GLUCOSE SERPL-MCNC: 89 MG/DL (ref 70–110)
HCT VFR BLD AUTO: 25.2 % (ref 37–48.5)
HGB BLD-MCNC: 7.8 G/DL (ref 12–16)
MAGNESIUM SERPL-MCNC: 1.4 MG/DL (ref 1.6–2.6)
MCH RBC QN AUTO: 25.7 PG (ref 27–31)
MCHC RBC AUTO-ENTMCNC: 31 G/DL (ref 32–36)
MCV RBC AUTO: 83 FL (ref 82–98)
PLATELET # BLD AUTO: 309 K/UL (ref 150–350)
PMV BLD AUTO: 10.2 FL (ref 9.2–12.9)
POTASSIUM SERPL-SCNC: 4 MMOL/L (ref 3.5–5.1)
RBC # BLD AUTO: 3.03 M/UL (ref 4–5.4)
SARS-COV-2 RNA RESP QL NAA+PROBE: NOT DETECTED
SODIUM SERPL-SCNC: 142 MMOL/L (ref 136–145)
WBC # BLD AUTO: 7.68 K/UL (ref 3.9–12.7)

## 2020-04-28 PROCEDURE — 25000003 PHARM REV CODE 250: Mod: HCNC | Performed by: PODIATRIST

## 2020-04-28 PROCEDURE — 37000009 HC ANESTHESIA EA ADD 15 MINS: Mod: HCNC | Performed by: PODIATRIST

## 2020-04-28 PROCEDURE — S0020 INJECTION, BUPIVICAINE HYDRO: HCPCS | Mod: HCNC | Performed by: PODIATRIST

## 2020-04-28 PROCEDURE — 25000003 PHARM REV CODE 250: Mod: HCNC | Performed by: INTERNAL MEDICINE

## 2020-04-28 PROCEDURE — 36000707: Mod: HCNC | Performed by: PODIATRIST

## 2020-04-28 PROCEDURE — 71000033 HC RECOVERY, INTIAL HOUR: Mod: HCNC | Performed by: PODIATRIST

## 2020-04-28 PROCEDURE — 83735 ASSAY OF MAGNESIUM: CPT | Mod: HCNC

## 2020-04-28 PROCEDURE — 99233 PR SUBSEQUENT HOSPITAL CARE,LEVL III: ICD-10-PCS | Mod: HCNC,,, | Performed by: HOSPITALIST

## 2020-04-28 PROCEDURE — 99233 SBSQ HOSP IP/OBS HIGH 50: CPT | Mod: HCNC,,, | Performed by: HOSPITALIST

## 2020-04-28 PROCEDURE — 80048 BASIC METABOLIC PNL TOTAL CA: CPT | Mod: HCNC

## 2020-04-28 PROCEDURE — 27201423 OPTIME MED/SURG SUP & DEVICES STERILE SUPPLY: Mod: HCNC | Performed by: PODIATRIST

## 2020-04-28 PROCEDURE — 63600175 PHARM REV CODE 636 W HCPCS: Mod: HCNC | Performed by: INTERNAL MEDICINE

## 2020-04-28 PROCEDURE — 28805 AMPUTATION THRU METATARSAL: CPT | Mod: HCNC,RT,, | Performed by: PODIATRIST

## 2020-04-28 PROCEDURE — 36000706: Mod: HCNC | Performed by: PODIATRIST

## 2020-04-28 PROCEDURE — 88305 TISSUE EXAM BY PATHOLOGIST: ICD-10-PCS | Mod: 26,HCNC,, | Performed by: PATHOLOGY

## 2020-04-28 PROCEDURE — 94761 N-INVAS EAR/PLS OXIMETRY MLT: CPT | Mod: HCNC

## 2020-04-28 PROCEDURE — 88305 TISSUE EXAM BY PATHOLOGIST: CPT | Mod: 26,HCNC,, | Performed by: PATHOLOGY

## 2020-04-28 PROCEDURE — 36415 COLL VENOUS BLD VENIPUNCTURE: CPT | Mod: HCNC

## 2020-04-28 PROCEDURE — 63600175 PHARM REV CODE 636 W HCPCS: Mod: HCNC | Performed by: NURSE ANESTHETIST, CERTIFIED REGISTERED

## 2020-04-28 PROCEDURE — 71000039 HC RECOVERY, EACH ADD'L HOUR: Mod: HCNC | Performed by: PODIATRIST

## 2020-04-28 PROCEDURE — 28805 PR AMPUTATION FOOT,TRANSMETATARSAL: ICD-10-PCS | Mod: HCNC,RT,, | Performed by: PODIATRIST

## 2020-04-28 PROCEDURE — 11000001 HC ACUTE MED/SURG PRIVATE ROOM: Mod: HCNC

## 2020-04-28 PROCEDURE — S0030 INJECTION, METRONIDAZOLE: HCPCS | Mod: HCNC | Performed by: INTERNAL MEDICINE

## 2020-04-28 PROCEDURE — 85027 COMPLETE CBC AUTOMATED: CPT | Mod: HCNC

## 2020-04-28 PROCEDURE — 88305 TISSUE EXAM BY PATHOLOGIST: CPT | Mod: HCNC | Performed by: PATHOLOGY

## 2020-04-28 PROCEDURE — 63600175 PHARM REV CODE 636 W HCPCS: Mod: HCNC | Performed by: HOSPITALIST

## 2020-04-28 PROCEDURE — 37000008 HC ANESTHESIA 1ST 15 MINUTES: Mod: HCNC | Performed by: PODIATRIST

## 2020-04-28 PROCEDURE — 25000003 PHARM REV CODE 250: Mod: HCNC | Performed by: ANESTHESIOLOGY

## 2020-04-28 RX ORDER — ONDANSETRON 2 MG/ML
4 INJECTION INTRAMUSCULAR; INTRAVENOUS DAILY PRN
Status: DISCONTINUED | OUTPATIENT
Start: 2020-04-28 | End: 2020-04-30

## 2020-04-28 RX ORDER — LIDOCAINE HYDROCHLORIDE 10 MG/ML
INJECTION, SOLUTION EPIDURAL; INFILTRATION; INTRACAUDAL; PERINEURAL
Status: DISCONTINUED | OUTPATIENT
Start: 2020-04-28 | End: 2020-04-28 | Stop reason: HOSPADM

## 2020-04-28 RX ORDER — OXYCODONE HYDROCHLORIDE 5 MG/1
5 TABLET ORAL
Status: DISCONTINUED | OUTPATIENT
Start: 2020-04-28 | End: 2020-04-29

## 2020-04-28 RX ORDER — MAGNESIUM SULFATE HEPTAHYDRATE 40 MG/ML
2 INJECTION, SOLUTION INTRAVENOUS ONCE
Status: COMPLETED | OUTPATIENT
Start: 2020-04-28 | End: 2020-04-28

## 2020-04-28 RX ORDER — PROPOFOL 10 MG/ML
VIAL (ML) INTRAVENOUS
Status: DISCONTINUED | OUTPATIENT
Start: 2020-04-28 | End: 2020-04-28

## 2020-04-28 RX ORDER — HYDROMORPHONE HYDROCHLORIDE 2 MG/ML
0.4 INJECTION, SOLUTION INTRAMUSCULAR; INTRAVENOUS; SUBCUTANEOUS EVERY 5 MIN PRN
Status: DISCONTINUED | OUTPATIENT
Start: 2020-04-28 | End: 2020-04-29

## 2020-04-28 RX ORDER — PROPOFOL 10 MG/ML
VIAL (ML) INTRAVENOUS CONTINUOUS PRN
Status: DISCONTINUED | OUTPATIENT
Start: 2020-04-28 | End: 2020-04-28

## 2020-04-28 RX ORDER — MIDAZOLAM HYDROCHLORIDE 1 MG/ML
INJECTION INTRAMUSCULAR; INTRAVENOUS
Status: DISCONTINUED | OUTPATIENT
Start: 2020-04-28 | End: 2020-04-28

## 2020-04-28 RX ORDER — MEPERIDINE HYDROCHLORIDE 25 MG/ML
12.5 INJECTION INTRAMUSCULAR; INTRAVENOUS; SUBCUTANEOUS ONCE AS NEEDED
Status: DISCONTINUED | OUTPATIENT
Start: 2020-04-28 | End: 2020-04-29

## 2020-04-28 RX ORDER — LIDOCAINE HYDROCHLORIDE 10 MG/ML
1 INJECTION, SOLUTION EPIDURAL; INFILTRATION; INTRACAUDAL; PERINEURAL ONCE
Status: DISCONTINUED | OUTPATIENT
Start: 2020-04-28 | End: 2020-05-01 | Stop reason: HOSPADM

## 2020-04-28 RX ORDER — BUPIVACAINE HYDROCHLORIDE 5 MG/ML
INJECTION, SOLUTION EPIDURAL; INTRACAUDAL
Status: DISCONTINUED | OUTPATIENT
Start: 2020-04-28 | End: 2020-04-28 | Stop reason: HOSPADM

## 2020-04-28 RX ORDER — SODIUM CHLORIDE 0.9 % (FLUSH) 0.9 %
3 SYRINGE (ML) INJECTION
Status: DISCONTINUED | OUTPATIENT
Start: 2020-04-28 | End: 2020-05-01 | Stop reason: HOSPADM

## 2020-04-28 RX ORDER — FENTANYL CITRATE 50 UG/ML
INJECTION, SOLUTION INTRAMUSCULAR; INTRAVENOUS
Status: DISCONTINUED | OUTPATIENT
Start: 2020-04-28 | End: 2020-04-28

## 2020-04-28 RX ORDER — SODIUM CHLORIDE, SODIUM LACTATE, POTASSIUM CHLORIDE, CALCIUM CHLORIDE 600; 310; 30; 20 MG/100ML; MG/100ML; MG/100ML; MG/100ML
INJECTION, SOLUTION INTRAVENOUS CONTINUOUS PRN
Status: DISCONTINUED | OUTPATIENT
Start: 2020-04-28 | End: 2020-04-28

## 2020-04-28 RX ADMIN — CEFEPIME 1 G: 1 INJECTION, POWDER, FOR SOLUTION INTRAMUSCULAR; INTRAVENOUS at 02:04

## 2020-04-28 RX ADMIN — OXYCODONE HYDROCHLORIDE 5 MG: 5 TABLET ORAL at 07:04

## 2020-04-28 RX ADMIN — CLOPIDOGREL BISULFATE 75 MG: 75 TABLET ORAL at 09:04

## 2020-04-28 RX ADMIN — TIMOLOL MALEATE 1 DROP: 5 SOLUTION OPHTHALMIC at 09:04

## 2020-04-28 RX ADMIN — PROPOFOL 50 MCG/KG/MIN: 10 INJECTION, EMULSION INTRAVENOUS at 02:04

## 2020-04-28 RX ADMIN — SODIUM CHLORIDE, SODIUM LACTATE, POTASSIUM CHLORIDE, AND CALCIUM CHLORIDE: .6; .31; .03; .02 INJECTION, SOLUTION INTRAVENOUS at 05:04

## 2020-04-28 RX ADMIN — OXYCODONE HYDROCHLORIDE 5 MG: 5 TABLET ORAL at 11:04

## 2020-04-28 RX ADMIN — METRONIDAZOLE 500 MG: 500 INJECTION, SOLUTION INTRAVENOUS at 02:04

## 2020-04-28 RX ADMIN — METRONIDAZOLE 500 MG: 500 INJECTION, SOLUTION INTRAVENOUS at 05:04

## 2020-04-28 RX ADMIN — ASPIRIN 81 MG: 81 TABLET, COATED ORAL at 09:04

## 2020-04-28 RX ADMIN — LATANOPROST 1 DROP: 50 SOLUTION OPHTHALMIC at 08:04

## 2020-04-28 RX ADMIN — HYDROCODONE BITARTRATE AND ACETAMINOPHEN 1 TABLET: 5; 325 TABLET ORAL at 06:04

## 2020-04-28 RX ADMIN — METRONIDAZOLE 500 MG: 500 INJECTION, SOLUTION INTRAVENOUS at 11:04

## 2020-04-28 RX ADMIN — MAGNESIUM SULFATE IN WATER 2 G: 40 INJECTION, SOLUTION INTRAVENOUS at 09:04

## 2020-04-28 RX ADMIN — FENTANYL CITRATE 50 MCG: 50 INJECTION, SOLUTION INTRAMUSCULAR; INTRAVENOUS at 02:04

## 2020-04-28 RX ADMIN — SODIUM CHLORIDE, SODIUM LACTATE, POTASSIUM CHLORIDE, AND CALCIUM CHLORIDE: 600; 310; 30; 20 INJECTION, SOLUTION INTRAVENOUS at 02:04

## 2020-04-28 RX ADMIN — MIDAZOLAM HYDROCHLORIDE 1 MG: 1 INJECTION, SOLUTION INTRAMUSCULAR; INTRAVENOUS at 02:04

## 2020-04-28 RX ADMIN — TIMOLOL MALEATE 1 DROP: 5 SOLUTION OPHTHALMIC at 08:04

## 2020-04-28 RX ADMIN — HYDRALAZINE HYDROCHLORIDE 25 MG: 25 TABLET, FILM COATED ORAL at 11:04

## 2020-04-28 RX ADMIN — NIFEDIPINE 90 MG: 30 TABLET, FILM COATED, EXTENDED RELEASE ORAL at 09:04

## 2020-04-28 RX ADMIN — CYANOCOBALAMIN TAB 500 MCG 500 MCG: 500 TAB at 09:04

## 2020-04-28 RX ADMIN — PROPOFOL 30 MG: 10 INJECTION, EMULSION INTRAVENOUS at 02:04

## 2020-04-28 RX ADMIN — TRAMADOL HYDROCHLORIDE 50 MG: 50 TABLET, FILM COATED ORAL at 12:04

## 2020-04-28 NOTE — ASSESSMENT & PLAN NOTE
-Mrs. De Dios was admitted to inpatient status.  -On admit there was erythema in the foot proximal to the necrotic areas  -Arterial US of right leg showed monophasic flow in the right peroneal artery.  Low velocities.  -No evidence of osteomylitis on imaging   -She is s/p R lower extremity angioplasty of distal superficial femoral and popliteal arteries on 4/24  -She is being treated with IV cefepime and flagyl for possible component of cellulitis.  -Podiatry, vascular surgery and cardiology are on board and input appreciated.  -Plan is for amputation today - unclear if TMA will be sufficient.  She is covid negative.  -Continue aspirin, plavix and antibiotics for now.  -Will require PT/OT evaluation after surgery and likely inpatient rehab.

## 2020-04-28 NOTE — ASSESSMENT & PLAN NOTE
-Unknown baseline Cr.   -Suspect MICKEY due to poorly controlled HTN vs infection/gangrene   -Continue to hold home HCTZ   -Continue to avoid nephrotoxins and renally dose meds   -Cr now 1.3  -Continue IVF per Nephrology  -Appreciate nephrology assistance

## 2020-04-28 NOTE — ANESTHESIA POSTPROCEDURE EVALUATION
Anesthesia Post Evaluation    Patient: Home De Dios    Procedure(s) Performed: Procedure(s) (LRB):  AMPUTATION, FOOT, TRANSMETATARSAL right (Right)    Final Anesthesia Type: MAC    Patient location during evaluation: PACU  Patient participation: Yes- Able to Participate  Level of consciousness: awake and alert  Post-procedure vital signs: reviewed and stable  Pain management: adequate  Airway patency: patent    PONV status at discharge: No PONV  Anesthetic complications: no      Cardiovascular status: hemodynamically stable  Respiratory status: unassisted  Hydration status: euvolemic  Follow-up not needed.          Vitals Value Taken Time   /72 4/28/2020  4:40 PM   Temp 36.7 °C (98 °F) 4/28/2020  4:08 PM   Pulse 56 4/28/2020  4:53 PM   Resp 18 4/28/2020  4:40 PM   SpO2 100 % 4/28/2020  4:50 PM   Vitals shown include unvalidated device data.      No case tracking events are documented in the log.      Pain/Brennan Score: Pain Rating Prior to Med Admin: 10 (4/28/2020 12:55 AM)  Pain Rating Post Med Admin: 0 (4/28/2020  1:55 AM)  Brennan Score: 9 (4/28/2020  4:25 PM)

## 2020-04-28 NOTE — PT/OT/SLP PROGRESS
"Occupational Therapy   Treatment    Name: Home De Dios  MRN: [id number]  Admitting Diagnosis:  Gangrene of toe of right foot  3 Days Post-Op    Recommendations:     Discharge Recommendations: (TBD after surgical procedure scheduled for 4/28/20.)  Discharge Equipment Recommendations:  walker, rolling, wheelchair  Barriers to discharge:  Other (Comment)(Pt having surgical procedure tomorrow and will need re-evaluation.)    Assessment:     Home De Dios is a 77 y.o. female with a medical diagnosis of Gangrene of toe of right foot.  She presents agreeable to OT tx session.  Pt clearly demonstrates poor insight into deficits and disease process.  Pt telling OT that she is going to continue to "soak" her feet and "cut" the skin on the sides of her feet as needed in the future like she has always done in the past including that no one is going to tell her how to take care of her feet since she already knows how.   Performance deficits affecting function are weakness, impaired endurance, impaired sensation, impaired self care skills, impaired functional mobilty, gait instability, impaired balance, decreased lower extremity function, orthopedic precautions, impaired skin, pain, decreased safety awareness.  Re-evaluation of pt's functional status after surgery to determine discharge recommendation.    Rehab Prognosis:  Good; patient would benefit from acute skilled OT services to address these deficits and reach maximum level of function.       Plan:     Patient to be seen 4 x/week to address the above listed problems via self-care/home management, therapeutic activities, therapeutic exercises  · Plan of Care Expires: 05/21/20  · Plan of Care Reviewed with: patient    Subjective     Pain/Comfort:  · Pain Rating 1: (Pt not rating pain, only stating that her right foot throbs when daggling.)  · Location - Side 1: Right  · Location - Orientation 1: generalized  · Location 1: foot  · Pain Addressed 1: Reposition, Distraction, " Cessation of Activity  · Pain Rating Post-Intervention 1: 0/10    Objective:     Communicated with: nurse prior to session.  Patient found HOB elevated with peripheral IV, telemetry upon OT entry to room.    General Precautions: Standard, fall   Orthopedic Precautions:RLE non weight bearing   Braces: (Pt had a Darco shoe for right foot but at this time, it is not in her room.  )     Occupational Performance:     Bed Mobility:    · Patient completed Supine to Sit with stand by assistance  · Patient completed Sit to Supine with stand by assistance     Functional Mobility/Transfers:  · Patient completed Sit <> Stand Transfer with contact guard assistance  with  holding onto bed rail with left hand for support.   · Functional Mobility: Pt not wanting perform any further mobility and transfers due to throbbing of right foot in dependent position.  Pt able to adhere to NWB to right foot during static standing with bilateral UE support.     Activities of Daily Living:  · Feeding:  independence and of Pt making the comment that she has her own taste in food and enjoys herbal foods and drinks she makes herself.   · Grooming: Set up in sitting  · Lower Body Dressing: Donning left sock at SBA sitting EOB.      [facility] 6 Click ADL: 20    Treatment & Education:  Role of OT, POC, NWB to right foot, safety with ADLs and need to call for assist for any needs/wants and not to attempt to get out of bed on her own.    Patient left HOB elevated with all lines intact, call button in reach, bed alarm on and nurse notifiedEducation:      GOALS:   Multidisciplinary Problems     Occupational Therapy Goals        Problem: Occupational Therapy Goal    Goal Priority Disciplines Outcome Interventions   Occupational Therapy Goal     OT, PT/OT Ongoing, Progressing    Description:  Goals to be met by: 4/28/2020     Patient will increase functional independence with ADLs by performing:    UE Dressing with [place].  LE Dressing with  Supervision.  Grooming while standing at sink with Supervision.  Toileting from toilet with Supervision for hygiene and clothing management.   Toilet transfer to toilet with Supervision.                      Time Tracking:     OT Date of Treatment: 04/27/20  OT Start Time: 1733  OT Stop Time: 1754  OT Total Time (min): 21 min    Billable Minutes:Self Care/Home Management 21    LOC Michel  4/27/2020

## 2020-04-28 NOTE — ASSESSMENT & PLAN NOTE
-Adequately controlled  -only home med is HCTZ 12.5 mg QD, holding 2/2 MICKEY   -Continue nifedipine 90 mg daily  -PRN hydralazine   -monitor

## 2020-04-28 NOTE — PROGRESS NOTES
"[facility] Hospital Medicine  Progress Note    Patient Name: Home De Dios  MRN: [id number]  Patient Class: IP- Inpatient   Admission Date: 4/19/2020  Length of Stay: 9 days  Attending Physician: Lewis Garcia MD  Primary Care Provider: Quintin Cerda MD        Subjective:     Principal Problem:Gangrene of toe of right foot        HPI:  Ms. Home De Dios is a 77 y.o. female, with PMH of HTN, POAG, who [place] [facility] [place] with right toe pain. She states the toes of her right foot began changing color and turning black about 10 days ago, and have been having worsening numbness x 4-5 days. She states this began after soaking her foot in bleach and Epsom salts and then cutting her toe nails. The right foot subsequently became itchy, and then numb, particularly on the 1st, 2nd and 4th toes. She denies neuropathy or prior numbness/tingling., trauma, fever, chills. ED imaging shows no fracture/dislocaton or bony erosion. An US is pending. Labs showed MICKEY without hyperkalemia, and elevated CRP of 119. She was admitted to inpatient status.     Overview/Hospital Course:  Patient admitted with gangrene of R toes and MICKEY. IV antibiotics were initiated for possible cellulitis of R foot. She was started on IV fluids and renal function improved each day. Nephrology following. Xray of R foot did not suggest osteomyelitis. US arteries R LE revealed, "Monophasic flow in the right peroneal artery.  Low velocities." Podiatry, vascular surgery, and cardiology have been following the patient. On 4/24/2020 patient underwent successful angioplasty of distal superficial femoral and popliteal arteries. She is scheduled for TMA on 4/28/20    Interval History: No acute events overnight.  In good spirits.  Denies pain this morning.  Eager to have surgery completed today.    Review of Systems   Constitutional: Negative for chills and fever.   HENT: Negative for congestion and sneezing.    Eyes: Negative " for pain and discharge.   Respiratory: Negative for cough and shortness of breath.    Cardiovascular: Negative for chest pain and leg swelling.   Gastrointestinal: Negative for abdominal pain, nausea and vomiting.   Endocrine: Negative for cold intolerance.   Genitourinary: Negative for difficulty urinating and dysuria.   Musculoskeletal: Negative for arthralgias and back pain.   Skin: Positive for color change.   Neurological: Negative for tremors and weakness.   Hematological: Negative for adenopathy. Does not bruise/bleed easily.   Psychiatric/Behavioral: Negative for agitation and confusion.     Objective:     Vital Signs (Most Recent):  Temp: 97.3 °F (36.3 °C) (04/28/20 0840)  Pulse: 70 (04/28/20 1000)  Resp: 16 (04/28/20 0840)  BP: (!) 148/67 (04/28/20 0840)  SpO2: 97 % (04/28/20 0840) Vital Signs (24h Range):  Temp:  [97.3 °F (36.3 °C)-99.9 °F (37.7 °C)] 97.3 °F (36.3 °C)  Pulse:  [] 70  Resp:  [16-20] 16  SpO2:  [97 %-100 %] 97 %  BP: (133-151)/(60-70) 148/67     Weight: 52 kg (114 lb 10.2 oz)  Body mass index is 17.96 kg/m².    Intake/Output Summary (Last 24 hours) at 4/28/2020 1051  Last data filed at 4/28/2020 0500  Gross per 24 hour   Intake 1346 ml   Output 950 ml   Net 396 ml      Physical Exam   Constitutional: She is oriented to person, place, and time. She appears well-developed and well-nourished. No distress.   Eyes: No scleral icterus.   Cardiovascular: Normal rate, regular rhythm and normal heart sounds.   Pulmonary/Chest: Effort normal and breath sounds normal. No respiratory distress.   Abdominal: Soft. Bowel sounds are normal. There is no tenderness.   Musculoskeletal: She exhibits no edema.   Neurological: She is alert and oriented to person, place, and time.   Skin: Skin is warm and dry.   Necrosis/dry gangrene of right toes 1-4. Foot otherwise warm   Psychiatric: She has a normal mood and affect.   Nursing note and vitals reviewed.    Significant Labs: All labs in last 24 hours have  been reviewed by me.    Imaging: All imaging in last 24 hours has been reviewed by me.      Assessment/Plan:      * Gangrene of toes of right foot  -Mrs. De Dios was admitted to inpatient status.  -On admit there was erythema in the foot proximal to the necrotic areas  -Arterial US of right leg showed monophasic flow in the right peroneal artery.  Low velocities.  -No evidence of osteomylitis on imaging   -She is s/p R lower extremity angioplasty of distal superficial femoral and popliteal arteries on 4/24  -She is being treated with IV cefepime and flagyl for possible component of cellulitis.  -Podiatry, vascular surgery and cardiology are on board and input appreciated.  -Plan is for amputation today - unclear if TMA will be sufficient.  She is covid negative.  -Continue aspirin, plavix and antibiotics for now.  -Will require PT/OT evaluation after surgery and likely inpatient rehab.          MICKEY (acute kidney injury)  -Unknown baseline Cr.   -Suspect MICKEY due to poorly controlled HTN vs infection/gangrene   -Continue to hold home HCTZ   -Continue to avoid nephrotoxins and renally dose meds   -Cr now 1.3  -Continue IVF per Nephrology  -Appreciate nephrology assistance    Hypomagnesemia  -Replace magnesium today.  -Repeat labs in AM.    Malnutrition of mild degree  -Encourage PO intake  -Nutritional supplements added per nutrition recs      Anemia  -Continue ferrous sulfate QOD   -B12 328. Continue cyanocobalamin daily  -Monitor      Hypokalemia  -K normal today  -Repeat BMP in AM    Essential hypertension  -Adequately controlled  -only home med is HCTZ 12.5 mg QD, holding 2/2 MICKEY   -Continue nifedipine 90 mg daily  -PRN hydralazine   -monitor         VTE Risk Mitigation (From admission, onward)         Ordered     Place sequential compression device  Until discontinued      04/19/20 2118     IP VTE HIGH RISK PATIENT  Once      04/19/20 2118     Reason for No Pharmacological VTE Prophylaxis  Once     Question:   Reasons:  Answer:  Physician Provided (leave comment)    04/19/20 2116                      Lewis Garcia MD  Department of Hospital Medicine   [facility]

## 2020-04-28 NOTE — PROGRESS NOTES
Patient left for OR at approximately 1340, via stretcher with one attendant. Report called to holding area. Peripheral IV patent, flushing easily. Patient in no distress. VSS. Safety precautions maintained.

## 2020-04-28 NOTE — BRIEF OP NOTE
[facility]  Brief Operative Note    SUMMARY     Surgery Date: 4/28/2020     Surgeon(s) and Role:     * Ha Munguia DPM - Primary    Assisting Surgeon: Josefina Stack [id number]    Pre-op Diagnosis:  Toe necrosis [I96]  Toe pain [M79.676]  Gangrene of toe of right foot [I96]  Gangrene of toe of right foot [I96]    Post-op Diagnosis:  Post-Op Diagnosis Codes:     * Toe necrosis [I96]     * Toe pain [M79.676]     * Gangrene of toe of right foot [I96]     * Gangrene of toe of right foot [I96]    Procedure(s) (LRB):  AMPUTATION, FOOT, TRANSMETATARSAL right (Right)    Anesthesia: Local MAC    Description of Procedure:   Transmetatarsal amputation right foot to the level slightly proximal to the metatarsal necks 1-5.     Description of the findings of the procedure: No tourniquet was used throughout the procedure to assess blood flow to right foot. Moderate bleeding appreciated.     Estimated Blood Loss: 80 mL         Specimens:   Specimen (12h ago, onward)    None

## 2020-04-28 NOTE — PROGRESS NOTES
Progress Note  Nephrology    Consult Requested By: Lewis Garcia MD  Reason for Consult: MICKEY    SUBJECTIVE:     History of Present Illness:  Patient is a 77 y.o. female presents with gangrenous right toe/ foot and found to have MICKEY with Cr 5.5. She deines any history of CKD and states that she only takes HCTZ for HTN and no other routine meds. She does repots that when her foot started itching and swelling more approximately 8 days ago she began taking Ibuprofen bid. There are no labs since 2013 because she tells me that she does not like to have her blood drawn.    Interval History:  She feels well today. Foot pain well controlled. Surgery later today?      Past Medical History:   Diagnosis Date    Glaucoma     Glaucoma (increased eye pressure)     Hypertension      Past Surgical History:   Procedure Laterality Date    APPENDECTOMY      CATARACT EXTRACTION W/  INTRAOCULAR LENS IMPLANT Left 10/16/2013        CATARACT EXTRACTION W/  INTRAOCULAR LENS IMPLANT Right 12/18/2013        HYSTERECTOMY       Family History   Problem Relation Age of Onset    Heart disease Mother     Cancer Sister     Amblyopia Neg Hx     Blindness Neg Hx     Macular degeneration Neg Hx     Retinal detachment Neg Hx     Stroke Neg Hx     Thyroid disease Neg Hx     Cataracts Neg Hx     Glaucoma Neg Hx      Social History     Tobacco Use    Smoking status: Never Smoker    Smokeless tobacco: Never Used   Substance Use Topics    Alcohol use: No    Drug use: Not on file       Medications Prior to Admission   Medication Sig Dispense Refill Last Dose    latanoprost (XALATAN) 0.005 % ophthalmic solution Place 1 drop into both eyes every evening. 2.5 mL 12 4/19/2020    timolol maleate 0.5% (TIMOPTIC) 0.5 % Drop Place 1 drop into both eyes 2 (two) times daily. 5 mL 12 4/19/2020    hydroCHLOROthiazide (HYDRODIURIL) 12.5 MG Tab Take 1 tablet (12.5 mg total) by mouth once daily. 90 tablet 2 Unknown        Review of patient's allergies indicates:  No Known Allergies     Review of Systems:  Constitutional: No fever or chills, no weight changes.  Eyes: No visual changes or photophobia  HEENT: No nasal congestion or sore throat  Respiratory: No cough or shorness of breath  Cardiovascular: No chest pain or palpitations  Gastrointestinal: Poor appetite, no nausea or vomiting, no change in bowel habits  Genitourinary: No hematuria or dysuria  Skin: No rash or pruritis, black toe/ red foot, itching  Hematologic/lymphatic: No easy bruising, bleeding or lymphadenopathy  Musculoskeletal: No arthralgias or myalgias  Neurological: No seizures or tremors  Endocrine: No heat/cold intolerance.  No polyuria/polydipsia.  Psychiatric:  No depression or anxiety. +Poor insight    OBJECTIVE:     Vital Signs (Most Recent)  Temp: 97.3 °F (36.3 °C) (04/28/20 0840)  Pulse: 80 (04/28/20 0840)  Resp: 16 (04/28/20 0840)  BP: (!) 148/67 (04/28/20 0840)  SpO2: 97 % (04/28/20 0840)    Vital Signs Range (Last 24H):  Temp:  [97.3 °F (36.3 °C)-99.9 °F (37.7 °C)]   Pulse:  []   Resp:  [16-20]   BP: (133-151)/(60-70)   SpO2:  [97 %-100 %]     Physical Exam:    General appearance: Well developed, thin  Head: Normocephalic, atraumatic  Eyes:  Conjunctivae nl. Sclera anicteric. PERRL.  HEENT: Lips, mucosa, and tongue normal; teeth and gums normal and oropharynx clear.  Neck: Supple, trachea midline, thyroid not enlarged,   Lungs: Clear to auscultation bilaterally and normal respiratory effort  Heart: Regular rate and rhythm, S1, S2 normal, no murmur, click, rub or gallop  Abdomen: Soft, non-tender non-distended; bowel sounds normal; no masses,  no organomegaly  Extremities: dry gangrene of right toes, fluctuance of forefoot now. No edema  Pulses: nonpalpable  Neurologic: Normal strength and tone. No focal numbness or weakness  Psychiatric:  Alert and oriented times 3.       Diagnostic Results:  Labs: Reviewed  X-Ray: Reviewed  US:  Reviewed    ASSESSMENT/PLAN:     1. MICKEY likely due to right foot gangrene and Ibuprofen/ Hypotension/ Hypokalemia  -Last known Cr 2013 was 0.9  -Minimal proteinuria 280 by ratio  -Current Cr down to 1.3 and stable with resolution of acidosis even after angiogram 4/24.  -good UOP  -Replace K prn  -CPK is normal  -Renal US no obstruction, 2 right non-obstructing calculi and neg urine eosin.  -Continue gentle IVF's with LR.  -Avoid nephrotoxins, NSAIDs/ PPI and renal dose meds.    2. HTN  -Continue Nifedipine XL to 90 mg daily.  -Continue the prn hydralazine  -Was on HCTZ monotherapy at home but no diuretics at present.    3. Dry Gangrene of right toes/ foot, severe PAD  -On cefepime and Flagyl, appears dry at present though  -Dr. Baron performed angio 4/24/2020: [facility]: Right leg: SFA: Distal 80%. Popliteal: 100%. AT & PT: Occluded. PER: Mid and distal occlusions. SFA: & POP: PTA 6 mm balloon. Mild residual. PER: Mid PTA.  -He suspects she will need BKA or TMA    4. Fe Defic Anemia/ Relative B12 Defic  -Started oral Fe for now  -Started 500mcg daily  B12  -Normal folate and TSH  -H/H trending down overall but stable 7.8 today    5.  Hypernatremia  -Resolved after D5W  -Now LR     6.  Hypomag  -Replace prn.

## 2020-04-28 NOTE — PROGRESS NOTES
[facility]  Cardiology  Progress Note    Patient Name: Home De Dios  MRN: [id number]  Admission Date: 4/19/2020  Hospital Length of Stay: 9 days  Code Status: Full Code   Attending Physician: Lewis Garcia MD   Primary Care Physician: Quintin Cerda MD  Expected Discharge Date:   Principal Problem:Gangrene of toe of right foot    Subjective:     Brief HPI:    Home De Dios is a 77 y.o.female with hypertension. She had pain in the left foot and soaked her feet in water on about 4/1/2020. She noted discoloration of several toes on the right foot. She developed rest pain. She was afraid of going to the hospital because of the COVID situation. She presented on 4/19/2020 with gangrenous toes and acute renal failure. She was admitted.       Hospital Course:    Hydration.    4/24/2020: [facility]: Right leg: SFA: Distal 80%. Popliteal: 100%. AT & PT: Occluded. PER: Mid and distal occlusions. SFA: & POP: PTA 6 mm balloon. Mild residual. PER: Mid PTA.    Interval History:    Right ankle warm. No CP or SOB. For amputation today.    Review of Systems   Constitution: Positive for malaise/fatigue. Negative for chills and fever.   HENT: Negative for nosebleeds.    Eyes: Negative for vision loss in left eye and vision loss in right eye.   Cardiovascular: Negative for chest pain, leg swelling, orthopnea, palpitations and paroxysmal nocturnal dyspnea.   Respiratory: Negative for cough, hemoptysis, shortness of breath, sputum production and wheezing.    Hematologic/Lymphatic: Negative for bleeding problem.   Skin:        Right toes gangrenous.   Musculoskeletal: Negative for myalgias.   Gastrointestinal: Negative for abdominal pain, heartburn, hematemesis, hematochezia, jaundice, melena, nausea and vomiting.   Genitourinary: Negative for hematuria.   Neurological: Negative for dizziness, headaches, light-headedness, vertigo and weakness.   Psychiatric/Behavioral: Negative for altered mental status. The patient  is not nervous/anxious.    Allergic/Immunologic: Negative for persistent infections.     Objective:     Vital Signs (Most Recent):  Temp: 97.3 °F (36.3 °C) (04/28/20 0840)  Pulse: 80 (04/28/20 0840)  Resp: 16 (04/28/20 0840)  BP: (!) 148/67 (04/28/20 0840)  SpO2: 97 % (04/28/20 0840) Vital Signs (24h Range):  Temp:  [97.3 °F (36.3 °C)-99.9 °F (37.7 °C)] 97.3 °F (36.3 °C)  Pulse:  [] 80  Resp:  [16-20] 16  SpO2:  [97 %-100 %] 97 %  BP: (133-151)/(60-70) 148/67     Weight: 52 kg (114 lb 10.2 oz)  Body mass index is 17.96 kg/m².    SpO2: 97 %  O2 Device (Oxygen Therapy): room air      Intake/Output Summary (Last 24 hours) at 4/28/2020 0913  Last data filed at 4/28/2020 0500  Gross per 24 hour   Intake 1346 ml   Output 950 ml   Net 396 ml       Lines/Drains/Airways     Peripheral Intravenous Line                 Peripheral IV - Single Lumen 04/25/20 2100 20 G Anterior;Left Forearm 2 days                Physical Exam   Constitutional: She appears well-developed and well-nourished.   Cardiovascular: Normal rate, regular rhythm, S1 normal and S2 normal.   Pulses:       Femoral pulses are 2+ on the right side, and 2+ on the left side.       Popliteal pulses are 1+ on the right side, and 1+ on the left side.        Dorsalis pedis pulses are 0 on the right side, and 0 on the left side.        Posterior tibial pulses are 0 on the right side, and 0 on the left side.   Dry gangrene of right toes.  Foot warm.   Pulmonary/Chest: Effort normal and breath sounds normal.       Current Medications:     aspirin  81 mg Oral Daily    ceFEPime (MAXIPIME) IVPB  1 g Intravenous Q24H    clopidogreL  75 mg Oral Daily    cyanocobalamin  500 mcg Oral Daily    ferrous sulfate  325 mg Oral Every other day    latanoprost  1 drop Both Eyes QHS    magnesium sulfate IVPB  2 g Intravenous Once    metronidazole  500 mg Intravenous Q8H    NIFEdipine  90 mg Oral Daily    timolol maleate 0.5%  1 drop Both Eyes BID     Current Laboratory  Results:    Recent Results (from the past 24 hour(s))   COVID-19 Routine Screening    Collection Time: 04/27/20 12:36 PM   Result Value Ref Range    SARS-CoV2 (COVID-19) Qualitative PCR Not Detected Not Detected   CBC Without Differential    Collection Time: 04/28/20  4:19 AM   Result Value Ref Range    WBC 7.68 3.90 - 12.70 K/uL    RBC 3.03 (L) 4.00 - 5.40 M/uL    Hemoglobin 7.8 (L) 12.0 - 16.0 g/dL    Hematocrit 25.2 (L) 37.0 - 48.5 %    Mean Corpuscular Volume 83 82 - 98 fL    Mean Corpuscular Hemoglobin 25.7 (L) 27.0 - 31.0 pg    Mean Corpuscular Hemoglobin Conc 31.0 (L) 32.0 - 36.0 g/dL    RDW 13.2 11.5 - 14.5 %    Platelets 309 150 - 350 K/uL    MPV 10.2 9.2 - 12.9 fL   Basic Metabolic Panel (BMP)    Collection Time: 04/28/20  4:19 AM   Result Value Ref Range    Sodium 142 136 - 145 mmol/L    Potassium 4.0 3.5 - 5.1 mmol/L    Chloride 108 95 - 110 mmol/L    CO2 24 23 - 29 mmol/L    Glucose 89 70 - 110 mg/dL    BUN, Bld 8 8 - 23 mg/dL    Creatinine 1.3 0.5 - 1.4 mg/dL    Calcium 7.9 (L) 8.7 - 10.5 mg/dL    Anion Gap 10 8 - 16 mmol/L    eGFR if African American 45 (A) >60 mL/min/1.73 m^2    eGFR if non African American 39 (A) >60 mL/min/1.73 m^2   Magnesium    Collection Time: 04/28/20  4:19 AM   Result Value Ref Range    Magnesium 1.4 (L) 1.6 - 2.6 mg/dL     Current Imaging Results:    US Lower Extremity Arteries Right   Final Result      Dampened waveforms noted in the anterior and posterior tibial arteries and dorsalis pedis artery, similar to prior.  No significant focal velocity gradient.      Popliteal artery shows improved waveforms when compared to prior exam.         Electronically signed by: Estiven Peoples MD   Date:    04/28/2020   Time:    08:21      US Retroperitoneal Complete (Kidney and   Final Result      Findings suggestive of medical renal disease.      Two nonobstructing renal stones in the right kidney, both measuring 3 mm.         Electronically signed by: Johnny Sams MD    Date:    04/20/2020   Time:    15:58      US Lower Extremity Arteries Right   Final Result   Abnormal      Monophasic flow in the right peroneal artery.  Low velocities.      This report was flagged in Epic as abnormal.         Electronically signed by: Angela Saenz   Date:    04/19/2020   Time:    19:22      X-Ray Foot Complete Right   Final Result      No evidence of acute fracture or dislocation of the right foot.  No osseous erosions.  Follow-up with MRI of the right foot, as clinically warranted.      Advanced vascular calcifications.         Electronically signed by: Efraín Blanc MD   Date:    04/19/2020   Time:    17:10          Assessment and Plan:     Problem List:    Active Diagnoses:    Diagnosis Date Noted POA    PRINCIPAL PROBLEM:  Gangrene of toes of right foot [I96] 04/19/2020 Yes    Anemia [D64.9] 04/26/2020 Yes    Malnutrition of mild degree [E44.1] 04/26/2020 Yes    Hypokalemia [E87.6] 04/20/2020 Yes    MICKEY (acute kidney injury) [N17.9] 04/19/2020 Yes    Essential hypertension [I10] 06/18/2013 Yes     Chronic      Problems Resolved During this Admission:     Assessment and Plan:     1. Peripheral Artery Disease              4/1/2020: Began noticing discoloration of toes right foot.              4/19/2020: Presents with gangrene of toes right foot.              4/19/2020: Arterial Duplex: Slow distal runoff.   4/24/2020: [facility]: Right leg: SFA: Distal 80%. Popliteal: 100%. AT & PT: Occluded. PER: Mid and distal occlusions. SFA: & POP: PTA 6 mm balloon. Mild residual. PER: Mid PTA.   Dry gangrene of toes right foot.              Foot much warmer after recanalization of popliteal.   On aspirin 81 mg Q24 and clopidogrel 75 mg Q24.              Small hope she may only lose forefoot.   She needs to improve her nutritional status.               2. Hypertension   On nifedipine 90 mg Q24.      3. Acute Kidney Disease              4/19/2020: BUN/crea 44/5.5.              4/22/2020: BUN/crea  27/2.6.   4/23/2020: BUN/crea 18/1.9.   4/25/2020: BUN/crea 10/1.5. CrCl 38.   4/28/2020: BUN/crea 8/1.3. CrCl 45.              Dr. Gisela Wharton/Dr. Jessi Nguyen.    4. Underweight   4/26/2020: Weight 52 kg. BMI 18.   She needs to improve her nutritional status and gain weight to have improve her changes to heal a needed amputation.    I have discussed this with her several times and she has been seen by dietitian.             VTE Risk Mitigation (From admission, onward)         Ordered     Place sequential compression device  Until discontinued      04/19/20 2118     IP VTE HIGH RISK PATIENT  Once      04/19/20 2118     Reason for No Pharmacological VTE Prophylaxis  Once     Question:  Reasons:  Answer:  Physician Provided (leave comment)    04/19/20 2118                Coral Baron MD  Cardiology  [facility]

## 2020-04-28 NOTE — ANESTHESIA PREPROCEDURE EVALUATION
04/28/2020  Home De Dios is a 77 y.o., female.    Anesthesia Evaluation    I have reviewed the Patient Summary Reports.    I have reviewed the Nursing Notes.   I have reviewed the Medications.     Review of Systems  Social:  Non-Smoker    EENT/Dental:EENT/Dental Normal   Cardiovascular:   Hypertension, well controlled    Pulmonary:  Pulmonary Normal    Renal/:   Chronic Renal Disease, CRI    Hepatic/GI:  Hepatic/GI Normal    Endocrine:  Endocrine Normal        Physical Exam  General:  Well nourished    Airway/Jaw/Neck:  Airway Findings: Mouth Opening: Normal Tongue: Normal  General Airway Assessment: Adult  Mallampati: II  TM Distance: Normal, at least 6 cm  Jaw/Neck Findings:     Neck ROM: Normal ROM      Dental:  Dental Findings: Upper Dentures, Lower Dentures             Anesthesia Plan  Type of Anesthesia, risks & benefits discussed:  Anesthesia Type:  MAC  Patient's Preference:   Intra-op Monitoring Plan:   Intra-op Monitoring Plan Comments:   Post Op Pain Control Plan:   Post Op Pain Control Plan Comments:   Induction:   IV  Beta Blocker:         Informed Consent: Patient understands risks and agrees with Anesthesia plan.  Questions answered. Anesthesia consent signed with patient.  ASA Score: 3     Day of Surgery Review of History & Physical:    H&P update referred to the surgeon.         Ready For Surgery From Anesthesia Perspective.

## 2020-04-28 NOTE — PLAN OF CARE
Problem: Occupational Therapy Goal  Goal: Occupational Therapy Goal  Description  Goals to be met by: 4/28/2020     Patient will increase functional independence with ADLs by performing:    UE Dressing with [place].  LE Dressing with Supervision.  Grooming while standing at sink with Supervision.  Toileting from toilet with Supervision for hygiene and clothing management.   Toilet transfer to toilet with Supervision.     Outcome: Ongoing, Progressing   Progressing towards goals.  Pt to benefit from continued acute care OT services to increase independence in self-care/functional transfers.  Continue POC.

## 2020-04-28 NOTE — TRANSFER OF CARE
"Anesthesia Transfer of Care Note    Patient: Home De Dios    Procedure(s) Performed: Procedure(s) (LRB):  AMPUTATION, FOOT, TRANSMETATARSAL right (Right)    Patient location: PACU    Anesthesia Type: general    Transport from OR: Transported from OR on 2-3 L/min O2 by NC with adequate spontaneous ventilation    Post pain: adequate analgesia    Post assessment: no apparent anesthetic complications    Post vital signs: stable    Level of consciousness: awake    Nausea/Vomiting: no nausea/vomiting    Complications: none    Transfer of care protocol was followed      Last vitals:   Visit Vitals  BP (!) 148/66 (BP Location: Right arm, Patient Position: Lying)   Pulse 79   Temp 36.7 °C (98 °F) (Oral)   Resp 18   Ht 5' 7" (1.702 m)   Wt 52 kg (114 lb 10.2 oz)   LMP  (LMP Unknown)   SpO2 96%   Breastfeeding? No   BMI 17.96 kg/m²     "

## 2020-04-28 NOTE — OP NOTE
Operative Note       Surgery Date: 4/28/2020     Surgeon(s) and Role:     * Ha Munguia DPM - Primary     * Josefina Stack [id number]    Pre-op Diagnosis:  Toe necrosis [I96]  Toe pain [M79.676]  Gangrene of toe of right foot [I96]  Gangrene of toe of right foot [I96]    Post-op Diagnosis: Post-Op Diagnosis Codes:     * Toe necrosis [I96]     * Toe pain [M79.676]     * Gangrene of toe of right foot [I96]     * Gangrene of toe of right foot [I96]    Procedure(s) (LRB):  AMPUTATION, FOOT, TRANSMETATARSAL right (Right)    Anesthesia: Local MAC    Procedure in Detail/Findings:    The patient was brought to the operating room on a stretcher and transferred onto the operating table in a supine position. A timeout was performed verifying the patient's identity, surgical site, allergies, and medications. All were in agreement. Following the successful induction of MAC anesthesia, a right ankle block was achieved using 20 ml of a 1:1 mixture consisting of 1% lidocaine plain and 0.5% Bupivicaine plain.  The right foot was prepped and draped in a sterile manner.       Next, attention was directed to the right foot where a fish mouth incison was made from dorsal to plantar encompassing all the digits 1-5 at  the level of the metatarsophalangeal joints while ensuring to salvage enough healthy soft tissue dorsally and plantarly to facilitate closure after the amputation. The incision was deepened through the subcutaneous tissue down to bone severing all neurovascular and tendoninous structures. A key elevator was used to reflect all soft tissue and periosteum from the metatarsal bone 1-5 proximal to the metatarsal necks. An oscillating saw was used to cut the metatarsals 1-5  at the level proximal to the metatarsal necks and careful attention was made to maintain the metatarsal parabola.  The sesamoids were removed in total from the 1st mtpj plantarly.   All rough edges were smoothed with a bone rasp.  A mini c arm  was used to confirm the parabola. Appropriate metatarsal parabola was appreciated. The flexor and extensor tendons were excised as far proximally as possible with a #15 blade. Tibial and fibular sesamoids were dissected out and excised.  Distal forefoot was sent off to pathology. All small bleeding vessels were cauterized as necessary.      The surgical site was then copiously irrigated with normal sterile saline solution. Deep subcutaneous tissue closure was achieved with 2-0, 4-0 vicryl suture material. Skin was completely reapproximated with simple, interrupted sutures using 0-0 prolene and 2-0 prolene suture material. The incision site was covered with adaptic, 4x4 gauze, and a dry dressing consisting of kerlix was applied to the right foot. A post op DARCO shoe was applied to the right foot.    No tourniquet was used throughout the procedure due to patient's PAD and to assess blood flow to right foot. Moderate bleeding to surgical site appreciated.    The patient was transferred to the recovery room with vital signs stable and vascular status intact. Following a period of post op monitoring, the patient was returned to the hospital floor  with the following post op instructions:   1. Keep dressing dry and intact until Podiatry follow up.   2.Weight bearing status: partial weightbearing to right heel in post op shoe  3. All necessary prescriptions ordered and medical management will continue.   4. Contact Podiatry for all post op follow up care and if any problems arise.        Estimated Blood Loss: 80 mL           Specimens (From admission, onward)     Start     Ordered    04/28/20 0226  Specimen to Pathology, Surgery Orthopedics  Once     Question:  Procedure Type:  Answer:  Orthopedics    04/28/20 8311              Implants: * No implants in log *           Disposition: PACU - hemodynamically stable.           Condition: Good    Attestation:  I was present and scrubbed for the entire procedure.

## 2020-04-28 NOTE — PLAN OF CARE
Vitals stable on room air. Pain well controlled with tramadol. Pt educated on NPO status. Running SR on monitor. Bed in lowest position and locked. Call bell within reach. Will continue to monitor

## 2020-04-28 NOTE — SUBJECTIVE & OBJECTIVE
Interval History: No acute events overnight.  In good spirits.  Denies pain this morning.  Eager to have surgery completed today.    Review of Systems   Constitutional: Negative for chills and fever.   HENT: Negative for congestion and sneezing.    Eyes: Negative for pain and discharge.   Respiratory: Negative for cough and shortness of breath.    Cardiovascular: Negative for chest pain and leg swelling.   Gastrointestinal: Negative for abdominal pain, nausea and vomiting.   Endocrine: Negative for cold intolerance.   Genitourinary: Negative for difficulty urinating and dysuria.   Musculoskeletal: Negative for arthralgias and back pain.   Skin: Positive for color change.   Neurological: Negative for tremors and weakness.   Hematological: Negative for adenopathy. Does not bruise/bleed easily.   Psychiatric/Behavioral: Negative for agitation and confusion.     Objective:     Vital Signs (Most Recent):  Temp: 97.3 °F (36.3 °C) (04/28/20 0840)  Pulse: 70 (04/28/20 1000)  Resp: 16 (04/28/20 0840)  BP: (!) 148/67 (04/28/20 0840)  SpO2: 97 % (04/28/20 0840) Vital Signs (24h Range):  Temp:  [97.3 °F (36.3 °C)-99.9 °F (37.7 °C)] 97.3 °F (36.3 °C)  Pulse:  [] 70  Resp:  [16-20] 16  SpO2:  [97 %-100 %] 97 %  BP: (133-151)/(60-70) 148/67     Weight: 52 kg (114 lb 10.2 oz)  Body mass index is 17.96 kg/m².    Intake/Output Summary (Last 24 hours) at 4/28/2020 1051  Last data filed at 4/28/2020 0500  Gross per 24 hour   Intake 1346 ml   Output 950 ml   Net 396 ml      Physical Exam   Constitutional: She is oriented to person, place, and time. She appears well-developed and well-nourished. No distress.   Eyes: No scleral icterus.   Cardiovascular: Normal rate, regular rhythm and normal heart sounds.   Pulmonary/Chest: Effort normal and breath sounds normal. No respiratory distress.   Abdominal: Soft. Bowel sounds are normal. There is no tenderness.   Musculoskeletal: She exhibits no edema.   Neurological: She is alert and  oriented to person, place, and time.   Skin: Skin is warm and dry.   Necrosis/dry gangrene of right toes 1-4. Foot otherwise warm   Psychiatric: She has a normal mood and affect.   Nursing note and vitals reviewed.    Significant Labs: All labs in last 24 hours have been reviewed by me.    Imaging: All imaging in last 24 hours has been reviewed by me.

## 2020-04-28 NOTE — PT/OT/SLP PROGRESS
Physical Therapy  Not Seen    Patient Name:  Home De Dios   MRN:  [id number]    Patient not seen today secondary to Unavailable (Comment)(At OR for amputation). Will follow-up tomorrow, 4/29.    Melody Waters, PT

## 2020-04-29 LAB
ANION GAP SERPL CALC-SCNC: 13 MMOL/L (ref 8–16)
BUN SERPL-MCNC: 10 MG/DL (ref 8–23)
CALCIUM SERPL-MCNC: 8.3 MG/DL (ref 8.7–10.5)
CHLORIDE SERPL-SCNC: 104 MMOL/L (ref 95–110)
CO2 SERPL-SCNC: 23 MMOL/L (ref 23–29)
CREAT SERPL-MCNC: 1.4 MG/DL (ref 0.5–1.4)
ERYTHROCYTE [DISTWIDTH] IN BLOOD BY AUTOMATED COUNT: 13.4 % (ref 11.5–14.5)
EST. GFR  (AFRICAN AMERICAN): 42 ML/MIN/1.73 M^2
EST. GFR  (NON AFRICAN AMERICAN): 36 ML/MIN/1.73 M^2
GLUCOSE SERPL-MCNC: 131 MG/DL (ref 70–110)
HCT VFR BLD AUTO: 28.9 % (ref 37–48.5)
HGB BLD-MCNC: 9.2 G/DL (ref 12–16)
MAGNESIUM SERPL-MCNC: 1.7 MG/DL (ref 1.6–2.6)
MCH RBC QN AUTO: 26.3 PG (ref 27–31)
MCHC RBC AUTO-ENTMCNC: 31.8 G/DL (ref 32–36)
MCV RBC AUTO: 83 FL (ref 82–98)
PLATELET # BLD AUTO: 373 K/UL (ref 150–350)
PMV BLD AUTO: 10.2 FL (ref 9.2–12.9)
POCT GLUCOSE: 90 MG/DL (ref 70–110)
POTASSIUM SERPL-SCNC: 4.2 MMOL/L (ref 3.5–5.1)
RBC # BLD AUTO: 3.5 M/UL (ref 4–5.4)
SODIUM SERPL-SCNC: 140 MMOL/L (ref 136–145)
WBC # BLD AUTO: 10.35 K/UL (ref 3.9–12.7)

## 2020-04-29 PROCEDURE — 25000003 PHARM REV CODE 250: Mod: HCNC | Performed by: INTERNAL MEDICINE

## 2020-04-29 PROCEDURE — 97535 SELF CARE MNGMENT TRAINING: CPT | Mod: HCNC

## 2020-04-29 PROCEDURE — 11000001 HC ACUTE MED/SURG PRIVATE ROOM: Mod: HCNC

## 2020-04-29 PROCEDURE — 63600175 PHARM REV CODE 636 W HCPCS: Mod: HCNC | Performed by: PHYSICIAN ASSISTANT

## 2020-04-29 PROCEDURE — 97164 PT RE-EVAL EST PLAN CARE: CPT | Mod: HCNC

## 2020-04-29 PROCEDURE — 36415 COLL VENOUS BLD VENIPUNCTURE: CPT | Mod: HCNC

## 2020-04-29 PROCEDURE — 25000003 PHARM REV CODE 250: Mod: HCNC | Performed by: ANESTHESIOLOGY

## 2020-04-29 PROCEDURE — 97110 THERAPEUTIC EXERCISES: CPT | Mod: HCNC

## 2020-04-29 PROCEDURE — 83735 ASSAY OF MAGNESIUM: CPT | Mod: HCNC

## 2020-04-29 PROCEDURE — 99233 SBSQ HOSP IP/OBS HIGH 50: CPT | Mod: HCNC,,, | Performed by: HOSPITALIST

## 2020-04-29 PROCEDURE — 85027 COMPLETE CBC AUTOMATED: CPT | Mod: HCNC

## 2020-04-29 PROCEDURE — 63600175 PHARM REV CODE 636 W HCPCS: Mod: HCNC | Performed by: INTERNAL MEDICINE

## 2020-04-29 PROCEDURE — 97168 OT RE-EVAL EST PLAN CARE: CPT | Mod: HCNC

## 2020-04-29 PROCEDURE — 99233 PR SUBSEQUENT HOSPITAL CARE,LEVL III: ICD-10-PCS | Mod: HCNC,,, | Performed by: HOSPITALIST

## 2020-04-29 PROCEDURE — 80048 BASIC METABOLIC PNL TOTAL CA: CPT | Mod: HCNC

## 2020-04-29 PROCEDURE — S0030 INJECTION, METRONIDAZOLE: HCPCS | Mod: HCNC | Performed by: INTERNAL MEDICINE

## 2020-04-29 RX ORDER — HYDRALAZINE HYDROCHLORIDE 20 MG/ML
5 INJECTION INTRAMUSCULAR; INTRAVENOUS ONCE
Status: COMPLETED | OUTPATIENT
Start: 2020-04-29 | End: 2020-04-29

## 2020-04-29 RX ADMIN — METRONIDAZOLE 500 MG: 500 INJECTION, SOLUTION INTRAVENOUS at 05:04

## 2020-04-29 RX ADMIN — HYDRALAZINE HYDROCHLORIDE 5 MG: 20 INJECTION INTRAMUSCULAR; INTRAVENOUS at 05:04

## 2020-04-29 RX ADMIN — CEFEPIME 1 G: 1 INJECTION, POWDER, FOR SOLUTION INTRAMUSCULAR; INTRAVENOUS at 02:04

## 2020-04-29 RX ADMIN — TIMOLOL MALEATE 1 DROP: 5 SOLUTION OPHTHALMIC at 09:04

## 2020-04-29 RX ADMIN — METRONIDAZOLE 500 MG: 500 INJECTION, SOLUTION INTRAVENOUS at 03:04

## 2020-04-29 RX ADMIN — CLOPIDOGREL BISULFATE 75 MG: 75 TABLET ORAL at 08:04

## 2020-04-29 RX ADMIN — ASPIRIN 81 MG: 81 TABLET, COATED ORAL at 08:04

## 2020-04-29 RX ADMIN — HYDROCODONE BITARTRATE AND ACETAMINOPHEN 1 TABLET: 5; 325 TABLET ORAL at 12:04

## 2020-04-29 RX ADMIN — FERROUS SULFATE TAB EC 325 MG (65 MG FE EQUIVALENT) 325 MG: 325 (65 FE) TABLET DELAYED RESPONSE at 08:04

## 2020-04-29 RX ADMIN — OXYCODONE HYDROCHLORIDE 5 MG: 5 TABLET ORAL at 04:04

## 2020-04-29 RX ADMIN — TRAMADOL HYDROCHLORIDE 50 MG: 50 TABLET, FILM COATED ORAL at 07:04

## 2020-04-29 RX ADMIN — METRONIDAZOLE 500 MG: 500 INJECTION, SOLUTION INTRAVENOUS at 09:04

## 2020-04-29 RX ADMIN — TIMOLOL MALEATE 1 DROP: 5 SOLUTION OPHTHALMIC at 08:04

## 2020-04-29 RX ADMIN — OXYCODONE HYDROCHLORIDE 5 MG: 5 TABLET ORAL at 02:04

## 2020-04-29 RX ADMIN — HYDROCODONE BITARTRATE AND ACETAMINOPHEN 1 TABLET: 5; 325 TABLET ORAL at 06:04

## 2020-04-29 RX ADMIN — LATANOPROST 1 DROP: 50 SOLUTION OPHTHALMIC at 09:04

## 2020-04-29 RX ADMIN — SODIUM CHLORIDE, SODIUM LACTATE, POTASSIUM CHLORIDE, AND CALCIUM CHLORIDE: .6; .31; .03; .02 INJECTION, SOLUTION INTRAVENOUS at 09:04

## 2020-04-29 RX ADMIN — CYANOCOBALAMIN TAB 500 MCG 500 MCG: 500 TAB at 08:04

## 2020-04-29 RX ADMIN — NIFEDIPINE 90 MG: 30 TABLET, FILM COATED, EXTENDED RELEASE ORAL at 08:04

## 2020-04-29 NOTE — PROGRESS NOTES
"[facility] Hospital Medicine  Progress Note    Patient Name: Home De Dios  MRN: [id number]  Patient Class: IP- Inpatient   Admission Date: 4/19/2020  Length of Stay: 10 days  Attending Physician: Lewis Garcia MD  Primary Care Provider: Quintin Cerda MD        Subjective:     Principal Problem:Gangrene of toe of right foot        HPI:  Ms. Home De Dios is a 77 y.o. female, with PMH of HTN, POAG, who presented to [facility] ED on 4/19/20 with right toe pain. She states the toes of her right foot began changing color and turning black about 10 days ago, and have been having worsening numbness x 4-5 days. She states this began after soaking her foot in bleach and Epsom salts and then cutting her toe nails. The right foot subsequently became itchy, and then numb, particularly on the 1st, 2nd and 4th toes. She denies neuropathy or prior numbness/tingling., trauma, fever, chills. ED imaging shows no fracture/dislocaton or bony erosion. An US is pending. Labs showed MICKEY without hyperkalemia, and elevated CRP of 119. She was admitted to inpatient status.     Overview/Hospital Course:  Patient admitted with gangrene of R toes and MICKEY. IV antibiotics were initiated for possible cellulitis of R foot. She was started on IV fluids and renal function improved each day. Nephrology following. Xray of R foot did not suggest osteomyelitis. US arteries R LE revealed, "Monophasic flow in the right peroneal artery.  Low velocities." Podiatry, vascular surgery, and cardiology have been following the patient. On 4/24/2020 patient underwent successful angioplasty of distal superficial femoral and popliteal arteries. She is scheduled for TMA on 4/28/20    Interval History: S/p right TMA yesterday.  Had significant pain overnight, but it is well controlled presently.  She remains in good spirits.      Review of Systems   Constitutional: Negative for chills and fever.   HENT: Negative for congestion and sneezing.  "   Eyes: Negative for pain and discharge.   Respiratory: Negative for cough and shortness of breath.    Cardiovascular: Negative for chest pain and leg swelling.   Gastrointestinal: Negative for abdominal pain, nausea and vomiting.   Endocrine: Negative for cold intolerance.   Genitourinary: Negative for difficulty urinating and dysuria.   Musculoskeletal: Negative for arthralgias and back pain.   Skin: Positive for color change.   Neurological: Negative for tremors and weakness.   Hematological: Negative for adenopathy. Does not bruise/bleed easily.   Psychiatric/Behavioral: Negative for agitation and confusion.     Objective:     Vital Signs (Most Recent):  Temp: 99.8 °F (37.7 °C) (04/29/20 0756)  Pulse: 72 (04/29/20 1000)  Resp: 19 (04/29/20 0756)  BP: (!) 144/69 (04/29/20 0756)  SpO2: 96 % (04/29/20 0756) Vital Signs (24h Range):  Temp:  [96.6 °F (35.9 °C)-100.2 °F (37.9 °C)] 99.8 °F (37.7 °C)  Pulse:  [] 72  Resp:  [16-19] 19  SpO2:  [94 %-100 %] 96 %  BP: (144-186)/(66-88) 144/69     Weight: 52 kg (114 lb 10.2 oz)  Body mass index is 17.96 kg/m².    Intake/Output Summary (Last 24 hours) at 4/29/2020 1103  Last data filed at 4/29/2020 0600  Gross per 24 hour   Intake 2330 ml   Output 1800 ml   Net 530 ml      Physical Exam   Constitutional: She is oriented to person, place, and time. She appears well-developed and well-nourished. No distress.   Eyes: No scleral icterus.   Cardiovascular: Normal rate, regular rhythm and normal heart sounds.   Pulmonary/Chest: Effort normal and breath sounds normal. No respiratory distress.   Abdominal: Soft. Bowel sounds are normal. There is no tenderness.   Musculoskeletal: She exhibits no edema.   Neurological: She is alert and oriented to person, place, and time.   Skin: Skin is warm and dry.   Right foot bandages c/d/i and not removed today.   Psychiatric: She has a normal mood and affect.   Nursing note and vitals reviewed.    Significant Labs: All labs in last 24  hours have been reviewed by me.    Imaging: All imaging in last 24 hours has been reviewed by me.      Assessment/Plan:      * Gangrene of toes of right foot  -Mrs. De Dios was admitted to inpatient status.  -On admit there was erythema in the foot proximal to the necrotic areas  -Arterial US of right leg showed monophasic flow in the right peroneal artery.  Low velocities.  -No evidence of osteomylitis on imaging   -She is s/p R lower extremity angioplasty of distal superficial femoral and popliteal arteries on 4/24  -Podiatry, vascular surgery and cardiology are on board and input appreciated.  -On 4/28 she was taken for right TMA.  -She is being treated with IV cefepime and flagyl for possible component of cellulitis.  Continue these for now given near fever and mild bump in WBC today.  -Continue aspirin, plavix   -Consult PT/OT.  Partial WB RLE w darco when ambulating.  May need inpatient rehab.          MICKEY (acute kidney injury)  -Unknown baseline Cr.   -Suspect MICKEY due to poorly controlled HTN vs infection/gangrene   -Continue to hold home HCTZ   -Continue to avoid nephrotoxins and renally dose meds   -Cr now 1.4  -Continue IVF per Nephrology  -Appreciate nephrology assistance    Hypomagnesemia  -Magnesium normal today.  -Repeat labs in AM.    Malnutrition of mild degree  -Encourage PO intake  -Nutritional supplements added per nutrition recs      Anemia  -Continue ferrous sulfate QOD   -B12 328. Continue cyanocobalamin daily  -Monitor      Hypokalemia  -K normal today  -Repeat BMP in AM    Essential hypertension  -Bump in BP likely due to pain.  Only modestly elevated this morning  -only home med is HCTZ 12.5 mg QD, holding 2/2 MICKEY   -Continue nifedipine 90 mg daily  -PRN hydralazine   -monitor         VTE Risk Mitigation (From admission, onward)         Ordered     Place sequential compression device  Until discontinued      04/19/20 2118     IP VTE HIGH RISK PATIENT  Once      04/19/20 2118     Reason for No  Pharmacological VTE Prophylaxis  Once     Question:  Reasons:  Answer:  Physician Provided (leave comment)    04/19/20 2116                      Lewis Garcia MD  Department of Hospital Medicine   [facility]

## 2020-04-29 NOTE — PLAN OF CARE
Pt remained free of falls and injuries throughout shift. AAOx3, occasionally disoriented to time. Purposeful hourly rounding performed. Pt swallows meds whole. IV flushed and infusing LR @75 mL/hr. Managed pain with PRN medication. Patient ambulates with 1 assist. Pt has not eaten any meals during this shift, pt educated on importance of PO intake for treatment plan and healing her  Incision. Pt also has not urinated or had BM today, repeatedly declines needing to go to the toilet. VSS on room air. Telemetry maintained on NSR. Podiatry assessed and changed right foot dressing with gauze, cast padding and elastic bandage, dsg CDI. Pt resting comfortably in bed, HOB and legs elevated, pillows in place floating heels, denies needs at this time. Bed low and locked, bed alarm on, call light in reach. Side rails up x2. Will continue to monitor.

## 2020-04-29 NOTE — ASSESSMENT & PLAN NOTE
-Unknown baseline Cr.   -Suspect MICKEY due to poorly controlled HTN vs infection/gangrene   -Continue to hold home HCTZ   -Continue to avoid nephrotoxins and renally dose meds   -Cr now 1.4  -Continue IVF per Nephrology  -Appreciate nephrology assistance

## 2020-04-29 NOTE — NURSING
Notified Sushila Antony pt /78. Prn hydralazine was given at 2320 for /84. One time IV hydralazine dose ordered

## 2020-04-29 NOTE — ASSESSMENT & PLAN NOTE
-Mrs. De Dios was admitted to inpatient status.  -On admit there was erythema in the foot proximal to the necrotic areas  -Arterial US of right leg showed monophasic flow in the right peroneal artery.  Low velocities.  -No evidence of osteomylitis on imaging   -She is s/p R lower extremity angioplasty of distal superficial femoral and popliteal arteries on 4/24  -Podiatry, vascular surgery and cardiology are on board and input appreciated.  -On 4/28 she was taken for right TMA.  -She is being treated with IV cefepime and flagyl for possible component of cellulitis.  Continue these for now given near fever and mild bump in WBC today.  -Continue aspirin, plavix   -Consult PT/OT.  Partial WB RLE w lc when ambulating.  May need inpatient rehab.

## 2020-04-29 NOTE — PLAN OF CARE
Problem: Physical Therapy Goal  Goal: Physical Therapy Goal  Description  Goals to be met by: 20    Patient will increase functional independence with mobility by performin. Don/doff DARCO shoe independently without cueing for need for shoe prior to OOB mobility.   2. Gait x 100 feet with mod(I) with RW with PWB RLE with DARCO shoe.  3. Bed <> chair transfer with mod I and least restrictive assistive device with PWB RLE.     Outcome: Ongoing, Progressing     Pt underwent R TMA yesterday. PT orders reconciled s/p surgery. Re-eval performed. Pt tolerated re-evaluation well with no adverse reactions. Pt performed supine <> sit with min A, sit <> stand with CGA, and ambulation x 40 ft with CGA and rolling walker. Pt will benefit from skilled PT services to address impairments and functional limitations. Recommend discharge to home with HHPT/OT.

## 2020-04-29 NOTE — PROGRESS NOTES
Progress Note  Nephrology    Consult Requested By: Lewis Garcia MD  Reason for Consult: MICKEY    SUBJECTIVE:     History of Present Illness:  Patient is a 77 y.o. female presents with gangrenous right toe/ foot and found to have MICKEY with Cr 5.5. She deines any history of CKD and states that she only takes HCTZ for HTN and no other routine meds. She does repots that when her foot started itching and swelling more approximately 8 days ago she began taking Ibuprofen bid. There are no labs since 2013 because she tells me that she does not like to have her blood drawn.    Interval History:  S/p amputation R TMT yesterday.  Lots of post op pain and accellerated HTN likely due to pain. Told her she needs to ask for pain meds before pain is intolerable.      Past Medical History:   Diagnosis Date    Glaucoma     Glaucoma (increased eye pressure)     Hypertension      Past Surgical History:   Procedure Laterality Date    APPENDECTOMY      CATARACT EXTRACTION W/  INTRAOCULAR LENS IMPLANT Left 10/16/2013        CATARACT EXTRACTION W/  INTRAOCULAR LENS IMPLANT Right 12/18/2013        HYSTERECTOMY       Family History   Problem Relation Age of Onset    Heart disease Mother     Cancer Sister     Amblyopia Neg Hx     Blindness Neg Hx     Macular degeneration Neg Hx     Retinal detachment Neg Hx     Stroke Neg Hx     Thyroid disease Neg Hx     Cataracts Neg Hx     Glaucoma Neg Hx      Social History     Tobacco Use    Smoking status: Never Smoker    Smokeless tobacco: Never Used   Substance Use Topics    Alcohol use: No    Drug use: Not on file       Medications Prior to Admission   Medication Sig Dispense Refill Last Dose    latanoprost (XALATAN) 0.005 % ophthalmic solution Place 1 drop into both eyes every evening. 2.5 mL 12 4/19/2020    timolol maleate 0.5% (TIMOPTIC) 0.5 % Drop Place 1 drop into both eyes 2 (two) times daily. 5 mL 12 4/19/2020    hydroCHLOROthiazide (HYDRODIURIL)  12.5 MG Tab Take 1 tablet (12.5 mg total) by mouth once daily. 90 tablet 2 Unknown       Review of patient's allergies indicates:  No Known Allergies     Review of Systems:  Constitutional: No fever or chills, no weight changes.  Eyes: No visual changes or photophobia  HEENT: No nasal congestion or sore throat  Respiratory: No cough or shorness of breath  Cardiovascular: No chest pain or palpitations  Gastrointestinal: Poor appetite, no nausea or vomiting, no change in bowel habits  Genitourinary: No hematuria or dysuria  Skin: No rash or pruritis, black toe/ red foot, itching  Hematologic/lymphatic: No easy bruising, bleeding or lymphadenopathy  Musculoskeletal: No arthralgias or myalgias  Neurological: No seizures or tremors  Endocrine: No heat/cold intolerance.  No polyuria/polydipsia.  Psychiatric:  No depression or anxiety. +Poor insight    OBJECTIVE:     Vital Signs (Most Recent)  Temp: 100.2 °F (37.9 °C) (04/29/20 0435)  Pulse: 94 (04/29/20 0700)  Resp: 16 (04/29/20 0435)  BP: (!) 149/69 (04/29/20 0629)  SpO2: 98 % (04/29/20 0435)    Vital Signs Range (Last 24H):  Temp:  [96.6 °F (35.9 °C)-100.2 °F (37.9 °C)]   Pulse:  []   Resp:  [16-18]   BP: (148-186)/(66-88)   SpO2:  [94 %-100 %]     Physical Exam:    General appearance: Well developed, thin  Head: Normocephalic, atraumatic  Eyes:  Conjunctivae nl. Sclera anicteric. PERRL.  HEENT: Lips, mucosa, and tongue normal; teeth and gums normal and oropharynx clear.  Neck: Supple, trachea midline, thyroid not enlarged,   Lungs: Clear to auscultation bilaterally and normal respiratory effort  Heart: Regular rate and rhythm, S1, S2 normal, no murmur, click, rub or gallop  Abdomen: Soft, non-tender non-distended; bowel sounds normal; no masses,  no organomegaly  Extremities: R foot transmet amp dressed, CDI. No edema  Pulses: nonpalpable  Neurologic: Normal strength and tone. No focal numbness or weakness  Psychiatric:  Alert and oriented times 3.        Diagnostic Results:  Labs: Reviewed  X-Ray: Reviewed  US: Reviewed    ASSESSMENT/PLAN:     1. MICKEY likely due to right foot gangrene and Ibuprofen/ Hypotension/ Hypokalemia  -Last known Cr 2013 was 0.9  -Minimal proteinuria 280 by ratio  -Current Cr down to 1.4 and stable with resolution of acidosis even after angiogram 4/24.  -good UOP  -Replace K prn  -CPK is normal  -Renal US no obstruction, 2 right non-obstructing calculi and neg urine eosin.  -Continue gentle IVF's with LR.  -Avoid nephrotoxins, NSAIDs/ PPI and renal dose meds.    2. HTN  -Continue Nifedipine XL to 90 mg daily.  -Continue the prn hydralazine but she will prob need less once pain better controlled  -Was on HCTZ monotherapy at home but no diuretics at present.    3. Dry Gangrene of right toes/ foot, severe PAD  -On cefepime and Flagyl, appears dry at present though  -Dr. Baron performed angio 4/24/2020: [facility]: Right leg: SFA: Distal 80%. Popliteal: 100%. AT & PT: Occluded. PER: Mid and distal occlusions. SFA: & POP: PTA 6 mm balloon. Mild residual. PER: Mid PTA.  -s/p TMA 4/28/2020    4. Fe Defic Anemia/ Relative B12 Defic  -Started oral Fe for now  -Started 500mcg daily  B12  -Normal folate and TSH  -H/H improved spontaneously today 9.2    5.  Hypernatremia  -Resolved after D5W  -Now LR     6.  Hypomag  -Replace prn.

## 2020-04-29 NOTE — PLAN OF CARE
Pt with transmetatarsal amp on yesterday, 4/28/20. Discharge needs to be determined by pt/ot, whether DME with HH, or SNF or rehab.    CM to follow for plans and arrangements.     04/29/20 0659   Discharge Assessment   Assessment Type Discharge Planning Reassessment   Confirmed/corrected address and phone number on facesheet? Yes   Assessment information obtained from? Patient;Medical Record   Prior to hospitilization cognitive status: Alert/Oriented   Prior to hospitalization functional status: Needs Assistance;Assistive Equipment   Current cognitive status: Alert/Oriented   Current Functional Status: Needs Assistance;Assistive Equipment   Lives With child(rj), adult;spouse   Able to Return to Prior Arrangements other (see comments)  (pending pt/ot recs)   Is patient able to care for self after discharge? Unable to determine at this time (comments)  (pending pt/ot recs)   Patient's perception of discharge disposition home health;rehab facility   Patient currently being followed by outpatient case management? No   Patient currently receives any other outside agency services? No   Equipment Currently Used at Home   (to be determined)   Do you have any problems affording any of your prescribed medications? No   Is the patient taking medications as prescribed? yes   Does the patient have transportation home? Yes

## 2020-04-29 NOTE — SUBJECTIVE & OBJECTIVE
Interval History: S/p right TMA yesterday.  Had significant pain overnight, but it is well controlled presently.  She remains in good spirits.      Review of Systems   Constitutional: Negative for chills and fever.   HENT: Negative for congestion and sneezing.    Eyes: Negative for pain and discharge.   Respiratory: Negative for cough and shortness of breath.    Cardiovascular: Negative for chest pain and leg swelling.   Gastrointestinal: Negative for abdominal pain, nausea and vomiting.   Endocrine: Negative for cold intolerance.   Genitourinary: Negative for difficulty urinating and dysuria.   Musculoskeletal: Negative for arthralgias and back pain.   Skin: Positive for color change.   Neurological: Negative for tremors and weakness.   Hematological: Negative for adenopathy. Does not bruise/bleed easily.   Psychiatric/Behavioral: Negative for agitation and confusion.     Objective:     Vital Signs (Most Recent):  Temp: 99.8 °F (37.7 °C) (04/29/20 0756)  Pulse: 72 (04/29/20 1000)  Resp: 19 (04/29/20 0756)  BP: (!) 144/69 (04/29/20 0756)  SpO2: 96 % (04/29/20 0756) Vital Signs (24h Range):  Temp:  [96.6 °F (35.9 °C)-100.2 °F (37.9 °C)] 99.8 °F (37.7 °C)  Pulse:  [] 72  Resp:  [16-19] 19  SpO2:  [94 %-100 %] 96 %  BP: (144-186)/(66-88) 144/69     Weight: 52 kg (114 lb 10.2 oz)  Body mass index is 17.96 kg/m².    Intake/Output Summary (Last 24 hours) at 4/29/2020 1103  Last data filed at 4/29/2020 0600  Gross per 24 hour   Intake 2330 ml   Output 1800 ml   Net 530 ml      Physical Exam   Constitutional: She is oriented to person, place, and time. She appears well-developed and well-nourished. No distress.   Eyes: No scleral icterus.   Cardiovascular: Normal rate, regular rhythm and normal heart sounds.   Pulmonary/Chest: Effort normal and breath sounds normal. No respiratory distress.   Abdominal: Soft. Bowel sounds are normal. There is no tenderness.   Musculoskeletal: She exhibits no edema.   Neurological: She  is alert and oriented to person, place, and time.   Skin: Skin is warm and dry.   Right foot bandages c/d/i and not removed today.   Psychiatric: She has a normal mood and affect.   Nursing note and vitals reviewed.    Significant Labs: All labs in last 24 hours have been reviewed by me.    Imaging: All imaging in last 24 hours has been reviewed by me.

## 2020-04-29 NOTE — PT/OT/SLP RE-EVAL
Occupational Therapy   Re-evaluation and Treatment    Name: Home De Dios  MRN: [id number]  Admitting Diagnosis:  Gangrene of toe of right foot 1 Day Post-Op    Recommendations:     Discharge Recommendations: home health OT, home health PT  Discharge Equipment Recommendations:  walker, rolling, 3-in-1 commode, wheelchair  Barriers to discharge:  Other (Comment), Inaccessible home environment(current functional level)    Assessment:   OT re-evaluation completed.  Educated on maintaining PWB to RLE prior to ambulation.  Able to don/doff sock from LLE with forward trunk lean/downward reach and using L-lateral lean while reaching with LUE to pull sock over heel with foot supported at bed; assist required to don post-op shoe to RLE while seated EOB.  Sit>stand from EOB with CGA and verbal cue for safe hand placement when transitioning to stance.  Ambulated short household distance bed>bathroom>sink and return to bed with RW with CGA and verbal cues for step to gait pattern leading with RLE to maintain PWB to RLE.  Step transfer to toilet with RW and verbal cue for use of grab bar and MIN lift assist from toilet.  Able to clean front gal seated at toilet with RUE with L-lateral weight shift and extended LLE supported at grab bar.  Required verbal cues for safe RW positioning at sink as Pt. With initial tendency to discard prior ADL task.  Hand hygiene in stance at sink with forward flexed trunk and alternating unilateral forearm support at sink while reaching for soap dispenser and nozzle and BUE-forearm support while scrubbing hands.  Recommend post acute HH OT.  Needs 3-in-1 commode.  To benefit from continued acute care OT services to increase independence in self-care/functional transfers.  OT to follow.     Home De Dios is a 77 y.o. female with a medical diagnosis of Gangrene of toe of right foot.  She presents with below deficits decreasing independence in self-care/functional transfers.  Performance deficits  affecting function are weakness, impaired endurance, impaired self care skills, impaired functional mobilty, gait instability, impaired balance, decreased lower extremity function, decreased safety awareness, pain, impaired skin, decreased upper extremity function, orthopedic precautions.      Rehab Prognosis:  Good; patient would benefit from acute skilled OT services to address these deficits and reach maximum level of function.       Plan:     Patient to be seen 5 x/week to address the above listed problems via self-care/home management, therapeutic activities, therapeutic exercises  · Plan of Care Expires: 05/13/20  · Plan of Care Reviewed with: patient    Subjective     Chief Complaint: RLE foot pain.  Patient/Family stated goals: Wants to return home.  Communicated with: Jorge CHINCHILLA LPN prior to session.  Pain/Comfort:  · Pain Rating 1: 8/10  · Location - Side 1: Right  · Location - Orientation 1: generalized  · Location 1: foot(surgical pain s/p RLE-TMA)  · Pain Addressed 1: Reposition, Distraction, Cessation of Activity, Nurse notified  · Pain Rating Post-Intervention 1: 10/10(after ambulation task)    Objective:     Communicated with: Jorge CHINCHILLA LPN prior to session.  Patient found HOB elevated with: peripheral IV, bed alarm, telemetry upon OT entry to room.    General Precautions: Standard, fall   Orthopedic Precautions:RLE partial weight bearing(to R-heel with post-op shoe)   Braces: (post-op shoe to RLE)     Occupational Performance:    Bed Mobility:    Supine<>sit with supervision and verbal cuing for forward scooting towards EOB.  Scooting/bridging towards HOB MOD I with complete lifting of RLE from bed during task.    Functional Mobility/Transfers:  Sit>stand from EOB with CGA and verbal cue for safe hand placement when transitioning to stance.  Ambulated short household distance bed>bathroom>sink and return to bed with RW with CGA and verbal cues for step to gait pattern leading with RLE to maintain PWB  to RLE.  Step transfer to toilet with RW and verbal cue for use of grab bar and MIN lift assist from toilet.     Activities of Daily Living:  Able to clean front gal seated at toilet with RUE with L-lateral weight shift and extended LLE supported at grab bar.  Required verbal cues for safe RW positioning at sink as Pt. With initial tendency to discard prior ADL task.  Hand hygiene in stance at sink with forward flexed trunk and alternating unilateral forearm support at sink while reaching for soap dispenser and nozzle and BUE-forearm support while scrubbing hands.    Cognitive/Visual Perceptual:  Cognitive/Psychosocial Skills:  -       Oriented to: Person, Place and Situation; knows month/year though states the exact date is the  and the day of the week is Monday requiring reorientation to exact date and day.  Pt. Also knows .  -       Follows Commands/attention:Follows one-step commands  -       Communication: clear/fluent  -       Memory: immediate recall intact as Pt. given 3 words and able to recall immediately after; able to recall 2/3 words after 15-minutes with verbal cue given for each word  -       Safety awareness/insight to disability: impaired   -       Mood/Affect/Coping skills/emotional control: Appropriate to situation, Cooperative and Pleasant  Visual/Perceptual:  -grossly intact; able to read date on communication board though unable to decipher day of the week in significantly smaller letters    Physical Exam:  Postural examination/scapula alignment: -       Rounded shoulders  -       Forward head  Skin integrity: Visible skin intact and dressing noted to RLDEVANG s/p KELLEY on 2020  Edema:  None noted  Sensation: -       Intact  light/touch and denies BUE numbness/tingling  Dominant hand: -       R  Upper Extremity Range of Motion:  -       Right Upper Extremity: WFL  -       Left Upper Extremity: WFL  Upper Extremity Strength: -       Right Upper Extremity: WFL  -       Left Upper Extremity:  WFL   Strength: -       Right Upper Extremity: WFL  -       Left Upper Extremity: WFL  Fine Motor Coordination: -       Intact  Left hand, finger to nose, Right hand, finger to nose, Left hand thumb/finger opposition skills and Right hand thumb/finger opposition skills    [facility] 6 Click:  [facility] Total Score: 19    Treatment & Education:Education:    Educated on role of OT, POC, functional transfer/ADL safety, and orthopedic precautions.     Patient left HOB elevated with all lines intact, call button in reach, bed alarm on and nursing notified    GOALS:   Multidisciplinary Problems     Occupational Therapy Goals        Problem: Occupational Therapy Goal    Goal Priority Disciplines Outcome Interventions   Occupational Therapy Goal     OT, PT/OT Ongoing, Progressing    Description:  OT re-evaluation completed on 4/29/2020 with goals revised as needed:  Goals to be met by: 5/13/2020     Patient will increase functional independence with ADLs by performing:    UE Dressing with [place].  LE Dressing with Supervision.  Grooming while standing at sink with Supervision.  [place] [facility] [place] hygiene and clothing management.   [place] [facility] [place] with Supervision.                       History:     Past Medical History:   Diagnosis Date    Glaucoma     Glaucoma (increased eye pressure)     Hypertension        Past Surgical History:   Procedure Laterality Date    AORTOGRAPHY WITH SERIALOGRAPHY Right 4/24/2020    Procedure: AORTOGRAM WITH RUNOFF;  Surgeon: Coral Baron MD;  Location: [facility] CATH LAB;  Service: Cardiology;  Laterality: Right;    APPENDECTOMY      CATARACT EXTRACTION W/  INTRAOCULAR LENS IMPLANT Left 10/16/2013        CATARACT EXTRACTION W/  INTRAOCULAR LENS IMPLANT Right 12/18/2013        HYSTERECTOMY         Time Tracking:     OT Date of Treatment: 04/29/20  OT Start Time: 1148  OT Stop Time: 1220  OT Total Time (min): 32  min    Billable Minutes:Re-eval 12  Self Care/Home Management 20    Lou Wilde, OT  4/29/2020

## 2020-04-29 NOTE — PLAN OF CARE
Pt remained free from falls or injuries this shift. Pt independent in repositioning. No skin breakdown noticed. Pt rested well through the night.  R foot wrapped in surgical dressing. Medicated several times prn pain to R foot. Prn hydralazine given for htn.

## 2020-04-29 NOTE — PROGRESS NOTES
[facility]  Podiatry  Consult Note    Patient Name: Home De Dios  MRN: [id number]  Admission Date: 4/19/2020  Hospital Length of Stay: 10 days  Attending Physician: Lewis Garcia MD  Primary Care Provider: Quintin Cerda MD     Consults  Subjective:     History of Present Illness: pod 1 s/p TMA right.  Dressing reinforced - has been removed or fallen off foot and been re-wrapped.  Patient denies, but nurse says shoe picks at it constantly.      Denies significant pain (controlled).    Walked with sx shoe right today with PT twice without complication.    Scheduled Meds:   aspirin  81 mg Oral Daily    ceFEPime (MAXIPIME) IVPB  1 g Intravenous Q24H    clopidogreL  75 mg Oral Daily    cyanocobalamin  500 mcg Oral Daily    ferrous sulfate  325 mg Oral Every other day    latanoprost  1 drop Both Eyes QHS    lidocaine (PF) 10 mg/ml (1%)  1 mL Intradermal Once    metronidazole  500 mg Intravenous Q8H    NIFEdipine  90 mg Oral Daily    timolol maleate 0.5%  1 drop Both Eyes BID     Continuous Infusions:   lactated ringers 75 mL/hr at 04/29/20 0943     PRN Meds:acetaminophen, hydrALAZINE, HYDROcodone-acetaminophen, ondansetron, ondansetron, sodium chloride 0.9%, sodium chloride 0.9%, traMADoL    Review of patient's allergies indicates:  No Known Allergies     Past Medical History:   Diagnosis Date    Glaucoma     Glaucoma (increased eye pressure)     Hypertension      Past Surgical History:   Procedure Laterality Date    AORTOGRAPHY WITH SERIALOGRAPHY Right 4/24/2020    Procedure: AORTOGRAM WITH RUNOFF;  Surgeon: Coral Baron MD;  Location: [facility] CATH LAB;  Service: Cardiology;  Laterality: Right;    APPENDECTOMY      CATARACT EXTRACTION W/  INTRAOCULAR LENS IMPLANT Left 10/16/2013        CATARACT EXTRACTION W/  INTRAOCULAR LENS IMPLANT Right 12/18/2013        HYSTERECTOMY         Family History     Problem Relation (Age of Onset)    Cancer Sister     Heart disease Mother        Tobacco Use    Smoking status: Never Smoker    Smokeless tobacco: Never Used   Substance and Sexual Activity    Alcohol use: No    Drug use: Not on file    Sexual activity: Not on file     Review of Systems  Objective:     Vital Signs (Most Recent):  Temp: 99.4 °F (37.4 °C) (04/29/20 1314)  Pulse: 93 (04/29/20 1400)  Resp: 18 (04/29/20 1314)  BP: 137/63 (04/29/20 1314)  SpO2: 96 % (04/29/20 1314) Vital Signs (24h Range):  Temp:  [96.6 °F (35.9 °C)-100.2 °F (37.9 °C)] 99.4 °F (37.4 °C)  Pulse:  [] 93  Resp:  [16-19] 18  SpO2:  [94 %-100 %] 96 %  BP: (137-186)/(63-88) 137/63     Weight: 52 kg (114 lb 10.2 oz)  Body mass index is 17.96 kg/m².    Foot Exam    Laboratory:  A1C:   Recent Labs   Lab 04/20/20  0342   HGBA1C 5.4     Blood Cultures: No results for input(s): LABBLOO in the last 48 hours.  BMP:   Recent Labs   Lab 04/29/20  0452   *      K 4.2      CO2 23   BUN 10   CREATININE 1.4   CALCIUM 8.3*   MG 1.7     CBC:   Recent Labs   Lab 04/29/20  0452   WBC 10.35   RBC 3.50*   HGB 9.2*   HCT 28.9*   *   MCV 83   MCH 26.3*   MCHC 31.8*     CMP:   Recent Labs   Lab 04/29/20  0452   *   CALCIUM 8.3*      K 4.2   CO2 23      BUN 10   CREATININE 1.4     Coagulation: No results for input(s): PT, INR, APTT in the last 168 hours.  CRP: No results for input(s): CRP in the last 168 hours.  ESR: No results for input(s): SEDRATE in the last 168 hours.  Prealbumin: No results for input(s): PREALBUMIN in the last 48 hours.  Wound Cultures: No results for input(s): LABAERO in the last 4320 hours.  Microbiology Results (last 7 days)     ** No results found for the last 168 hours. **        Specimen (12h ago, onward)    None          Diagnostic Results:  X-Ray: I have reviewed all pertinent results/findings within the past 24 hours.  TMA right good parabola, no apparent complication    Clinical Findings:  Incision distal right  foot well  approximated, good skin apposition, sutures intact without gaps, drainage, pus, tracking, fluctuance, malodor, or signs of infection.    Dp/pt palpable right, cap fill about 4 seconds distal right foot, warm.    Negative calf tenderness/swelling right and left.    Negative allodynia both feet/ankles.      Assessment/Plan:     Active Diagnoses:    Diagnosis Date Noted POA    PRINCIPAL PROBLEM:  Gangrene of toes of right foot [I96] 04/19/2020 Yes    Hypomagnesemia [E83.42] 04/28/2020 No    Anemia [D64.9] 04/26/2020 Yes    Malnutrition of mild degree [E44.1] 04/26/2020 Yes    Hypokalemia [E87.6] 04/20/2020 Yes    MICKEY (acute kidney injury) [N17.9] 04/19/2020 Yes    Essential hypertension [I10] 06/18/2013 Yes     Chronic      Problems Resolved During this Admission:     Continue gradual protected return to WB in sx shoe all times.    Dressed with Ag foam, kerlix x 2, cast padding, coban(no tension, just to secure dressing preventnig removal).  Please do not change or remove dressing.    Follow with me in office 1 week, sooner prn.      Thank you for your consult. I will sign off. Please contact us if you have any additional questions.    Ha Munguia DPM  Podiatry  [facility]

## 2020-04-29 NOTE — ASSESSMENT & PLAN NOTE
-Bump in BP likely due to pain.  Only modestly elevated this morning  -only home med is HCTZ 12.5 mg QD, holding 2/2 MICKEY   -Continue nifedipine 90 mg daily  -PRN hydralazine   -monitor

## 2020-04-29 NOTE — PLAN OF CARE
Problem: Occupational Therapy Goal  Goal: Occupational Therapy Goal  Description  OT re-evaluation completed on 4/29/2020 with goals revised as needed:  Goals to be met by: 5/13/2020     Patient will increase functional independence with ADLs by performing:    UE Dressing with [place].  LE Dressing with Supervision.  Grooming while standing at sink with Supervision.  Toileting from [facility] with Supervision for hygiene and clothing management.   Toilet transfer to [facility] frame over toilet with Supervision.      Outcome: Ongoing, Progressing     OT re-evaluation completed.  Educated on maintaining PWB to RLE prior to ambulation.  Able to don/doff sock from LLE with forward trunk lean/downward reach and using L-lateral lean while reaching with LUE to pull sock over heel with foot supported at bed; assist required to don post-op shoe to RLE while seated EOB.  Sit>stand from EOB with CGA and verbal cue for safe hand placement when transitioning to stance.  Ambulated short household distance bed>bathroom>sink and return to bed with RW with CGA and verbal cues for step to gait pattern leading with RLE to maintain PWB to RLE.  Step transfer to toilet with RW and verbal cue for use of grab bar and MIN lift assist from toilet.  Required verbal cues for safe RW positioning at sink as Pt. With initial tendency to discard prior ADL task.  Hand hygiene in stance at sink with forward flexed trunk and alternating unilateral forearm support at sink while reaching for soap dispenser and nozzle and BUE-forearm support while scrubbing hands.  Recommend post acute HH OT.  Needs 3-in-1 commode.  To benefit from continued acute care OT services to increase independence in self-care/functional transfers.  OT to follow.

## 2020-04-29 NOTE — NURSING
"Pt called nursed station stating "uh I do not know what you want me to do, can you come see". Upon assessment, pt's dressing to right foot unraveled and not intact with small amount of bright red blood noted. Pt walked with PT with weight bearing shoe prior to my arrival. Dressing reinforced and new rolled kerlix applied to foot. Will continue to monitor.   "

## 2020-04-29 NOTE — PLAN OF CARE
POC reviewed with patient and family via phone. Patient returned to unit at approximately 1730 via stretcher with one attendant. Dressing in place to right foot, no drainage noted. Foot elevated at 30 degrees in bed. Partial WB continues to RLE. Medicated with PRN pain meds with positive relief noted. Continues on LR via patent peripheral IV. Per phil Chaparro to place on regular diet post procedure. Tolerated dinner well. Uses bedpan independently. Telemetry monitor in place, NSR. All safety precautions in place call bell in reach.

## 2020-04-29 NOTE — PT/OT/SLP RE-EVAL
Physical Therapy Re-evaluation and Treatment    Patient Name:  Home De Dios   MRN:  [id number]    Recommendations:     Discharge Recommendations:  home, home health PT, home health OT   Discharge Equipment Recommendations: walker, rolling   Barriers to discharge: Decreased caregiver support    Assessment:     Home De Dios is a 77 y.o. female admitted with a medical diagnosis of Gangrene of toe of right foot.  She presents with the following impairments/functional limitations:  weakness, impaired endurance, impaired self care skills, impaired functional mobilty, gait instability, impaired balance, decreased lower extremity function, pain, impaired cardiopulmonary response to activity, impaired skin, orthopedic precautions.    Pt underwent R TMA yesterday. PT orders reconciled s/p surgery. Re-eval performed. Pt tolerated re-evaluation well with no adverse reactions. Pt performed supine <> sit with min A, sit <> stand with CGA, and ambulation x 40 ft with CGA and rolling walker. Pt will benefit from skilled PT services to address impairments and functional limitations. Recommend discharge to home with HHPT/OT.     Rehab Prognosis:  Good; patient would benefit from acute skilled PT services to address these deficits and reach maximum level of function.      Recent Surgery: Procedure(s) (LRB):  AMPUTATION, FOOT, TRANSMETATARSAL right (Right) 1 Day Post-Op    Plan:     During this hospitalization, patient to be seen 5 x/week to address the above listed problems via gait training, therapeutic activities, therapeutic exercises  · Plan of Care Expires:  05/29/20   Plan of Care Reviewed with: patient    Subjective     Communicated with RN (Namrata) prior to session.  Patient found supine with peripheral IV, bed alarm, telemetry upon PT entry to room, agreeable to evaluation.      Chief Complaint: None stated  Patient comments/goals: None stated  Pain/Comfort:  · Pain Rating 1: 0/10  · Pain Rating Post-Intervention 1:  0/10    Patients cultural, spiritual, Yazidi conflicts given the current situation: (None stated)      Objective:     Patient found with: peripheral IV, bed alarm, telemetry     General Precautions: Standard, fall   Orthopedic Precautions:RLE partial weight bearing(RLE PWB to heel with post-op shoe)   Braces: (DARCO shoe R foot)     Exams:  · Cognition:   ? Patient is oriented to person, place, time, and situation.  ? Pt follows approximately 100% of multiple step commands.    ? Mood: Pleasant and cooperative.  ? Safety Awareness: Fair  · Musculoskeletal:  ? Posture:  WNL  ? LE ROM/Strength:   § R ROM: WNL  § L ROM: WNL  § R Strength:   § Hip flexion: 5/5  § Knee extension: 5/5  § Dorsiflexion: 5/5   § L Strength:   § Hip flexion: 5/5  § Knee extension: 5/5  § Dorsiflexion: 5/5   · Neuromuscular:  ? Sensation: Intact to light touch bilateral LEs.   ? Tone/Reflexes: No impairments identified with functional mobility. No formal testing performed.   ? Coordination:  § Toe tapping: intact  ? Balance:   § Static sitting: SBA  § Dynamic sitting: SBA  § Static standing: CGA  § Dynamic standing: CGA  ? Visual-vestibular: No impairments identified with functional mobility. No formal testing performed.  · Integument: Visible skin intact    Functional Mobility:  · Bed Mobility:     · Supine to Sit: stand by assistance  · Sit to Supine: stand by assistance  · Transfers:     · Sit to Stand:  contact guard assistance with rolling walker  · Gait: 40 ft with rolling walker and contact guard assistance.    AM-PAC 6 CLICK MOBILITY  Total Score:19       Therapeutic Activities and Exercises:  Pt performed seated therapeutic exercises including ankle pumps, hip flexion, LAQs x 10 reps x 2 sets with verbal and tactile cues.     Bed mobility, transfer, and gait training as described above.    PT educated patient re:   · PT plan of care/role of PT  · Use of rolling walker  · Fall and safety precautions  · Gait deviations  · Discharge  recommendations   · Use of call light (don't get up without assistance)  Pt verbalized understanding     The patient is safe to transfer with RN assist, whiteboard updated.   Patient encouraged to sit up in chair throughout the day to prevent deconditioning.     Patient left supine with all lines intact and call button in reach.    GOALS:   Multidisciplinary Problems     Physical Therapy Goals        Problem: Physical Therapy Goal    Goal Priority Disciplines Outcome Goal Variances Interventions   Physical Therapy Goal     PT, PT/OT Ongoing, Progressing     Description:  Goals to be met by: 20    Patient will increase functional independence with mobility by performin. Don/doff DARCO shoe independently without cueing for need for shoe prior to OOB mobility.   2. Gait x 100 feet with mod(I) with RW with PWB RLE with DARCO shoe.  3. Bed <> chair transfer with mod I and least restrictive assistive device with PWB RLE.                       History:     Past Medical History:   Diagnosis Date    Glaucoma     Glaucoma (increased eye pressure)     Hypertension        Past Surgical History:   Procedure Laterality Date    AORTOGRAPHY WITH SERIALOGRAPHY Right 2020    Procedure: AORTOGRAM WITH RUNOFF;  Surgeon: Coral Baron MD;  Location: [facility] CATH LAB;  Service: Cardiology;  Laterality: Right;    APPENDECTOMY      CATARACT EXTRACTION W/  INTRAOCULAR LENS IMPLANT Left 10/16/2013        CATARACT EXTRACTION W/  INTRAOCULAR LENS IMPLANT Right 2013        HYSTERECTOMY         Time Tracking:     PT Received On: 20  PT Start Time: 1038     PT Stop Time: 1103  PT Total Time (min): 25 min     Billable Minutes: Re-eval 15 and Therapeutic Exercise 10      Mireille Morales, PT  2020

## 2020-04-29 NOTE — PROGRESS NOTES
[facility]  Cardiology  Progress Note    Patient Name: Home De Dios  MRN: [id number]  Admission Date: 4/19/2020  Hospital Length of Stay: 10 days  Code Status: Full Code   Attending Physician: Lewis Garcia MD   Primary Care Physician: Quintin Cerda MD  Expected Discharge Date:   Principal Problem:Gangrene of toe of right foot    Subjective:     Brief HPI:    Home De Dios is a 77 y.o.female with hypertension. She had pain in the left foot and soaked her feet in water on about 4/1/2020. She noted discoloration of several toes on the right foot. She developed rest pain. She was afraid of going to the hospital because of the COVID situation. She presented on 4/19/2020 with gangrenous toes and acute renal failure. She was admitted.       Hospital Course:    Hydration.    4/24/2020: [facility]: Right leg: SFA: Distal 80%. Popliteal: 100%. AT & PT: Occluded. PER: Mid and distal occlusions. SFA: & POP: PTA 6 mm balloon. Mild residual. PER: Mid PTA.    4/28/2020: Right TMA. Moderate bleeding was seen.    Interval History:    No CP or SOB.    Review of Systems   Constitution: Positive for malaise/fatigue. Negative for chills and fever.   HENT: Negative for nosebleeds.    Eyes: Negative for vision loss in left eye and vision loss in right eye.   Cardiovascular: Negative for chest pain, leg swelling, orthopnea, palpitations and paroxysmal nocturnal dyspnea.   Respiratory: Negative for cough, hemoptysis, shortness of breath, sputum production and wheezing.    Hematologic/Lymphatic: Negative for bleeding problem.   Skin:        Right toes gangrenous.   Musculoskeletal: Negative for myalgias.   Gastrointestinal: Negative for abdominal pain, heartburn, hematemesis, hematochezia, jaundice, melena, nausea and vomiting.   Genitourinary: Negative for hematuria.   Neurological: Negative for dizziness, headaches, light-headedness, vertigo and weakness.   Psychiatric/Behavioral: Negative for altered mental  status. The patient is not nervous/anxious.    Allergic/Immunologic: Negative for persistent infections.     Objective:     Vital Signs (Most Recent):  Temp: 99.4 °F (37.4 °C) (04/29/20 1314)  Pulse: 96 (04/29/20 1314)  Resp: 18 (04/29/20 1314)  BP: 137/63 (04/29/20 1314)  SpO2: 96 % (04/29/20 1314) Vital Signs (24h Range):  Temp:  [96.6 °F (35.9 °C)-100.2 °F (37.9 °C)] 99.4 °F (37.4 °C)  Pulse:  [] 96  Resp:  [16-19] 18  SpO2:  [94 %-100 %] 96 %  BP: (137-186)/(63-88) 137/63     Weight: 52 kg (114 lb 10.2 oz)  Body mass index is 17.96 kg/m².    SpO2: 96 %  O2 Device (Oxygen Therapy): room air      Intake/Output Summary (Last 24 hours) at 4/29/2020 1403  Last data filed at 4/29/2020 0930  Gross per 24 hour   Intake 2360 ml   Output 1400 ml   Net 960 ml       Lines/Drains/Airways     Peripheral Intravenous Line                 Peripheral IV - Single Lumen 04/28/20 1940 20 G Left;Posterior Forearm less than 1 day                Physical Exam   Constitutional: She appears well-developed and well-nourished.   Cardiovascular: Normal rate, regular rhythm, S1 normal and S2 normal.   Pulses:       Femoral pulses are 2+ on the right side, and 2+ on the left side.       Popliteal pulses are 1+ on the right side, and 1+ on the left side.        Dorsalis pedis pulses are 0 on the right side, and 0 on the left side.        Posterior tibial pulses are 0 on the right side, and 0 on the left side.   Right TMA.  Foot in dressing.   Pulmonary/Chest: Effort normal and breath sounds normal.       Current Medications:     aspirin  81 mg Oral Daily    ceFEPime (MAXIPIME) IVPB  1 g Intravenous Q24H    clopidogreL  75 mg Oral Daily    cyanocobalamin  500 mcg Oral Daily    ferrous sulfate  325 mg Oral Every other day    latanoprost  1 drop Both Eyes QHS    lidocaine (PF) 10 mg/ml (1%)  1 mL Intradermal Once    metronidazole  500 mg Intravenous Q8H    NIFEdipine  90 mg Oral Daily    timolol maleate 0.5%  1 drop Both Eyes BID      Current Laboratory Results:    Recent Results (from the past 24 hour(s))   CBC Without Differential    Collection Time: 04/29/20  4:52 AM   Result Value Ref Range    WBC 10.35 3.90 - 12.70 K/uL    RBC 3.50 (L) 4.00 - 5.40 M/uL    Hemoglobin 9.2 (L) 12.0 - 16.0 g/dL    Hematocrit 28.9 (L) 37.0 - 48.5 %    Mean Corpuscular Volume 83 82 - 98 fL    Mean Corpuscular Hemoglobin 26.3 (L) 27.0 - 31.0 pg    Mean Corpuscular Hemoglobin Conc 31.8 (L) 32.0 - 36.0 g/dL    RDW 13.4 11.5 - 14.5 %    Platelets 373 (H) 150 - 350 K/uL    MPV 10.2 9.2 - 12.9 fL   Basic Metabolic Panel (BMP)    Collection Time: 04/29/20  4:52 AM   Result Value Ref Range    Sodium 140 136 - 145 mmol/L    Potassium 4.2 3.5 - 5.1 mmol/L    Chloride 104 95 - 110 mmol/L    CO2 23 23 - 29 mmol/L    Glucose 131 (H) 70 - 110 mg/dL    BUN, Bld 10 8 - 23 mg/dL    Creatinine 1.4 0.5 - 1.4 mg/dL    Calcium 8.3 (L) 8.7 - 10.5 mg/dL    Anion Gap 13 8 - 16 mmol/L    eGFR if African American 42 (A) >60 mL/min/1.73 m^2    eGFR if non African American 36 (A) >60 mL/min/1.73 m^2   Magnesium    Collection Time: 04/29/20  4:52 AM   Result Value Ref Range    Magnesium 1.7 1.6 - 2.6 mg/dL   POCT glucose    Collection Time: 04/29/20  8:34 AM   Result Value Ref Range    POCT Glucose 90 70 - 110 mg/dL     Current Imaging Results:    X-Ray Foot Complete Right   Final Result      As above         Electronically signed by: Bear Tipton MD   Date:    04/28/2020   Time:    17:15      US Lower Extremity Arteries Right   Final Result      Dampened waveforms noted in the anterior and posterior tibial arteries and dorsalis pedis artery, similar to prior.  No significant focal velocity gradient.      Popliteal artery shows improved waveforms when compared to prior exam.         Electronically signed by: Estiven Peoples MD   Date:    04/28/2020   Time:    08:21      US Retroperitoneal Complete (Kidney and   Final Result      Findings suggestive of medical renal disease.      Two  nonobstructing renal stones in the right kidney, both measuring 3 mm.         Electronically signed by: Johnny Sams MD   Date:    04/20/2020   Time:    15:58      US Lower Extremity Arteries Right   Final Result   Abnormal      Monophasic flow in the right peroneal artery.  Low velocities.      This report was flagged in Epic as abnormal.         Electronically signed by: Angela Saenz   Date:    04/19/2020   Time:    19:22      X-Ray Foot Complete Right   Final Result      No evidence of acute fracture or dislocation of the right foot.  No osseous erosions.  Follow-up with MRI of the right foot, as clinically warranted.      Advanced vascular calcifications.         Electronically signed by: Efraín Blanc MD   Date:    04/19/2020   Time:    17:10          Assessment and Plan:     Problem List:    Active Diagnoses:    Diagnosis Date Noted POA    PRINCIPAL PROBLEM:  Gangrene of toes of right foot [I96] 04/19/2020 Yes    Hypomagnesemia [E83.42] 04/28/2020 No    Anemia [D64.9] 04/26/2020 Yes    Malnutrition of mild degree [E44.1] 04/26/2020 Yes    Hypokalemia [E87.6] 04/20/2020 Yes    MICKEY (acute kidney injury) [N17.9] 04/19/2020 Yes    Essential hypertension [I10] 06/18/2013 Yes     Chronic      Problems Resolved During this Admission:     Assessment and Plan:     1. Peripheral Artery Disease              4/1/2020: Began noticing discoloration of toes right foot.              4/19/2020: Presents with gangrene of toes right foot.              4/19/2020: Arterial Duplex: Slow distal runoff.   4/24/2020: [facility]: Right leg: SFA: Distal 80%. Popliteal: 100%. AT & PT: Occluded. PER: Mid and distal occlusions. SFA: & POP: PTA 6 mm balloon. Mild residual. PER: Mid PTA.   4/28/2020: Right TMA. Moderate bleeding was seen.   On aspirin 81 mg Q24 and clopidogrel 75 mg Q24.              Hopefully she will heal TMA - if not she will need BKA.   She needs to improve her nutritional status.               2.  Hypertension   On nifedipine 90 mg Q24.      3. Acute Kidney Disease              4/19/2020: BUN/crea 44/5.5.              4/22/2020: BUN/crea 27/2.6.   4/23/2020: BUN/crea 18/1.9.   4/25/2020: BUN/crea 10/1.5. CrCl 38.   4/28/2020: BUN/crea 8/1.3. CrCl 45.              Dr. Gisela Wharton/Dr. Jessi Nguyen.    4. Underweight   4/26/2020: Weight 52 kg. BMI 18.   She needs to improve her nutritional status and gain weight to have improve her changes to heal a needed amputation.    I have discussed this with her several times and she has been seen by dietitian.             VTE Risk Mitigation (From admission, onward)         Ordered     Place sequential compression device  Until discontinued      04/19/20 2118     IP VTE HIGH RISK PATIENT  Once      04/19/20 2118     Reason for No Pharmacological VTE Prophylaxis  Once     Question:  Reasons:  Answer:  Physician Provided (leave comment)    04/19/20 2118                Coral Baron MD  Cardiology  [facility]

## 2020-04-30 LAB
ANION GAP SERPL CALC-SCNC: 7 MMOL/L (ref 8–16)
BUN SERPL-MCNC: 9 MG/DL (ref 8–23)
CALCIUM SERPL-MCNC: 8 MG/DL (ref 8.7–10.5)
CHLORIDE SERPL-SCNC: 106 MMOL/L (ref 95–110)
CO2 SERPL-SCNC: 27 MMOL/L (ref 23–29)
CREAT SERPL-MCNC: 1.4 MG/DL (ref 0.5–1.4)
ERYTHROCYTE [DISTWIDTH] IN BLOOD BY AUTOMATED COUNT: 13.6 % (ref 11.5–14.5)
EST. GFR  (AFRICAN AMERICAN): 42 ML/MIN/1.73 M^2
EST. GFR  (NON AFRICAN AMERICAN): 36 ML/MIN/1.73 M^2
GLUCOSE SERPL-MCNC: 135 MG/DL (ref 70–110)
HCT VFR BLD AUTO: 23.6 % (ref 37–48.5)
HGB BLD-MCNC: 7.6 G/DL (ref 12–16)
MAGNESIUM SERPL-MCNC: 1.6 MG/DL (ref 1.6–2.6)
MCH RBC QN AUTO: 26.5 PG (ref 27–31)
MCHC RBC AUTO-ENTMCNC: 32.2 G/DL (ref 32–36)
MCV RBC AUTO: 82 FL (ref 82–98)
PLATELET # BLD AUTO: 357 K/UL (ref 150–350)
PMV BLD AUTO: 10.2 FL (ref 9.2–12.9)
POTASSIUM SERPL-SCNC: 4.1 MMOL/L (ref 3.5–5.1)
RBC # BLD AUTO: 2.87 M/UL (ref 4–5.4)
SODIUM SERPL-SCNC: 140 MMOL/L (ref 136–145)
WBC # BLD AUTO: 9.29 K/UL (ref 3.9–12.7)

## 2020-04-30 PROCEDURE — 25000003 PHARM REV CODE 250: Mod: HCNC | Performed by: INTERNAL MEDICINE

## 2020-04-30 PROCEDURE — 80048 BASIC METABOLIC PNL TOTAL CA: CPT | Mod: HCNC

## 2020-04-30 PROCEDURE — 63600175 PHARM REV CODE 636 W HCPCS: Mod: HCNC | Performed by: INTERNAL MEDICINE

## 2020-04-30 PROCEDURE — S0030 INJECTION, METRONIDAZOLE: HCPCS | Mod: HCNC | Performed by: INTERNAL MEDICINE

## 2020-04-30 PROCEDURE — 97116 GAIT TRAINING THERAPY: CPT | Mod: HCNC,CQ

## 2020-04-30 PROCEDURE — 99232 SBSQ HOSP IP/OBS MODERATE 35: CPT | Mod: HCNC,,, | Performed by: HOSPITALIST

## 2020-04-30 PROCEDURE — 94761 N-INVAS EAR/PLS OXIMETRY MLT: CPT | Mod: HCNC

## 2020-04-30 PROCEDURE — 83735 ASSAY OF MAGNESIUM: CPT | Mod: HCNC

## 2020-04-30 PROCEDURE — 97535 SELF CARE MNGMENT TRAINING: CPT | Mod: HCNC

## 2020-04-30 PROCEDURE — 99232 PR SUBSEQUENT HOSPITAL CARE,LEVL II: ICD-10-PCS | Mod: HCNC,,, | Performed by: HOSPITALIST

## 2020-04-30 PROCEDURE — 11000001 HC ACUTE MED/SURG PRIVATE ROOM: Mod: HCNC

## 2020-04-30 PROCEDURE — 36415 COLL VENOUS BLD VENIPUNCTURE: CPT | Mod: HCNC

## 2020-04-30 PROCEDURE — 25000003 PHARM REV CODE 250: Mod: HCNC | Performed by: HOSPITALIST

## 2020-04-30 PROCEDURE — 85027 COMPLETE CBC AUTOMATED: CPT | Mod: HCNC

## 2020-04-30 RX ORDER — HYDROCODONE BITARTRATE AND ACETAMINOPHEN 10; 325 MG/1; MG/1
1 TABLET ORAL EVERY 6 HOURS PRN
Status: DISCONTINUED | OUTPATIENT
Start: 2020-04-30 | End: 2020-05-01 | Stop reason: HOSPADM

## 2020-04-30 RX ORDER — HYDROCODONE BITARTRATE AND ACETAMINOPHEN 5; 325 MG/1; MG/1
1 TABLET ORAL EVERY 6 HOURS PRN
Status: DISCONTINUED | OUTPATIENT
Start: 2020-04-30 | End: 2020-05-01 | Stop reason: HOSPADM

## 2020-04-30 RX ORDER — NIFEDIPINE 30 MG/1
90 TABLET, EXTENDED RELEASE ORAL DAILY
Status: DISCONTINUED | OUTPATIENT
Start: 2020-05-01 | End: 2020-05-01 | Stop reason: HOSPADM

## 2020-04-30 RX ADMIN — HYDROCODONE BITARTRATE AND ACETAMINOPHEN 1 TABLET: 5; 325 TABLET ORAL at 01:04

## 2020-04-30 RX ADMIN — LATANOPROST 1 DROP: 50 SOLUTION OPHTHALMIC at 09:04

## 2020-04-30 RX ADMIN — METRONIDAZOLE 500 MG: 500 INJECTION, SOLUTION INTRAVENOUS at 09:04

## 2020-04-30 RX ADMIN — SODIUM CHLORIDE, SODIUM LACTATE, POTASSIUM CHLORIDE, AND CALCIUM CHLORIDE: .6; .31; .03; .02 INJECTION, SOLUTION INTRAVENOUS at 12:04

## 2020-04-30 RX ADMIN — HYDROCODONE BITARTRATE AND ACETAMINOPHEN 1 TABLET: 10; 325 TABLET ORAL at 09:04

## 2020-04-30 RX ADMIN — TIMOLOL MALEATE 1 DROP: 5 SOLUTION OPHTHALMIC at 09:04

## 2020-04-30 RX ADMIN — TRAMADOL HYDROCHLORIDE 50 MG: 50 TABLET, FILM COATED ORAL at 01:04

## 2020-04-30 RX ADMIN — METRONIDAZOLE 500 MG: 500 INJECTION, SOLUTION INTRAVENOUS at 02:04

## 2020-04-30 RX ADMIN — NIFEDIPINE 90 MG: 30 TABLET, FILM COATED, EXTENDED RELEASE ORAL at 09:04

## 2020-04-30 RX ADMIN — ASPIRIN 81 MG: 81 TABLET, COATED ORAL at 09:04

## 2020-04-30 RX ADMIN — HYDROCODONE BITARTRATE AND ACETAMINOPHEN 1 TABLET: 5; 325 TABLET ORAL at 09:04

## 2020-04-30 RX ADMIN — CYANOCOBALAMIN TAB 500 MCG 500 MCG: 500 TAB at 09:04

## 2020-04-30 RX ADMIN — CEFEPIME 1 G: 1 INJECTION, POWDER, FOR SOLUTION INTRAMUSCULAR; INTRAVENOUS at 01:04

## 2020-04-30 RX ADMIN — CLOPIDOGREL BISULFATE 75 MG: 75 TABLET ORAL at 09:04

## 2020-04-30 RX ADMIN — HYDROCODONE BITARTRATE AND ACETAMINOPHEN 1 TABLET: 10; 325 TABLET ORAL at 03:04

## 2020-04-30 RX ADMIN — SODIUM CHLORIDE, SODIUM LACTATE, POTASSIUM CHLORIDE, AND CALCIUM CHLORIDE: .6; .31; .03; .02 INJECTION, SOLUTION INTRAVENOUS at 06:04

## 2020-04-30 NOTE — PROGRESS NOTES
Progress Note  Nephrology    Consult Requested By: Lewis Garcia MD  Reason for Consult: MICKEY    SUBJECTIVE:     History of Present Illness:  Patient is a 77 y.o. female presents with gangrenous right toe/ foot and found to have MICKEY with Cr 5.5. She deines any history of CKD and states that she only takes HCTZ for HTN and no other routine meds. She does repots that when her foot started itching and swelling more approximately 8 days ago she began taking Ibuprofen bid. There are no labs since 2013 because she tells me that she does not like to have her blood drawn.    Interval History:  Pain better controlled today.  No complaints.    Past Medical History:   Diagnosis Date    Glaucoma     Glaucoma (increased eye pressure)     Hypertension      Past Surgical History:   Procedure Laterality Date    AORTOGRAPHY WITH SERIALOGRAPHY Right 4/24/2020    Procedure: AORTOGRAM WITH RUNOFF;  Surgeon: Coral Baron MD;  Location: [facility] CATH LAB;  Service: Cardiology;  Laterality: Right;    APPENDECTOMY      CATARACT EXTRACTION W/  INTRAOCULAR LENS IMPLANT Left 10/16/2013        CATARACT EXTRACTION W/  INTRAOCULAR LENS IMPLANT Right 12/18/2013        FOOT AMPUTATION THROUGH METATARSAL Right 4/28/2020    Procedure: AMPUTATION, FOOT, TRANSMETATARSAL right;  Surgeon: Ha Munguia DPM;  Location: [facility] OR;  Service: Podiatry;  Laterality: Right;    HYSTERECTOMY       Family History   Problem Relation Age of Onset    Heart disease Mother     Cancer Sister     Amblyopia Neg Hx     Blindness Neg Hx     Macular degeneration Neg Hx     Retinal detachment Neg Hx     Stroke Neg Hx     Thyroid disease Neg Hx     Cataracts Neg Hx     Glaucoma Neg Hx      Social History     Tobacco Use    Smoking status: Never Smoker    Smokeless tobacco: Never Used   Substance Use Topics    Alcohol use: No    Drug use: Not on file       Medications Prior to Admission   Medication Sig Dispense Refill Last Dose     latanoprost (XALATAN) 0.005 % ophthalmic solution Place 1 drop into both eyes every evening. 2.5 mL 12 4/19/2020    timolol maleate 0.5% (TIMOPTIC) 0.5 % Drop Place 1 drop into both eyes 2 (two) times daily. 5 mL 12 4/19/2020    hydroCHLOROthiazide (HYDRODIURIL) 12.5 MG Tab Take 1 tablet (12.5 mg total) by mouth once daily. 90 tablet 2 Unknown       Review of patient's allergies indicates:  No Known Allergies     Review of Systems:  Constitutional: No fever or chills, no weight changes.  Eyes: No visual changes or photophobia  HEENT: No nasal congestion or sore throat  Respiratory: No cough or shorness of breath  Cardiovascular: No chest pain or palpitations  Gastrointestinal: Poor appetite, no nausea or vomiting, no change in bowel habits  Genitourinary: No hematuria or dysuria  Skin: No rash or pruritis, black toe/ red foot, itching  Hematologic/lymphatic: No easy bruising, bleeding or lymphadenopathy  Musculoskeletal: No arthralgias or myalgias  Neurological: No seizures or tremors  Endocrine: No heat/cold intolerance.  No polyuria/polydipsia.  Psychiatric:  No depression or anxiety. +Poor insight    OBJECTIVE:     Vital Signs (Most Recent)  Temp: 100 °F (37.8 °C) (04/30/20 0753)  Pulse: 88 (04/30/20 1000)  Resp: 17 (04/30/20 0753)  BP: (!) 145/69 (04/30/20 0753)  SpO2: 98 % (04/30/20 0753)    Vital Signs Range (Last 24H):  Temp:  [98.8 °F (37.1 °C)-100 °F (37.8 °C)]   Pulse:  []   Resp:  [16-18]   BP: (131-150)/(63-72)   SpO2:  [95 %-98 %]     Physical Exam:    General appearance: Well developed, thin  Head: Normocephalic, atraumatic  Eyes:  Conjunctivae nl. Sclera anicteric. PERRL.  HEENT: Lips, mucosa, and tongue normal; teeth and gums normal and oropharynx clear.  Neck: Supple, trachea midline, thyroid not enlarged,   Lungs: Clear to auscultation bilaterally and normal respiratory effort  Heart: Regular rate and rhythm, S1, S2 normal, no murmur, click, rub or gallop  Abdomen: Soft, non-tender  non-distended; bowel sounds normal; no masses,  no organomegaly  Extremities: R foot transmet amp dressed, CDI. No edema  Pulses: nonpalpable  Neurologic: Normal strength and tone. No focal numbness or weakness  Psychiatric:  Alert and oriented times 3.       Diagnostic Results:  Labs: Reviewed  X-Ray: Reviewed  US: Reviewed    ASSESSMENT/PLAN:     1. MICKEY likely due to right foot gangrene and Ibuprofen/ Hypotension/ Hypokalemia  -Last known Cr 2013 was 0.9  -Minimal proteinuria 280 by ratio  -Current Cr down to 1.4 and stable with resolution of acidosis even after angiogram 4/24.  -good UOP  -Replace K prn  -CPK is normal  -Renal US no obstruction, 2 right non-obstructing calculi and neg urine eosin.  -Continue gentle IVF's with LR.  -Avoid nephrotoxins, NSAIDs/ PPI and renal dose meds.    2. HTN  -Continue Nifedipine XL to 90 mg daily.  -Continue the prn hydralazine but she will prob need less once pain better controlled  -Was on HCTZ monotherapy at home but no diuretics at present.    3. Dry Gangrene of right toes/ foot, severe PAD  -On cefepime and Flagyl, appears dry at present though  -Dr. Baron performed angio 4/24/2020: [facility]: Right leg: SFA: Distal 80%. Popliteal: 100%. AT & PT: Occluded. PER: Mid and distal occlusions. SFA: & POP: PTA 6 mm balloon. Mild residual. PER: Mid PTA.  -s/p TMA 4/28/2020    4. Fe Defic Anemia/ Relative B12 Defic  -Started oral Fe for now  -Started 500mcg daily  B12  -Normal folate and TSH  -Hgb 7.6 today.

## 2020-04-30 NOTE — SUBJECTIVE & OBJECTIVE
Interval History: No acute events overnight.  Doing well and in good spirits.  Pain is controlled.      Review of Systems   Constitutional: Negative for chills and fever.   HENT: Negative for congestion and sneezing.    Eyes: Negative for pain and discharge.   Respiratory: Negative for cough and shortness of breath.    Cardiovascular: Negative for chest pain and leg swelling.   Gastrointestinal: Negative for abdominal pain, nausea and vomiting.   Endocrine: Negative for cold intolerance.   Genitourinary: Negative for difficulty urinating and dysuria.   Musculoskeletal: Negative for arthralgias and back pain.   Skin: Negative for color change.   Neurological: Negative for tremors and weakness.   Hematological: Negative for adenopathy. Does not bruise/bleed easily.   Psychiatric/Behavioral: Negative for agitation and confusion.     Objective:     Vital Signs (Most Recent):  Temp: 100 °F (37.8 °C) (04/30/20 0753)  Pulse: 100 (04/30/20 0800)  Resp: 17 (04/30/20 0753)  BP: (!) 145/69 (04/30/20 0753)  SpO2: 98 % (04/30/20 0753) Vital Signs (24h Range):  Temp:  [98.8 °F (37.1 °C)-100 °F (37.8 °C)] 100 °F (37.8 °C)  Pulse:  [] 100  Resp:  [16-18] 17  SpO2:  [95 %-98 %] 98 %  BP: (131-150)/(63-72) 145/69     Weight: 52 kg (114 lb 10.2 oz)  Body mass index is 17.96 kg/m².    Intake/Output Summary (Last 24 hours) at 4/30/2020 0939  Last data filed at 4/30/2020 0600  Gross per 24 hour   Intake 1060 ml   Output 1000 ml   Net 60 ml      Physical Exam   Constitutional: She is oriented to person, place, and time. She appears well-developed and well-nourished. No distress.   Eyes: No scleral icterus.   Cardiovascular: Normal rate, regular rhythm and normal heart sounds.   Pulmonary/Chest: Effort normal and breath sounds normal. No respiratory distress.   Abdominal: Soft. Bowel sounds are normal. There is no tenderness.   Musculoskeletal: She exhibits no edema.   Neurological: She is alert and oriented to person, place, and  time.   Skin: Skin is warm and dry.   Right foot bandages c/d/i and not removed today.   Psychiatric: She has a normal mood and affect.   Nursing note and vitals reviewed.    Significant Labs: All labs in last 24 hours have been reviewed by me.    Imaging: All imaging in last 24 hours has been reviewed by me.

## 2020-04-30 NOTE — ASSESSMENT & PLAN NOTE
-Mrs. De Dios was admitted to inpatient status.  -On admit there was erythema in the foot proximal to the necrotic areas  -Arterial US of right leg showed monophasic flow in the right peroneal artery.  Low velocities.  -No evidence of osteomylitis on imaging   -She is s/p R lower extremity angioplasty of distal superficial femoral and popliteal arteries on 4/24  -Podiatry, vascular surgery and cardiology are on board and input appreciated.  -On 4/28 she was taken for right TMA.  -Consult PT/OT.  Partial WB RLE eric platt when ambulating.    -Continue aspirin, plavix   -She has been treated with IV cefepime and flagyl for possible component of cellulitis.  If ok with podiatry will discontinue these today.  If remains stable will plan discharge home home with home health tomorrow.

## 2020-04-30 NOTE — PT/OT/SLP PROGRESS
Physical Therapy Treatment    Patient Name:  Home De Dios   MRN:  [id number]    Recommendations:     Discharge Recommendations:  home health PT, home health OT   Discharge Equipment Recommendations: walker, rolling, 3-in-1 commode, wheelchair   Barriers to discharge: None    Assessment:     Home De Dios is a 77 y.o. female admitted with a medical diagnosis of Gangrene of toe of right foot.  She presents with the following impairments/functional limitations:  weakness, impaired endurance, impaired self care skills, impaired functional mobilty, gait instability, impaired balance, decreased lower extremity function, decreased safety awareness, pain, impaired skin, edema, orthopedic precautions ;pt with fair mobility today, dec. amb distance 2/2 inc. Pain in R foot today. .    Rehab Prognosis: Good; patient would benefit from acute skilled PT services to address these deficits and reach maximum level of function.    Recent Surgery: Procedure(s) (LRB):  AMPUTATION, FOOT, TRANSMETATARSAL right (Right) 2 Days Post-Op    Plan:     During this hospitalization, patient to be seen 5 x/week to address the identified rehab impairments via gait training, therapeutic activities, therapeutic exercises and progress toward the following goals:    · Plan of Care Expires:  05/29/20    Subjective     Chief Complaint: pain in R foot  Patient/Family Comments/goals: pt reluctant to therapy today, though finally agreeable.  Pain/Comfort:  · Pain Rating 1: 10/10  · Location - Side 1: Right  · Location - Orientation 1: generalized  · Pain Addressed 1: Reposition, Distraction, Cessation of Activity, Nurse notified  · Pain Rating Post-Intervention 1: 10/10      Objective:     Communicated with nurse prior to session.  Patient found supine with peripheral IV, telemetry upon PT entry to room.     General Precautions: Standard, fall   Orthopedic Precautions:RLE partial weight bearing(in DARCO shoe, heel wt bearing only)   Braces: (DARCO shoe)      Functional Mobility:  · Bed Mobility:     · Supine to Sit: supervision  · Sit to Supine: supervision  · Transfers:     · Sit to Stand:  stand by assistance and contact guard assistance with rolling walker  · Gait: amb'd 20' with RW and CGA,  PWB on R heel in DARCO shoe      AM-PAC 6 CLICK MOBILITY  Turning over in bed (including adjusting bedclothes, sheets and blankets)?: 4  Sitting down on and standing up from a chair with arms (e.g., wheelchair, bedside commode, etc.): 3  Moving from lying on back to sitting on the side of the bed?: 4  Moving to and from a bed to a chair (including a wheelchair)?: 3  Need to walk in hospital room?: 3  Climbing 3-5 steps with a railing?: 2  Basic Mobility Total Score: 19       Therapeutic Activities and Exercises:   pt able to catherine L sock and R Darco shoe with SBA.    Patient left HOB elevated with all lines intact, call button in reach, nurse notified and RLE elevated on pillow...    GOALS:   Multidisciplinary Problems     Physical Therapy Goals        Problem: Physical Therapy Goal    Goal Priority Disciplines Outcome Goal Variances Interventions   Physical Therapy Goal     PT, PT/OT Ongoing, Progressing     Description:  Goals to be met by: 20    Patient will increase functional independence with mobility by performin. Don/doff DARCO shoe independently without cueing for need for shoe prior to OOB mobility.   2. Gait x 100 feet with mod(I) with RW with PWB RLE with DARCO shoe.  3. Bed <> chair transfer with mod I and least restrictive assistive device with PWB RLE.                       Time Tracking:     PT Received On: 20  PT Start Time: 1318     PT Stop Time: 1331  PT Total Time (min): 13 min     Billable Minutes: Gait Training 13    Treatment Type: Treatment  PT/PTA: PTA     PTA Visit Number: 1     Darcy Sullivan PTA  2020

## 2020-04-30 NOTE — PLAN OF CARE
Pt remained free of falls and injuries throughout shift. AAOx4. Purposeful hourly rounding performed. Pt still has poor PO intake, reinforced and educated importance of nutrition for healing process of surgical incision. Pt swallows meds whole. IV flushed and infusing. Managed pain with PRN medication. Patient ambulates to BSC with 1 assist. Pt needs reinforcing that she needs to call for assistance to get up. Dsg CDI to right foot. VSS on room air. Pt resting comfortably in bed, denies needs at this time. Bed low and locked, bed alarm on, call light in reach. Side rails up x2. Will continue to monitor.

## 2020-04-30 NOTE — ASSESSMENT & PLAN NOTE
-Blood pressure improved overall.  Still minimally elevated this morning.  -Only home med is HCTZ 12.5 mg QD, holding 2/2 MICKEY   -Continue nifedipine 90 mg daily  -PRN hydralazine   -monitor

## 2020-04-30 NOTE — PT/OT/SLP PROGRESS
Occupational Therapy   Treatment and Treatment    Name: Home De Dios  MRN: [id number]  Admitting Diagnosis:  Gangrene of toe of right foot  2 Days Post-Op    Recommendations:     Discharge Recommendations: home health PT, home health OT(24 hr assist; Pt has decreased insight into her deficits.)  Discharge Equipment Recommendations:  3-in-1 commode, walker, rolling, wheelchair  Barriers to discharge:  Other (Comment)(Pt having surgical procedure tomorrow and will need re-evaluation.)    Assessment:     Home De Dios is a 77 y.o. female with a medical diagnosis of Gangrene of toe of right foot.  She presents lying in bed and agreeable to OT tx session.  Pt educated and trained in safety strategies to decrease fall risk during ADLs/ADL mobility, PWB precautions of right foot, grooming standing at sink and dressing . Performance deficits affecting function are weakness, impaired endurance, impaired self care skills, impaired functional mobilty, gait instability, impaired balance, decreased lower extremity function, decreased safety awareness, impaired skin, pain, edema, orthopedic precautions.  Recommend home with 24 hr assist and Home Health OT and PT.     Rehab Prognosis:  Good; patient would benefit from acute skilled OT services to address these deficits and reach maximum level of function.       Plan:     Patient to be seen 5 x/week to address the above listed problems via self-care/home management, therapeutic activities, therapeutic exercises  · Plan of Care Expires: 05/13/20  · Plan of Care Reviewed with: patient    Subjective     Pain/Comfort:  · Pain Rating 1: 10/10(Though pt reports 10/10 pain, she has no objective signs of pain at any time during tx session.)  · Location - Side 1: Right  · Location - Orientation 1: generalized  · Location 1: foot  · Pain Addressed 1: Pre-medicate for activity, Reposition, Distraction, Cessation of Activity, Nurse notified  · Pain Rating Post-Intervention 1: 10/10(Nurse  giving pain medication at end of tx session. )    Objective:     Communicated with: nurse  prior to session.  Patient found HOB elevated with peripheral IV, left foot dressing with Coban on top, bed alarm, call button, and telemetry upon OT entry to room.    General Precautions: Standard, fall   Orthopedic Precautions:RLE partial weight bearing   Braces: (Darco shoe right foot)     Occupational Performance:     Bed Mobility:    · Patient completed Rolling/Turning to Right with modified independence  · Patient completed Supine to Sit with modified independence  · Patient completed Sit to Supine with modified independence     Functional Mobility/Transfers:  · Patient completed Sit <> Stand Transfer with contact guard assistance and verbal cues for correct hand placment.  with  rolling walker   · Functional Mobility: Pt needing continued verbal/tactile cueing on correct use of RW and keeping it close to her during ambulation.  Pt needing repeat cueing on correct hand placement during sit<>stand transition with RW.  Pt also needing assist for correct placement of RW while standing at sink.  Pt tends to push the RW too far in front of her prior to taking steps.    Activities of Daily Living:  · Grooming: CGA/SBA  standing at sink  · Lower Body Dressing: moderate assistance for donning Darco shoe and for problem solving throughout task.  · Toileting: Pt has been using the bed pan with assist.  Recommended to pt and nurse that pt now uses the BSC with assist of nursing staff.  Recommend pt avoid using bed pan to progress with self care tasks.      [facility] 6 Click ADL: 19    Treatment & Education:  Role of OT, POC, Safety with ADLs/ADL mobility, use of BSC with assist instead of bed pan, need for continued therapy after discharge, keeping RW closer to her during ambulation.    Patient left HOB elevated with all lines intact, call button in reach, bed alarm on and nurse pEducation:  resent.    GOALS:   Multidisciplinary  Problems     Occupational Therapy Goals        Problem: Occupational Therapy Goal    Goal Priority Disciplines Outcome Interventions   Occupational Therapy Goal     OT, PT/OT Ongoing, Progressing    Description:  OT re-evaluation completed on 4/29/2020 with goals revised as needed:  Goals to be met by: 5/13/2020     Patient will increase functional independence with ADLs by performing:    UE Dressing with [place].  LE Dressing with Supervision.  Grooming while standing at sink with Supervision.  Toileting from [facility] with Supervision for hygiene and clothing management.   Toilet transfer to [facility] frame over toilet with Supervision.                       Time Tracking:     OT Date of Treatment: 04/30/20  OT Start Time: 1500  OT Stop Time: 1528  OT Total Time (min): 28 min    Billable Minutes:Self Care/Home Management 28    LOC Michel  4/30/2020

## 2020-04-30 NOTE — PROGRESS NOTES
"[facility] Hospital Medicine  Progress Note    Patient Name: Home De Dios  MRN: [id number]  Patient Class: IP- Inpatient   Admission Date: 4/19/2020  Length of Stay: 11 days  Attending Physician: Lewis Garcia MD  Primary Care Provider: Quintin Cerda MD        Subjective:     Principal Problem:Gangrene of toe of right foot        HPI:  Ms. Home De Dios is a 77 y.o. female, with PMH of HTN, POAG, who presented to [facility] ED on 4/19/20 with right toe pain. She states the toes of her right foot began changing color and turning black about 10 days ago, and have been having worsening numbness x 4-5 days. She states this began after soaking her foot in bleach and Epsom salts and then cutting her toe nails. The right foot subsequently became itchy, and then numb, particularly on the 1st, 2nd and 4th toes. She denies neuropathy or prior numbness/tingling., trauma, fever, chills. ED imaging shows no fracture/dislocaton or bony erosion. An US is pending. Labs showed MICKEY without hyperkalemia, and elevated CRP of 119. She was admitted to inpatient status.     Overview/Hospital Course:  Patient admitted with gangrene of R toes and MICKEY. IV antibiotics were initiated for possible cellulitis of R foot. She was started on IV fluids and renal function improved each day. Nephrology following. Xray of R foot did not suggest osteomyelitis. US arteries R LE revealed, "Monophasic flow in the right peroneal artery.  Low velocities." Podiatry, vascular surgery, and cardiology have been following the patient. On 4/24/2020 patient underwent successful angioplasty of distal superficial femoral and popliteal arteries. She is scheduled for TMA on 4/28/20    Interval History: No acute events overnight.  Doing well and in good spirits.  Pain is controlled.      Review of Systems   Constitutional: Negative for chills and fever.   HENT: Negative for congestion and sneezing.    Eyes: Negative for pain and discharge. "   Respiratory: Negative for cough and shortness of breath.    Cardiovascular: Negative for chest pain and leg swelling.   Gastrointestinal: Negative for abdominal pain, nausea and vomiting.   Endocrine: Negative for cold intolerance.   Genitourinary: Negative for difficulty urinating and dysuria.   Musculoskeletal: Negative for arthralgias and back pain.   Skin: Negative for color change.   Neurological: Negative for tremors and weakness.   Hematological: Negative for adenopathy. Does not bruise/bleed easily.   Psychiatric/Behavioral: Negative for agitation and confusion.     Objective:     Vital Signs (Most Recent):  Temp: 100 °F (37.8 °C) (04/30/20 0753)  Pulse: 100 (04/30/20 0800)  Resp: 17 (04/30/20 0753)  BP: (!) 145/69 (04/30/20 0753)  SpO2: 98 % (04/30/20 0753) Vital Signs (24h Range):  Temp:  [98.8 °F (37.1 °C)-100 °F (37.8 °C)] 100 °F (37.8 °C)  Pulse:  [] 100  Resp:  [16-18] 17  SpO2:  [95 %-98 %] 98 %  BP: (131-150)/(63-72) 145/69     Weight: 52 kg (114 lb 10.2 oz)  Body mass index is 17.96 kg/m².    Intake/Output Summary (Last 24 hours) at 4/30/2020 0939  Last data filed at 4/30/2020 0600  Gross per 24 hour   Intake 1060 ml   Output 1000 ml   Net 60 ml      Physical Exam   Constitutional: She is oriented to person, place, and time. She appears well-developed and well-nourished. No distress.   Eyes: No scleral icterus.   Cardiovascular: Normal rate, regular rhythm and normal heart sounds.   Pulmonary/Chest: Effort normal and breath sounds normal. No respiratory distress.   Abdominal: Soft. Bowel sounds are normal. There is no tenderness.   Musculoskeletal: She exhibits no edema.   Neurological: She is alert and oriented to person, place, and time.   Skin: Skin is warm and dry.   Right foot bandages c/d/i and not removed today.   Psychiatric: She has a normal mood and affect.   Nursing note and vitals reviewed.    Significant Labs: All labs in last 24 hours have been reviewed by me.    Imaging: All  imaging in last 24 hours has been reviewed by me.      Assessment/Plan:      * Gangrene of toes of right foot  -Mrs. De Dios was admitted to inpatient status.  -On admit there was erythema in the foot proximal to the necrotic areas  -Arterial US of right leg showed monophasic flow in the right peroneal artery.  Low velocities.  -No evidence of osteomylitis on imaging   -She is s/p R lower extremity angioplasty of distal superficial femoral and popliteal arteries on 4/24  -Podiatry, vascular surgery and cardiology are on board and input appreciated.  -On 4/28 she was taken for right TMA.  -Consult PT/OT.  Partial WB RLE w darco when ambulating.    -Continue aspirin, plavix   -She has been treated with IV cefepime and flagyl for possible component of cellulitis.  If ok with podiatry will discontinue these today.  If remains stable will plan discharge home home with home health tomorrow.    MICKEY (acute kidney injury)  -Unknown baseline Cr.   -Suspect MICKEY due to poorly controlled HTN vs infection/gangrene   -Continue to hold home HCTZ   -Continue to avoid nephrotoxins and renally dose meds   -Cr remains stable at 1.4  -Appreciate nephrology assistance    Hypomagnesemia  -Magnesium normal today.  -Repeat labs in AM.    Malnutrition of mild degree  -Encourage PO intake  -Nutritional supplements added per nutrition recs      Anemia  -Continue ferrous sulfate QOD   -B12 328. Continue cyanocobalamin daily  -Monitor      Hypokalemia  -K normal today  -Repeat BMP in AM    Essential hypertension  -Blood pressure improved overall.  Still minimally elevated this morning.  -Only home med is HCTZ 12.5 mg QD, holding 2/2 MICKEY   -Continue nifedipine 90 mg daily  -PRN hydralazine   -monitor         VTE Risk Mitigation (From admission, onward)         Ordered     Place sequential compression device  Until discontinued      04/19/20 2118     IP VTE HIGH RISK PATIENT  Once      04/19/20 2118     Reason for No Pharmacological VTE Prophylaxis   Once     Question:  Reasons:  Answer:  Physician Provided (leave comment)    04/19/20 1237                      Lewis Garcia MD  Department of Hospital Medicine   [facility]

## 2020-04-30 NOTE — PLAN OF CARE
Problem: Occupational Therapy Goal  Goal: Occupational Therapy Goal  Description  OT re-evaluation completed on 4/29/2020 with goals revised as needed:  Goals to be met by: 5/13/2020     Patient will increase functional independence with ADLs by performing:    UE Dressing with [place].  LE Dressing with Supervision.  Grooming while standing at sink with Supervision.  Toileting from [facility] with Supervision for hygiene and clothing management.   Toilet transfer to [facility] frame over toilet with Supervision.      Outcome: Ongoing, Progressing   Progressing towards goals.  Pt to benefit from continued acute care OT services to increase independence in self-care/functional transfers.  Continue POC.  Recommend discharge to home with 24 hr assist and Home Health OT and PT.

## 2020-04-30 NOTE — PLAN OF CARE
Patient resting in NAD. VSS on RA. Dressing to R foot c/d/i. BLE elevated on pillow. IVF infusing. Pt voiding per bedpan. PRN medication given effectively for pain x1. No needs expressed at this time. Safety measures maintained. Will continue to monitor.

## 2020-04-30 NOTE — ASSESSMENT & PLAN NOTE
-Unknown baseline Cr.   -Suspect MICKEY due to poorly controlled HTN vs infection/gangrene   -Continue to hold home HCTZ   -Continue to avoid nephrotoxins and renally dose meds   -Cr remains stable at 1.4  -Appreciate nephrology assistance

## 2020-05-01 VITALS
WEIGHT: 114.63 LBS | HEART RATE: 77 BPM | BODY MASS INDEX: 17.99 KG/M2 | SYSTOLIC BLOOD PRESSURE: 118 MMHG | OXYGEN SATURATION: 94 % | HEIGHT: 67 IN | RESPIRATION RATE: 16 BRPM | DIASTOLIC BLOOD PRESSURE: 59 MMHG | TEMPERATURE: 99 F

## 2020-05-01 PROBLEM — R53.81 DEBILITY: Status: ACTIVE | Noted: 2020-05-01

## 2020-05-01 LAB
ANION GAP SERPL CALC-SCNC: 10 MMOL/L (ref 8–16)
BUN SERPL-MCNC: 11 MG/DL (ref 8–23)
CALCIUM SERPL-MCNC: 8.3 MG/DL (ref 8.7–10.5)
CHLORIDE SERPL-SCNC: 106 MMOL/L (ref 95–110)
CO2 SERPL-SCNC: 25 MMOL/L (ref 23–29)
CREAT SERPL-MCNC: 1.5 MG/DL (ref 0.5–1.4)
ERYTHROCYTE [DISTWIDTH] IN BLOOD BY AUTOMATED COUNT: 13.9 % (ref 11.5–14.5)
EST. GFR  (AFRICAN AMERICAN): 38 ML/MIN/1.73 M^2
EST. GFR  (NON AFRICAN AMERICAN): 33 ML/MIN/1.73 M^2
FINAL PATHOLOGIC DIAGNOSIS: NORMAL
GLUCOSE SERPL-MCNC: 100 MG/DL (ref 70–110)
GROSS: NORMAL
HCT VFR BLD AUTO: 23.2 % (ref 37–48.5)
HGB BLD-MCNC: 7.3 G/DL (ref 12–16)
MAGNESIUM SERPL-MCNC: 1.5 MG/DL (ref 1.6–2.6)
MCH RBC QN AUTO: 26.1 PG (ref 27–31)
MCHC RBC AUTO-ENTMCNC: 31.5 G/DL (ref 32–36)
MCV RBC AUTO: 83 FL (ref 82–98)
PLATELET # BLD AUTO: 381 K/UL (ref 150–350)
PMV BLD AUTO: 10.6 FL (ref 9.2–12.9)
POTASSIUM SERPL-SCNC: 4.3 MMOL/L (ref 3.5–5.1)
RBC # BLD AUTO: 2.8 M/UL (ref 4–5.4)
SODIUM SERPL-SCNC: 141 MMOL/L (ref 136–145)
WBC # BLD AUTO: 9.77 K/UL (ref 3.9–12.7)

## 2020-05-01 PROCEDURE — 25000003 PHARM REV CODE 250: Mod: HCNC | Performed by: HOSPITALIST

## 2020-05-01 PROCEDURE — 94761 N-INVAS EAR/PLS OXIMETRY MLT: CPT | Mod: HCNC

## 2020-05-01 PROCEDURE — 99239 HOSP IP/OBS DSCHRG MGMT >30: CPT | Mod: HCNC,,, | Performed by: HOSPITALIST

## 2020-05-01 PROCEDURE — 80048 BASIC METABOLIC PNL TOTAL CA: CPT | Mod: HCNC

## 2020-05-01 PROCEDURE — 36415 COLL VENOUS BLD VENIPUNCTURE: CPT | Mod: HCNC

## 2020-05-01 PROCEDURE — 63600175 PHARM REV CODE 636 W HCPCS: Mod: HCNC | Performed by: INTERNAL MEDICINE

## 2020-05-01 PROCEDURE — 99239 PR HOSPITAL DISCHARGE DAY,>30 MIN: ICD-10-PCS | Mod: HCNC,,, | Performed by: HOSPITALIST

## 2020-05-01 PROCEDURE — 63600175 PHARM REV CODE 636 W HCPCS: Mod: HCNC | Performed by: HOSPITALIST

## 2020-05-01 PROCEDURE — 25000003 PHARM REV CODE 250: Mod: HCNC | Performed by: INTERNAL MEDICINE

## 2020-05-01 PROCEDURE — 85027 COMPLETE CBC AUTOMATED: CPT | Mod: HCNC

## 2020-05-01 PROCEDURE — 83735 ASSAY OF MAGNESIUM: CPT | Mod: HCNC

## 2020-05-01 RX ORDER — DOCUSATE SODIUM 100 MG/1
100 CAPSULE, LIQUID FILLED ORAL DAILY PRN
Qty: 30 CAPSULE | Refills: 1 | Status: SHIPPED | OUTPATIENT
Start: 2020-05-01 | End: 2022-11-07

## 2020-05-01 RX ORDER — ASPIRIN 81 MG/1
81 TABLET ORAL DAILY
Qty: 30 TABLET | Refills: 1 | Status: SHIPPED | OUTPATIENT
Start: 2020-05-01 | End: 2022-11-07

## 2020-05-01 RX ORDER — HYDROCODONE BITARTRATE AND ACETAMINOPHEN 10; 325 MG/1; MG/1
1 TABLET ORAL EVERY 6 HOURS PRN
Qty: 28 TABLET | Refills: 0 | Status: ON HOLD | OUTPATIENT
Start: 2020-05-01 | End: 2020-05-28 | Stop reason: HOSPADM

## 2020-05-01 RX ORDER — FERROUS SULFATE 325(65) MG
325 TABLET, DELAYED RELEASE (ENTERIC COATED) ORAL EVERY OTHER DAY
Qty: 15 TABLET | Refills: 1 | Status: SHIPPED | OUTPATIENT
Start: 2020-05-01 | End: 2022-11-07

## 2020-05-01 RX ORDER — DOCUSATE SODIUM 100 MG/1
100 CAPSULE, LIQUID FILLED ORAL DAILY PRN
Status: DISCONTINUED | OUTPATIENT
Start: 2020-05-01 | End: 2020-05-01 | Stop reason: HOSPADM

## 2020-05-01 RX ORDER — NIFEDIPINE 90 MG/1
90 TABLET, EXTENDED RELEASE ORAL DAILY
Qty: 30 TABLET | Refills: 1 | Status: SHIPPED | OUTPATIENT
Start: 2020-05-01 | End: 2020-07-15 | Stop reason: SDUPTHER

## 2020-05-01 RX ORDER — UBIDECARENONE 75 MG
500 CAPSULE ORAL DAILY
Qty: 30 TABLET | Refills: 1 | Status: SHIPPED | OUTPATIENT
Start: 2020-05-01 | End: 2022-11-07

## 2020-05-01 RX ORDER — CLOPIDOGREL BISULFATE 75 MG/1
75 TABLET ORAL DAILY
Qty: 30 TABLET | Refills: 1 | Status: SHIPPED | OUTPATIENT
Start: 2020-05-01 | End: 2022-11-07

## 2020-05-01 RX ORDER — MAGNESIUM SULFATE HEPTAHYDRATE 40 MG/ML
2 INJECTION, SOLUTION INTRAVENOUS ONCE
Status: COMPLETED | OUTPATIENT
Start: 2020-05-01 | End: 2020-05-01

## 2020-05-01 RX ADMIN — NIFEDIPINE 90 MG: 30 TABLET, FILM COATED, EXTENDED RELEASE ORAL at 09:05

## 2020-05-01 RX ADMIN — SODIUM CHLORIDE, SODIUM LACTATE, POTASSIUM CHLORIDE, AND CALCIUM CHLORIDE: .6; .31; .03; .02 INJECTION, SOLUTION INTRAVENOUS at 07:05

## 2020-05-01 RX ADMIN — TIMOLOL MALEATE 1 DROP: 5 SOLUTION OPHTHALMIC at 09:05

## 2020-05-01 RX ADMIN — MAGNESIUM SULFATE IN WATER 2 G: 40 INJECTION, SOLUTION INTRAVENOUS at 09:05

## 2020-05-01 RX ADMIN — ASPIRIN 81 MG: 81 TABLET, COATED ORAL at 09:05

## 2020-05-01 RX ADMIN — CLOPIDOGREL BISULFATE 75 MG: 75 TABLET ORAL at 09:05

## 2020-05-01 RX ADMIN — CYANOCOBALAMIN TAB 500 MCG 500 MCG: 500 TAB at 09:05

## 2020-05-01 RX ADMIN — ACETAMINOPHEN 650 MG: 325 TABLET ORAL at 12:05

## 2020-05-01 RX ADMIN — FERROUS SULFATE TAB EC 325 MG (65 MG FE EQUIVALENT) 325 MG: 325 (65 FE) TABLET DELAYED RESPONSE at 09:05

## 2020-05-01 NOTE — PLAN OF CARE
Pt discharging today with [facility]. Family to provide transportation home. Pt has RW and bsc delivered to pt's room. Son, Pradeep De Dios informed of transportation procedure, and given phone # to call.    No additional CM needs or barriers for discharge.     05/01/20 1203   Final Note   Assessment Type Final Discharge Note   Anticipated Discharge Disposition Home-Health

## 2020-05-01 NOTE — ASSESSMENT & PLAN NOTE
-Unknown baseline Cr.   -Suspect MICKEY due to poorly controlled HTN vs infection/gangrene   -Continue to hold home HCTZ   -Continue to avoid nephrotoxins and renally dose meds   -Cr remains stable at 1.5  -Appreciate nephrology assistance

## 2020-05-01 NOTE — PROGRESS NOTES
Progress Note  Nephrology    Consult Requested By: Lewis Garcia MD  Reason for Consult: MICKEY    SUBJECTIVE:     History of Present Illness:  Patient is a 77 y.o. female presents with gangrenous right toe/ foot and found to have MICKEY with Cr 5.5. She deines any history of CKD and states that she only takes HCTZ for HTN and no other routine meds. She does repots that when her foot started itching and swelling more approximately 8 days ago she began taking Ibuprofen bid. There are no labs since 2013 because she tells me that she does not like to have her blood drawn.    Interval History:  Pain better controlled today.  No complaints. Probable discharge today.    Past Medical History:   Diagnosis Date    Glaucoma     Glaucoma (increased eye pressure)     Hypertension      Past Surgical History:   Procedure Laterality Date    AORTOGRAPHY WITH SERIALOGRAPHY Right 4/24/2020    Procedure: AORTOGRAM WITH RUNOFF;  Surgeon: Coral Baron MD;  Location: [facility] CATH LAB;  Service: Cardiology;  Laterality: Right;    APPENDECTOMY      CATARACT EXTRACTION W/  INTRAOCULAR LENS IMPLANT Left 10/16/2013        CATARACT EXTRACTION W/  INTRAOCULAR LENS IMPLANT Right 12/18/2013        FOOT AMPUTATION THROUGH METATARSAL Right 4/28/2020    Procedure: AMPUTATION, FOOT, TRANSMETATARSAL right;  Surgeon: Ha Munguia DPM;  Location: [facility] OR;  Service: Podiatry;  Laterality: Right;    HYSTERECTOMY       Family History   Problem Relation Age of Onset    Heart disease Mother     Cancer Sister     Amblyopia Neg Hx     Blindness Neg Hx     Macular degeneration Neg Hx     Retinal detachment Neg Hx     Stroke Neg Hx     Thyroid disease Neg Hx     Cataracts Neg Hx     Glaucoma Neg Hx      Social History     Tobacco Use    Smoking status: Never Smoker    Smokeless tobacco: Never Used   Substance Use Topics    Alcohol use: No    Drug use: Not on file       Medications Prior to Admission   Medication Sig  Dispense Refill Last Dose    latanoprost (XALATAN) 0.005 % ophthalmic solution Place 1 drop into both eyes every evening. 2.5 mL 12 4/19/2020    timolol maleate 0.5% (TIMOPTIC) 0.5 % Drop Place 1 drop into both eyes 2 (two) times daily. 5 mL 12 4/19/2020    [DISCONTINUED] hydroCHLOROthiazide (HYDRODIURIL) 12.5 MG Tab Take 1 tablet (12.5 mg total) by mouth once daily. 90 tablet 2 Unknown       Review of patient's allergies indicates:  No Known Allergies     Review of Systems:  Constitutional: No fever or chills, no weight changes.  Eyes: No visual changes or photophobia  HEENT: No nasal congestion or sore throat  Respiratory: No cough or shorness of breath  Cardiovascular: No chest pain or palpitations  Gastrointestinal: Poor appetite, no nausea or vomiting, no change in bowel habits  Genitourinary: No hematuria or dysuria  Skin: No rash or pruritis, black toe/ red foot, itching  Hematologic/lymphatic: No easy bruising, bleeding or lymphadenopathy  Musculoskeletal: No arthralgias or myalgias  Neurological: No seizures or tremors  Endocrine: No heat/cold intolerance.  No polyuria/polydipsia.  Psychiatric:  No depression or anxiety. +Poor insight    OBJECTIVE:     Vital Signs (Most Recent)  Temp: 98.7 °F (37.1 °C) (05/01/20 0758)  Pulse: 77 (05/01/20 1200)  Resp: 16 (05/01/20 0846)  BP: (!) 118/59 (05/01/20 0758)  SpO2: (!) 94 % (05/01/20 0846)    Vital Signs Range (Last 24H):  Temp:  [98.3 °F (36.8 °C)-99.3 °F (37.4 °C)]   Pulse:  [73-96]   Resp:  [16-20]   BP: (118-140)/(57-62)   SpO2:  [93 %-98 %]     Physical Exam:    General appearance: Well developed, thin  Head: Normocephalic, atraumatic  Eyes:  Conjunctivae nl. Sclera anicteric. PERRL.  HEENT: Lips, mucosa, and tongue normal; teeth and gums normal and oropharynx clear.  Neck: Supple, trachea midline, thyroid not enlarged,   Lungs: Clear to auscultation bilaterally and normal respiratory effort  Heart: Regular rate and rhythm, S1, S2 normal, no murmur, click,  rub or gallop  Abdomen: Soft, non-tender non-distended; bowel sounds normal; no masses,  no organomegaly  Extremities: R foot transmet amp dressed, CDI. No edema  Pulses: nonpalpable  Neurologic: Normal strength and tone. No focal numbness or weakness  Psychiatric:  Alert and oriented times 3.       Diagnostic Results:  Labs: Reviewed  X-Ray: Reviewed  US: Reviewed    ASSESSMENT/PLAN:     1. MICKEY likely due to right foot gangrene and Ibuprofen/ Hypotension/ Hypokalemia  -Last known Cr 2013 was 0.9  -Minimal proteinuria 280 by ratio  -Current Cr down to 1.5 and stable with resolution of acidosis even after angiogram 4/24.  -good UOP  -Avoid nephrotoxins, NSAIDs/ PPI and renal dose meds.  -Will need a BMP in a week or so to ensure downward creat trend.    2. HTN  -Continue Nifedipine XL to 90 mg daily.  -Continue as now.  -Was on HCTZ monotherapy at home but would avoid diuretics in this lady at risk for volume depletion    3. Dry Gangrene of right toes/ foot, severe PAD  -On cefepime and Flagyl, appears dry at present though  -Dr. Baron performed angio 4/24/2020: [facility]: Right leg: SFA: Distal 80%. Popliteal: 100%. AT & PT: Occluded. PER: Mid and distal occlusions. SFA: & POP: PTA 6 mm balloon. Mild residual. PER: Mid PTA.  -s/p TMA 4/28/2020    4. Fe Defic Anemia/ Relative B12 Defic  -Started oral Fe for now  -Started 500mcg daily  B12  -Normal folate and TSH  -Hgb 7.3 today.

## 2020-05-01 NOTE — PROGRESS NOTES
[facility]  Cardiology  Progress Note    Patient Name: Home De Dios  MRN: [id number]  Admission Date: 4/19/2020  Hospital Length of Stay: 11 days  Code Status: Full Code   Attending Physician: Lewis Garcia MD   Primary Care Physician: Quintin Cerda MD  Expected Discharge Date:   Principal Problem:Gangrene of toe of right foot    Subjective:     Brief HPI:    Home De Dios is a 77 y.o.female with hypertension. She had pain in the left foot and soaked her feet in water on about 4/1/2020. She noted discoloration of several toes on the right foot. She developed rest pain. She was afraid of going to the hospital because of the COVID situation. She presented on 4/19/2020 with gangrenous toes and acute renal failure. She was admitted.       Hospital Course:    Hydration.    4/24/2020: [facility]: Right leg: SFA: Distal 80%. Popliteal: 100%. AT & PT: Occluded. PER: Mid and distal occlusions. SFA: & POP: PTA 6 mm balloon. Mild residual. PER: Mid PTA.    4/28/2020: Right TMA. Moderate bleeding was seen.    Interval History:    No CP or SOB.    Review of Systems   Constitution: Positive for malaise/fatigue. Negative for chills and fever.   HENT: Negative for nosebleeds.    Eyes: Negative for vision loss in left eye and vision loss in right eye.   Cardiovascular: Negative for chest pain, leg swelling, orthopnea, palpitations and paroxysmal nocturnal dyspnea.   Respiratory: Negative for cough, hemoptysis, shortness of breath, sputum production and wheezing.    Hematologic/Lymphatic: Negative for bleeding problem.   Skin:        Right toes gangrenous.   Musculoskeletal: Negative for myalgias.   Gastrointestinal: Negative for abdominal pain, heartburn, hematemesis, hematochezia, jaundice, melena, nausea and vomiting.   Genitourinary: Negative for hematuria.   Neurological: Negative for dizziness, headaches, light-headedness, vertigo and weakness.   Psychiatric/Behavioral: Negative for altered mental  status. The patient is not nervous/anxious.    Allergic/Immunologic: Negative for persistent infections.     Objective:     Vital Signs (Most Recent):  Temp: 99 °F (37.2 °C) (04/30/20 1543)  Pulse: 89 (04/30/20 1923)  Resp: 20 (04/30/20 1923)  BP: (!) 140/62 (04/30/20 1543)  SpO2: (!) 94 % (04/30/20 1923) Vital Signs (24h Range):  Temp:  [98.6 °F (37 °C)-100 °F (37.8 °C)] 99 °F (37.2 °C)  Pulse:  [] 89  Resp:  [16-20] 20  SpO2:  [94 %-98 %] 94 %  BP: (131-150)/(62-69) 140/62     Weight: 52 kg (114 lb 10.2 oz)  Body mass index is 17.96 kg/m².    SpO2: (!) 94 %  O2 Device (Oxygen Therapy): room air      Intake/Output Summary (Last 24 hours) at 4/30/2020 1948  Last data filed at 4/30/2020 1522  Gross per 24 hour   Intake 1440 ml   Output 1200 ml   Net 240 ml       Lines/Drains/Airways     Peripheral Intravenous Line                 Peripheral IV - Single Lumen 04/30/20 1631 22 G Anterior;Left Upper Arm less than 1 day                Physical Exam   Constitutional: She appears well-developed and well-nourished.   Cardiovascular: Normal rate, regular rhythm, S1 normal and S2 normal.   Pulses:       Femoral pulses are 2+ on the right side, and 2+ on the left side.       Popliteal pulses are 1+ on the right side, and 1+ on the left side.        Dorsalis pedis pulses are 0 on the right side, and 0 on the left side.        Posterior tibial pulses are 0 on the right side, and 0 on the left side.   Right TMA.  Foot in dressing.   Pulmonary/Chest: Effort normal and breath sounds normal.       Current Medications:     aspirin  81 mg Oral Daily    clopidogreL  75 mg Oral Daily    cyanocobalamin  500 mcg Oral Daily    ferrous sulfate  325 mg Oral Every other day    latanoprost  1 drop Both Eyes QHS    lidocaine (PF) 10 mg/ml (1%)  1 mL Intradermal Once    [START ON 5/1/2020] NIFEdipine  90 mg Oral Daily    timolol maleate 0.5%  1 drop Both Eyes BID     Current Laboratory Results:    Recent Results (from the past 24  hour(s))   CBC Without Differential    Collection Time: 04/30/20  4:05 AM   Result Value Ref Range    WBC 9.29 3.90 - 12.70 K/uL    RBC 2.87 (L) 4.00 - 5.40 M/uL    Hemoglobin 7.6 (L) 12.0 - 16.0 g/dL    Hematocrit 23.6 (L) 37.0 - 48.5 %    Mean Corpuscular Volume 82 82 - 98 fL    Mean Corpuscular Hemoglobin 26.5 (L) 27.0 - 31.0 pg    Mean Corpuscular Hemoglobin Conc 32.2 32.0 - 36.0 g/dL    RDW 13.6 11.5 - 14.5 %    Platelets 357 (H) 150 - 350 K/uL    MPV 10.2 9.2 - 12.9 fL   Basic Metabolic Panel (BMP)    Collection Time: 04/30/20  4:05 AM   Result Value Ref Range    Sodium 140 136 - 145 mmol/L    Potassium 4.1 3.5 - 5.1 mmol/L    Chloride 106 95 - 110 mmol/L    CO2 27 23 - 29 mmol/L    Glucose 135 (H) 70 - 110 mg/dL    BUN, Bld 9 8 - 23 mg/dL    Creatinine 1.4 0.5 - 1.4 mg/dL    Calcium 8.0 (L) 8.7 - 10.5 mg/dL    Anion Gap 7 (L) 8 - 16 mmol/L    eGFR if African American 42 (A) >60 mL/min/1.73 m^2    eGFR if non African American 36 (A) >60 mL/min/1.73 m^2   Magnesium    Collection Time: 04/30/20  4:05 AM   Result Value Ref Range    Magnesium 1.6 1.6 - 2.6 mg/dL     Current Imaging Results:    X-Ray Foot Complete Right   Final Result      As above         Electronically signed by: Bear Tipton MD   Date:    04/28/2020   Time:    17:15      US Lower Extremity Arteries Right   Final Result      Dampened waveforms noted in the anterior and posterior tibial arteries and dorsalis pedis artery, similar to prior.  No significant focal velocity gradient.      Popliteal artery shows improved waveforms when compared to prior exam.         Electronically signed by: Estiven Peoples MD   Date:    04/28/2020   Time:    08:21      US Retroperitoneal Complete (Kidney and   Final Result      Findings suggestive of medical renal disease.      Two nonobstructing renal stones in the right kidney, both measuring 3 mm.         Electronically signed by: Johnny Sams MD   Date:    04/20/2020   Time:    15:58      US Lower  Extremity Arteries Right   Final Result   Abnormal      Monophasic flow in the right peroneal artery.  Low velocities.      This report was flagged in Epic as abnormal.         Electronically signed by: Angela Saenz   Date:    04/19/2020   Time:    19:22      X-Ray Foot Complete Right   Final Result      No evidence of acute fracture or dislocation of the right foot.  No osseous erosions.  Follow-up with MRI of the right foot, as clinically warranted.      Advanced vascular calcifications.         Electronically signed by: Efraín Blanc MD   Date:    04/19/2020   Time:    17:10          Assessment and Plan:     Problem List:    Active Diagnoses:    Diagnosis Date Noted POA    PRINCIPAL PROBLEM:  Gangrene of toes of right foot [I96] 04/19/2020 Yes    Hypomagnesemia [E83.42] 04/28/2020 No    Anemia [D64.9] 04/26/2020 Yes    Malnutrition of mild degree [E44.1] 04/26/2020 Yes    Hypokalemia [E87.6] 04/20/2020 Yes    MICKEY (acute kidney injury) [N17.9] 04/19/2020 Yes    Essential hypertension [I10] 06/18/2013 Yes     Chronic      Problems Resolved During this Admission:     Assessment and Plan:     1. Peripheral Artery Disease              4/1/2020: Began noticing discoloration of toes right foot.              4/19/2020: Presents with gangrene of toes right foot.              4/19/2020: Arterial Duplex: Slow distal runoff.   4/24/2020: [facility]: Right leg: SFA: Distal 80%. Popliteal: 100%. AT & PT: Occluded. PER: Mid and distal occlusions. SFA: & POP: PTA 6 mm balloon. Mild residual. PER: Mid PTA.   4/28/2020: Right TMA. Moderate bleeding was seen.   On aspirin 81 mg Q24 and clopidogrel 75 mg Q24.              Hopefully she will heal TMA - if not she will need BKA.   She needs to improve her nutritional status.               2. Hypertension   On nifedipine 90 mg Q24.      3. Acute Kidney Disease              4/19/2020: BUN/crea 44/5.5.              4/22/2020: BUN/crea 27/2.6.   4/23/2020: BUN/crea  18/1.9.   4/25/2020: BUN/crea 10/1.5. CrCl 38.   4/28/2020: BUN/crea 8/1.3. CrCl 45.              Dr. Gisela Wharton/Dr. Jessi Nguyen.    4. Underweight   4/26/2020: Weight 52 kg. BMI 18.   She needs to improve her nutritional status and gain weight to have improve her changes to heal a needed amputation.    I have discussed this with her several times and she has been seen by dietitian.             VTE Risk Mitigation (From admission, onward)         Ordered     Place sequential compression device  Until discontinued      04/19/20 2118     IP VTE HIGH RISK PATIENT  Once      04/19/20 2118     Reason for No Pharmacological VTE Prophylaxis  Once     Question:  Reasons:  Answer:  Physician Provided (leave comment)    04/19/20 2118                Coral Baron MD  Cardiology  [facility]

## 2020-05-01 NOTE — PT/OT/SLP PROGRESS
Occupational Therapy      Patient Name:  Home De Dios   MRN:  [id number]    Patient not seen today secondary to (Pt requesting no therapy today due to discharging to home today.    LOC Michel  5/1/2020

## 2020-05-01 NOTE — PROGRESS NOTES
[facility]  Cardiology  Progress Note    Patient Name: Home De Dios  MRN: [id number]  Admission Date: 4/19/2020  Hospital Length of Stay: 12 days  Code Status: Full Code   Attending Physician: Lewis Garcia MD   Primary Care Physician: Quintin Cerda MD  Expected Discharge Date:   Principal Problem:Gangrene of toe of right foot    Subjective:     Brief HPI:    Home De Dios is a 77 y.o.female with hypertension. She had pain in the left foot and soaked her feet in water on about 4/1/2020. She noted discoloration of several toes on the right foot. She developed rest pain. She was afraid of going to the hospital because of the COVID situation. She presented on 4/19/2020 with gangrenous toes and acute renal failure. She was admitted.       Hospital Course:    Hydration.    4/24/2020: [facility]: Right leg: SFA: Distal 80%. Popliteal: 100%. AT & PT: Occluded. PER: Mid and distal occlusions. SFA: & POP: PTA 6 mm balloon. Mild residual. PER: Mid PTA.    4/28/2020: Right TMA. Moderate bleeding was seen.    Interval History:    No CP or SOB. Less pain in foot.    Review of Systems   Constitution: Positive for malaise/fatigue. Negative for chills and fever.   HENT: Negative for nosebleeds.    Eyes: Negative for vision loss in left eye and vision loss in right eye.   Cardiovascular: Negative for chest pain, leg swelling, orthopnea, palpitations and paroxysmal nocturnal dyspnea.   Respiratory: Negative for cough, hemoptysis, shortness of breath, sputum production and wheezing.    Hematologic/Lymphatic: Negative for bleeding problem.   Skin:        Right toes gangrenous.   Musculoskeletal: Negative for myalgias.   Gastrointestinal: Negative for abdominal pain, heartburn, hematemesis, hematochezia, jaundice, melena, nausea and vomiting.   Genitourinary: Negative for hematuria.   Neurological: Negative for dizziness, headaches, light-headedness, vertigo and weakness.   Psychiatric/Behavioral: Negative for  altered mental status. The patient is not nervous/anxious.    Allergic/Immunologic: Negative for persistent infections.     Objective:     Vital Signs (Most Recent):  Temp: 98.7 °F (37.1 °C) (05/01/20 0758)  Pulse: 77 (05/01/20 1200)  Resp: 16 (05/01/20 0846)  BP: (!) 118/59 (05/01/20 0758)  SpO2: (!) 94 % (05/01/20 0846) Vital Signs (24h Range):  Temp:  [98.3 °F (36.8 °C)-99.3 °F (37.4 °C)] 98.7 °F (37.1 °C)  Pulse:  [73-96] 77  Resp:  [16-20] 16  SpO2:  [93 %-98 %] 94 %  BP: (118-140)/(57-62) 118/59     Weight: 52 kg (114 lb 10.2 oz)  Body mass index is 17.96 kg/m².    SpO2: (!) 94 %  O2 Device (Oxygen Therapy): room air      Intake/Output Summary (Last 24 hours) at 5/1/2020 1359  Last data filed at 5/1/2020 0813  Gross per 24 hour   Intake 1270 ml   Output 850 ml   Net 420 ml       Lines/Drains/Airways     Peripheral Intravenous Line                 Peripheral IV - Single Lumen 04/30/20 1631 22 G Anterior;Left Upper Arm less than 1 day                Physical Exam   Constitutional: She appears well-developed and well-nourished.   Cardiovascular: Normal rate, regular rhythm, S1 normal and S2 normal.   Pulses:       Femoral pulses are 2+ on the right side, and 2+ on the left side.       Popliteal pulses are 1+ on the right side, and 1+ on the left side.        Dorsalis pedis pulses are 0 on the right side, and 0 on the left side.        Posterior tibial pulses are 0 on the right side, and 0 on the left side.   Right TMA.  Foot in dressing.   Pulmonary/Chest: Effort normal and breath sounds normal.       Current Medications:     aspirin  81 mg Oral Daily    clopidogreL  75 mg Oral Daily    cyanocobalamin  500 mcg Oral Daily    ferrous sulfate  325 mg Oral Every other day    latanoprost  1 drop Both Eyes QHS    lidocaine (PF) 10 mg/ml (1%)  1 mL Intradermal Once    NIFEdipine  90 mg Oral Daily    timolol maleate 0.5%  1 drop Both Eyes BID     Current Laboratory Results:    Recent Results (from the past 24  hour(s))   CBC Without Differential    Collection Time: 05/01/20  4:03 AM   Result Value Ref Range    WBC 9.77 3.90 - 12.70 K/uL    RBC 2.80 (L) 4.00 - 5.40 M/uL    Hemoglobin 7.3 (L) 12.0 - 16.0 g/dL    Hematocrit 23.2 (L) 37.0 - 48.5 %    Mean Corpuscular Volume 83 82 - 98 fL    Mean Corpuscular Hemoglobin 26.1 (L) 27.0 - 31.0 pg    Mean Corpuscular Hemoglobin Conc 31.5 (L) 32.0 - 36.0 g/dL    RDW 13.9 11.5 - 14.5 %    Platelets 381 (H) 150 - 350 K/uL    MPV 10.6 9.2 - 12.9 fL   Basic Metabolic Panel (BMP)    Collection Time: 05/01/20  4:03 AM   Result Value Ref Range    Sodium 141 136 - 145 mmol/L    Potassium 4.3 3.5 - 5.1 mmol/L    Chloride 106 95 - 110 mmol/L    CO2 25 23 - 29 mmol/L    Glucose 100 70 - 110 mg/dL    BUN, Bld 11 8 - 23 mg/dL    Creatinine 1.5 (H) 0.5 - 1.4 mg/dL    Calcium 8.3 (L) 8.7 - 10.5 mg/dL    Anion Gap 10 8 - 16 mmol/L    eGFR if African American 38 (A) >60 mL/min/1.73 m^2    eGFR if non African American 33 (A) >60 mL/min/1.73 m^2   Magnesium    Collection Time: 05/01/20  4:03 AM   Result Value Ref Range    Magnesium 1.5 (L) 1.6 - 2.6 mg/dL     Current Imaging Results:    X-Ray Foot Complete Right   Final Result      As above         Electronically signed by: Bear Tipton MD   Date:    04/28/2020   Time:    17:15      US Lower Extremity Arteries Right   Final Result      Dampened waveforms noted in the anterior and posterior tibial arteries and dorsalis pedis artery, similar to prior.  No significant focal velocity gradient.      Popliteal artery shows improved waveforms when compared to prior exam.         Electronically signed by: Estiven Peoples MD   Date:    04/28/2020   Time:    08:21      US Retroperitoneal Complete (Kidney and   Final Result      Findings suggestive of medical renal disease.      Two nonobstructing renal stones in the right kidney, both measuring 3 mm.         Electronically signed by: Johnny Sams MD   Date:    04/20/2020   Time:    15:58      US Lower  Extremity Arteries Right   Final Result   Abnormal      Monophasic flow in the right peroneal artery.  Low velocities.      This report was flagged in Epic as abnormal.         Electronically signed by: Angela Saenz   Date:    04/19/2020   Time:    19:22      X-Ray Foot Complete Right   Final Result      No evidence of acute fracture or dislocation of the right foot.  No osseous erosions.  Follow-up with MRI of the right foot, as clinically warranted.      Advanced vascular calcifications.         Electronically signed by: Efraín Blanc MD   Date:    04/19/2020   Time:    17:10          Assessment and Plan:     Problem List:    Active Diagnoses:    Diagnosis Date Noted POA    PRINCIPAL PROBLEM:  Gangrene of toes of right foot [I96] 04/19/2020 Yes    Debility [R53.81] 05/01/2020 Yes    Hypomagnesemia [E83.42] 04/28/2020 No    Anemia [D64.9] 04/26/2020 Yes    Malnutrition of mild degree [E44.1] 04/26/2020 Yes    Hypokalemia [E87.6] 04/20/2020 Yes    MICKEY (acute kidney injury) [N17.9] 04/19/2020 Yes    Essential hypertension [I10] 06/18/2013 Yes     Chronic      Problems Resolved During this Admission:     Assessment and Plan:     1. Peripheral Artery Disease              4/1/2020: Began noticing discoloration of toes right foot.              4/19/2020: Presents with gangrene of toes right foot.              4/19/2020: Arterial Duplex: Slow distal runoff.   4/24/2020: [facility]: Right leg: SFA: Distal 80%. Popliteal: 100%. AT & PT: Occluded. PER: Mid and distal occlusions. SFA: & POP: PTA 6 mm balloon. Mild residual. PER: Mid PTA.   4/28/2020: Right TMA. Moderate bleeding was seen.   On aspirin 81 mg Q24 and clopidogrel 75 mg Q24.              Hopefully she will heal TMA - if not she will need BKA.   She needs to improve her nutritional status.               2. Hypertension   On nifedipine 90 mg Q24.      3. Acute Kidney Disease              4/19/2020: BUN/crea 44/5.5.              4/22/2020: BUN/crea  27/2.6.   4/23/2020: BUN/crea 18/1.9.   4/25/2020: BUN/crea 10/1.5. CrCl 38.   4/28/2020: BUN/crea 8/1.3. CrCl 45.              Dr. Gisela Wharton/Dr. Jessi Nguyen.    4. Underweight   4/26/2020: Weight 52 kg. BMI 18.   She needs to improve her nutritional status and gain weight to have improve her changes to heal a needed amputation.    I have discussed this with her several times and she has been seen by dietitian.             VTE Risk Mitigation (From admission, onward)         Ordered     Place sequential compression device  Until discontinued      04/19/20 2118     IP VTE HIGH RISK PATIENT  Once      04/19/20 2118     Reason for No Pharmacological VTE Prophylaxis  Once     Question:  Reasons:  Answer:  Physician Provided (leave comment)    04/19/20 2118                Coral Baron MD  Cardiology  [facility]

## 2020-05-01 NOTE — PLAN OF CARE
Plan of care reviewed with patient. Patient AAOx4, VSS on room air. Patient complains of 10 out 10 pain to right foot, given PRN pain medications which provided positive relief. PRN stool softener order obtained for patient this AM. Patient continues to have poor appetite yesterday evening, PO intake encouraged. LR infusing through 22 G in left upper extremity. Patient up to the bedside commode to void with ortho shoe and assistance. Purposeful rounding completed. All safety measures in place. Bed alarm on, patient instructed to call staff for assistance.

## 2020-05-01 NOTE — ASSESSMENT & PLAN NOTE
-Blood pressure improved overall.    -Only home med is HCTZ 12.5 mg QD, holding 2/2 MICKEY   -Continue nifedipine 90 mg daily

## 2020-05-01 NOTE — DISCHARGE SUMMARY
[facility] Hospital Medicine  Discharge Summary      Patient Name: Home De Dios  MRN: [id number]  Admission Date: 4/19/2020  Hospital Length of Stay: 12 days  Discharge Date and Time: No discharge date for patient encounter.  Attending Physician: Russell Garcia MD   Discharging Provider: Russell Garcia MD  Primary Care Provider: Quintin Cerda MD      HPI:   Ms. Home De Dios is a 77 y.o. female, with PMH of HTN, POAG, who presented to [facility] ED on 4/19/20 with right toe pain. She states the toes of her right foot began changing color and turning black about 10 days ago, and have been having worsening numbness x 4-5 days. She states this began after soaking her foot in bleach and Epsom salts and then cutting her toe nails. The right foot subsequently became itchy, and then numb, particularly on the 1st, 2nd and 4th toes. She denies neuropathy or prior numbness/tingling., trauma, fever, chills. ED imaging shows no fracture/dislocaton or bony erosion. An US is pending. Labs showed MICKEY without hyperkalemia, and elevated CRP of 119. She was admitted to inpatient status.     Procedure(s) (LRB):  AMPUTATION, FOOT, TRANSMETATARSAL right (Right)      Consults:   Consults (From admission, onward)        Status Ordering Provider     Anesthesiology  Once     Provider:  Reyes Ruffin MD    Acknowledged CHRISTIANO JACOBO     Inpatient consult to Cardiology  Once     Provider:  Coral Baron MD    Completed RUSSELL PALMER JR     Inpatient consult to Nephrology-[facility]  Once     Provider:  Gisela Wharton MD    Completed ZAKIA NULL     Inpatient consult to Podiatry  Once     Provider:  Lexis Morgan DPM    Completed ZAKIA NULL     Inpatient consult to Registered Dietitian/Nutritionist  Once     Provider:  (Not yet assigned)    CORAL Srivastava     Inpatient consult to Vascular Surgery  Once     Provider:  Russell Palmer Jr., MD    Completed MIGUEL ÁNGEL SANDERS      Pharmacy to renally dose medication  Once     Provider:  (Not yet assigned)    Acknowledged JULIAN NOWAK        Hospital Course By Problem:   * Gangrene of toes of right foot  -Mrs. De Dios was admitted to inpatient status.  -On admit there was erythema in the foot proximal to the necrotic areas  -Arterial US of right leg showed monophasic flow in the right peroneal artery.  Low velocities.  -No evidence of osteomylitis on imaging   -She is s/p R lower extremity angioplasty of distal superficial femoral and popliteal arteries on 4/24  -Podiatry, vascular surgery and cardiology are on board and input appreciated.  -On 4/28 she was taken for right TMA.  -Consult PT/OT.  Partial WB RLE w darco when ambulating.    -Continue aspirin, plavix   -She has been treated with IV cefepime and flagyl for possible component of cellulitis.  Discussed with podiatry and as cultures remain negative antibiotics were stopped on 4/30.  She continues to do well so will not resume antibiotics.  -Will discharge home with HH today.  Needs to leave wound dressing on until following up with podiatry next week.    MICKEY (acute kidney injury)  -Unknown baseline Cr.   -Suspect MICKEY due to poorly controlled HTN vs infection/gangrene   -Continue to hold home HCTZ   -Continue to avoid nephrotoxins and renally dose meds   -Cr remains stable at 1.5  -Appreciate nephrology assistance    Debility  -Continue PT/OT with HH service at home.    Hypomagnesemia  -Magnesium normal today.    Malnutrition of mild degree  -Encourage PO intake  -Nutritional supplements added per nutrition recs    Anemia  -Continue ferrous sulfate QOD   -B12 328. Continue cyanocobalamin daily    Hypokalemia  -K normal today    Essential hypertension  -Blood pressure improved overall.    -Only home med is HCTZ 12.5 mg QD, holding 2/2 MICKEY   -Continue nifedipine 90 mg daily    Final Active Diagnoses:    Diagnosis Date Noted POA    PRINCIPAL PROBLEM:  Gangrene of toes of right foot [I96]  "04/19/2020 Yes    MICKEY (acute kidney injury) [N17.9] 04/19/2020 Yes    Debility [R53.81] 05/01/2020 Yes    Hypomagnesemia [E83.42] 04/28/2020 No    Anemia [D64.9] 04/26/2020 Yes    Malnutrition of mild degree [E44.1] 04/26/2020 Yes    Hypokalemia [E87.6] 04/20/2020 Yes    Essential hypertension [I10] 06/18/2013 Yes     Chronic      Problems Resolved During this Admission:       Discharged Condition: stable    Disposition: [place]-Health Care [facility]    Follow Up:  Follow-up Information     Ha Munguia DPM In 1 week.    Specialties:  Podiatry, Wound Care  Why:  For wound re-check.  Call to make appointment today.  Contact information:  [address]  [facility] [id number] [phone]             Quintin Cerda MD In 1 week.    Specialty:  Internal Medicine  Contact information:  [address] [facility] [id number] [phone]             [facility]    Specialty:  DME Provider  Why:  DME: HEATHER, BSC  Contact information:  [address]  [facility] [address]  [phone]             [facility]    Specialty:  Home Health Services  Why:  Home Health  Contact information:  [address]  [address]  [facility] [id number] [phone]                 Patient Instructions:      WALKER FOR HOME USE     Order Specific Question Answer Comments   Type of Walker: Adult (5'4"-6'6")    With wheels? Yes    Height: 5' 7" (1.702 m)    Weight: 52 kg (114 lb 10.2 oz)    Length of need (1-99 months): 99    Does patient have medical equipment at home? shower chair    Please check all that apply: Patient's condition impairs ambulation.    Please check all that apply: Patient is unable to safely ambulate without equipment.      COMMODE FOR HOME USE     Order Specific Question Answer Comments   Type: Standard    Height: 5' 7" (1.702 m)    Weight: 52 kg (114 lb 10.2 oz)    Does patient have medical equipment at home? shower chair    Length of need (1-99 months): 99  "     Diet Cardiac     Diet renal     Notify your health care provider if you experience any of the following:  increased confusion or weakness     Notify your health care provider if you experience any of the following:  persistent dizziness, light-headedness, or visual disturbances     Notify your health care provider if you experience any of the following:  worsening rash     Notify your health care provider if you experience any of the following:  severe persistent headache     Notify your health care provider if you experience any of the following:  difficulty breathing or increased cough     Notify your health care provider if you experience any of the following:  severe uncontrolled pain     Notify your health care provider if you experience any of the following:  persistent nausea and vomiting or diarrhea     Notify your health care provider if you experience any of the following:  temperature >100.4     Notify your health care provider if you experience any of the following:  redness, tenderness, or signs of infection (pain, swelling, redness, odor or green/yellow discharge around incision site)     Leave dressing on - Keep it clean, dry, and intact until clinic visit     Activity as tolerated     Weight bearing restrictions (specify):   Order Comments: Partial weight bearing to right heal       Significant Diagnostic Studies: Labs:   BMP:   Recent Labs   Lab 04/30/20 0405 05/01/20  0403   * 100    141   K 4.1 4.3    106   CO2 27 25   BUN 9 11   CREATININE 1.4 1.5*   CALCIUM 8.0* 8.3*   MG 1.6 1.5*   , CMP   Recent Labs   Lab 04/30/20 0405 05/01/20  0403    141   K 4.1 4.3    106   CO2 27 25   * 100   BUN 9 11   CREATININE 1.4 1.5*   CALCIUM 8.0* 8.3*   ANIONGAP 7* 10   ESTGFRAFRICA 42* 38*   EGFRNONAA 36* 33*    and CBC   Recent Labs   Lab 04/30/20 0405 05/01/20  0403   WBC 9.29 9.77   HGB 7.6* 7.3*   HCT 23.6* 23.2*   * 381*       Pending Diagnostic Studies:      Procedure Component Value Units Date/Time    Specimen to Pathology, Surgery Orthopedics [[id number]] Collected:  04/28/20 1536    Order Status:  Sent Lab Status:  In process Updated:  04/29/20 0827         Medications:  Reconciled Home Medications:      Medication List      START taking these medications    aspirin 81 MG EC tablet  Commonly known as:  ECOTRIN  Take 1 tablet (81 mg total) by mouth once daily.     clopidogreL 75 mg tablet  Commonly known as:  PLAVIX  Take 1 tablet (75 mg total) by mouth once daily.     cyanocobalamin 500 MCG tablet  Take 1 tablet (500 mcg total) by mouth once daily.     ferrous sulfate 325 (65 FE) MG EC tablet  Take 1 tablet (325 mg total) by mouth every other day.     HYDROcodone-acetaminophen  mg per tablet  Commonly known as:  NORCO  Take 1 tablet by mouth every 6 (six) hours as needed.     NIFEdipine 90 MG (OSM) 24 hr tablet  Commonly known as:  PROCARDIA-XL  Take 1 tablet (90 mg total) by mouth once daily.     STOOL SOFTENER 100 MG capsule  Generic drug:  docusate sodium  Take 1 capsule (100 mg total) by mouth daily as needed for Constipation.        CONTINUE taking these medications    latanoprost 0.005 % ophthalmic solution  Commonly known as:  XALATAN  Place 1 drop into both eyes every evening.     timolol maleate 0.5% 0.5 % Drop  Commonly known as:  TIMOPTIC  Place 1 drop into both eyes 2 (two) times daily.        STOP taking these medications    hydroCHLOROthiazide 12.5 MG Tab  Commonly known as:  HYDRODIURIL            Indwelling Lines/Drains at time of discharge:   Lines/Drains/Airways     None                 Time spent on the discharge of patient: 35 minutes  Patient was seen and examined on the date of discharge and determined to be suitable for discharge.         Lewis Garcia MD  Department of Hospital Medicine  [facility]

## 2020-05-01 NOTE — PLAN OF CARE
[facility]    HOME HEALTH ORDERS  FACE TO FACE ENCOUNTER    Patient Name: Home De Dios  YOB: 1942    PCP: Quintin Cerda MD   PCP Address: [address] [place], [place] [id number]  PCP Phone Number: [phone]  PCP Fax: [phone]    Encounter Date: 05/01/2020    Admit to Home Health    Diagnoses:  Active Hospital Problems    Diagnosis  POA    *Gangrene of toes of right foot [I96]  Yes     Priority: 1 - High    MICKEY (acute kidney injury) [N17.9]  Yes     Priority: 2     Hypomagnesemia [E83.42]  No    Anemia [D64.9]  Yes    Malnutrition of mild degree [E44.1]  Yes    Hypokalemia [E87.6]  Yes    Essential hypertension [I10]  Yes     Chronic      Resolved Hospital Problems   No resolved problems to display.       No future appointments.  Follow-up Information     Ha Munguia DPM In 1 week.    Specialties:  Podiatry, Wound Care  Why:  For wound re-check.  Call to make appointment today.  Contact information:  [address] [id number] [phone]             Quintin Cerda MD In 1 week.    Specialty:  Internal Medicine  Contact information:  [facility] [id number] [phone]                     I have seen and examined this patient face to face today. My clinical findings that support the need for the home health skilled services and home bound status are the following:  Weakness/numbness causing balance and gait disturbance due to Weakness/Debility and Surgery making it taxing to leave home.  Requiring assistive device to leave home due to unsteady gait caused by  Weakness/Debility.  Medical restrictions requiring assistance of another human to leave home due to  Unstable ambulation and Decreased range of motions in extremities.    Allergies:Review of patient's allergies indicates:  No Known Allergies    Diet: cardiac diet and renal diet    Activities: ambulate in house with assistance    Nursing:   SN to  complete comprehensive assessment including routine vital signs. Instruct on disease process and s/s of complications to report to MD. Review/verify medication list sent home with the patient at time of discharge  and instruct patient/caregiver as needed. Frequency may be adjusted depending on start of care date.    Notify MD if SBP > 160 or < 90; DBP > 90 or < 50; HR > 120 or < 50; Temp > 101; Other:         CONSULTS:    Physical Therapy to evaluate and treat. Evaluate for home safety and equipment needs; Establish/upgrade home exercise program. Perform / instruct on therapeutic exercises, gait training, transfer training, and Range of Motion.  Occupational Therapy to evaluate and treat. Evaluate home environment for safety and equipment needs. Perform/Instruct on transfers, ADL training, ROM, and therapeutic exercises.  Aide to provide assistance with personal care, ADLs, and vital signs.    WOUND CARE ORDERS  Keep dressing clean and do not remove until clinic visit with podistrist next week.    Medications:     Current Discharge Medication List      START taking these medications    Details   aspirin (ECOTRIN) 81 MG EC tablet Take 1 tablet (81 mg total) by mouth once daily.  Qty: 30 tablet, Refills: 1      clopidogreL (PLAVIX) 75 mg tablet Take 1 tablet (75 mg total) by mouth once daily.  Qty: 30 tablet, Refills: 1      cyanocobalamin 500 MCG tablet Take 1 tablet (500 mcg total) by mouth once daily.  Qty: 30 tablet, Refills: 1      docusate sodium (COLACE) 100 MG capsule Take 1 capsule (100 mg total) by mouth daily as needed for Constipation.  Qty: 30 capsule, Refills: 1      ferrous sulfate 325 (65 FE) MG EC tablet Take 1 tablet (325 mg total) by mouth every other day.  Qty: 15 tablet, Refills: 1      HYDROcodone-acetaminophen (NORCO)  mg per tablet Take 1 tablet by mouth every 6 (six) hours as needed.  Qty: 28 tablet, Refills: 0    Comments: Quantity prescribed more than 7 day supply? No      NIFEdipine  (PROCARDIA-XL) 90 MG (OSM) 24 hr tablet Take 1 tablet (90 mg total) by mouth once daily.  Qty: 30 tablet, Refills: 1         CONTINUE these medications which have NOT CHANGED    Details   latanoprost (XALATAN) 0.005 % ophthalmic solution Place 1 drop into both eyes every evening.  Qty: 2.5 mL, Refills: 12    Associated Diagnoses: Primary open angle glaucoma of right eye, severe stage; Primary open angle glaucoma of left eye, moderate stage      timolol maleate 0.5% (TIMOPTIC) 0.5 % Drop Place 1 drop into both eyes 2 (two) times daily.  Qty: 5 mL, Refills: 12    Associated Diagnoses: Primary open angle glaucoma of right eye, severe stage; Primary open angle glaucoma of left eye, moderate stage         STOP taking these medications       hydroCHLOROthiazide (HYDRODIURIL) 12.5 MG Tab Comments:   Reason for Stopping:               I certify that this patient is confined to her home and needs intermittent skilled nursing care, physical therapy and occupational therapy.

## 2020-05-01 NOTE — NURSING
Pt d/c'd home, IV to LUE removed, site covered w/gauze and secured w/Coban, no bleeding noted to site. D/c paperwork reviewed, copy of d/c instructions given to Pt, Pt verbalized understanding. Telemetry box removed and returned to Tele room. Meds delivered from Pharmacy, BSC and walker delivered to Pt's room prior to d/c. Pt's son called stating that he had arrived and awaiting her d/c. Pt left the unit via w/c w/transport to private vehicle.

## 2020-05-01 NOTE — ASSESSMENT & PLAN NOTE
-Mrs. De Dios was admitted to inpatient status.  -On admit there was erythema in the foot proximal to the necrotic areas  -Arterial US of right leg showed monophasic flow in the right peroneal artery.  Low velocities.  -No evidence of osteomylitis on imaging   -She is s/p R lower extremity angioplasty of distal superficial femoral and popliteal arteries on 4/24  -Podiatry, vascular surgery and cardiology are on board and input appreciated.  -On 4/28 she was taken for right TMA.  -Consult PT/OT.  Partial WB RLE w lc when ambulating.    -Continue aspirin, plavix   -She has been treated with IV cefepime and flagyl for possible component of cellulitis.  Discussed with podiatry and as cultures remain negative antibiotics were stopped on 4/30.  She continues to do well so will not resume antibiotics.  -Will discharge home with HH today.  Needs to leave wound dressing on until following up with podiatry next week.

## 2020-05-01 NOTE — PLAN OF CARE
Pt discharging home today, family to provide transportation.    DME delivered to pt prior to dc.    Home Health arrangements in progress and will be added to pt's AVS. Home Health plans complete with [facility].    No additional CM needs or barriers for discharge.     05/01/20 1022   Final Note   Anticipated Discharge Disposition Home-Health   What phone number can be called within the next 1-3 days to see how you are doing after discharge? [phone]   Hospital Follow Up  Appt(s) scheduled? Yes   Discharge plans and expectations educations in teach back method with documentation complete? Yes   Right Care Referral Info   Post Acute Recommendation Home-care

## 2020-05-01 NOTE — PT/OT/SLP PROGRESS
Physical Therapy      Patient Name:  Home De Dios   MRN:  [id number]    Patient not seen today secondary to Other (Comment)(pt being d/c & requesting to defer therapy today).     Louisa Foley PTA

## 2020-05-02 NOTE — PT/OT/SLP DISCHARGE
Occupational Therapy Discharge Summary    Home De Dios  MRN: [id number]   Principal Problem: Gangrene of toe of right foot      Patient Discharged from acute Occupational Therapy on 5/1/20.  Please refer to prior OT note dated 4/30/20 for functional status.    Assessment:      Patient appropriate for care in another setting. Patient has not met goals.    Objective:     GOALS:   Multidisciplinary Problems     Occupational Therapy Goals        Problem: Occupational Therapy Goal    Goal Priority Disciplines Outcome Interventions   Occupational Therapy Goal     OT, PT/OT Ongoing, Progressing    Description:  OT re-evaluation completed on 4/29/2020 with goals revised as needed:  Goals to be met by: 5/13/2020     Patient will increase functional independence with ADLs by performing:    UE Dressing with [place].  LE Dressing with Supervision.  Grooming while standing at sink with Supervision.  Toileting from [facility] with Supervision for hygiene and clothing management.   Toilet transfer to [facility] frame over toilet with Supervision.                       Reasons for Discontinuation of Therapy Services  Transfer to alternate level of care.      Plan:     Patient Discharged to: Home with Home Health Service    Nery Morocho, Ina, LOTR  5/2/2020

## 2020-05-03 PROCEDURE — G0180 PR HOME HEALTH MD CERTIFICATION: ICD-10-PCS | Mod: ,,, | Performed by: HOSPITALIST

## 2020-05-03 PROCEDURE — G0180 MD CERTIFICATION HHA PATIENT: HCPCS | Mod: ,,, | Performed by: HOSPITALIST

## 2020-05-04 ENCOUNTER — PATIENT OUTREACH (OUTPATIENT)
Dept: ADMINISTRATIVE | Facility: CLINIC | Age: 80
End: 2020-05-04

## 2020-05-04 DIAGNOSIS — Z89.431 S/P TRANSMETATARSAL AMPUTATION OF FOOT, RIGHT: ICD-10-CM

## 2020-05-04 DIAGNOSIS — I96 GANGRENE: Primary | ICD-10-CM

## 2020-05-04 NOTE — PATIENT INSTRUCTIONS
Wound Check After Surgery, No Complication  Surgery involves cutting through layers of skin, fatty tissue, muscle, and sometimes bone and cartilage. Sutures (stitches) or staples are used to close all layers of the wound. The sutures on the inside will dissolve in about 2 to 3 weeks. Any sutures or staples used on the outside need to be removed in about 7 to 14 days, depending on the location.  It is normal to have some clear or bloody discharge on the wound covering or bandage (dressing) for the first few days after surgery. If your wound was sutured (sewn) closed, you should not have to change the dressing more than twice a day in the first few days. Bleeding or discharge requiring more frequent dressing changes can be a sign of a problem.  It is normal to feel pain at the incision site. The pain decreases as the wound heals. Most of the pain and soreness from the skin incision should go away by the time the sutures or staples are removed. Soreness and pain from deeper tissues may last another week or two.  Pain that continues more than a few weeks after surgery or pain that worsens anytime after surgery can be a sign of a problem, such as:  · Infection  · Separation of wound edges  · Collection of blood or other below the skin  Home care  Different types of surgery require different types of care and dressing changes. It is important to follow all instructions and advice from your surgeon, as well as other members of your healthcare team.  Wound care  · Keep the wound clean, as directed by your healthcare provider.  · Change the dressing as directed. Change the dressing sooner if it becomes wet or stained with blood or fluid from the wound.  · Bathe with a sponge (no shower or tub baths) for the first few days after surgery, or until there is no more drainage from the wound. Unless you received different instructions from your surgeon, you can then shower. Do not soak the area in water (no baths or swimming)  until the tape, sutures, or staples are removed and any wound opening has dried out and healed.  Changing the dressing  · Wash your hands before changing the dressings.  · Carefully remove the dressing and tape; dont just yank it off. If it sticks to the wound, you may need to wet it a little to remove it, unless your healthcare provider told you not to wet it.  · Wash your hands again before putting on a new, clean dressing.  · Gently clean the wound with clean water (or saline) using gauze or a clean washcloth. Do not rub it or pick at it.  · Do not use soap, alcohol, hydrogen peroxide, or any other cleanser.  · If you were told to dry the wound before putting on a new dressing, gently pat it dry. Do not rub.  · Throw out the old dressing. Do not reuse it!  · Wash your hands again when you are done.  Types of dressings  Your healthcare team will tell you what type of dressing to put on your wound. Follow your healthcare teams instructions carefully, and contact them if you have any questions. Two common types of dressings are described below. You may have one of these or another type.  · Dry dressing. Use dry gauze. If the wound is still draining, use a nonadherent dressing, which shouldnt stick to the wound.  · Wet-to-dry dressing. Wet the gauze, and squeeze out the excess water (or saline), before putting it on. Then, cover this with a dry pad.  Medicines  · If you were given antibiotics, take them until they are used up or your healthcare provider tells you to stop. It is important to finish the antibiotics even though you feel better, to make sure the infection has cleared.  · You can take acetaminophen or ibuprofen for pain, unless you were given a different pain medicine to use. (Note: If you have chronic liver or kidney disease, or have ever had a stomach ulcer or gastrointestinal bleeding, or are taking blood thinner medicines, talk with your healthcare provider before using these  medicines.)  · Aspirin should never be used in anyone under 18 years of age who is ill with a fever. It may cause severe liver damage.  Follow-up care  Follow up with your healthcare provider, or as advised, for your next wound check or removal of your sutures, staples, or tape.  · If a culture was done, you will be notified if the results will affect your treatment. You can call as directed for the results.  · If imaging tests, such as X-rays, an ultrasound, or CT scan were done, they will be reviewed by a specialist. You will be notified of the results, especially if they affect treatment.  Call 911  Call emergency services right away if any of these occur:  · Trouble breathing or swallowing, wheezing  · Hoarse voice or trouble speaking  · Extreme confusion  · Extreme drowsiness or trouble awakening  · Fainting or loss of consciousness  · Rapid heart rate or very slow heart rate  · Vomiting blood, or large amounts of blood in stool  · Discomfort in the center of the chest that feels like pressure, squeezing, a sense of fullness, or pain.  · Discomfort or pain in other upper body areas, such as the back, one or both arms, neck, jaw, or stomach  · Stroke symptoms (spot a stroke FAST)  ¨ F: Face drooping. One side of the face is numb or droops.  ¨ A: Arm weakness. One arm feels weak or numb.  ¨ S: Speech difficulty: Speech is slurred, or the person is unable to speak.  ¨ T: Time to call 911. Even if symptoms go away, call 911.  When to seek medical advice  Call your healthcare provider right away if any of the following occur:  · Increasing pain at the site of surgery  · Fever over 100.4º F (38º C)  · Redness around the wound  · Fluid, pus, or blood draining from the wound  · Vomiting, constipation, or diarrhea  Date Last Reviewed: 9/27/2015  © 6031-6043 [employer]. [address] [place] [address], [place] [address]. All rights reserved. This information is not intended as a substitute for professional  medical care. Always follow your healthcare professional's instructions.

## 2020-05-11 ENCOUNTER — NURSE TRIAGE (OUTPATIENT)
Dept: ADMINISTRATIVE | Facility: CLINIC | Age: 80
End: 2020-05-11

## 2020-05-11 ENCOUNTER — DOCUMENT SCAN (OUTPATIENT)
Dept: HOME HEALTH SERVICES | Facility: HOSPITAL | Age: 80
End: 2020-05-11
Payer: MEDICARE

## 2020-05-11 NOTE — TELEPHONE ENCOUNTER
Contacted pt for post procedural symptom tracker. Pt denies sob, fever, or cough since procedure.    Reason for Disposition   [1] Follow-up call to recent contact AND [2] information only call, no triage required    Additional Information   Negative: [1] Caller is not with the adult (patient) AND [2] reporting urgent symptoms   Negative: Lab result questions   Negative: Medication questions   Negative: Caller can't be reached by phone   Negative: Caller has already spoken to PCP or another triager   Negative: Requesting regular office appointment   Negative: [1] Caller requesting NON-URGENT health information AND [2] PCP's office is the best resource   Negative: RN needs further essential information from caller in order to complete triage   Negative: Health Information question, no triage required and triager able to answer question   Negative: General information question, no triage required and triager able to answer question   Negative: Question about upcoming scheduled test, no triage required and triager able to answer question   Negative: [1] Caller is not with the adult (patient) AND [2] probable NON-URGENT symptoms    Protocols used: INFORMATION ONLY CALL-A-

## 2020-05-12 ENCOUNTER — NURSE TRIAGE (OUTPATIENT)
Dept: ADMINISTRATIVE | Facility: CLINIC | Age: 80
End: 2020-05-12

## 2020-05-12 NOTE — TELEPHONE ENCOUNTER
Spoke with patient on behalf of the [facility] post procedure symptom tracker and she denies developing cough, feer or difficulty breathing since procedure

## 2020-05-13 ENCOUNTER — CARE AT HOME (OUTPATIENT)
Dept: HOME HEALTH SERVICES | Facility: CLINIC | Age: 80
End: 2020-05-13
Payer: MEDICARE

## 2020-05-13 ENCOUNTER — EXTERNAL HOME HEALTH (OUTPATIENT)
Dept: HOME HEALTH SERVICES | Facility: HOSPITAL | Age: 80
End: 2020-05-13
Payer: MEDICARE

## 2020-05-13 ENCOUNTER — TELEPHONE (OUTPATIENT)
Dept: PODIATRY | Facility: CLINIC | Age: 80
End: 2020-05-13

## 2020-05-13 VITALS
DIASTOLIC BLOOD PRESSURE: 62 MMHG | RESPIRATION RATE: 18 BRPM | OXYGEN SATURATION: 97 % | HEART RATE: 72 BPM | SYSTOLIC BLOOD PRESSURE: 122 MMHG | TEMPERATURE: 99 F

## 2020-05-13 DIAGNOSIS — Z89.431 S/P TRANSMETATARSAL AMPUTATION OF FOOT, RIGHT: ICD-10-CM

## 2020-05-13 DIAGNOSIS — I96 GANGRENE: ICD-10-CM

## 2020-05-13 PROCEDURE — 99495 TCM SERVICES (MODERATE COMPLEXITY): ICD-10-PCS | Mod: S$GLB,,, | Performed by: NURSE PRACTITIONER

## 2020-05-13 PROCEDURE — 99495 TRANSJ CARE MGMT MOD F2F 14D: CPT | Mod: S$GLB,,, | Performed by: NURSE PRACTITIONER

## 2020-05-13 NOTE — TELEPHONE ENCOUNTER
Spoke with nurse with [place] matter resolved.             ----- Message from Lucie Solorzano sent at 5/13/2020 10:07 AM CDT -----  Contact: Faye    Name of Who is Calling:Faye      What is the request in detail: Faye with [facility] would like a call back patient post op appointment and wound care .    Can the clinic reply by [facility]: No      What Number to Call Back if not in [facility]: [phone]

## 2020-05-13 NOTE — PROGRESS NOTES
[facility]  Transition of Care Home Visit    Visit Date: 5/13/2020  Encounter Provider: Irish Reyes NP  PCP:  Quintin Cerda MD    PRESENTING HISTORY      Patient ID: Home De Dios is a 77 y.o. female.    Consult Requested By:  Dr. Quintin Cerda  Reason for Consult:  Hospital Follow Up    Home is being seen at home due to mobility.      Chief Complaint: Transitional Care and Wound Check      History of Present Illness: Ms. Home De Dios is a 77 y.o. female who was recently admitted to the hospital.    Admit: 4/19/2020  Discharge: 5/1/2020  Hospital Course By Problem:   * Gangrene of toes of right foot  -Mrs. De Dios was admitted to inpatient status.  -On admit there was erythema in the foot proximal to the necrotic areas  -Arterial US of right leg showed monophasic flow in the right peroneal artery.  Low velocities.  -No evidence of osteomylitis on imaging   -She is s/p R lower extremity angioplasty of distal superficial femoral and popliteal arteries on 4/24  -Podiatry, vascular surgery and cardiology are on board and input appreciated.  -On 4/28 she was taken for right TMA.  -Consult PT/OT.  Partial WB RLE w darco when ambulating.    -Continue aspirin, plavix   -She has been treated with IV cefepime and flagyl for possible component of cellulitis.  Discussed with podiatry and as cultures remain negative antibiotics were stopped on 4/30.  She continues to do well so will not resume antibiotics.  -Will discharge home with HH today.  Needs to leave wound dressing on until following up with podiatry next week.  ___________________________________________________________________    Today:    HPI:  Pt is being seen for hospital follow up after admit for gangrene and amputation. See hospital course.    Today, she is found in her home with her son present for this visit. She reports doing well some pain to foot. Dressing is dry and intact to foot but she is concerned as it has not been changed  since her surgery. Reviewed chart and directions say to leave dressing intact until podiatry follow up in 1 week. She reports she was not given an appt with podiatry and home health cannot change the dressing without orders. Home health nurse called podiatry office this am to attempt appt    Review of Systems   Constitutional: Negative for activity change, fatigue and fever.   HENT: Negative.    Eyes: Negative.    Respiratory: Negative for chest tightness.    Cardiovascular: Negative.  Negative for leg swelling.   Gastrointestinal: Negative.    Endocrine: Negative.    Genitourinary: Negative.    Musculoskeletal: Positive for gait problem.        Pain to right foot   Skin: Positive for wound.   Allergic/Immunologic: Negative.    Hematological: Negative.    Psychiatric/Behavioral: Negative.  Negative for agitation.   All other systems reviewed and are negative.      Assessments:  · Environmental: clean house  · Functional Status: independent  · Safety:   · Nutritional: adequate  · Home Health/DME/Supplies: [facility]    PAST HISTORY:     Past Medical History:   Diagnosis Date    Glaucoma     Glaucoma (increased eye pressure)     Hypertension        Past Surgical History:   Procedure Laterality Date    AORTOGRAPHY WITH SERIALOGRAPHY Right 4/24/2020    Procedure: AORTOGRAM WITH RUNOFF;  Surgeon: Coral Baron MD;  Location: [facility] CATH LAB;  Service: Cardiology;  Laterality: Right;    APPENDECTOMY      CATARACT EXTRACTION W/  INTRAOCULAR LENS IMPLANT Left 10/16/2013        CATARACT EXTRACTION W/  INTRAOCULAR LENS IMPLANT Right 12/18/2013        FOOT AMPUTATION THROUGH METATARSAL Right 4/28/2020    Procedure: AMPUTATION, FOOT, TRANSMETATARSAL right;  Surgeon: Ha Munguia DPM;  Location: [facility] OR;  Service: Podiatry;  Laterality: Right;    HYSTERECTOMY         Family History   Problem Relation Age of Onset    Heart disease Mother     Cancer Sister     Amblyopia Neg Hx      Blindness Neg Hx     Macular degeneration Neg Hx     Retinal detachment Neg Hx     Stroke Neg Hx     Thyroid disease Neg Hx     Cataracts Neg Hx     Glaucoma Neg Hx        Social History     Socioeconomic History    Marital status:      Spouse name: Not on file    Number of children: Not on file    Years of education: Not on file    Highest education level: Not on file   Occupational History    Not on file   Social Needs    Financial resource strain: Not on file    Food insecurity:     Worry: Not on file     Inability: Not on file    Transportation needs:     Medical: Not on file     Non-medical: Not on file   Tobacco Use    Smoking status: Never Smoker    Smokeless tobacco: Never Used   Substance and Sexual Activity    Alcohol use: No    Drug use: Not on file    Sexual activity: Not on file   Lifestyle    Physical activity:     Days per week: Not on file     Minutes per session: Not on file    Stress: Not on file   Relationships    Social connections:     Talks on phone: Not on file     Gets together: Not on file     Attends Scientologist service: Not on file     Active member of club or organization: Not on file     Attends meetings of clubs or organizations: Not on file     Relationship status: Not on file   Other Topics Concern    Not on file   Social History Narrative    Not on file       MEDICATIONS & ALLERGIES:     Current Outpatient Medications on File Prior to Visit   Medication Sig Dispense Refill    aspirin (ECOTRIN) 81 MG EC tablet Take 1 tablet (81 mg total) by mouth once daily. 30 tablet 1    clopidogreL (PLAVIX) 75 mg tablet Take 1 tablet (75 mg total) by mouth once daily. 30 tablet 1    cyanocobalamin 500 MCG tablet Take 1 tablet (500 mcg total) by mouth once daily. 30 tablet 1    docusate sodium (COLACE) 100 MG capsule Take 1 capsule (100 mg total) by mouth daily as needed for Constipation. 30 capsule 1    ferrous sulfate 325 (65 FE) MG EC tablet Take 1 tablet (325 mg  total) by mouth every other day. 15 tablet 1    HYDROcodone-acetaminophen (NORCO)  mg per tablet Take 1 tablet by mouth every 6 (six) hours as needed. 28 tablet 0    latanoprost (XALATAN) 0.005 % ophthalmic solution Place 1 drop into both eyes every evening. 2.5 mL 12    NIFEdipine (PROCARDIA-XL) 90 MG (OSM) 24 hr tablet Take 1 tablet (90 mg total) by mouth once daily. 30 tablet 1    timolol maleate 0.5% (TIMOPTIC) 0.5 % Drop Place 1 drop into both eyes 2 (two) times daily. 5 mL 12     No current facility-administered medications on file prior to visit.         Review of patient's allergies indicates:  No Known Allergies    OBJECTIVE:     Vital Signs:  Vitals:    05/13/20 1506   BP: 122/62   Pulse: 72   Resp: 18   Temp: 98.7 °F (37.1 °C)     There is no height or weight on file to calculate BMI.     Physical Exam:  Physical Exam   Constitutional: She is oriented to person, place, and time. She appears well-developed and well-nourished. No distress.   HENT:   Head: Normocephalic and atraumatic.   Eyes: Pupils are equal, round, and reactive to light. EOM are normal.   Neck: Normal range of motion. Neck supple.   Cardiovascular: Normal rate, regular rhythm and normal heart sounds.   Pulmonary/Chest: Effort normal and breath sounds normal.   Abdominal: Soft. Bowel sounds are normal.   Musculoskeletal: Normal range of motion. She exhibits deformity. She exhibits no edema.   S/p amputation of all toes to right foot   Neurological: She is alert and oriented to person, place, and time. No cranial nerve deficit.   Skin: Skin is warm and dry.   Psychiatric: She has a normal mood and affect. Her behavior is normal. Judgment and thought content normal.   Vitals reviewed.      Laboratory  Lab Results   Component Value Date    WBC 9.77 05/01/2020    HGB 7.3 (L) 05/01/2020    HCT 23.2 (L) 05/01/2020    MCV 83 05/01/2020     (H) 05/01/2020     Lab Results   Component Value Date    INR 1.0 04/19/2020     Lab Results    Component Value Date    HGBA1C 5.4 04/20/2020     No results for input(s): POCTGLUCOSE in the last 72 hours.    Diagnostic Results:      TRANSITION OF CARE:     [facility] Contact Note: 5/4/2020    Family and/or Caretaker present at visit?  Yes.  Diagnostic tests reviewed/disposition: No diagnosic tests pending after this hospitalization.  Disease/illness education:   Home health/community services discussion/referrals: Patient has home health [facility] at [facility].   Establishment or re-establishment of referral orders for community resources: No other necessary community resources.   Discussion with other health care providers: Discussed with podiatry.     Transition of Care Visit:     I have reviewed and updated the history and problem list.  I have reconciled the medication list.  I have discussed the hospitalization and current medical issues, prognosis and plans with the patient/family.  I  spent more than 50% of time discussing the care with the patient/family.  Total Face-to-Face Encounter: 60 minutes.    Medications Reconciliation:   I have reconciled the patient's home medications and discharge medications with the patient/family. I have updated all changes.  Refer to After-Visit Medication List.    ASSESSMENT & PLAN:       Home was seen today for transitional care and wound check.    Diagnoses and all orders for this visit:    Gangrene  -     Ambulatory referral/consult to [facility]    S/P transmetatarsal amputation of foot, right  -     Ambulatory referral/consult to [facility] [place] [facility]    Discussed pt with podiatry office. Dr Munguia is out this week. Appt made for pt next week. Pt and son notified  Office staff discussed pt with Dr Munguia and he would like NP to change dressing.  Football dressing removed.  Wound appears to be healing. Sutures intact, minimal bleeding on dressing noted.  Some oozing to suture line near outer aspect. No redness or erythema noted, no  pus/drainage  Some bruising noted to dorsum of foot.  Area cleaned with sterile saline, telfa dressing applied  Cast padding x2 with coban to hold in place. No compression with dressing  Pt tolerated well. Reports some pain to her foot relieved with pain medication  Elevate foot on pillow.  PT/OT in place.  Orders to home health to change dressing on 5/15 as per above with football dressing.  Pt will make podiatry appt.    Were controlled substances prescribed?  No    Instructions for the patient:  Keep appt with Dr Munguia next week    Scheduled Follow-up :  Future Appointments   Date Time Provider Department Center   5/19/2020  4:30 PM [facility] Munguia DPM [facility] [place]       After Visit Medication List :     Medication List           Accurate as of May 13, 2020  3:10 PM. If you have any questions, ask your nurse or doctor.               CONTINUE taking these medications    aspirin 81 MG EC tablet  Commonly known as:  ECOTRIN  Take 1 tablet (81 mg total) by mouth once daily.     clopidogreL 75 mg tablet  Commonly known as:  PLAVIX  Take 1 tablet (75 mg total) by mouth once daily.     cyanocobalamin 500 MCG tablet  Take 1 tablet (500 mcg total) by mouth once daily.     ferrous sulfate 325 (65 FE) MG EC tablet  Take 1 tablet (325 mg total) by mouth every other day.     HYDROcodone-acetaminophen  mg per tablet  Commonly known as:  NORCO  Take 1 tablet by mouth every 6 (six) hours as needed.     latanoprost 0.005 % ophthalmic solution  Commonly known as:  XALATAN  Place 1 drop into both eyes every evening.     NIFEdipine 90 MG (OSM) 24 hr tablet  Commonly known as:  PROCARDIA-XL  Take 1 tablet (90 mg total) by mouth once daily.     STOOL SOFTENER 100 MG capsule  Generic drug:  docusate sodium  Take 1 capsule (100 mg total) by mouth daily as needed for Constipation.     timolol maleate 0.5% 0.5 % Drop  Commonly known as:  TIMOPTIC  Place 1 drop into both eyes 2 (two) times daily.        Attestation: Screening  criteria to assess the level of the patient's risk for infection with COVID-19 as recommended by the CDC at the time of the above documented home visit concluded appropriateness to proceed. Universal precautions were maintained at all times, including provider use of >60% alcohol gel hand  immediately prior to entry and upon departing the patient's home as well as cleaning of equipment used in home visit with antibacterial/germicidal disposable wipes.      Signature:  Irish Reyes NP    Patient consent obtained prior to treatment

## 2020-05-14 ENCOUNTER — DOCUMENT SCAN (OUTPATIENT)
Dept: HOME HEALTH SERVICES | Facility: HOSPITAL | Age: 80
End: 2020-05-14
Payer: MEDICARE

## 2020-05-18 ENCOUNTER — DOCUMENT SCAN (OUTPATIENT)
Dept: HOME HEALTH SERVICES | Facility: HOSPITAL | Age: 80
End: 2020-05-18
Payer: MEDICARE

## 2020-05-20 ENCOUNTER — OFFICE VISIT (OUTPATIENT)
Dept: PODIATRY | Facility: CLINIC | Age: 80
End: 2020-05-20
Payer: MEDICARE

## 2020-05-20 VITALS
DIASTOLIC BLOOD PRESSURE: 70 MMHG | BODY MASS INDEX: 17.89 KG/M2 | SYSTOLIC BLOOD PRESSURE: 143 MMHG | HEART RATE: 107 BPM | HEIGHT: 67 IN | WEIGHT: 114 LBS

## 2020-05-20 DIAGNOSIS — I73.9 PAD (PERIPHERAL ARTERY DISEASE): ICD-10-CM

## 2020-05-20 DIAGNOSIS — I96 GANGRENE OF RIGHT FOOT: Primary | ICD-10-CM

## 2020-05-20 DIAGNOSIS — Z98.890 POST-OPERATIVE STATE: ICD-10-CM

## 2020-05-20 PROCEDURE — 99999 PR PBB SHADOW E&M-EST. PATIENT-LVL III: ICD-10-PCS | Mod: PBBFAC,HCNC,, | Performed by: PODIATRIST

## 2020-05-20 PROCEDURE — 99499 UNLISTED E&M SERVICE: CPT | Mod: S$GLB,,, | Performed by: PODIATRIST

## 2020-05-20 PROCEDURE — 99024 PR POST-OP FOLLOW-UP VISIT: ICD-10-PCS | Mod: HCNC,S$GLB,, | Performed by: PODIATRIST

## 2020-05-20 PROCEDURE — 99999 PR PBB SHADOW E&M-EST. PATIENT-LVL III: CPT | Mod: PBBFAC,HCNC,, | Performed by: PODIATRIST

## 2020-05-20 PROCEDURE — 99024 POSTOP FOLLOW-UP VISIT: CPT | Mod: HCNC,S$GLB,, | Performed by: PODIATRIST

## 2020-05-20 PROCEDURE — 99499 RISK ADDL DX/OHS AUDIT: ICD-10-PCS | Mod: S$GLB,,, | Performed by: PODIATRIST

## 2020-05-20 NOTE — PROGRESS NOTES
Subjective:      Patient ID: Home De Dios is a 77 y.o. female.    Chief Complaint: Wound Care (Left Foot) and Foot Ulcer    Three weeks s/p TMA right.  Dressing CDI.  Relates some pain distal foot right.  Denies f/c/ns/n/v/d.  Relates decreased appetite.  Minimal WB right in sx shoe.    Review of Systems   Constitution: Negative for chills, diaphoresis, fever, malaise/fatigue and night sweats.   Cardiovascular: Negative for claudication, cyanosis, leg swelling and syncope.   Skin: Positive for poor wound healing. Negative for color change, dry skin, nail changes, rash, suspicious lesions and unusual hair distribution.   Musculoskeletal: Negative for falls, joint pain, joint swelling, muscle cramps, muscle weakness and stiffness.   Gastrointestinal: Negative for constipation, diarrhea, nausea and vomiting.   Neurological: Negative for brief paralysis, disturbances in coordination, focal weakness, numbness, paresthesias, sensory change and tremors.           Objective:      Physical Exam   Constitutional: She is oriented to person, place, and time. She appears well-developed and well-nourished. She is cooperative. No distress.   Cardiovascular:   Pulses:       Popliteal pulses are 2+ on the right side, and 2+ on the left side.        Dorsalis pedis pulses are 1+ on the right side, and 1+ on the left side.        Posterior tibial pulses are 1+ on the right side, and 1+ on the left side.   Capillary refill 3 seconds all toes left foot which is warm to touch.    Distal right foot tepid-cold with black ischemic necrosis of skin.     Negative lymphadenopathy bilateral popliteal fossa and tarsal tunnel.      Negavie lower extremity edema bilateral.     Musculoskeletal:        Right ankle: She exhibits normal range of motion, no swelling, no ecchymosis, no deformity, no laceration and normal pulse. Achilles tendon normal. Achilles tendon exhibits no pain, no defect and normal Hale's test results.   Normal angle, base,  station of gait. All ten toes without clubbing, cyanosis, or signs of ischemia.  No pain to palpation bilateral lower extremities.  Range of motion, stability, muscle strength, and muscle tone normal bilateral feet and legs.    Lymphadenopathy: No inguinal adenopathy noted on the right or left side.   Negative lymphadenopathy bilateral popliteal fossa and tarsal tunnel.    Negative lymphangitic streaking bilateral feet/ankles/legs.   Neurological: She is alert and oriented to person, place, and time. She has normal strength. She displays no atrophy and no tremor. No sensory deficit. She exhibits normal muscle tone. Gait normal.   Reflex Scores:       Patellar reflexes are 2+ on the right side and 2+ on the left side.       Achilles reflexes are 2+ on the right side and 2+ on the left side.  Negative tinel sign to percussion sural, superficial peroneal, deep peroneal, saphenous, and posterior tibial nerves right and left ankles and feet.     Skin: Skin is warm, dry and intact. Capillary refill takes 2 to 3 seconds. No abrasion, no bruising, no burn, no ecchymosis, no laceration, no lesion and no rash noted. She is not diaphoretic. No cyanosis or erythema. No pallor. Nails show no clubbing.     Distal right foot surgical site is black stable escharotic necrosis with skin breakdown and ulceration about 3.5cm proximal to incision on dorsal surface  without , pus, tracking, fluctuance, malodor, or cardinal signs infection.     Otherwise, Skin thin, shiny, atrophic, with decreased density and distribution of pedal hair bilateral, but without hyperpigmentation, zully discoloration,  ulcers, masses, nodules or cords palpated bilateral feet and legs.   Psychiatric: She has a normal mood and affect.             Assessment:       Encounter Diagnoses   Name Primary?    Gangrene of right foot Yes    PAD (peripheral artery disease)     Post-operative state          Plan:       Home was seen today for wound care and foot  ulcer.    Diagnoses and all orders for this visit:    Gangrene of right foot    PAD (peripheral artery disease)    Post-operative state      I counseled the patient on her conditions, their implications and medical management.    Unfortunately, I have nothing left to offer this patient.  I discussed that the safest, most direct route to healing and safety is to have BKA.  She and her son agree with the plan reluctantly verbally acknowledging so.    Spoke with Dr. Mccord:  Will direct admit to hospitalist service tomorrow 9 am via ED logistically (post covid-19 access point).    Dressed wound with iodoform.    Patient will need surgical consult for BKA right.    Recommend dressing change orders as follows until BKA:    Clean wound with wound cleanser.  Blot dry.  Swab with Betadine.  Cover with wound foam.  Secure with Kerlix/tape.    No compression or elevation.    Recommend heel protector boots bilateral.          Follow up in about 2 weeks (around 6/3/2020) for pad.

## 2020-05-21 ENCOUNTER — DOCUMENT SCAN (OUTPATIENT)
Dept: HOME HEALTH SERVICES | Facility: HOSPITAL | Age: 80
End: 2020-05-21
Payer: MEDICARE

## 2020-05-21 ENCOUNTER — HOSPITAL ENCOUNTER (INPATIENT)
Facility: OTHER | Age: 80
LOS: 7 days | Discharge: NURSING, REHAB OR LONG-TERM CARE | DRG: 240 | End: 2020-05-28
Attending: EMERGENCY MEDICINE | Admitting: EMERGENCY MEDICINE
Payer: MEDICARE

## 2020-05-21 DIAGNOSIS — I96 GANGRENE OF RIGHT FOOT: Primary | ICD-10-CM

## 2020-05-21 PROBLEM — E87.5 HYPERKALEMIA: Status: ACTIVE | Noted: 2020-05-21

## 2020-05-21 LAB
ABO + RH BLD: NORMAL
ANION GAP SERPL CALC-SCNC: 14 MMOL/L (ref 8–16)
BASOPHILS # BLD AUTO: 0.05 K/UL (ref 0–0.2)
BASOPHILS NFR BLD: 0.9 % (ref 0–1.9)
BLD GP AB SCN CELLS X3 SERPL QL: NORMAL
BLOOD GROUP ANTIBODIES SERPL: NORMAL
BUN SERPL-MCNC: 17 MG/DL (ref 8–23)
CALCIUM SERPL-MCNC: 9.5 MG/DL (ref 8.7–10.5)
CHLORIDE SERPL-SCNC: 114 MMOL/L (ref 95–110)
CO2 SERPL-SCNC: 25 MMOL/L (ref 23–29)
CREAT SERPL-MCNC: 2.2 MG/DL (ref 0.5–1.4)
DIFFERENTIAL METHOD: ABNORMAL
EOSINOPHIL # BLD AUTO: 0.5 K/UL (ref 0–0.5)
EOSINOPHIL NFR BLD: 8.5 % (ref 0–8)
ERYTHROCYTE [DISTWIDTH] IN BLOOD BY AUTOMATED COUNT: 15.2 % (ref 11.5–14.5)
EST. GFR  (AFRICAN AMERICAN): 24 ML/MIN/1.73 M^2
EST. GFR  (NON AFRICAN AMERICAN): 21 ML/MIN/1.73 M^2
GLUCOSE SERPL-MCNC: 102 MG/DL (ref 70–110)
HCT VFR BLD AUTO: 30 % (ref 37–48.5)
HGB BLD-MCNC: 8.9 G/DL (ref 12–16)
IMM GRANULOCYTES # BLD AUTO: 0.01 K/UL (ref 0–0.04)
IMM GRANULOCYTES NFR BLD AUTO: 0.2 % (ref 0–0.5)
LYMPHOCYTES # BLD AUTO: 1.3 K/UL (ref 1–4.8)
LYMPHOCYTES NFR BLD: 21.6 % (ref 18–48)
MAGNESIUM SERPL-MCNC: 2.2 MG/DL (ref 1.6–2.6)
MCH RBC QN AUTO: 25.7 PG (ref 27–31)
MCHC RBC AUTO-ENTMCNC: 29.7 G/DL (ref 32–36)
MCV RBC AUTO: 87 FL (ref 82–98)
MONOCYTES # BLD AUTO: 0.5 K/UL (ref 0.3–1)
MONOCYTES NFR BLD: 9 % (ref 4–15)
NEUTROPHILS # BLD AUTO: 3.5 K/UL (ref 1.8–7.7)
NEUTROPHILS NFR BLD: 59.8 % (ref 38–73)
NRBC BLD-RTO: 0 /100 WBC
PHOSPHATE SERPL-MCNC: 3.5 MG/DL (ref 2.7–4.5)
PLATELET # BLD AUTO: 349 K/UL (ref 150–350)
PMV BLD AUTO: 10.9 FL (ref 9.2–12.9)
POTASSIUM SERPL-SCNC: 4.5 MMOL/L (ref 3.5–5.1)
RBC # BLD AUTO: 3.46 M/UL (ref 4–5.4)
SARS-COV-2 RDRP RESP QL NAA+PROBE: NEGATIVE
SODIUM SERPL-SCNC: 153 MMOL/L (ref 136–145)
WBC # BLD AUTO: 5.79 K/UL (ref 3.9–12.7)

## 2020-05-21 PROCEDURE — 86870 RBC ANTIBODY IDENTIFICATION: CPT | Mod: HCNC

## 2020-05-21 PROCEDURE — 94761 N-INVAS EAR/PLS OXIMETRY MLT: CPT | Mod: HCNC

## 2020-05-21 PROCEDURE — 84100 ASSAY OF PHOSPHORUS: CPT | Mod: HCNC

## 2020-05-21 PROCEDURE — 80048 BASIC METABOLIC PNL TOTAL CA: CPT | Mod: HCNC

## 2020-05-21 PROCEDURE — 99285 EMERGENCY DEPT VISIT HI MDM: CPT | Mod: 25,HCNC

## 2020-05-21 PROCEDURE — 99223 PR INITIAL HOSPITAL CARE,LEVL III: ICD-10-PCS | Mod: HCNC,AI,, | Performed by: INTERNAL MEDICINE

## 2020-05-21 PROCEDURE — 36415 COLL VENOUS BLD VENIPUNCTURE: CPT | Mod: HCNC

## 2020-05-21 PROCEDURE — 99223 1ST HOSP IP/OBS HIGH 75: CPT | Mod: HCNC,AI,, | Performed by: INTERNAL MEDICINE

## 2020-05-21 PROCEDURE — 83735 ASSAY OF MAGNESIUM: CPT | Mod: HCNC

## 2020-05-21 PROCEDURE — U0002 COVID-19 LAB TEST NON-CDC: HCPCS | Mod: HCNC

## 2020-05-21 PROCEDURE — 86850 RBC ANTIBODY SCREEN: CPT | Mod: HCNC

## 2020-05-21 PROCEDURE — 86905 BLOOD TYPING RBC ANTIGENS: CPT | Mod: HCNC

## 2020-05-21 PROCEDURE — 85025 COMPLETE CBC W/AUTO DIFF WBC: CPT | Mod: HCNC

## 2020-05-21 PROCEDURE — 25000003 PHARM REV CODE 250: Mod: HCNC | Performed by: INTERNAL MEDICINE

## 2020-05-21 PROCEDURE — 11000001 HC ACUTE MED/SURG PRIVATE ROOM: Mod: HCNC

## 2020-05-21 RX ORDER — ACETAMINOPHEN 325 MG/1
650 TABLET ORAL EVERY 6 HOURS PRN
Status: DISCONTINUED | OUTPATIENT
Start: 2020-05-21 | End: 2020-05-28 | Stop reason: HOSPADM

## 2020-05-21 RX ORDER — FAMOTIDINE 20 MG/1
20 TABLET, FILM COATED ORAL 2 TIMES DAILY PRN
Status: DISCONTINUED | OUTPATIENT
Start: 2020-05-21 | End: 2020-05-28 | Stop reason: HOSPADM

## 2020-05-21 RX ORDER — OXYCODONE HYDROCHLORIDE 5 MG/1
5 TABLET ORAL EVERY 4 HOURS PRN
Status: DISCONTINUED | OUTPATIENT
Start: 2020-05-21 | End: 2020-05-28 | Stop reason: HOSPADM

## 2020-05-21 RX ORDER — SODIUM CHLORIDE 9 MG/ML
INJECTION, SOLUTION INTRAVENOUS CONTINUOUS
Status: DISCONTINUED | OUTPATIENT
Start: 2020-05-21 | End: 2020-05-22

## 2020-05-21 RX ORDER — ONDANSETRON 2 MG/ML
4 INJECTION INTRAMUSCULAR; INTRAVENOUS EVERY 8 HOURS PRN
Status: DISCONTINUED | OUTPATIENT
Start: 2020-05-21 | End: 2020-05-28 | Stop reason: HOSPADM

## 2020-05-21 RX ORDER — FERROUS SULFATE 325(65) MG
325 TABLET, DELAYED RELEASE (ENTERIC COATED) ORAL EVERY OTHER DAY
Status: DISCONTINUED | OUTPATIENT
Start: 2020-05-22 | End: 2020-05-28 | Stop reason: HOSPADM

## 2020-05-21 RX ORDER — SODIUM CHLORIDE 0.9 % (FLUSH) 0.9 %
10 SYRINGE (ML) INJECTION
Status: DISCONTINUED | OUTPATIENT
Start: 2020-05-21 | End: 2020-05-28 | Stop reason: HOSPADM

## 2020-05-21 RX ORDER — LATANOPROST 50 UG/ML
1 SOLUTION/ DROPS OPHTHALMIC NIGHTLY
Status: DISCONTINUED | OUTPATIENT
Start: 2020-05-21 | End: 2020-05-28 | Stop reason: HOSPADM

## 2020-05-21 RX ORDER — NIFEDIPINE 30 MG/1
90 TABLET, EXTENDED RELEASE ORAL DAILY
Status: DISCONTINUED | OUTPATIENT
Start: 2020-05-21 | End: 2020-05-28 | Stop reason: HOSPADM

## 2020-05-21 RX ORDER — CYANOCOBALAMIN (VITAMIN B-12) 250 MCG
500 TABLET ORAL DAILY
Status: DISCONTINUED | OUTPATIENT
Start: 2020-05-21 | End: 2020-05-28 | Stop reason: HOSPADM

## 2020-05-21 RX ORDER — TIMOLOL MALEATE 5 MG/ML
1 SOLUTION/ DROPS OPHTHALMIC 2 TIMES DAILY
Status: DISCONTINUED | OUTPATIENT
Start: 2020-05-21 | End: 2020-05-28 | Stop reason: HOSPADM

## 2020-05-21 RX ORDER — DOCUSATE SODIUM 100 MG/1
100 CAPSULE, LIQUID FILLED ORAL DAILY PRN
Status: DISCONTINUED | OUTPATIENT
Start: 2020-05-21 | End: 2020-05-28 | Stop reason: HOSPADM

## 2020-05-21 RX ADMIN — CYANOCOBALAMIN TAB 250 MCG 500 MCG: 250 TAB at 02:05

## 2020-05-21 RX ADMIN — LATANOPROST 1 DROP: 50 SOLUTION OPHTHALMIC at 10:05

## 2020-05-21 RX ADMIN — SODIUM CHLORIDE: 0.9 INJECTION, SOLUTION INTRAVENOUS at 04:05

## 2020-05-21 RX ADMIN — NIFEDIPINE 90 MG: 30 TABLET, FILM COATED, EXTENDED RELEASE ORAL at 02:05

## 2020-05-21 RX ADMIN — TIMOLOL MALEATE 1 DROP: 5 SOLUTION/ DROPS OPHTHALMIC at 10:05

## 2020-05-21 NOTE — ASSESSMENT & PLAN NOTE
Cr 2.2 up from 1.5  Not on ACE/ARB/diuretic  No recent contrast  Give IVF  Monitor uop and labs  Renal dose meds, avoid nephrotoxins

## 2020-05-21 NOTE — ED NOTES
Called to floor, spoke to RN, advised that she would need to call this RN back for report. Awaiting call back to get pt up to floor.

## 2020-05-21 NOTE — ED PROVIDER NOTES
Encounter Date: 5/21/2020    SCRIBE #1 NOTE: Nalini EATON am scribing for, and in the presence of, Dr. Zaman.       History     Chief Complaint   Patient presents with    Foot Pain     pt sent from PCP for direct admit for BKA.      Time seen by provider: 8:21 AM    This is a 77 y.o. female who presents from her podiatrist, Dr. Munguia, for direct admission for right BKA. Patient reports pain to right foot since March. Per medical records, there is gangrene of right foot. She denies hx of dm.     The history is provided by the patient and medical records.     Review of patient's allergies indicates:  No Known Allergies  Past Medical History:   Diagnosis Date    Glaucoma     Glaucoma (increased eye pressure)     Hypertension      Past Surgical History:   Procedure Laterality Date    AORTOGRAPHY WITH SERIALOGRAPHY Right 4/24/2020    Procedure: AORTOGRAM WITH RUNOFF;  Surgeon: Coral Baron MD;  Location: [facility] CATH LAB;  Service: Cardiology;  Laterality: Right;    APPENDECTOMY      CATARACT EXTRACTION W/  INTRAOCULAR LENS IMPLANT Left 10/16/2013        CATARACT EXTRACTION W/  INTRAOCULAR LENS IMPLANT Right 12/18/2013        FOOT AMPUTATION THROUGH METATARSAL Right 4/28/2020    Procedure: AMPUTATION, FOOT, TRANSMETATARSAL right;  Surgeon: Ha Munguia DPM;  Location: [facility] OR;  Service: Podiatry;  Laterality: Right;    HYSTERECTOMY       Family History   Problem Relation Age of Onset    Heart disease Mother     Cancer Sister     Amblyopia Neg Hx     Blindness Neg Hx     Macular degeneration Neg Hx     Retinal detachment Neg Hx     Stroke Neg Hx     Thyroid disease Neg Hx     Cataracts Neg Hx     Glaucoma Neg Hx      Social History     Tobacco Use    Smoking status: Never Smoker    Smokeless tobacco: Never Used   Substance Use Topics    Alcohol use: No    Drug use: Not on file     Review of Systems   Constitutional: Negative for chills and fever.   HENT: Negative  for congestion, sore throat and trouble swallowing.    Eyes: Negative for photophobia and visual disturbance.   Respiratory: Negative for shortness of breath.    Cardiovascular: Negative for chest pain.   Gastrointestinal: Negative for abdominal pain, nausea and vomiting.   Genitourinary: Negative for dysuria and hematuria.   Musculoskeletal: Negative for back pain and neck pain.        Positive for foot pain.   Skin: Positive for wound. Negative for rash.   Neurological: Negative for weakness and headaches.   Psychiatric/Behavioral: Negative.        Physical Exam     Initial Vitals [05/21/20 0820]   BP Pulse Resp Temp SpO2   (!) 147/67 90 18 98.2 °F (36.8 °C) 98 %      MAP       --         Physical Exam    Nursing note and vitals reviewed.  Constitutional: She appears well-developed and well-nourished. No distress.   HENT:   Head: Normocephalic and atraumatic.   Eyes: Conjunctivae and EOM are normal. Pupils are equal, round, and reactive to light.   Neck: Normal range of motion. Neck supple.   Cardiovascular: Normal rate, regular rhythm and normal heart sounds. Exam reveals no gallop and no friction rub.    No murmur heard.  Pulmonary/Chest: Breath sounds normal. No respiratory distress. She has no wheezes. She has no rhonchi. She has no rales.   Abdominal: Soft. There is no tenderness. There is no rebound and no guarding.   Musculoskeletal: Normal range of motion. She exhibits no edema or tenderness.   Neurological: She is alert and oriented to person, place, and time. She has normal strength.   Skin: Skin is warm and dry.   Bandage in place to right foot. Deferred exam due to bandage placed yesterday by podiatry. No leakage from bandage.   Left foot with dry flaky skin. Callus left small toe.   Psychiatric: She has a normal mood and affect. Her behavior is normal. Judgment and thought content normal.         ED Course   Procedures  Labs Reviewed   SARS-COV-2 RNA AMPLIFICATION, QUAL    Narrative:     Needs  admission   CBC W/ AUTO DIFFERENTIAL   BASIC METABOLIC PANEL          Imaging Results    None          Medical Decision Making:   History:   Old Medical Records: I decided to obtain old medical records.  Old Records Summarized: records from clinic visits.       <> Summary of Records: Reviewed medical records.  Patient seen by Podiatry yesterday.  Worsening gangrene of the foot in recommends likely need for BKA.  Clinical Tests:   Lab Tests: Ordered and Reviewed            Scribe Attestation:   Scribe #1: I performed the above scribed service and the documentation accurately describes the services I performed. I attest to the accuracy of the note.    Attending Attestation:           Physician Attestation for Scribe:  Physician Attestation Statement for Scribe #1: I, Dr. Zaman, reviewed documentation, as scribed by Nalini Kuhn in my presence, and it is both accurate and complete.                 ED Course as of May 21 0944   Thu May 21, 2020   0837 A 77-year-old female sent over for direct admission due to gangrenous right foot.  Patient has no other complaints.  Will get labs including a COVID-19.  I did not remove the dressing on the ft as it was just placed by the specialist yesterday, however I did review his notes of the description of the foot.    Distal right foot tepid-cold with black ischemic necrosis of skin.      [SM]   0929 COVID-19 negative.  Will speak with Hospital Medicine.    [SM]   0961 This is the extent to the patients complaints today here in the emergency department.    [SM]      ED Course User Index  [SM] Uziel Zaman DO                Clinical Impression:     1. Gangrene of right foot            ED Disposition Condition    Admit                           Uziel Zaman DO  05/21/20 0910

## 2020-05-21 NOTE — ED NOTES
Called back to floor, was advised charge RNBessy will call this RN back for report. Awaiting call back.

## 2020-05-21 NOTE — NURSING
"Pt arrived to floor via stretcher with transport ,and transferred to bed. SCDs applied, oriented to room, call light placed within reach, bed low and locked No complaints of pain. Ask about Pt bowel history, pt states "she haven't had a bowel movement in a long time she's having trouble in that area". No acute distress noted at this time. Will continue to monitor.    "

## 2020-05-21 NOTE — NURSING
Pt free of trauma, falls, and injury. Pt VSS and afebrile throughout shift. Pt free of skin breakdown. Pt dressing is clean, dry, and intact. Pt has been eating and voiding adequately throughout shift. Pt has call light in reach, bed alarm on, bed brakes on, side rails up x2, bed in low position, SCDs and heel boots on, nonskid socks on. Pt lying in bed in no distress. Hourly rounding performed this shift

## 2020-05-21 NOTE — SUBJECTIVE & OBJECTIVE
Past Medical History:   Diagnosis Date    Glaucoma     Glaucoma (increased eye pressure)     Hypertension        Past Surgical History:   Procedure Laterality Date    AORTOGRAPHY WITH SERIALOGRAPHY Right 4/24/2020    Procedure: AORTOGRAM WITH RUNOFF;  Surgeon: Coral Baron MD;  Location: [facility] CATH LAB;  Service: Cardiology;  Laterality: Right;    APPENDECTOMY      CATARACT EXTRACTION W/  INTRAOCULAR LENS IMPLANT Left 10/16/2013        CATARACT EXTRACTION W/  INTRAOCULAR LENS IMPLANT Right 12/18/2013        FOOT AMPUTATION THROUGH METATARSAL Right 4/28/2020    Procedure: AMPUTATION, FOOT, TRANSMETATARSAL right;  Surgeon: Ha Munguia DPM;  Location: [facility] OR;  Service: Podiatry;  Laterality: Right;    HYSTERECTOMY         Review of patient's allergies indicates:  No Known Allergies    No current facility-administered medications on file prior to encounter.      Current Outpatient Medications on File Prior to Encounter   Medication Sig    aspirin (ECOTRIN) 81 MG EC tablet Take 1 tablet (81 mg total) by mouth once daily.    clopidogreL (PLAVIX) 75 mg tablet Take 1 tablet (75 mg total) by mouth once daily.    cyanocobalamin 500 MCG tablet Take 1 tablet (500 mcg total) by mouth once daily.    docusate sodium (COLACE) 100 MG capsule Take 1 capsule (100 mg total) by mouth daily as needed for Constipation.    ferrous sulfate 325 (65 FE) MG EC tablet Take 1 tablet (325 mg total) by mouth every other day.    HYDROcodone-acetaminophen (NORCO)  mg per tablet Take 1 tablet by mouth every 6 (six) hours as needed.    latanoprost (XALATAN) 0.005 % ophthalmic solution Place 1 drop into both eyes every evening.    NIFEdipine (PROCARDIA-XL) 90 MG (OSM) 24 hr tablet Take 1 tablet (90 mg total) by mouth once daily.    timolol maleate 0.5% (TIMOPTIC) 0.5 % Drop Place 1 drop into both eyes 2 (two) times daily.     Family History     Problem Relation (Age of Onset)    Cancer Sister     Heart disease Mother        Tobacco Use    Smoking status: Never Smoker    Smokeless tobacco: Never Used   Substance and Sexual Activity    Alcohol use: No    Drug use: Not on file    Sexual activity: Not on file     Review of Systems   Constitutional: Negative for chills and fever.   HENT: Negative for sinus pressure and sinus pain.    Respiratory: Negative for chest tightness and shortness of breath.    Cardiovascular: Negative for chest pain, palpitations and leg swelling.   Gastrointestinal: Negative for abdominal pain and blood in stool.   Endocrine: Negative for polydipsia and polyphagia.   Genitourinary: Negative for dysuria, flank pain and frequency.   Musculoskeletal: Negative for arthralgias, back pain and joint swelling.   Neurological: Negative for seizures and syncope.   Hematological: Does not bruise/bleed easily.   Psychiatric/Behavioral: Negative for agitation. The patient is not nervous/anxious.      Objective:     Vital Signs (Most Recent):  Temp: 97.9 °F (36.6 °C) (05/21/20 1216)  Pulse: 61 (05/21/20 1216)  Resp: 16 (05/21/20 1216)  BP: (!) 146/65 (05/21/20 1216)  SpO2: 100 % (05/21/20 1216) Vital Signs (24h Range):  Temp:  [97.9 °F (36.6 °C)-98.2 °F (36.8 °C)] 97.9 °F (36.6 °C)  Pulse:  [] 61  Resp:  [16-18] 16  SpO2:  [97 %-100 %] 100 %  BP: (121-147)/(58-97) 146/65        There is no height or weight on file to calculate BMI.    Physical Exam   Constitutional: She is oriented to person, place, and time. She appears well-developed and well-nourished. No distress.   HENT:   Head: Normocephalic and atraumatic.   Eyes: Conjunctivae are normal. No scleral icterus.   Neck: No JVD present. No tracheal deviation present. No thyromegaly present.   Cardiovascular: Regular rhythm and normal heart sounds. Exam reveals no gallop.   No murmur heard.  Pulmonary/Chest: Effort normal and breath sounds normal. She has no wheezes. She has no rales.   Abdominal: Soft. Bowel sounds are normal. She  exhibits no distension. There is no tenderness. No hernia.   Musculoskeletal: She exhibits no edema.   See associated photos of gangrenous foot   Neurological: She is alert and oriented to person, place, and time.   Skin: Skin is warm and dry. Capillary refill takes less than 2 seconds. No rash noted. She is not diaphoretic. No erythema.   Psychiatric: She has a normal mood and affect.                   Significant Labs:   BMP:   Recent Labs   Lab 05/21/20  0854      *   K 4.5   *   CO2 25   BUN 17   CREATININE 2.2*   CALCIUM 9.5     CBC:   Recent Labs   Lab 05/21/20  0854   WBC 5.79   HGB 8.9*   HCT 30.0*          Significant Imaging: I have reviewed all pertinent imaging results/findings within the past 24 hours.

## 2020-05-21 NOTE — HPI
Direct admit from podiatry clinic for right BKA.     She states the toes of her right foot began changing color and turning black about 4-5 weeks agoShe states this began after soaking her foot in bleach and Epsom salts and then cutting her toe nails. The right foot subsequently became itchy, and then numb, particularly on the 1st, 2nd and 4th toes.      She had a TMA  3 weeks ago.    Since then she has developed an area of wet gangrene involving the imputation site.    Podiatry feels they have nothing left to offer and  most direct route to healing and safety is to have BKA.    Pt denies fever / chills.    Notes minimal amount of drainage from foot below surgical incision.

## 2020-05-21 NOTE — ED NOTES
Called to floor, advised [place], RN to call this RN back. Will await call back, Charge RN aware.

## 2020-05-21 NOTE — PLAN OF CARE
Discharge Planning:  Patient admitted on 5-21-20  LOS- day 0  Chart reviewed, Care plan discussed with Ms De Dios  Discussed care plan with treatment team,  attending Dr Barker  Consults following are: case mgt., surgery  PCP updated in Epic: yes  Insurance: medicare  DME at home: [facility], r/w  Current dispo: await for surgery to follow and assess (possible BKA)  Transportation: has reliable  Case management to follow        05/21/20 1413   Discharge Assessment   Assessment Type Discharge Planning Assessment   Confirmed/corrected address and phone number on facesheet? Yes   Assessment information obtained from? Patient;Caregiver;Medical Record   Communicated expected length of stay with patient/caregiver yes   Prior to hospitilization cognitive status: Alert/Oriented   Prior to hospitalization functional status: Assistive Equipment   Current cognitive status: Alert/Oriented   Current Functional Status: Assistive Equipment   Lives With child(rj), adult;spouse   Able to Return to Prior Arrangements yes   Is patient able to care for self after discharge? Yes   Equipment Currently Used at Home walker, rolling;bedside commode   Do you have any problems affording any of your prescribed medications? No   Is the patient taking medications as prescribed? yes   Does the patient have transportation home? Yes   Transportation Anticipated family or friend will provide   Discharge Plan A Rehab

## 2020-05-21 NOTE — PLAN OF CARE
05/21/20 1411   Readmission Questionnaire   At the time of your discharge, did someone talk to you about what your health problems were? Yes   At the time of discharge, did someone talk to you about what to watch out for regarding worsening of your health problem? Yes   At the time of discharge, did someone talk to you about what to do if you experienced worsening of your health problem? Yes   At the time of discharge, did someone talk to you about which medication to take when you left the hospital and which ones to stop taking? Yes   At the time of discharge, did someone talk to you about when and where to follow up with a doctor after you left the hospital? Yes

## 2020-05-21 NOTE — ED NOTES
"Pt came into ED this morning per request of her Md for direct admit to hospital for potential surgery of RIGHT foot " gangrene". Pt assisted to bed, VS obtained, Md in room for eval. Locked bed in lowest position, rail for safety. Call light within reach and advised to use for assistance. Will continue to monitor.   "

## 2020-05-21 NOTE — H&P
[facility] Hospital Medicine  History & Physical    Patient Name: Home De Dios  MRN: [id number]  Admission Date: 5/21/2020  Attending Physician: Michael Barker MD   Primary Care Provider: Quintin Cerda MD         Patient information was obtained from patient and past medical records.     Subjective:     Principal Problem:Gangrene of right foot    Chief Complaint:   Chief Complaint   Patient presents with    Foot Pain     pt sent from PCP for direct admit for BKA.         HPI: Direct admit from podiatry clinic for right BKA.     She states the toes of her right foot began changing color and turning black about 4-5 weeks agoShe states this began after soaking her foot in bleach and Epsom salts and then cutting her toe nails. The right foot subsequently became itchy, and then numb, particularly on the 1st, 2nd and 4th toes.      She had a TMA  3 weeks ago.    Since then she has developed an area of wet gangrene involving the imputation site.    Podiatry feels they have nothing left to offer and  most direct route to healing and safety is to have BKA.    Pt denies fever / chills.    Notes minimal amount of drainage from foot below surgical incision.      Past Medical History:   Diagnosis Date    Glaucoma     Glaucoma (increased eye pressure)     Hypertension        Past Surgical History:   Procedure Laterality Date    AORTOGRAPHY WITH SERIALOGRAPHY Right 4/24/2020    Procedure: AORTOGRAM WITH RUNOFF;  Surgeon: oCral Baron MD;  Location: [facility] CATH LAB;  Service: Cardiology;  Laterality: Right;    APPENDECTOMY      CATARACT EXTRACTION W/  INTRAOCULAR LENS IMPLANT Left 10/16/2013        CATARACT EXTRACTION W/  INTRAOCULAR LENS IMPLANT Right 12/18/2013        FOOT AMPUTATION THROUGH METATARSAL Right 4/28/2020    Procedure: AMPUTATION, FOOT, TRANSMETATARSAL right;  Surgeon: Ha Munguia DPM;  Location: [facility] OR;  Service: Podiatry;  Laterality: Right;     HYSTERECTOMY         Review of patient's allergies indicates:  No Known Allergies    No current facility-administered medications on file prior to encounter.      Current Outpatient Medications on File Prior to Encounter   Medication Sig    aspirin (ECOTRIN) 81 MG EC tablet Take 1 tablet (81 mg total) by mouth once daily.    clopidogreL (PLAVIX) 75 mg tablet Take 1 tablet (75 mg total) by mouth once daily.    cyanocobalamin 500 MCG tablet Take 1 tablet (500 mcg total) by mouth once daily.    docusate sodium (COLACE) 100 MG capsule Take 1 capsule (100 mg total) by mouth daily as needed for Constipation.    ferrous sulfate 325 (65 FE) MG EC tablet Take 1 tablet (325 mg total) by mouth every other day.    HYDROcodone-acetaminophen (NORCO)  mg per tablet Take 1 tablet by mouth every 6 (six) hours as needed.    latanoprost (XALATAN) 0.005 % ophthalmic solution Place 1 drop into both eyes every evening.    NIFEdipine (PROCARDIA-XL) 90 MG (OSM) 24 hr tablet Take 1 tablet (90 mg total) by mouth once daily.    timolol maleate 0.5% (TIMOPTIC) 0.5 % Drop Place 1 drop into both eyes 2 (two) times daily.     Family History     Problem Relation (Age of Onset)    Cancer Sister    Heart disease Mother        Tobacco Use    Smoking status: Never Smoker    Smokeless tobacco: Never Used   Substance and Sexual Activity    Alcohol use: No    Drug use: Not on file    Sexual activity: Not on file     Review of Systems   Constitutional: Negative for chills and fever.   HENT: Negative for sinus pressure and sinus pain.    Respiratory: Negative for chest tightness and shortness of breath.    Cardiovascular: Negative for chest pain, palpitations and leg swelling.   Gastrointestinal: Negative for abdominal pain and blood in stool.   Endocrine: Negative for polydipsia and polyphagia.   Genitourinary: Negative for dysuria, flank pain and frequency.   Musculoskeletal: Negative for arthralgias, back pain and joint swelling.    Neurological: Negative for seizures and syncope.   Hematological: Does not bruise/bleed easily.   Psychiatric/Behavioral: Negative for agitation. The patient is not nervous/anxious.      Objective:     Vital Signs (Most Recent):  Temp: 97.9 °F (36.6 °C) (05/21/20 1216)  Pulse: 61 (05/21/20 1216)  Resp: 16 (05/21/20 1216)  BP: (!) 146/65 (05/21/20 1216)  SpO2: 100 % (05/21/20 1216) Vital Signs (24h Range):  Temp:  [97.9 °F (36.6 °C)-98.2 °F (36.8 °C)] 97.9 °F (36.6 °C)  Pulse:  [] 61  Resp:  [16-18] 16  SpO2:  [97 %-100 %] 100 %  BP: (121-147)/(58-97) 146/65        There is no height or weight on file to calculate BMI.    Physical Exam   Constitutional: She is oriented to person, place, and time. She appears well-developed and well-nourished. No distress.   HENT:   Head: Normocephalic and atraumatic.   Eyes: Conjunctivae are normal. No scleral icterus.   Neck: No JVD present. No tracheal deviation present. No thyromegaly present.   Cardiovascular: Regular rhythm and normal heart sounds. Exam reveals no gallop.   No murmur heard.  Pulmonary/Chest: Effort normal and breath sounds normal. She has no wheezes. She has no rales.   Abdominal: Soft. Bowel sounds are normal. She exhibits no distension. There is no tenderness. No hernia.   Musculoskeletal: She exhibits no edema.   See associated photos of gangrenous foot   Neurological: She is alert and oriented to person, place, and time.   Skin: Skin is warm and dry. Capillary refill takes less than 2 seconds. No rash noted. She is not diaphoretic. No erythema.   Psychiatric: She has a normal mood and affect.                   Significant Labs:   BMP:   Recent Labs   Lab 05/21/20  0854      *   K 4.5   *   CO2 25   BUN 17   CREATININE 2.2*   CALCIUM 9.5     CBC:   Recent Labs   Lab 05/21/20  0854   WBC 5.79   HGB 8.9*   HCT 30.0*          Significant Imaging: I have reviewed all pertinent imaging results/findings within the past 24  hours.    Assessment/Plan:     * Gangrene of right foot  Surgical consultation for possible BKA.  3 weeks s/p TMA  No obvious infection so will hold off Abx      Hyperkalemia  Mild   monitor      Malnutrition of mild degree  Monitor caloric intake and weights.  Po supplements      MICKEY (acute kidney injury)  Cr 2.2 up from 1.5  Not on ACE/ARB/diuretic  No recent contrast  Give IVF  Monitor uop and labs  Renal dose meds, avoid nephrotoxins      Essential hypertension  Cont nifedipine and monitor BP        VTE Risk Mitigation (From admission, onward)         Ordered     IP VTE HIGH RISK PATIENT  Once      05/21/20 1231     Place sequential compression device  Until discontinued      05/21/20 1231     Place SHERYL hose  Until discontinued      05/21/20 1231                   Michael Barker MD  Department of Hospital Medicine   [facility]

## 2020-05-21 NOTE — ASSESSMENT & PLAN NOTE
Surgical consultation for possible BKA.  3 weeks s/p TMA  No obvious infection so will hold off Abx

## 2020-05-22 ENCOUNTER — ANESTHESIA EVENT (OUTPATIENT)
Dept: SURGERY | Facility: OTHER | Age: 80
DRG: 240 | End: 2020-05-22
Payer: MEDICARE

## 2020-05-22 ENCOUNTER — ANESTHESIA (OUTPATIENT)
Dept: SURGERY | Facility: OTHER | Age: 80
DRG: 240 | End: 2020-05-22
Payer: MEDICARE

## 2020-05-22 ENCOUNTER — DOCUMENT SCAN (OUTPATIENT)
Dept: HOME HEALTH SERVICES | Facility: HOSPITAL | Age: 80
End: 2020-05-22
Payer: MEDICARE

## 2020-05-22 PROBLEM — E87.0 HYPERNATREMIA: Status: ACTIVE | Noted: 2020-05-22

## 2020-05-22 LAB
ANION GAP SERPL CALC-SCNC: 11 MMOL/L (ref 8–16)
BASOPHILS # BLD AUTO: 0.03 K/UL (ref 0–0.2)
BASOPHILS NFR BLD: 0.6 % (ref 0–1.9)
BUN SERPL-MCNC: 14 MG/DL (ref 8–23)
CALCIUM SERPL-MCNC: 9.2 MG/DL (ref 8.7–10.5)
CHLORIDE SERPL-SCNC: 118 MMOL/L (ref 95–110)
CO2 SERPL-SCNC: 23 MMOL/L (ref 23–29)
CREAT SERPL-MCNC: 1.7 MG/DL (ref 0.5–1.4)
DIFFERENTIAL METHOD: ABNORMAL
EOSINOPHIL # BLD AUTO: 0.4 K/UL (ref 0–0.5)
EOSINOPHIL NFR BLD: 8.1 % (ref 0–8)
ERYTHROCYTE [DISTWIDTH] IN BLOOD BY AUTOMATED COUNT: 15.3 % (ref 11.5–14.5)
EST. GFR  (AFRICAN AMERICAN): 33 ML/MIN/1.73 M^2
EST. GFR  (NON AFRICAN AMERICAN): 28 ML/MIN/1.73 M^2
FERRITIN SERPL-MCNC: 420 NG/ML (ref 20–300)
GLUCOSE SERPL-MCNC: 83 MG/DL (ref 70–110)
HCT VFR BLD AUTO: 29.9 % (ref 37–48.5)
HGB BLD-MCNC: 8.8 G/DL (ref 12–16)
IMM GRANULOCYTES # BLD AUTO: 0.02 K/UL (ref 0–0.04)
IMM GRANULOCYTES NFR BLD AUTO: 0.4 % (ref 0–0.5)
IRON SERPL-MCNC: 37 UG/DL (ref 30–160)
LYMPHOCYTES # BLD AUTO: 1.3 K/UL (ref 1–4.8)
LYMPHOCYTES NFR BLD: 23.6 % (ref 18–48)
MCH RBC QN AUTO: 25.4 PG (ref 27–31)
MCHC RBC AUTO-ENTMCNC: 29.4 G/DL (ref 32–36)
MCV RBC AUTO: 86 FL (ref 82–98)
MONOCYTES # BLD AUTO: 0.4 K/UL (ref 0.3–1)
MONOCYTES NFR BLD: 8.3 % (ref 4–15)
NEUTROPHILS # BLD AUTO: 3.1 K/UL (ref 1.8–7.7)
NEUTROPHILS NFR BLD: 59 % (ref 38–73)
NRBC BLD-RTO: 0 /100 WBC
PLATELET # BLD AUTO: 317 K/UL (ref 150–350)
PMV BLD AUTO: 10.7 FL (ref 9.2–12.9)
POTASSIUM SERPL-SCNC: 4.3 MMOL/L (ref 3.5–5.1)
RBC # BLD AUTO: 3.46 M/UL (ref 4–5.4)
SATURATED IRON: 21 % (ref 20–50)
SODIUM SERPL-SCNC: 152 MMOL/L (ref 136–145)
TOTAL IRON BINDING CAPACITY: 178 UG/DL (ref 250–450)
TRANSFERRIN SERPL-MCNC: 120 MG/DL (ref 200–375)
VIT B12 SERPL-MCNC: 362 PG/ML (ref 210–950)
WBC # BLD AUTO: 5.29 K/UL (ref 3.9–12.7)

## 2020-05-22 PROCEDURE — 37000008 HC ANESTHESIA 1ST 15 MINUTES: Mod: HCNC | Performed by: SPECIALIST

## 2020-05-22 PROCEDURE — 99232 SBSQ HOSP IP/OBS MODERATE 35: CPT | Mod: HCNC,,, | Performed by: INTERNAL MEDICINE

## 2020-05-22 PROCEDURE — 82607 VITAMIN B-12: CPT | Mod: HCNC

## 2020-05-22 PROCEDURE — 88311 DECALCIFY TISSUE: CPT | Mod: 26,HCNC,, | Performed by: PATHOLOGY

## 2020-05-22 PROCEDURE — 36000706: Mod: HCNC | Performed by: SPECIALIST

## 2020-05-22 PROCEDURE — 88307 PR  SURG PATH,LEVEL V: ICD-10-PCS | Mod: 26,HCNC,, | Performed by: PATHOLOGY

## 2020-05-22 PROCEDURE — 71000039 HC RECOVERY, EACH ADD'L HOUR: Mod: HCNC | Performed by: SPECIALIST

## 2020-05-22 PROCEDURE — 71000033 HC RECOVERY, INTIAL HOUR: Mod: HCNC | Performed by: SPECIALIST

## 2020-05-22 PROCEDURE — 27201423 OPTIME MED/SURG SUP & DEVICES STERILE SUPPLY: Mod: HCNC | Performed by: SPECIALIST

## 2020-05-22 PROCEDURE — 25000003 PHARM REV CODE 250: Mod: HCNC | Performed by: INTERNAL MEDICINE

## 2020-05-22 PROCEDURE — 85025 COMPLETE CBC W/AUTO DIFF WBC: CPT | Mod: HCNC

## 2020-05-22 PROCEDURE — 63600175 PHARM REV CODE 636 W HCPCS: Mod: HCNC | Performed by: NURSE ANESTHETIST, CERTIFIED REGISTERED

## 2020-05-22 PROCEDURE — 88307 TISSUE EXAM BY PATHOLOGIST: CPT | Mod: 26,HCNC,, | Performed by: PATHOLOGY

## 2020-05-22 PROCEDURE — 82747 ASSAY OF FOLIC ACID RBC: CPT | Mod: HCNC

## 2020-05-22 PROCEDURE — 11000001 HC ACUTE MED/SURG PRIVATE ROOM: Mod: HCNC

## 2020-05-22 PROCEDURE — 88311 PR  DECALCIFY TISSUE: ICD-10-PCS | Mod: 26,HCNC,, | Performed by: PATHOLOGY

## 2020-05-22 PROCEDURE — 36000707: Mod: HCNC | Performed by: SPECIALIST

## 2020-05-22 PROCEDURE — 99232 PR SUBSEQUENT HOSPITAL CARE,LEVL II: ICD-10-PCS | Mod: HCNC,,, | Performed by: INTERNAL MEDICINE

## 2020-05-22 PROCEDURE — 36415 COLL VENOUS BLD VENIPUNCTURE: CPT | Mod: HCNC

## 2020-05-22 PROCEDURE — 82728 ASSAY OF FERRITIN: CPT | Mod: HCNC

## 2020-05-22 PROCEDURE — 63600175 PHARM REV CODE 636 W HCPCS: Mod: HCNC | Performed by: SPECIALIST

## 2020-05-22 PROCEDURE — 94761 N-INVAS EAR/PLS OXIMETRY MLT: CPT | Mod: HCNC

## 2020-05-22 PROCEDURE — 88307 TISSUE EXAM BY PATHOLOGIST: CPT | Mod: HCNC | Performed by: PATHOLOGY

## 2020-05-22 PROCEDURE — 88311 DECALCIFY TISSUE: CPT | Mod: HCNC | Performed by: PATHOLOGY

## 2020-05-22 PROCEDURE — 83540 ASSAY OF IRON: CPT | Mod: HCNC

## 2020-05-22 PROCEDURE — 25000003 PHARM REV CODE 250: Mod: HCNC | Performed by: NURSE ANESTHETIST, CERTIFIED REGISTERED

## 2020-05-22 PROCEDURE — 37000009 HC ANESTHESIA EA ADD 15 MINS: Mod: HCNC | Performed by: SPECIALIST

## 2020-05-22 PROCEDURE — 80048 BASIC METABOLIC PNL TOTAL CA: CPT | Mod: HCNC

## 2020-05-22 RX ORDER — MEPERIDINE HYDROCHLORIDE 25 MG/ML
12.5 INJECTION INTRAMUSCULAR; INTRAVENOUS; SUBCUTANEOUS ONCE AS NEEDED
Status: DISCONTINUED | OUTPATIENT
Start: 2020-05-22 | End: 2020-05-22 | Stop reason: HOSPADM

## 2020-05-22 RX ORDER — GLYCOPYRROLATE 0.2 MG/ML
INJECTION INTRAMUSCULAR; INTRAVENOUS
Status: DISCONTINUED | OUTPATIENT
Start: 2020-05-22 | End: 2020-05-22

## 2020-05-22 RX ORDER — SODIUM CHLORIDE 0.9 % (FLUSH) 0.9 %
3 SYRINGE (ML) INJECTION
Status: DISCONTINUED | OUTPATIENT
Start: 2020-05-22 | End: 2020-05-28 | Stop reason: HOSPADM

## 2020-05-22 RX ORDER — ONDANSETRON 2 MG/ML
INJECTION INTRAMUSCULAR; INTRAVENOUS
Status: DISCONTINUED | OUTPATIENT
Start: 2020-05-22 | End: 2020-05-22

## 2020-05-22 RX ORDER — PROPOFOL 10 MG/ML
VIAL (ML) INTRAVENOUS
Status: DISCONTINUED | OUTPATIENT
Start: 2020-05-22 | End: 2020-05-22

## 2020-05-22 RX ORDER — LIDOCAINE HYDROCHLORIDE 20 MG/ML
INJECTION INTRAVENOUS
Status: DISCONTINUED | OUTPATIENT
Start: 2020-05-22 | End: 2020-05-22

## 2020-05-22 RX ORDER — DEXAMETHASONE SODIUM PHOSPHATE 4 MG/ML
INJECTION, SOLUTION INTRA-ARTICULAR; INTRALESIONAL; INTRAMUSCULAR; INTRAVENOUS; SOFT TISSUE
Status: DISCONTINUED | OUTPATIENT
Start: 2020-05-22 | End: 2020-05-22

## 2020-05-22 RX ORDER — OXYCODONE HYDROCHLORIDE 5 MG/1
5 TABLET ORAL
Status: DISCONTINUED | OUTPATIENT
Start: 2020-05-22 | End: 2020-05-22 | Stop reason: HOSPADM

## 2020-05-22 RX ORDER — DEXTROSE MONOHYDRATE 50 MG/ML
INJECTION, SOLUTION INTRAVENOUS CONTINUOUS
Status: DISCONTINUED | OUTPATIENT
Start: 2020-05-22 | End: 2020-05-22

## 2020-05-22 RX ORDER — PHENYLEPHRINE HYDROCHLORIDE 10 MG/ML
INJECTION INTRAVENOUS
Status: DISCONTINUED | OUTPATIENT
Start: 2020-05-22 | End: 2020-05-22

## 2020-05-22 RX ORDER — ACETAMINOPHEN 10 MG/ML
INJECTION, SOLUTION INTRAVENOUS
Status: DISCONTINUED | OUTPATIENT
Start: 2020-05-22 | End: 2020-05-22

## 2020-05-22 RX ORDER — SODIUM CHLORIDE, SODIUM LACTATE, POTASSIUM CHLORIDE, CALCIUM CHLORIDE 600; 310; 30; 20 MG/100ML; MG/100ML; MG/100ML; MG/100ML
INJECTION, SOLUTION INTRAVENOUS CONTINUOUS PRN
Status: DISCONTINUED | OUTPATIENT
Start: 2020-05-22 | End: 2020-05-22

## 2020-05-22 RX ORDER — FENTANYL CITRATE 50 UG/ML
INJECTION, SOLUTION INTRAMUSCULAR; INTRAVENOUS
Status: DISCONTINUED | OUTPATIENT
Start: 2020-05-22 | End: 2020-05-22

## 2020-05-22 RX ORDER — HYDROMORPHONE HYDROCHLORIDE 2 MG/ML
0.4 INJECTION, SOLUTION INTRAMUSCULAR; INTRAVENOUS; SUBCUTANEOUS EVERY 5 MIN PRN
Status: DISCONTINUED | OUTPATIENT
Start: 2020-05-22 | End: 2020-05-22 | Stop reason: HOSPADM

## 2020-05-22 RX ORDER — HYDROMORPHONE HYDROCHLORIDE 1 MG/ML
0.5 INJECTION, SOLUTION INTRAMUSCULAR; INTRAVENOUS; SUBCUTANEOUS
Status: DISCONTINUED | OUTPATIENT
Start: 2020-05-22 | End: 2020-05-28 | Stop reason: HOSPADM

## 2020-05-22 RX ORDER — ONDANSETRON 2 MG/ML
4 INJECTION INTRAMUSCULAR; INTRAVENOUS DAILY PRN
Status: DISCONTINUED | OUTPATIENT
Start: 2020-05-22 | End: 2020-05-22 | Stop reason: HOSPADM

## 2020-05-22 RX ADMIN — DEXTROSE 2 G: 50 INJECTION, SOLUTION INTRAVENOUS at 02:05

## 2020-05-22 RX ADMIN — FENTANYL CITRATE 100 MCG: 50 INJECTION, SOLUTION INTRAMUSCULAR; INTRAVENOUS at 02:05

## 2020-05-22 RX ADMIN — TIMOLOL MALEATE 1 DROP: 5 SOLUTION/ DROPS OPHTHALMIC at 08:05

## 2020-05-22 RX ADMIN — CARBOXYMETHYLCELLULOSE SODIUM 3 DROP: 2.5 SOLUTION/ DROPS OPHTHALMIC at 01:05

## 2020-05-22 RX ADMIN — PHENYLEPHRINE HYDROCHLORIDE 50 MCG: 10 INJECTION INTRAVENOUS at 01:05

## 2020-05-22 RX ADMIN — GLYCOPYRROLATE 0.2 MG: 0.2 INJECTION, SOLUTION INTRAMUSCULAR; INTRAVENOUS at 01:05

## 2020-05-22 RX ADMIN — LATANOPROST 1 DROP: 50 SOLUTION OPHTHALMIC at 09:05

## 2020-05-22 RX ADMIN — PHENYLEPHRINE HYDROCHLORIDE 100 MCG: 10 INJECTION INTRAVENOUS at 02:05

## 2020-05-22 RX ADMIN — TIMOLOL MALEATE 1 DROP: 5 SOLUTION/ DROPS OPHTHALMIC at 09:05

## 2020-05-22 RX ADMIN — FENTANYL CITRATE 50 MCG: 50 INJECTION, SOLUTION INTRAMUSCULAR; INTRAVENOUS at 01:05

## 2020-05-22 RX ADMIN — PROPOFOL 100 MG: 10 INJECTION, EMULSION INTRAVENOUS at 01:05

## 2020-05-22 RX ADMIN — PHENYLEPHRINE HYDROCHLORIDE 50 MCG: 10 INJECTION INTRAVENOUS at 02:05

## 2020-05-22 RX ADMIN — PROPOFOL 50 MG: 10 INJECTION, EMULSION INTRAVENOUS at 02:05

## 2020-05-22 RX ADMIN — ONDANSETRON 4 MG: 2 INJECTION INTRAMUSCULAR; INTRAVENOUS at 02:05

## 2020-05-22 RX ADMIN — LIDOCAINE HYDROCHLORIDE 100 MG: 20 INJECTION, SOLUTION INTRAVENOUS at 01:05

## 2020-05-22 RX ADMIN — SODIUM CHLORIDE, SODIUM LACTATE, POTASSIUM CHLORIDE, AND CALCIUM CHLORIDE: 600; 310; 30; 20 INJECTION, SOLUTION INTRAVENOUS at 01:05

## 2020-05-22 RX ADMIN — DEXAMETHASONE SODIUM PHOSPHATE 4 MG: 4 INJECTION, SOLUTION INTRAMUSCULAR; INTRAVENOUS at 02:05

## 2020-05-22 RX ADMIN — ACETAMINOPHEN 1000 MG: 10 INJECTION, SOLUTION INTRAVENOUS at 02:05

## 2020-05-22 RX ADMIN — HYDROMORPHONE HYDROCHLORIDE 0.5 MG: 1 INJECTION, SOLUTION INTRAMUSCULAR; INTRAVENOUS; SUBCUTANEOUS at 05:05

## 2020-05-22 RX ADMIN — DEXTROSE: 5 SOLUTION INTRAVENOUS at 08:05

## 2020-05-22 RX ADMIN — SODIUM CHLORIDE: 0.9 INJECTION, SOLUTION INTRAVENOUS at 03:05

## 2020-05-22 NOTE — ASSESSMENT & PLAN NOTE
Surgical consultation for possible BKA.  3 weeks s/p TMA  No obvious infection so will hold off Abx  Pt currently refusing to proceed

## 2020-05-22 NOTE — OP NOTE
[facility]  Surgery Department  Operative Note    SUMMARY     Patient: Home De Dios    Medical Record: [id number]    Date of Procedure: 5/22/2020     Surgeon: Surgeon(s) and Role:     * Lewis Drummond Jr., MD - Primary    Assisting Surgeon: None    Pre-Operative Diagnosis: Gangrene of right foot [I96]    Post-Operative Diagnosis: Post-Op Diagnosis Codes:     * Gangrene of right foot [I96]    Procedure: Procedure(s) (LRB):  AMPUTATION, FOOT - RIGHT (Right)    Procedure in Detail:  Patient was brought the operating room and placed supine position, the right lower extremity prepped and draped in sterile fashion.  A fishmouth incision made with anterior and posterior flaps and the base of the flaps approximately 18 cm below the patella.  Using sharp dissection the incision was carried down through the fascia and muscular layers.  The blood vessels ligated with 0 Vicryl ligatures.  The bone was cleaned with a periosteal elevator and the tibia and fibula transected with a [employer] saw.  The fibula was transected approximately 2 cm above the tibia.  The edges of the bone smoothed with a rasp and the wounds irrigated.  The anterior and posterior flaps and closed with interrupted 0 Vicryl and the skin with stapling device.  Posterior splint applied and the wound sterilely dressed.  The patient tolerated the procedure well and left the operating in good condition.  At the end of procedure all sponge lap and instrument counts were correct.  Estimated blood loss-200 cc

## 2020-05-22 NOTE — ASSESSMENT & PLAN NOTE
Cr 2.2 on admit  up from 1.5  Not on ACE/ARB/diuretic  No recent contrast  Improving with  IVF  Monitor uop and labs  Renal dose meds, avoid nephrotoxins

## 2020-05-22 NOTE — PROGRESS NOTES
I spoke with the patient.  She verbally expresses understanding that I am recommending BKA because I have nothing further that can save her foot, and that it is the safest option to attempt to save her limb and reduce her chances of infection and sepsis which can threaten her life.    At her request, I have also spoken to her  and son who support her decision to proceed with BKA today.

## 2020-05-22 NOTE — CONSULTS
"Wound Care Consult for wound to right foot. Patient had a TMA performed by Dr. Munguia on [id number].. Patient was seen for follow up in clinic by Dr. Munguia, Podiatry, on 05-. Due to presence of gangrene at the site Dr. Munguia recommended a BKA, as he feels it is the best option for safe healing. Patient and her son reluctantly agreed to a BKA. Dr. Drummond has been consulted. RN, Judy at bedside for dressing change, as was Dr. Barker.    Nursing reported the patient had removed the dressing. There was no active bleeding, with a small amount of dry brown to red noted on the foam. All sutures were intact. Will follow Wound Care orders per Dr. Munguia:    Recommend: Right Foot TMA site with gangrene - Clean with wound cleanser, pat dry, paint with betadine, cover with foam and wrap with Kerlix, securing with tape.    Patient repeatedly verbalized she only wanted the "black" removed on her foot. Explained to patient the problem was the lack of blood circulation that was causing the "black" on her foot. Explained the purpose of an amputation, but patient appears to be in denial.     Patient is verbal, can move her foot with ease, and reports she has a "little pain" in her foot. Patient understands she is not to walk on the foot, even to the restroom.    Await General Surgery recommendations.    EULALIO Montes, Wound and Ostomy              "

## 2020-05-22 NOTE — SUBJECTIVE & OBJECTIVE
Interval History:     Refusing to proceed with BKA  States she feels like its getting better.        Review of Systems   Constitutional: Negative for chills and fever.   Respiratory: Negative for chest tightness and shortness of breath.    Cardiovascular: Negative for chest pain, palpitations and leg swelling.   Gastrointestinal: Negative for abdominal pain and blood in stool.   Genitourinary: Negative for dysuria, flank pain and frequency.   Musculoskeletal: Negative for arthralgias, back pain and joint swelling.   Neurological: Negative for seizures and syncope.   Hematological: Does not bruise/bleed easily.     Objective:     Vital Signs (Most Recent):  Temp: 98.7 °F (37.1 °C) (05/22/20 0747)  Pulse: 60 (05/22/20 0747)  Resp: 16 (05/22/20 0747)  BP: (!) 153/71 (05/22/20 0747)  SpO2: 99 % (05/22/20 0747) Vital Signs (24h Range):  Temp:  [97.4 °F (36.3 °C)-98.7 °F (37.1 °C)] 98.7 °F (37.1 °C)  Pulse:  [58-74] 60  Resp:  [16-18] 16  SpO2:  [93 %-100 %] 99 %  BP: (121-171)/(58-79) 153/71        There is no height or weight on file to calculate BMI.    Intake/Output Summary (Last 24 hours) at 5/22/2020 0938  Last data filed at 5/21/2020 2200  Gross per 24 hour   Intake 60 ml   Output --   Net 60 ml      Physical Exam   Constitutional: No distress.   HENT:   Head: Normocephalic and atraumatic.   Eyes: Conjunctivae are normal.   Cardiovascular: Regular rhythm and normal heart sounds.   No murmur heard.  Pulmonary/Chest: Effort normal and breath sounds normal.   Abdominal: Soft. Bowel sounds are normal. She exhibits no distension.   Skin: Skin is warm and dry. No rash noted.   Psychiatric: She has a normal mood and affect.   appearance of right foot unchanged      Significant Labs:   BMP:   Recent Labs   Lab 05/21/20  1441 05/22/20  0312   GLU  --  83   NA  --  152*   K  --  4.3   CL  --  118*   CO2  --  23   BUN  --  14   CREATININE  --  1.7*   CALCIUM  --  9.2   MG 2.2  --        Significant Imaging: I have reviewed  all pertinent imaging results/findings within the past 24 hours.

## 2020-05-22 NOTE — ANESTHESIA PREPROCEDURE EVALUATION
05/22/2020  Home De Dios is a 77 y.o., female.    Anesthesia Evaluation    I have reviewed the Patient Summary Reports.    I have reviewed the Nursing Notes.   I have reviewed the Medications.     Review of Systems  Anesthesia Hx:  No problems with previous Anesthesia  History of prior surgery of interest to airway management or planning: Previous anesthesia: MAC  April 28 Mac with MAC.  Procedure performed at an [facility]. Denies Family Hx of Anesthesia complications.   Denies Personal Hx of Anesthesia complications.   Social:  Non-Smoker    Hematology/Oncology:     Oncology Normal    -- Anemia:   EENT/Dental:EENT/Dental Normal   Cardiovascular:   Exercise tolerance: poor Hypertension, well controlled PVD hyperlipidemia ECG has been reviewed.    Pulmonary:  Pulmonary Normal    Renal/:   Chronic Renal Disease, CRI    Hepatic/GI:  Hepatic/GI Normal    Musculoskeletal:   Arthritis     Neurological:  Neurology Normal    Endocrine:  Endocrine Normal    Dermatological:  Skin Normal    Psych:  Psychiatric Normal           Physical Exam  General:  Cachexia    Airway/Jaw/Neck:  Airway Findings: Mouth Opening: Normal Tongue: Normal  General Airway Assessment: Adult  Mallampati: II      Dental:  Dental Findings: Upper Dentures, Lower Dentures        Mental Status:  Mental Status Findings:  Cooperative         Anesthesia Plan  Type of Anesthesia, risks & benefits discussed:  Anesthesia Type:  general  Patient's Preference:   Intra-op Monitoring Plan:   Intra-op Monitoring Plan Comments:   Post Op Pain Control Plan: per primary service following discharge from PACU and multimodal analgesia  Post Op Pain Control Plan Comments:   Induction:   IV  Beta Blocker:         Informed Consent: Patient understands risks and agrees with Anesthesia plan.  Questions answered. Anesthesia consent signed with patient.  ASA  Score: 3     Day of Surgery Review of History & Physical:    H&P update referred to the surgeon.         Ready For Surgery From Anesthesia Perspective.

## 2020-05-22 NOTE — PROGRESS NOTES
Patient known to me with gangrenous changes of the right lower extremity status post transmetatarsal amputation.  The incisions had not healed and there are necrotic changes extending up the plantar and extensor surface of the ft proximal to the site of the amputation.  The patient has extensive, non-reconstructible distal peripheral vascular disease as outlined on the aortogram of 04/24/2020.  I spoke at length with the patient and explained that the right foot would not heal given the inability to increased blood flow.  I explained that a below-knee amputation would likely heal.  The patient agreed to surgery and consents were signed

## 2020-05-22 NOTE — NURSING
Received patient back to room 367 from Recovery. AAO x 4. Resp. Even and unlabored. Ace wrap To Rt BKA CDI. Pt c/o pain @ 5 to RLE.. Will administer pain medication as ordered. VSS. Safety maintained. SCD on LLE. Bed in lowest position and locked. Call light in reach.

## 2020-05-22 NOTE — ANESTHESIA PROCEDURE NOTES
Intubation  Performed by: Toni Mota CRNA  Authorized by: Toni Mota CRNA     Intubation:     Induction:  Intravenous    Intubated:  Postinduction    Mask Ventilation:  Easy mask    Attempts:  1    Attempted By:  CRNA    Difficult Airway Encountered?: No      Complications:  None    Airway Device:  Supraglottic airway/LMA    Airway Device Size:  4.0    Style/Cuff Inflation:  Cuffed (inflated to minimal occlusive pressure)    Inflation Amount (mL):  10    Secured at:  The lips    Placement Verified By:  Capnometry    Findings Post-Intubation:  BS equal bilateral and atraumatic/condition of teeth unchanged

## 2020-05-22 NOTE — TRANSFER OF CARE
Anesthesia Transfer of Care Note    Patient: Home De Dios    Procedure(s) Performed: Procedure(s) (LRB):  AMPUTATION, FOOT - RIGHT (Right)    Patient location: PACU    Anesthesia Type: general    Transport from OR: Transported from OR on 2-3 L/min O2 by NC with adequate spontaneous ventilation    Post pain: adequate analgesia    Post assessment: no apparent anesthetic complications and tolerated procedure well    Post vital signs: stable    Level of consciousness: awake and alert    Nausea/Vomiting: no nausea/vomiting    Complications: none    Transfer of care protocol was followed      Last vitals:   Visit Vitals  BP (!) 148/66 (BP Location: Right arm, Patient Position: Lying)   Pulse (!) 56   Temp 36.7 °C (98.1 °F) (Oral)   Resp 16   LMP  (LMP Unknown)   SpO2 99%   Breastfeeding? No

## 2020-05-22 NOTE — PROGRESS NOTES
[facility] Hospital Medicine  Progress Note    Patient Name: Home De Dios  MRN: [id number]  Patient Class: IP- Inpatient   Admission Date: 5/21/2020  Length of Stay: 1 days  Attending Physician: Michael Barker MD  Primary Care Provider: Quintin Cerda MD        Subjective:     Principal Problem:Gangrene of right foot        HPI:  Direct admit from podiatry clinic for right BKA.     She states the toes of her right foot began changing color and turning black about 4-5 weeks agoShe states this began after soaking her foot in bleach and Epsom salts and then cutting her toe nails. The right foot subsequently became itchy, and then numb, particularly on the 1st, 2nd and 4th toes.      She had a TMA  3 weeks ago.    Since then she has developed an area of wet gangrene involving the imputation site.    Podiatry feels they have nothing left to offer and  most direct route to healing and safety is to have BKA.    Pt denies fever / chills.    Notes minimal amount of drainage from foot below surgical incision.      Overview/Hospital Course:  No notes on file    Interval History:     Refusing to proceed with BKA  States she feels like its getting better.        Review of Systems   Constitutional: Negative for chills and fever.   Respiratory: Negative for chest tightness and shortness of breath.    Cardiovascular: Negative for chest pain, palpitations and leg swelling.   Gastrointestinal: Negative for abdominal pain and blood in stool.   Genitourinary: Negative for dysuria, flank pain and frequency.   Musculoskeletal: Negative for arthralgias, back pain and joint swelling.   Neurological: Negative for seizures and syncope.   Hematological: Does not bruise/bleed easily.     Objective:     Vital Signs (Most Recent):  Temp: 98.7 °F (37.1 °C) (05/22/20 0747)  Pulse: 60 (05/22/20 0747)  Resp: 16 (05/22/20 0747)  BP: (!) 153/71 (05/22/20 0747)  SpO2: 99 % (05/22/20 0747) Vital Signs (24h Range):  Temp:   [97.4 °F (36.3 °C)-98.7 °F (37.1 °C)] 98.7 °F (37.1 °C)  Pulse:  [58-74] 60  Resp:  [16-18] 16  SpO2:  [93 %-100 %] 99 %  BP: (121-171)/(58-79) 153/71        There is no height or weight on file to calculate BMI.    Intake/Output Summary (Last 24 hours) at 5/22/2020 0938  Last data filed at 5/21/2020 2200  Gross per 24 hour   Intake 60 ml   Output --   Net 60 ml      Physical Exam   Constitutional: No distress.   HENT:   Head: Normocephalic and atraumatic.   Eyes: Conjunctivae are normal.   Cardiovascular: Regular rhythm and normal heart sounds.   No murmur heard.  Pulmonary/Chest: Effort normal and breath sounds normal.   Abdominal: Soft. Bowel sounds are normal. She exhibits no distension.   Skin: Skin is warm and dry. No rash noted.   Psychiatric: She has a normal mood and affect.   appearance of right foot unchanged      Significant Labs:   BMP:   Recent Labs   Lab 05/21/20  1441 05/22/20  0312   GLU  --  83   NA  --  152*   K  --  4.3   CL  --  118*   CO2  --  23   BUN  --  14   CREATININE  --  1.7*   CALCIUM  --  9.2   MG 2.2  --        Significant Imaging: I have reviewed all pertinent imaging results/findings within the past 24 hours.      Assessment/Plan:      * Gangrene of right foot  Surgical consultation for possible BKA.  3 weeks s/p TMA  No obvious infection so will hold off Abx  Pt currently refusing to proceed    Current daily local wound care      Hypernatremia  Replace free water      Hyperkalemia  Resolved without intervention      Malnutrition of mild degree  Monitor caloric intake and weights.  Po supplements      MICKEY (acute kidney injury)  Cr 2.2 on admit  up from 1.5  Not on ACE/ARB/diuretic  No recent contrast  Improving with  IVF  Monitor uop and labs  Renal dose meds, avoid nephrotoxins      Essential hypertension  Cont nifedipine and monitor BP      VTE Risk Mitigation (From admission, onward)         Ordered     IP VTE HIGH RISK PATIENT  Once      05/21/20 1231     Place sequential  compression device  Until discontinued      05/21/20 1231     Place SHERYL hose  Until discontinued      05/21/20 1231                      Michael Barker MD  Department of Hospital Medicine   [facility]

## 2020-05-22 NOTE — PLAN OF CARE
Pt AAOx4. Afebrile and VSS throughout shift. Poc reviewed with pt and questions answered. Repositions self independently. Pt is voiding up to toilet with assist. Yellow drainage noted. Scant dried drainage noted to R. Foot dressing. Pt denies pain this shift. Remains free from falls, injury, and skin breakdown. All needs and concerns met. Purposeful rounding done. Heel boots in place. Bed is locked and in lowest position, side rails up x2, call light in reach. Pt is resting comfortably at present. Will continue to monitor.

## 2020-05-22 NOTE — ANESTHESIA POSTPROCEDURE EVALUATION
Anesthesia Post Evaluation    Patient: Home De Dios    Procedure(s) Performed: Procedure(s) (LRB):  AMPUTATION, FOOT - RIGHT (Right)    Final Anesthesia Type: general    Patient location during evaluation: PACU  Patient participation: Yes- Able to Participate  Level of consciousness: awake and alert  Post-procedure vital signs: reviewed and stable  Pain management: adequate  Airway patency: patent    PONV status at discharge: No PONV  Anesthetic complications: no      Cardiovascular status: blood pressure returned to baseline  Respiratory status: unassisted, spontaneous ventilation and room air  Hydration status: euvolemic  Follow-up not needed.          Vitals Value Taken Time   /79 5/22/2020  4:16 PM   Temp 36.7 °C (98.1 °F) 5/22/2020  3:14 PM   Pulse 64 5/22/2020  4:21 PM   Resp 18 5/22/2020  4:00 PM   SpO2 81 % 5/22/2020  4:21 PM   Vitals shown include unvalidated device data.      No case tracking events are documented in the log.      Pain/Brennan Score: Brennan Score: 10 (5/22/2020  4:00 PM)

## 2020-05-23 PROBLEM — E87.5 HYPERKALEMIA: Status: RESOLVED | Noted: 2020-05-21 | Resolved: 2020-05-23

## 2020-05-23 PROBLEM — I73.9 PAD (PERIPHERAL ARTERY DISEASE): Status: ACTIVE | Noted: 2020-05-23

## 2020-05-23 LAB
ANION GAP SERPL CALC-SCNC: 11 MMOL/L (ref 8–16)
BASOPHILS # BLD AUTO: 0.03 K/UL (ref 0–0.2)
BASOPHILS # BLD AUTO: 0.03 K/UL (ref 0–0.2)
BASOPHILS NFR BLD: 0.5 % (ref 0–1.9)
BASOPHILS NFR BLD: 0.5 % (ref 0–1.9)
BUN SERPL-MCNC: 14 MG/DL (ref 8–23)
CALCIUM SERPL-MCNC: 8.6 MG/DL (ref 8.7–10.5)
CHLORIDE SERPL-SCNC: 116 MMOL/L (ref 95–110)
CO2 SERPL-SCNC: 21 MMOL/L (ref 23–29)
CREAT SERPL-MCNC: 1.7 MG/DL (ref 0.5–1.4)
DIFFERENTIAL METHOD: ABNORMAL
DIFFERENTIAL METHOD: ABNORMAL
EOSINOPHIL # BLD AUTO: 0.1 K/UL (ref 0–0.5)
EOSINOPHIL # BLD AUTO: 0.1 K/UL (ref 0–0.5)
EOSINOPHIL NFR BLD: 0.9 % (ref 0–8)
EOSINOPHIL NFR BLD: 0.9 % (ref 0–8)
ERYTHROCYTE [DISTWIDTH] IN BLOOD BY AUTOMATED COUNT: 15.7 % (ref 11.5–14.5)
ERYTHROCYTE [DISTWIDTH] IN BLOOD BY AUTOMATED COUNT: 15.7 % (ref 11.5–14.5)
EST. GFR  (AFRICAN AMERICAN): 33 ML/MIN/1.73 M^2
EST. GFR  (NON AFRICAN AMERICAN): 28 ML/MIN/1.73 M^2
GLUCOSE SERPL-MCNC: 81 MG/DL (ref 70–110)
HCT VFR BLD AUTO: 25.5 % (ref 37–48.5)
HCT VFR BLD AUTO: 25.5 % (ref 37–48.5)
HGB BLD-MCNC: 7.5 G/DL (ref 12–16)
HGB BLD-MCNC: 7.5 G/DL (ref 12–16)
IMM GRANULOCYTES # BLD AUTO: 0.01 K/UL (ref 0–0.04)
IMM GRANULOCYTES # BLD AUTO: 0.01 K/UL (ref 0–0.04)
IMM GRANULOCYTES NFR BLD AUTO: 0.2 % (ref 0–0.5)
IMM GRANULOCYTES NFR BLD AUTO: 0.2 % (ref 0–0.5)
LYMPHOCYTES # BLD AUTO: 1.4 K/UL (ref 1–4.8)
LYMPHOCYTES # BLD AUTO: 1.4 K/UL (ref 1–4.8)
LYMPHOCYTES NFR BLD: 22.2 % (ref 18–48)
LYMPHOCYTES NFR BLD: 22.2 % (ref 18–48)
MCH RBC QN AUTO: 25.8 PG (ref 27–31)
MCH RBC QN AUTO: 25.8 PG (ref 27–31)
MCHC RBC AUTO-ENTMCNC: 29.4 G/DL (ref 32–36)
MCHC RBC AUTO-ENTMCNC: 29.4 G/DL (ref 32–36)
MCV RBC AUTO: 88 FL (ref 82–98)
MCV RBC AUTO: 88 FL (ref 82–98)
MONOCYTES # BLD AUTO: 0.4 K/UL (ref 0.3–1)
MONOCYTES # BLD AUTO: 0.4 K/UL (ref 0.3–1)
MONOCYTES NFR BLD: 6.6 % (ref 4–15)
MONOCYTES NFR BLD: 6.6 % (ref 4–15)
NEUTROPHILS # BLD AUTO: 4.5 K/UL (ref 1.8–7.7)
NEUTROPHILS # BLD AUTO: 4.5 K/UL (ref 1.8–7.7)
NEUTROPHILS NFR BLD: 69.6 % (ref 38–73)
NEUTROPHILS NFR BLD: 69.6 % (ref 38–73)
NRBC BLD-RTO: 0 /100 WBC
NRBC BLD-RTO: 0 /100 WBC
PLATELET # BLD AUTO: 272 K/UL (ref 150–350)
PLATELET # BLD AUTO: 272 K/UL (ref 150–350)
PMV BLD AUTO: 10.9 FL (ref 9.2–12.9)
PMV BLD AUTO: 10.9 FL (ref 9.2–12.9)
POTASSIUM SERPL-SCNC: 4.6 MMOL/L (ref 3.5–5.1)
RBC # BLD AUTO: 2.91 M/UL (ref 4–5.4)
RBC # BLD AUTO: 2.91 M/UL (ref 4–5.4)
SODIUM SERPL-SCNC: 148 MMOL/L (ref 136–145)
WBC # BLD AUTO: 6.49 K/UL (ref 3.9–12.7)
WBC # BLD AUTO: 6.49 K/UL (ref 3.9–12.7)

## 2020-05-23 PROCEDURE — 97161 PT EVAL LOW COMPLEX 20 MIN: CPT | Mod: HCNC

## 2020-05-23 PROCEDURE — 80048 BASIC METABOLIC PNL TOTAL CA: CPT | Mod: HCNC

## 2020-05-23 PROCEDURE — 99233 SBSQ HOSP IP/OBS HIGH 50: CPT | Mod: HCNC,,, | Performed by: INTERNAL MEDICINE

## 2020-05-23 PROCEDURE — 25000003 PHARM REV CODE 250: Mod: HCNC | Performed by: SPECIALIST

## 2020-05-23 PROCEDURE — 99233 PR SUBSEQUENT HOSPITAL CARE,LEVL III: ICD-10-PCS | Mod: HCNC,,, | Performed by: INTERNAL MEDICINE

## 2020-05-23 PROCEDURE — 85025 COMPLETE CBC W/AUTO DIFF WBC: CPT | Mod: HCNC

## 2020-05-23 PROCEDURE — 97110 THERAPEUTIC EXERCISES: CPT | Mod: HCNC

## 2020-05-23 PROCEDURE — 36415 COLL VENOUS BLD VENIPUNCTURE: CPT | Mod: HCNC

## 2020-05-23 PROCEDURE — 94761 N-INVAS EAR/PLS OXIMETRY MLT: CPT | Mod: HCNC

## 2020-05-23 PROCEDURE — 11000001 HC ACUTE MED/SURG PRIVATE ROOM: Mod: HCNC

## 2020-05-23 PROCEDURE — 25000003 PHARM REV CODE 250: Mod: HCNC | Performed by: INTERNAL MEDICINE

## 2020-05-23 RX ORDER — POLYETHYLENE GLYCOL 3350 17 G/17G
17 POWDER, FOR SOLUTION ORAL DAILY
Status: DISCONTINUED | OUTPATIENT
Start: 2020-05-23 | End: 2020-05-28 | Stop reason: HOSPADM

## 2020-05-23 RX ORDER — ENOXAPARIN SODIUM 100 MG/ML
30 INJECTION SUBCUTANEOUS EVERY 24 HOURS
Status: DISCONTINUED | OUTPATIENT
Start: 2020-05-23 | End: 2020-05-28 | Stop reason: HOSPADM

## 2020-05-23 RX ORDER — ENOXAPARIN SODIUM 100 MG/ML
40 INJECTION SUBCUTANEOUS EVERY 24 HOURS
Status: DISCONTINUED | OUTPATIENT
Start: 2020-05-23 | End: 2020-05-23

## 2020-05-23 RX ADMIN — TIMOLOL MALEATE 1 DROP: 5 SOLUTION/ DROPS OPHTHALMIC at 08:05

## 2020-05-23 RX ADMIN — NIFEDIPINE 90 MG: 30 TABLET, FILM COATED, EXTENDED RELEASE ORAL at 08:05

## 2020-05-23 RX ADMIN — LATANOPROST 1 DROP: 50 SOLUTION OPHTHALMIC at 08:05

## 2020-05-23 RX ADMIN — POLYETHYLENE GLYCOL 3350 17 G: 17 POWDER, FOR SOLUTION ORAL at 01:05

## 2020-05-23 RX ADMIN — CYANOCOBALAMIN TAB 250 MCG 500 MCG: 250 TAB at 08:05

## 2020-05-23 RX ADMIN — OXYCODONE HYDROCHLORIDE 5 MG: 5 TABLET ORAL at 08:05

## 2020-05-23 NOTE — PROGRESS NOTES
[facility] Hospital Medicine  Progress Note    Patient Name: Home De Dios  MRN: [id number]  Patient Class: IP- Inpatient   Admission Date: 5/21/2020  Length of Stay: 2 days  Attending Physician: Naomi Nix MD  Primary Care Provider: Quintin Cerda MD        Subjective:     Principal Problem:Gangrene of right foot        HPI:  Direct admit from podiatry clinic for right BKA.     She states the toes of her right foot began changing color and turning black about 4-5 weeks agoShe states this began after soaking her foot in bleach and Epsom salts and then cutting her toe nails. The right foot subsequently became itchy, and then numb, particularly on the 1st, 2nd and 4th toes.      She had a TMA  3 weeks ago.    Since then she has developed an area of wet gangrene involving the imputation site.    Podiatry feels they have nothing left to offer and  most direct route to healing and safety is to have BKA.    Pt denies fever / chills.    Notes minimal amount of drainage from foot below surgical incision.      Overview/Hospital Course:  S/p bka 5/22.    No new subjective & objective note has been filed under this hospital service since the last note was generated.      Assessment/Plan:      * Gangrene of right foot  3 weeks s/p TMA  No obvious infection so will hold off Abx  S/p bka  Sx following      PAD (peripheral artery disease)  Holding asa, plavix with sx  Restart when ok with surgeon      Hypernatremia  Encouraged to drink water  Improving  Monitor labs       Debility  Pt/ot efforts      Malnutrition of mild degree  Monitor caloric intake and weights.  Po supplements      Anemia  H/h with some drop than yesterday  Monitor, no active bleeding  Iron profile ok, b12 normal  Continue b12 supplementation      MICKEY (acute kidney injury)  Cr 2.2 on admit  up from 1.5  Not on ACE/ARB/diuretic  No recent contrast  Improving with  IVF, ivf dcd  Monitor uop and labs  Renal dose meds, avoid  nephrotoxins      Essential hypertension  Cont nifedipine and monitor BP  running high, likely due to pain  Started on pain meds this am      VTE Risk Mitigation (From admission, onward)         Ordered     enoxaparin injection 40 mg  Daily      05/23/20 1303     IP VTE HIGH RISK PATIENT  Once      05/21/20 1231     Place sequential compression device  Until discontinued      05/21/20 1231     Place SHERYL hose  Until discontinued      05/21/20 1231                      Naomi Nix MD  Department of Hospital Medicine   [facility]

## 2020-05-23 NOTE — SUBJECTIVE & OBJECTIVE
Interval History: c/o pain in right bka stump, c/o poor appetite , constipation.    Review of Systems   Constitutional: Negative for chills and fever.   HENT: Negative for sinus pain and trouble swallowing.    Respiratory: Negative for cough and shortness of breath.    Cardiovascular: Negative for chest pain and leg swelling.   Gastrointestinal: Positive for constipation. Negative for abdominal pain, blood in stool, nausea and vomiting.   Genitourinary: Negative for dysuria and hematuria.   Musculoskeletal: Positive for gait problem.   Skin: Negative for rash.   Neurological: Negative for numbness and headaches.   Psychiatric/Behavioral: Negative for confusion.     Objective:     Vital Signs (Most Recent):  Temp: 97.6 °F (36.4 °C) (05/23/20 1210)  Pulse: (!) 54 (05/23/20 1210)  Resp: 16 (05/23/20 1210)  BP: (!) 166/72 (05/23/20 1210)  SpO2: 95 % (05/23/20 1210) Vital Signs (24h Range):  Temp:  [97 °F (36.1 °C)-98.3 °F (36.8 °C)] 97.6 °F (36.4 °C)  Pulse:  [53-81] 54  Resp:  [16-20] 16  SpO2:  [95 %-100 %] 95 %  BP: (131-175)/(61-79) 166/72     Weight: 51.7 kg (113 lb 15.7 oz)  Body mass index is 17.85 kg/m².    Intake/Output Summary (Last 24 hours) at 5/23/2020 1257  Last data filed at 5/22/2020 1800  Gross per 24 hour   Intake 1360 ml   Output --   Net 1360 ml      Physical Exam   Constitutional: She is oriented to person, place, and time. No distress.   HENT:   Head: Normocephalic and atraumatic.   Eyes: Pupils are equal, round, and reactive to light. EOM are normal.   Neck: Normal range of motion. Neck supple.   Cardiovascular: Normal rate and regular rhythm.   Pulmonary/Chest: Effort normal and breath sounds normal. No respiratory distress.   Abdominal: Soft. Bowel sounds are normal. She exhibits no distension. There is no tenderness.   Musculoskeletal: Normal range of motion. She exhibits no edema.   Right bka stump, dressed   Neurological: She is alert and oriented to person, place, and time. No cranial nerve  deficit.   Skin: Skin is warm.   Psychiatric: She has a normal mood and affect.   Vitals reviewed.      Significant Labs:   BMP:   Recent Labs   Lab 05/21/20  1441  05/23/20  0351   GLU  --    < > 81   NA  --    < > 148*   K  --    < > 4.6   CL  --    < > 116*   CO2  --    < > 21*   BUN  --    < > 14   CREATININE  --    < > 1.7*   CALCIUM  --    < > 8.6*   MG 2.2  --   --     < > = values in this interval not displayed.     CBC:   Recent Labs   Lab 05/22/20  0312 05/23/20  0351   WBC 5.29 6.49  6.49   HGB 8.8* 7.5*  7.5*   HCT 29.9* 25.5*  25.5*    272  272       Significant Imaging: I have reviewed all pertinent imaging results/findings within the past 24 hours.

## 2020-05-23 NOTE — ASSESSMENT & PLAN NOTE
H/h with some lower but stable   Monitor, no active bleeding  Iron profile ok, b12 normal  Continue b12 supplementation

## 2020-05-23 NOTE — PT/OT/SLP EVAL
Physical Therapy Evaluation    Patient Name:  Home D eDios   MRN:  [id number]    Recommendations:     Discharge Recommendations:  (TBD pending progress)   Discharge Equipment Recommendations: none   Barriers to discharge: Inaccessible home    Assessment:     Home De Dios is a 77 y.o. female admitted with a medical diagnosis of Gangrene of right foot.  She presents with the following impairments/functional limitations:  impaired endurance, impaired functional mobilty, gait instability, impaired balance, decreased lower extremity function, decreased safety awareness, pain, decreased ROM . Pt  with functional mobility deficits and should benefit from  PT  to maximize I and safety decrease risk of further decline of injury.    Rehab Prognosis: Good; patient would benefit from acute skilled PT services to address these deficits and reach maximum level of function.    Recent Surgery: Procedure(s) (LRB):  AMPUTATION, FOOT - RIGHT (Right) 1 Day Post-Op    Plan:     During this hospitalization, patient to be seen 5 x/week to address the identified rehab impairments via gait training, therapeutic activities, therapeutic exercises and progress toward the following goals:    · Plan of Care Expires:  06/23/20    Subjective     Chief Complaint: R residual limb pain  Patient/Family Comments/goals: to d/c home  Pain/Comfort:  · Pain Rating 1: 10/10  · Location - Side 1: Right  · Location - Orientation 1: generalized  · Location 1: (distal  R residual limb)  · Pain Addressed 1: Reposition, Pre-medicate for activity, Distraction, Cessation of Activity, Nurse notified  · Pain Rating Post-Intervention 1: 0/10(at rest)    Patients cultural, spiritual, Baptism conflicts given the current situation: no    Living Environment:  Pt lives with  SSIVAN, with 7 steps to enter. Currently son is staying with pt to assist with her care  Prior to admission, patients level of function was mod I, states walks very little with Rw, usually  uses w/c, transfers with assist from  Son. Ascend/descend stairs on buttocks assisted by son  Equipment used at home: walker, rolling, bedside commode, wheelchair.  DME owned (not currently used): none.  Upon discharge, patient will have assistance from son.    Objective:     Communicated with nurse prior to session.  Patient found supine with SCD, pressure relief boots, peripheral IV  upon PT entry to room.    General Precautions: Standard, fall   Orthopedic Precautions:N/A   Braces: (posterior LE splint R LE)     Exams:  · Cognitive Exam:  Patient is oriented to Person, Place, Time and Situation  · Gross Motor Coordination:  WFL  · Sensation:    · -       Subjectively Intact to L LE  · RLE ROM: WFL, knee with posterior brace within dressing  · RLE Strength: WFL  · LLE ROM: WFL  · LLE Strength: WFL    Functional Mobility:  · Bed Mobility:     · Rolling Left:  modified independence  · Rolling Right: modified independence  · Supine to Sit: modified independence  · Sit to Supine: modified independence  · Transfers:     · Sit to Stand:  stand by assistance with rolling walker  · Gait: unable to due pain at sx site      Therapeutic Activities and Exercises:   Pt presented supine in bed reluctantly agreeable to PT.  Bed mobilit roll L and R I, supine<>sit with mod I.  Sit>stand with SBA to RW. Pt c/o pain making it unable for her to attempt gait or transfer to BSC. Pt returned to supine mod I.  Therex: SLR, hip ABD, QS    AM-PAC 6 CLICK MOBILITY  Total Score:18     Patient left supine with all lines intact, call button in reach and physcian present.    GOALS:   Multidisciplinary Problems     Physical Therapy Goals        Problem: Physical Therapy Goal    Goal Priority Disciplines Outcome Goal Variances Interventions   Physical Therapy Goal     PT, PT/OT Ongoing, Progressing     Description:  Goals to be met by:2020    Patient will increase functional independence with mobility by performin. Sit<>stand with  SPV with RW.  2. Gait x 25 feet with RW with SBA.  3. Ascend/descend 7 step(s) with least restrictive assistive device and CGA  4. Transfer EOB to w/c with SBA                    History:     Past Medical History:   Diagnosis Date    Glaucoma     Glaucoma (increased eye pressure)     Hypertension        Past Surgical History:   Procedure Laterality Date    AORTOGRAPHY WITH SERIALOGRAPHY Right 4/24/2020    Procedure: AORTOGRAM WITH RUNOFF;  Surgeon: Coral Baron MD;  Location: [facility] CATH LAB;  Service: Cardiology;  Laterality: Right;    APPENDECTOMY      CATARACT EXTRACTION W/  INTRAOCULAR LENS IMPLANT Left 10/16/2013        CATARACT EXTRACTION W/  INTRAOCULAR LENS IMPLANT Right 12/18/2013        FOOT AMPUTATION THROUGH METATARSAL Right 4/28/2020    Procedure: AMPUTATION, FOOT, TRANSMETATARSAL right;  Surgeon: Ha Munguia DPM;  Location: [facility] OR;  Service: Podiatry;  Laterality: Right;    HYSTERECTOMY         Time Tracking:     PT Received On: 05/23/20  PT Start Time: 0949     PT Stop Time: 1014  PT Total Time (min): 25 min     Billable Minutes: Evaluation 15 and Therapeutic Exercise 10      Jono Chacon, PT  05/23/2020

## 2020-05-23 NOTE — ASSESSMENT & PLAN NOTE
Cr 2.2 on admit  up from 1.5  Not on ACE/ARB/diuretic  No recent contrast  Improving with  IVF, ivf dcd  Monitor uop and labs  Renal dose meds, avoid nephrotoxins

## 2020-05-23 NOTE — NURSING
AAOX4. VSS. Pt free of trauma, falls, injury and skin breakdown. Pt. Refused Lovenox injection. Education provided on its' importance. Oral fluids encouraged. BKA dressing CDI and elevated on a pillow. Pt denies pain at this time. Pt has been eating and voiding adequately throughout shift. Pt ambulates and repositions self independently. Purposeful hourly rounding. Pt has call light in reach, side rails up X2, bed in low position and nonskid socks on. Pt lying in bed in no distress. Will cont to monitor.

## 2020-05-23 NOTE — PLAN OF CARE
Problem: Physical Therapy Goal  Goal: Physical Therapy Goal  Description  Goals to be met by:2020    Patient will increase functional independence with mobility by performin. Sit<>stand with SPV with RW.  2. Gait x 25 feet with RW with SBA.  3. Ascend/descend 7 step(s) with least restrictive assistive device and CGA  4. Transfer EOB to w/c with SBA   Outcome: Ongoing, Progressing   Pt presented supine in bed reluctantly agreeable to PT.  Bed mobilit roll L and R I, supine<>sit with mod I.  Sit>stand with SBA to RW. Pt c/o pain making it unable for her to attempt gait or transfer to BSC. Pt returned to supine mod I.  Therex: SLR, hip ABD, QS

## 2020-05-24 LAB
ANION GAP SERPL CALC-SCNC: 10 MMOL/L (ref 8–16)
ANION GAP SERPL CALC-SCNC: 11 MMOL/L (ref 8–16)
BASOPHILS # BLD AUTO: 0.02 K/UL (ref 0–0.2)
BASOPHILS NFR BLD: 0.3 % (ref 0–1.9)
BUN SERPL-MCNC: 16 MG/DL (ref 8–23)
BUN SERPL-MCNC: 17 MG/DL (ref 8–23)
CALCIUM SERPL-MCNC: 8.6 MG/DL (ref 8.7–10.5)
CALCIUM SERPL-MCNC: 8.7 MG/DL (ref 8.7–10.5)
CHLORIDE SERPL-SCNC: 116 MMOL/L (ref 95–110)
CHLORIDE SERPL-SCNC: 116 MMOL/L (ref 95–110)
CO2 SERPL-SCNC: 22 MMOL/L (ref 23–29)
CO2 SERPL-SCNC: 25 MMOL/L (ref 23–29)
CREAT SERPL-MCNC: 1.5 MG/DL (ref 0.5–1.4)
CREAT SERPL-MCNC: 1.6 MG/DL (ref 0.5–1.4)
DIFFERENTIAL METHOD: ABNORMAL
EOSINOPHIL # BLD AUTO: 0.3 K/UL (ref 0–0.5)
EOSINOPHIL NFR BLD: 4.5 % (ref 0–8)
ERYTHROCYTE [DISTWIDTH] IN BLOOD BY AUTOMATED COUNT: 15.7 % (ref 11.5–14.5)
EST. GFR  (AFRICAN AMERICAN): 35 ML/MIN/1.73 M^2
EST. GFR  (AFRICAN AMERICAN): 38 ML/MIN/1.73 M^2
EST. GFR  (NON AFRICAN AMERICAN): 31 ML/MIN/1.73 M^2
EST. GFR  (NON AFRICAN AMERICAN): 33 ML/MIN/1.73 M^2
GLUCOSE SERPL-MCNC: 100 MG/DL (ref 70–110)
GLUCOSE SERPL-MCNC: 136 MG/DL (ref 70–110)
HCT VFR BLD AUTO: 25.1 % (ref 37–48.5)
HGB BLD-MCNC: 7.3 G/DL (ref 12–16)
IMM GRANULOCYTES # BLD AUTO: 0.02 K/UL (ref 0–0.04)
IMM GRANULOCYTES NFR BLD AUTO: 0.3 % (ref 0–0.5)
LYMPHOCYTES # BLD AUTO: 1.4 K/UL (ref 1–4.8)
LYMPHOCYTES NFR BLD: 21.5 % (ref 18–48)
MCH RBC QN AUTO: 25.3 PG (ref 27–31)
MCHC RBC AUTO-ENTMCNC: 29.1 G/DL (ref 32–36)
MCV RBC AUTO: 87 FL (ref 82–98)
MONOCYTES # BLD AUTO: 0.6 K/UL (ref 0.3–1)
MONOCYTES NFR BLD: 9.5 % (ref 4–15)
NEUTROPHILS # BLD AUTO: 4.2 K/UL (ref 1.8–7.7)
NEUTROPHILS NFR BLD: 63.9 % (ref 38–73)
NRBC BLD-RTO: 0 /100 WBC
PLATELET # BLD AUTO: 247 K/UL (ref 150–350)
PMV BLD AUTO: 10.9 FL (ref 9.2–12.9)
POTASSIUM SERPL-SCNC: 3.9 MMOL/L (ref 3.5–5.1)
POTASSIUM SERPL-SCNC: 3.9 MMOL/L (ref 3.5–5.1)
RBC # BLD AUTO: 2.88 M/UL (ref 4–5.4)
SODIUM SERPL-SCNC: 149 MMOL/L (ref 136–145)
SODIUM SERPL-SCNC: 151 MMOL/L (ref 136–145)
WBC # BLD AUTO: 6.61 K/UL (ref 3.9–12.7)

## 2020-05-24 PROCEDURE — 99233 SBSQ HOSP IP/OBS HIGH 50: CPT | Mod: HCNC,,, | Performed by: INTERNAL MEDICINE

## 2020-05-24 PROCEDURE — 99233 PR SUBSEQUENT HOSPITAL CARE,LEVL III: ICD-10-PCS | Mod: HCNC,,, | Performed by: INTERNAL MEDICINE

## 2020-05-24 PROCEDURE — 80048 BASIC METABOLIC PNL TOTAL CA: CPT | Mod: 91,HCNC

## 2020-05-24 PROCEDURE — 94761 N-INVAS EAR/PLS OXIMETRY MLT: CPT | Mod: HCNC

## 2020-05-24 PROCEDURE — 25000003 PHARM REV CODE 250: Mod: HCNC | Performed by: SPECIALIST

## 2020-05-24 PROCEDURE — 85025 COMPLETE CBC W/AUTO DIFF WBC: CPT | Mod: HCNC

## 2020-05-24 PROCEDURE — S5010 5% DEXTROSE AND 0.45% SALINE: HCPCS | Mod: HCNC | Performed by: INTERNAL MEDICINE

## 2020-05-24 PROCEDURE — 36415 COLL VENOUS BLD VENIPUNCTURE: CPT | Mod: HCNC

## 2020-05-24 PROCEDURE — 80048 BASIC METABOLIC PNL TOTAL CA: CPT | Mod: HCNC

## 2020-05-24 PROCEDURE — 63600175 PHARM REV CODE 636 W HCPCS: Mod: HCNC | Performed by: INTERNAL MEDICINE

## 2020-05-24 PROCEDURE — 99900035 HC TECH TIME PER 15 MIN (STAT): Mod: HCNC

## 2020-05-24 PROCEDURE — 11000001 HC ACUTE MED/SURG PRIVATE ROOM: Mod: HCNC

## 2020-05-24 PROCEDURE — 25000003 PHARM REV CODE 250: Mod: HCNC | Performed by: INTERNAL MEDICINE

## 2020-05-24 RX ORDER — DEXTROSE MONOHYDRATE AND SODIUM CHLORIDE 5; .45 G/100ML; G/100ML
INJECTION, SOLUTION INTRAVENOUS CONTINUOUS
Status: DISCONTINUED | OUTPATIENT
Start: 2020-05-24 | End: 2020-05-24

## 2020-05-24 RX ORDER — DEXTROSE MONOHYDRATE 50 MG/ML
INJECTION, SOLUTION INTRAVENOUS CONTINUOUS
Status: DISCONTINUED | OUTPATIENT
Start: 2020-05-24 | End: 2020-05-27

## 2020-05-24 RX ADMIN — OXYCODONE HYDROCHLORIDE 5 MG: 5 TABLET ORAL at 09:05

## 2020-05-24 RX ADMIN — POLYETHYLENE GLYCOL 3350 17 G: 17 POWDER, FOR SOLUTION ORAL at 09:05

## 2020-05-24 RX ADMIN — ENOXAPARIN SODIUM 30 MG: 100 INJECTION SUBCUTANEOUS at 04:05

## 2020-05-24 RX ADMIN — DEXTROSE: 5 SOLUTION INTRAVENOUS at 03:05

## 2020-05-24 RX ADMIN — TIMOLOL MALEATE 1 DROP: 5 SOLUTION/ DROPS OPHTHALMIC at 08:05

## 2020-05-24 RX ADMIN — NIFEDIPINE 90 MG: 30 TABLET, FILM COATED, EXTENDED RELEASE ORAL at 09:05

## 2020-05-24 RX ADMIN — CYANOCOBALAMIN TAB 250 MCG 500 MCG: 250 TAB at 09:05

## 2020-05-24 RX ADMIN — OXYCODONE HYDROCHLORIDE 5 MG: 5 TABLET ORAL at 08:05

## 2020-05-24 RX ADMIN — TIMOLOL MALEATE 1 DROP: 5 SOLUTION/ DROPS OPHTHALMIC at 09:05

## 2020-05-24 RX ADMIN — FERROUS SULFATE TAB EC 325 MG (65 MG FE EQUIVALENT) 325 MG: 325 (65 FE) TABLET DELAYED RESPONSE at 09:05

## 2020-05-24 RX ADMIN — DEXTROSE AND SODIUM CHLORIDE: 5; .45 INJECTION, SOLUTION INTRAVENOUS at 09:05

## 2020-05-24 NOTE — ASSESSMENT & PLAN NOTE
Cont nifedipine and monitor BP  Was running high,improved,  likely due to pain  Started on pain meds yesterday

## 2020-05-24 NOTE — ASSESSMENT & PLAN NOTE
Encouraged to drink water  Worse today, initially started on d5 half ns with worsened repeat labs  Start d5w and monitor labs

## 2020-05-24 NOTE — ASSESSMENT & PLAN NOTE
3 weeks s/p TMA  No obvious infection so will hold off Abx  S/p bka  Sx following  Advised patient to take prn pain meds

## 2020-05-24 NOTE — SUBJECTIVE & OBJECTIVE
Interval History: c/o pain in right bka stump, only took 1 pain pill yesterday, c/o poor appetite , likes supplements, small bm today.    Review of Systems   Constitutional: Negative for chills and fever.   HENT: Negative for sinus pain and trouble swallowing.    Respiratory: Negative for cough and shortness of breath.    Cardiovascular: Negative for chest pain and leg swelling.   Gastrointestinal: Positive for constipation. Negative for abdominal pain, blood in stool, nausea and vomiting.   Genitourinary: Negative for dysuria and hematuria.   Musculoskeletal: Positive for gait problem.   Skin: Negative for rash.   Neurological: Negative for numbness and headaches.   Psychiatric/Behavioral: Negative for confusion.     Objective:     Vital Signs (Most Recent):  Temp: 98.1 °F (36.7 °C) (05/24/20 1309)  Pulse: 60 (05/24/20 1309)  Resp: 16 (05/24/20 1309)  BP: (!) 128/59 (05/24/20 1309)  SpO2: 99 % (05/24/20 1309) Vital Signs (24h Range):  Temp:  [97.8 °F (36.6 °C)-99.1 °F (37.3 °C)] 98.1 °F (36.7 °C)  Pulse:  [57-87] 60  Resp:  [16-20] 16  SpO2:  [96 %-99 %] 99 %  BP: (112-168)/(57-73) 128/59     Weight: 51.7 kg (113 lb 15.7 oz)  Body mass index is 17.85 kg/m².    Intake/Output Summary (Last 24 hours) at 5/24/2020 1413  Last data filed at 5/23/2020 1700  Gross per 24 hour   Intake 240 ml   Output --   Net 240 ml      Physical Exam   Constitutional: She is oriented to person, place, and time. No distress.   HENT:   Head: Normocephalic and atraumatic.   Eyes: Pupils are equal, round, and reactive to light. EOM are normal.   Neck: Normal range of motion. Neck supple.   Cardiovascular: Normal rate and regular rhythm.   Pulmonary/Chest: Effort normal and breath sounds normal. No respiratory distress.   Abdominal: Soft. Bowel sounds are normal. She exhibits no distension. There is no tenderness.   Musculoskeletal: Normal range of motion. She exhibits no edema.   Right bka stump, dressed   Neurological: She is alert and  oriented to person, place, and time. No cranial nerve deficit.   Skin: Skin is warm.   Psychiatric: She has a normal mood and affect.   Vitals reviewed.      Significant Labs:   BMP:   Recent Labs   Lab 05/24/20  1310   *   *   K 3.9   *   CO2 25   BUN 17   CREATININE 1.6*   CALCIUM 8.6*     CBC:   Recent Labs   Lab 05/23/20  0351 05/24/20  0342   WBC 6.49  6.49 6.61   HGB 7.5*  7.5* 7.3*   HCT 25.5*  25.5* 25.1*     272 247       Significant Imaging: I have reviewed all pertinent imaging results/findings within the past 24 hours.

## 2020-05-24 NOTE — NURSING
Pt free of trauma, falls, and injury. Pt VSS and afebrile throughout shift. Pt free of skin breakdown. Pt dressing is clean, dry, and intact. Pt pain has been moderately controlled by PO pain meds and tolerated well. Pt has been voiding adequately throughout shift. Pt got up from bed to bedside commode, tolerated well. Pt refused PT and OT today.  Pt has call light in reach, bed alarm on, bed brakes on, side rails up x2, bed in low position, SCDs and heel boat on, IS at bedside,. Pt lying in bed in no distress. Hourly Rounding Performed This Shift.

## 2020-05-24 NOTE — PLAN OF CARE
VSS and afebrile, AAOx4. Dressing is CDI, no pain reported this shift. Regular diet tolerated well. Pt is moving around well. No significant events occurred this shift. Bed alarm on. Plan of care reviewed with patient. Purposeful rounding done, call light at bed side, bed at lowest position, brakes on, non-skid socks on, will continue to monitor.

## 2020-05-24 NOTE — PT/OT/SLP PROGRESS
"Physical Therapy  Not Seen    Patient Name:  Home De Dios   MRN:  [id number]    Patient not seen today secondary to Patient fatigue and increased pain - pt reports moving "a lot" this AM with therapy (no therapy services provided this AM). Discussed discharge dispo with consideration that  is w/c bound and son is planning to return to [place] "once quarantine is over" - pt wants to "wait and see" how she does in therapy tomorrow before making decisions. Will follow-up tomorrow, 5/25.    Melody Waters, PT, DPT    "

## 2020-05-24 NOTE — PROGRESS NOTES
[facility] Hospital Medicine  Progress Note    Patient Name: Home De Dios  MRN: [id number]  Patient Class: IP- Inpatient   Admission Date: 5/21/2020  Length of Stay: 3 days  Attending Physician: Naomi Nix MD  Primary Care Provider: Quintin Cerda MD        Subjective:     Principal Problem:Gangrene of right foot        HPI:  Direct admit from podiatry clinic for right BKA.     She states the toes of her right foot began changing color and turning black about 4-5 weeks agoShe states this began after soaking her foot in bleach and Epsom salts and then cutting her toe nails. The right foot subsequently became itchy, and then numb, particularly on the 1st, 2nd and 4th toes.      She had a TMA  3 weeks ago.    Since then she has developed an area of wet gangrene involving the imputation site.    Podiatry feels they have nothing left to offer and  most direct route to healing and safety is to have BKA.    Pt denies fever / chills.    Notes minimal amount of drainage from foot below surgical incision.      Overview/Hospital Course:  S/p bka 5/22.    Interval History: c/o pain in right bka stump, only took 1 pain pill yesterday, c/o poor appetite , likes supplements, small bm today.    Review of Systems   Constitutional: Negative for chills and fever.   HENT: Negative for sinus pain and trouble swallowing.    Respiratory: Negative for cough and shortness of breath.    Cardiovascular: Negative for chest pain and leg swelling.   Gastrointestinal: Positive for constipation. Negative for abdominal pain, blood in stool, nausea and vomiting.   Genitourinary: Negative for dysuria and hematuria.   Musculoskeletal: Positive for gait problem.   Skin: Negative for rash.   Neurological: Negative for numbness and headaches.   Psychiatric/Behavioral: Negative for confusion.     Objective:     Vital Signs (Most Recent):  Temp: 98.1 °F (36.7 °C) (05/24/20 1309)  Pulse: 60 (05/24/20 1309)  Resp: 16 (05/24/20  1309)  BP: (!) 128/59 (05/24/20 1309)  SpO2: 99 % (05/24/20 1309) Vital Signs (24h Range):  Temp:  [97.8 °F (36.6 °C)-99.1 °F (37.3 °C)] 98.1 °F (36.7 °C)  Pulse:  [57-87] 60  Resp:  [16-20] 16  SpO2:  [96 %-99 %] 99 %  BP: (112-168)/(57-73) 128/59     Weight: 51.7 kg (113 lb 15.7 oz)  Body mass index is 17.85 kg/m².    Intake/Output Summary (Last 24 hours) at 5/24/2020 1413  Last data filed at 5/23/2020 1700  Gross per 24 hour   Intake 240 ml   Output --   Net 240 ml      Physical Exam   Constitutional: She is oriented to person, place, and time. No distress.   HENT:   Head: Normocephalic and atraumatic.   Eyes: Pupils are equal, round, and reactive to light. EOM are normal.   Neck: Normal range of motion. Neck supple.   Cardiovascular: Normal rate and regular rhythm.   Pulmonary/Chest: Effort normal and breath sounds normal. No respiratory distress.   Abdominal: Soft. Bowel sounds are normal. She exhibits no distension. There is no tenderness.   Musculoskeletal: Normal range of motion. She exhibits no edema.   Right bka stump, dressed   Neurological: She is alert and oriented to person, place, and time. No cranial nerve deficit.   Skin: Skin is warm.   Psychiatric: She has a normal mood and affect.   Vitals reviewed.      Significant Labs:   BMP:   Recent Labs   Lab 05/24/20  1310   *   *   K 3.9   *   CO2 25   BUN 17   CREATININE 1.6*   CALCIUM 8.6*     CBC:   Recent Labs   Lab 05/23/20  0351 05/24/20  0342   WBC 6.49  6.49 6.61   HGB 7.5*  7.5* 7.3*   HCT 25.5*  25.5* 25.1*     272 247       Significant Imaging: I have reviewed all pertinent imaging results/findings within the past 24 hours.      Assessment/Plan:      * Gangrene of right foot  3 weeks s/p TMA  No obvious infection so will hold off Abx  S/p bka  Sx following  Advised patient to take prn pain meds    PAD (peripheral artery disease)  Holding asa, plavix with sx  Restart when ok with  surgeon      Hypernatremia  Encouraged to drink water  Worse today, initially started on d5 half ns with worsened repeat labs  Start d5w and monitor labs    Debility  Pt/ot efforts  Patient says wants to go home with therapy    Malnutrition of mild degree  Monitor caloric intake and weights.  Po supplements      Anemia  H/h with some lower but stable   Monitor, no active bleeding  Iron profile ok, b12 normal  Continue b12 supplementation      MICKEY (acute kidney injury)  Cr 2.2 on admit  up from 1.5  Not on ACE/ARB/diuretic  No recent contrast  Improving with  IVF  Monitor uop and labs  Renal dose meds, avoid nephrotoxins      Essential hypertension  Cont nifedipine and monitor BP  Was running high,improved,  likely due to pain  Started on pain meds yesterday         VTE Risk Mitigation (From admission, onward)         Ordered     enoxaparin injection 30 mg  Daily      05/23/20 1323     IP VTE HIGH RISK PATIENT  Once      05/21/20 1231     Place sequential compression device  Until discontinued      05/21/20 1231     Place SHERYL hose  Until discontinued      05/21/20 1231                      Naomi Nix MD  Department of Hospital Medicine   [facility]

## 2020-05-24 NOTE — PT/OT/SLP PROGRESS
Occupational Therapy  Not seen Note    Patient Name:  Home De Dios   MRN:  [id number]    OT evaluation attempted.  Patient not seen today secondary to Patient unwilling to participate.She reported being active this morning, wanting to rest for the rest of the day. Will follow-up Monday 5/24/20.    Nery Morocho, Ina, LOTR  5/24/2020

## 2020-05-25 LAB
ANION GAP SERPL CALC-SCNC: 11 MMOL/L (ref 8–16)
BASOPHILS # BLD AUTO: 0.01 K/UL (ref 0–0.2)
BASOPHILS NFR BLD: 0.2 % (ref 0–1.9)
BUN SERPL-MCNC: 15 MG/DL (ref 8–23)
CALCIUM SERPL-MCNC: 8.3 MG/DL (ref 8.7–10.5)
CHLORIDE SERPL-SCNC: 111 MMOL/L (ref 95–110)
CO2 SERPL-SCNC: 22 MMOL/L (ref 23–29)
CREAT SERPL-MCNC: 1.5 MG/DL (ref 0.5–1.4)
DIFFERENTIAL METHOD: ABNORMAL
EOSINOPHIL # BLD AUTO: 0.5 K/UL (ref 0–0.5)
EOSINOPHIL NFR BLD: 8.1 % (ref 0–8)
ERYTHROCYTE [DISTWIDTH] IN BLOOD BY AUTOMATED COUNT: 15.7 % (ref 11.5–14.5)
EST. GFR  (AFRICAN AMERICAN): 38 ML/MIN/1.73 M^2
EST. GFR  (NON AFRICAN AMERICAN): 33 ML/MIN/1.73 M^2
GLUCOSE SERPL-MCNC: 134 MG/DL (ref 70–110)
HCT VFR BLD AUTO: 22.7 % (ref 37–48.5)
HGB BLD-MCNC: 6.8 G/DL (ref 12–16)
IMM GRANULOCYTES # BLD AUTO: 0.02 K/UL (ref 0–0.04)
IMM GRANULOCYTES NFR BLD AUTO: 0.4 % (ref 0–0.5)
LYMPHOCYTES # BLD AUTO: 1.6 K/UL (ref 1–4.8)
LYMPHOCYTES NFR BLD: 27.9 % (ref 18–48)
MCH RBC QN AUTO: 26.1 PG (ref 27–31)
MCHC RBC AUTO-ENTMCNC: 30 G/DL (ref 32–36)
MCV RBC AUTO: 87 FL (ref 82–98)
MONOCYTES # BLD AUTO: 0.5 K/UL (ref 0.3–1)
MONOCYTES NFR BLD: 9 % (ref 4–15)
NEUTROPHILS # BLD AUTO: 3 K/UL (ref 1.8–7.7)
NEUTROPHILS NFR BLD: 54.4 % (ref 38–73)
NRBC BLD-RTO: 0 /100 WBC
PLATELET # BLD AUTO: 218 K/UL (ref 150–350)
PMV BLD AUTO: 11.3 FL (ref 9.2–12.9)
POTASSIUM SERPL-SCNC: 3.6 MMOL/L (ref 3.5–5.1)
RBC # BLD AUTO: 2.61 M/UL (ref 4–5.4)
SODIUM SERPL-SCNC: 144 MMOL/L (ref 136–145)
WBC # BLD AUTO: 5.56 K/UL (ref 3.9–12.7)

## 2020-05-25 PROCEDURE — 94761 N-INVAS EAR/PLS OXIMETRY MLT: CPT | Mod: HCNC

## 2020-05-25 PROCEDURE — 99232 SBSQ HOSP IP/OBS MODERATE 35: CPT | Mod: HCNC,,, | Performed by: INTERNAL MEDICINE

## 2020-05-25 PROCEDURE — 11000001 HC ACUTE MED/SURG PRIVATE ROOM: Mod: HCNC

## 2020-05-25 PROCEDURE — 25000003 PHARM REV CODE 250: Mod: HCNC | Performed by: SPECIALIST

## 2020-05-25 PROCEDURE — 36415 COLL VENOUS BLD VENIPUNCTURE: CPT | Mod: HCNC

## 2020-05-25 PROCEDURE — 25000003 PHARM REV CODE 250: Mod: HCNC | Performed by: INTERNAL MEDICINE

## 2020-05-25 PROCEDURE — 97165 OT EVAL LOW COMPLEX 30 MIN: CPT | Mod: HCNC

## 2020-05-25 PROCEDURE — 85025 COMPLETE CBC W/AUTO DIFF WBC: CPT | Mod: HCNC

## 2020-05-25 PROCEDURE — 63600175 PHARM REV CODE 636 W HCPCS: Mod: HCNC | Performed by: INTERNAL MEDICINE

## 2020-05-25 PROCEDURE — 80048 BASIC METABOLIC PNL TOTAL CA: CPT | Mod: HCNC

## 2020-05-25 PROCEDURE — 99232 PR SUBSEQUENT HOSPITAL CARE,LEVL II: ICD-10-PCS | Mod: HCNC,,, | Performed by: INTERNAL MEDICINE

## 2020-05-25 RX ADMIN — OXYCODONE HYDROCHLORIDE 5 MG: 5 TABLET ORAL at 05:05

## 2020-05-25 RX ADMIN — OXYCODONE HYDROCHLORIDE 5 MG: 5 TABLET ORAL at 09:05

## 2020-05-25 RX ADMIN — NIFEDIPINE 90 MG: 30 TABLET, FILM COATED, EXTENDED RELEASE ORAL at 08:05

## 2020-05-25 RX ADMIN — CYANOCOBALAMIN TAB 250 MCG 500 MCG: 250 TAB at 08:05

## 2020-05-25 RX ADMIN — TIMOLOL MALEATE 1 DROP: 5 SOLUTION/ DROPS OPHTHALMIC at 08:05

## 2020-05-25 RX ADMIN — DEXTROSE: 5 SOLUTION INTRAVENOUS at 05:05

## 2020-05-25 RX ADMIN — DEXTROSE: 5 SOLUTION INTRAVENOUS at 07:05

## 2020-05-25 RX ADMIN — OXYCODONE HYDROCHLORIDE 5 MG: 5 TABLET ORAL at 03:05

## 2020-05-25 RX ADMIN — ENOXAPARIN SODIUM 30 MG: 100 INJECTION SUBCUTANEOUS at 05:05

## 2020-05-25 RX ADMIN — OXYCODONE HYDROCHLORIDE 5 MG: 5 TABLET ORAL at 08:05

## 2020-05-25 RX ADMIN — POLYETHYLENE GLYCOL 3350 17 G: 17 POWDER, FOR SOLUTION ORAL at 08:05

## 2020-05-25 RX ADMIN — LATANOPROST 1 DROP: 50 SOLUTION OPHTHALMIC at 09:05

## 2020-05-25 NOTE — ASSESSMENT & PLAN NOTE
Cr 2.2 on admit  up from 1.5, now back to baseline  Not on ACE/ARB/diuretic  No recent contrast    Monitor uop and labs  Renal dose meds, avoid nephrotoxins

## 2020-05-25 NOTE — PLAN OF CARE
CM spoke to pt regarding IPR recs. Pt is in agreement with [facility] or [facility].    Referrals sent thru [facility].    CM also given permission by pt to speak to family.   05/25/20 1410   Post-Acute Status   Post-Acute Authorization Placement   Post-Acute Placement Status Pending Payor Medical Review   CM spoke to pt's son, Pradeep De Dios, [phone], to inform him of referrals. Son and spouse are in agreement with plan.    CM to follow for arrangements.

## 2020-05-25 NOTE — NURSING
"Pt rounding, found pt on the floor pt stated " I was trying to get to the bedside commode from the chair" asked why she didn't use call light to help with assisting pt stated "I couldn't reach the call light." pt was put in chair by therapy prior to incident. Pt denies hitting her head, pt complain of right leg pain." pt in no distress at this time,pt was put back in bed with ,call light in reach bed alarm on. Dr Barker notified.   "

## 2020-05-25 NOTE — PROGRESS NOTES
[facility] Hospital Medicine  Progress Note    Patient Name: Home De Dios  MRN: [id number]  Patient Class: IP- Inpatient   Admission Date: 5/21/2020  Length of Stay: 4 days  Attending Physician: Michael Barker MD  Primary Care Provider: Quintin Cerda MD        Subjective:     Principal Problem:Gangrene of right foot        HPI:  Direct admit from podiatry clinic for right BKA.     She states the toes of her right foot began changing color and turning black about 4-5 weeks agoShe states this began after soaking her foot in bleach and Epsom salts and then cutting her toe nails. The right foot subsequently became itchy, and then numb, particularly on the 1st, 2nd and 4th toes.      She had a TMA  3 weeks ago.    Since then she has developed an area of wet gangrene involving the imputation site.    Podiatry feels they have nothing left to offer and  most direct route to healing and safety is to have BKA.    Pt denies fever / chills.    Notes minimal amount of drainage from foot below surgical incision.      Overview/Hospital Course:  S/p bka 5/22.    Interval History:   Min  pain in right bka stump,   Did not do pt/ot yesterday    Son only her for short time and  is in wheelchair so unrealistic that she can go directly  home with fresh BKA      Review of Systems   Constitutional: Negative for chills and fever.   HENT: Negative for sinus pain and trouble swallowing.    Respiratory: Negative for cough and shortness of breath.    Cardiovascular: Negative for chest pain and leg swelling.   Gastrointestinal: Positive for constipation. Negative for abdominal pain, blood in stool, nausea and vomiting.   Genitourinary: Negative for dysuria and hematuria.   Musculoskeletal: Positive for gait problem.   Skin: Negative for rash.   Neurological: Negative for numbness and headaches.   Psychiatric/Behavioral: Negative for confusion.     Objective:     Vital Signs (Most Recent):  Temp: 98.3 °F (36.8  °C) (05/25/20 0752)  Pulse: 66 (05/25/20 0752)  Resp: 16 (05/25/20 0752)  BP: (!) 101/59 (05/25/20 0752)  SpO2: 96 % (05/25/20 0752) Vital Signs (24h Range):  Temp:  [98.1 °F (36.7 °C)-99.5 °F (37.5 °C)] 98.3 °F (36.8 °C)  Pulse:  [60-69] 66  Resp:  [16-18] 16  SpO2:  [95 %-99 %] 96 %  BP: (101-128)/(57-60) 101/59     Weight: 51.7 kg (113 lb 15.7 oz)  Body mass index is 17.85 kg/m².    Intake/Output Summary (Last 24 hours) at 5/25/2020 0932  Last data filed at 5/25/2020 0600  Gross per 24 hour   Intake 1260 ml   Output 350 ml   Net 910 ml      Physical Exam   Constitutional: She is oriented to person, place, and time. No distress.   HENT:   Head: Normocephalic and atraumatic.   Eyes: Pupils are equal, round, and reactive to light. EOM are normal.   Neck: Normal range of motion. Neck supple.   Cardiovascular: Normal rate and regular rhythm.   Pulmonary/Chest: Effort normal and breath sounds normal. No respiratory distress.   Abdominal: Soft. Bowel sounds are normal. She exhibits no distension. There is no tenderness.   Musculoskeletal: Normal range of motion. She exhibits no edema.   Right bka stump, dressed   Neurological: She is alert and oriented to person, place, and time. No cranial nerve deficit.   Skin: Skin is warm.   Psychiatric: She has a normal mood and affect.   Vitals reviewed.      Significant Labs:   BMP:   Recent Labs   Lab 05/25/20  0341   *      K 3.6   *   CO2 22*   BUN 15   CREATININE 1.5*   CALCIUM 8.3*     CBC:   Recent Labs   Lab 05/24/20  0342 05/25/20  0341   WBC 6.61 5.56   HGB 7.3* 6.8*   HCT 25.1* 22.7*    218       Significant Imaging: I have reviewed all pertinent imaging results/findings within the past 24 hours.      Assessment/Plan:      * Gangrene of right foot  3 weeks s/p TMA    S/p bka 5/22/20      PAD (peripheral artery disease)  Holding asa, plavix with sx  Restart when ok with surgeon      Hypernatremia  Improving  monitor    Debility  Pt/ot  efforts  Patient says wants to go home with therapy    Son only her for short time and  is in wheelchair so unrealistic that she can go directly  home with fresh BKA      Malnutrition of mild degree  Monitor caloric intake and weights.  Po supplements      Anemia  H/h with some lower but stable   Monitor, no active bleeding  Iron profile ok, b12 normal  Continue b12 supplementation    Pt resistant to transfusion      MICKEY (acute kidney injury)  Cr 2.2 on admit  up from 1.5, now back to baseline  Not on ACE/ARB/diuretic  No recent contrast    Monitor uop and labs  Renal dose meds, avoid nephrotoxins      Essential hypertension  Cont nifedipine and monitor BP  Was running high,improved,  likely due to pain  Started on pain meds yesterday         VTE Risk Mitigation (From admission, onward)         Ordered     enoxaparin injection 30 mg  Daily      05/23/20 1323     IP VTE HIGH RISK PATIENT  Once      05/21/20 1231     Place sequential compression device  Until discontinued      05/21/20 1231     Place SHERYL hose  Until discontinued      05/21/20 1231                      Michael Barker MD  Department of Hospital Medicine   [facility]

## 2020-05-25 NOTE — ASSESSMENT & PLAN NOTE
H/h with some lower but stable   Monitor, no active bleeding  Iron profile ok, b12 normal  Continue b12 supplementation    Pt resistant to transfusion

## 2020-05-25 NOTE — SUBJECTIVE & OBJECTIVE
Interval History:   Min  pain in right bka stump,   Did not do pt/ot yesterday    Son only her for short time and  is in wheelchair so unrealistic that she can go directly  home with fresh BKA      Review of Systems   Constitutional: Negative for chills and fever.   HENT: Negative for sinus pain and trouble swallowing.    Respiratory: Negative for cough and shortness of breath.    Cardiovascular: Negative for chest pain and leg swelling.   Gastrointestinal: Positive for constipation. Negative for abdominal pain, blood in stool, nausea and vomiting.   Genitourinary: Negative for dysuria and hematuria.   Musculoskeletal: Positive for gait problem.   Skin: Negative for rash.   Neurological: Negative for numbness and headaches.   Psychiatric/Behavioral: Negative for confusion.     Objective:     Vital Signs (Most Recent):  Temp: 98.3 °F (36.8 °C) (05/25/20 0752)  Pulse: 66 (05/25/20 0752)  Resp: 16 (05/25/20 0752)  BP: (!) 101/59 (05/25/20 0752)  SpO2: 96 % (05/25/20 0752) Vital Signs (24h Range):  Temp:  [98.1 °F (36.7 °C)-99.5 °F (37.5 °C)] 98.3 °F (36.8 °C)  Pulse:  [60-69] 66  Resp:  [16-18] 16  SpO2:  [95 %-99 %] 96 %  BP: (101-128)/(57-60) 101/59     Weight: 51.7 kg (113 lb 15.7 oz)  Body mass index is 17.85 kg/m².    Intake/Output Summary (Last 24 hours) at 5/25/2020 0932  Last data filed at 5/25/2020 0600  Gross per 24 hour   Intake 1260 ml   Output 350 ml   Net 910 ml      Physical Exam   Constitutional: She is oriented to person, place, and time. No distress.   HENT:   Head: Normocephalic and atraumatic.   Eyes: Pupils are equal, round, and reactive to light. EOM are normal.   Neck: Normal range of motion. Neck supple.   Cardiovascular: Normal rate and regular rhythm.   Pulmonary/Chest: Effort normal and breath sounds normal. No respiratory distress.   Abdominal: Soft. Bowel sounds are normal. She exhibits no distension. There is no tenderness.   Musculoskeletal: Normal range of motion. She exhibits no  edema.   Right bka stump, dressed   Neurological: She is alert and oriented to person, place, and time. No cranial nerve deficit.   Skin: Skin is warm.   Psychiatric: She has a normal mood and affect.   Vitals reviewed.      Significant Labs:   BMP:   Recent Labs   Lab 05/25/20  0341   *      K 3.6   *   CO2 22*   BUN 15   CREATININE 1.5*   CALCIUM 8.3*     CBC:   Recent Labs   Lab 05/24/20  0342 05/25/20  0341   WBC 6.61 5.56   HGB 7.3* 6.8*   HCT 25.1* 22.7*    218       Significant Imaging: I have reviewed all pertinent imaging results/findings within the past 24 hours.

## 2020-05-25 NOTE — PROGRESS NOTES
Consult for sacral area from nursing staff  77 y.o. female with a medical diagnosis of Gangrene of right foot s/p TMA.3 weeks ago  She presented with wet gangrene and significant pain of RLE from the podiatry clinic. Podiatry recommended a right BKA and she underwent a BKA with Dr Drummond on 5/22/2020.    Noted a fungal type rash to gluteal cleft  With white satellite lesions noted. Will place on critic aid antifungal ointment twice daily. Pt moving about in the bed and denies any complaint with her present bed. Do not feel we need to add a low air loss overlay.Tony score is 15 so she remains at high risk. Discussed measures to reduce the possibility of pressure injury.       05/25/20 1700        Wound 05/25/20 1215 Intertrigo medial Gluteal cleft   Date First Assessed/Time First Assessed: 05/25/20 1215   Pre-existing: No  Primary Wound Type: Intertrigo  Orientation: medial  Location: Gluteal cleft   Wound Image    Wound WDL ex   Dressing Appearance Open to air;No dressing   Drainage Amount None   Drainage Characteristics/Odor No odor   Appearance White;Other (see comments)  (satelite fungal type lesions to gluteal crease)   Periwound Area Moist;Satellite lesion   Care Cleansed with:;Soap and water;Applied:;Skin Barrier  (Ordered critic aid antifungal ointment)     Maritza Murcia RN CWON

## 2020-05-25 NOTE — PROGRESS NOTES
Postop day 3.  Status post right BKA  No complaints referable to lower extremity    PE  Right lower extremity-dressing intact, clean and dry

## 2020-05-25 NOTE — ASSESSMENT & PLAN NOTE
Pt/ot efforts  Patient says wants to go home with therapy    Son only her for short time and  is in wheelchair so unrealistic that she can go directly  home with fresh BKA

## 2020-05-25 NOTE — NURSING
Pt free of trauma, and injury. Pt had a fall earlier in the shift. Pt VSS and afebrile throughout shift. Pt free of skin breakdown. Pt dressing is clean, dry, and intact. Pt pain has been moderately controlled by PO pain meds and tolerated well. Pt has been voiding adequately throughout shift. Pt has call light in reach, bed alarm on, bed brakes on, side rails up x2, bed in low position, SCDs on and heel boats, nonskid socks on. Pt lying in bed in no distress. Hourly rounding performed this shift.

## 2020-05-25 NOTE — PT/OT/SLP EVAL
Occupational Therapy   Evaluation    Name: Home De Dios  MRN: [id number]  Admitting Diagnosis:  Gangrene of right foot 3 Days Post-Op    Recommendations:     Discharge Recommendations: rehabilitation facility  Discharge Equipment Recommendations:  none  Barriers to discharge:  Decreased caregiver support    Assessment:     Home De Dios is a 77 y.o. female with a medical diagnosis of Gangrene of right foot s/p R BKA.  She presents with significant pain in residual limb of RLE. Performance deficits affecting function are: impaired endurance, impaired self care skills, impaired functional mobilty, gait instability, impaired balance, decreased lower extremity function, decreased safety awareness, pain.  Pt agreeable to participating in therapy upon arrival to room.  Pt demonstrates strength and ROM in (B) UE needed for ADLs that is WNL.  She is able to don sock on L foot with supervision while seated at EOB.  Pt able to perform sit <> stand transfer from chair and EOB and stand pivot transfer to BSC with CGA and RW; however, decreased safety awareness and impaired balance observed during transfers.  Pt able to follow commands during session, but required some repetition for follow through.    PTA pt reports requiring some supervision and assist from son for ADLs, and was ambulating short distances with RW.  Pt is not at PLOF and and would benefit from skilled OT services to address problems listed above and maximize independence and safety with ADLs.  Rehab is recommended upon d/c from acute care to further address deficits, give pt greatest chance of resuming PLOF, and help pt improve overall functional independence.           Rehab Prognosis: Good; patient would benefit from acute skilled OT services to address these deficits and reach maximum level of function.       Plan:     Patient to be seen 5 x/week to address the above listed problems via self-care/home management, therapeutic activities, therapeutic  exercises  · Plan of Care Expires: 06/24/20  · Plan of Care Reviewed with: patient    Subjective     Chief Complaint: Pain in residual limb of RLE  Patient/Family Comments/goals: Return home    Occupational Profile:  Living Environment: Pt lives with  in [facility], [address].  Bathroom has tub/shower.  Son from [place] is currently staying with family.  He was visiting for spring break, but then ended up staying when quarantine issues ordered 2* Covid 19..  Previous level of function: Pt reports receiving some assist and supervision from son for ADLs, and was ambulating short distances with RW.    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  Equipment Used at Home:  walker, rolling, bedside commode, wheelchair  Assistance upon Discharge: Son able to provide some assist, but pt reports he will most likely return home to [place] soon    Pain/Comfort:  Pain Rating 1: 8/10  Location - Side 1: Right  Location - Orientation 1: generalized  Location 1: (distal R residual limb)  Pain Addressed 1: Reposition, Pre-medicate for activity, Cessation of Activity, Nurse notified  Pain Rating Post-Intervention 1: 8/10    Patients cultural, spiritual, Buddhist conflicts given the current situation: no    Objective:     Communicated with: RN (Jacey) prior to session.  Patient found HOB elevated with SCD on LLE, pressure relief boot on residual limb of RLE, peripheral IV upon OT entry to room.    General Precautions: Standard, fall   Orthopedic Precautions:    Braces: (posterior LE splint on RLE)     Occupational Performance:    Bed Mobility:    · Patient completed Scooting/Bridging with stand by assistance  · Patient completed Supine to Sit with stand by assistance    Functional Mobility/Transfers:  · Sit <> Stand: CGA and RW x 1 trial from EOB; CGA and RW x 1 trial from chair  · Toilet:  CGA and RW x 1 trial from BSC via stand pivot from chair <> BSC; cues  required for positioning of hands.  · Functional Mobility: Pt took ~4 steps with CGA and RW.  Mild postural sway and decreased safety awareness observed requiring cues for RW management and positioning.    Activities of Daily Living:  · Lower Body Dressing: stand by assistance for donning sock on R foot while seated at EOB.    Cognitive/Visual Perceptual:  Cognitive/Psychosocial Skills:    -       Oriented to: Person, Place, Time and Situation   -       Follows Commands/attention: Follows two-step commands; repetition required  -       Communication: clear/fluent  -       Memory: Possible deficits; will continue to assess  -       Safety awareness/insight to disability: Impaired.  Pt somewhat impulsive and hasty when performing transfers and taking steps.  -       Mood/Affect/Coping skills/emotional control: Cooperative; somewhat flat affect and appeared fatigued    Physical Exam:  Postural examination/scapula alignment:    -       No postural abnormalities identified  Skin integrity: Visible skin intact  Edema:  None noted  Sensation: -       Intact  Motor Planning: -       WFL  Dominant hand: -       Right  Upper Extremity Range of Motion:    -       Right Upper Extremity: WNL  -       Left Upper Extremity: WNL  Upper Extremity Strength:   -       Right Upper Extremity: WNL  -       Left Upper Extremity: WNL   Strength: 5/5 both hands  Fine Motor Coordination: Intact  Gross motor coordination: WFL  Balance:  Sitting- Independent; Standing- CGA-Min A  Vision: Intact    AMPAC 6 Click ADL:  AMPAC Total Score: 20    Treatment & Education:  *Pt educated on role of OT and POC discussed  *Benefits of rehab in post acute setting discussed  Education:    Patient left up in chair with all lines intact, call button in reach and PT notified.  PRAFO boot placed on RLE.    GOALS:   Multidisciplinary Problems     Occupational Therapy Goals        Problem: Occupational Therapy Goal    Goal Priority Disciplines Outcome  Interventions   Occupational Therapy Goal     OT, PT/OT Ongoing, Progressing    Description:  Goals to be met by: 6/1/2020     Patient will increase functional independence with ADLs by performing:    UE Dressing with Bates.  LE Dressing with Stand-by Assistance.  Grooming while seated with Bates.  Toileting from bedside commode with Contact Guard Assistance for hygiene and clothing management.   Toilet transfer to bedside commode with Contact Guard Assistance.                      History:     Past Medical History:   Diagnosis Date    Glaucoma     Glaucoma (increased eye pressure)     Hypertension          Past Surgical History:   Procedure Laterality Date    AORTOGRAPHY WITH SERIALOGRAPHY Right 4/24/2020    Procedure: AORTOGRAM WITH RUNOFF;  Surgeon: Coral Baron MD;  Location: [facility] CATH LAB;  Service: Cardiology;  Laterality: Right;    APPENDECTOMY      CATARACT EXTRACTION W/  INTRAOCULAR LENS IMPLANT Left 10/16/2013        CATARACT EXTRACTION W/  INTRAOCULAR LENS IMPLANT Right 12/18/2013        FOOT AMPUTATION Right 5/22/2020    Procedure: AMPUTATION, FOOT - RIGHT;  Surgeon: Lewis Drummond Jr., MD;  Location: [facility] OR;  Service: General;  Laterality: Right;    FOOT AMPUTATION THROUGH METATARSAL Right 4/28/2020    Procedure: AMPUTATION, FOOT, TRANSMETATARSAL right;  Surgeon: Ha Munguia DPM;  Location: [facility] OR;  Service: Podiatry;  Laterality: Right;    HYSTERECTOMY         Time Tracking:     OT Date of Treatment: 05/25/20  OT Start Time: 0938  OT Stop Time: 1013  OT Total Time (min): 35 min    Billable Minutes:Evaluation 35    LOC [place]ling  5/25/2020

## 2020-05-26 LAB
ABO + RH BLD: NORMAL
ANION GAP SERPL CALC-SCNC: 11 MMOL/L (ref 8–16)
BASOPHILS # BLD AUTO: 0.01 K/UL (ref 0–0.2)
BASOPHILS NFR BLD: 0.2 % (ref 0–1.9)
BLD GP AB SCN CELLS X3 SERPL QL: NORMAL
BLD PROD TYP BPU: NORMAL
BLOOD UNIT EXPIRATION DATE: NORMAL
BLOOD UNIT TYPE CODE: 5100
BLOOD UNIT TYPE: NORMAL
BUN SERPL-MCNC: 12 MG/DL (ref 8–23)
CALCIUM SERPL-MCNC: 8.1 MG/DL (ref 8.7–10.5)
CHLORIDE SERPL-SCNC: 105 MMOL/L (ref 95–110)
CO2 SERPL-SCNC: 21 MMOL/L (ref 23–29)
CODING SYSTEM: NORMAL
CREAT SERPL-MCNC: 1.3 MG/DL (ref 0.5–1.4)
DIFFERENTIAL METHOD: ABNORMAL
DISPENSE STATUS: NORMAL
EOSINOPHIL # BLD AUTO: 0.5 K/UL (ref 0–0.5)
EOSINOPHIL NFR BLD: 9.5 % (ref 0–8)
ERYTHROCYTE [DISTWIDTH] IN BLOOD BY AUTOMATED COUNT: 15.6 % (ref 11.5–14.5)
EST. GFR  (AFRICAN AMERICAN): 45 ML/MIN/1.73 M^2
EST. GFR  (NON AFRICAN AMERICAN): 39 ML/MIN/1.73 M^2
GLUCOSE SERPL-MCNC: 108 MG/DL (ref 70–110)
HCT VFR BLD AUTO: 22.1 % (ref 37–48.5)
HGB BLD-MCNC: 6.7 G/DL (ref 12–16)
IMM GRANULOCYTES # BLD AUTO: 0.02 K/UL (ref 0–0.04)
IMM GRANULOCYTES NFR BLD AUTO: 0.4 % (ref 0–0.5)
LYMPHOCYTES # BLD AUTO: 1.6 K/UL (ref 1–4.8)
LYMPHOCYTES NFR BLD: 30.1 % (ref 18–48)
MCH RBC QN AUTO: 25.8 PG (ref 27–31)
MCHC RBC AUTO-ENTMCNC: 30.3 G/DL (ref 32–36)
MCV RBC AUTO: 85 FL (ref 82–98)
MONOCYTES # BLD AUTO: 0.4 K/UL (ref 0.3–1)
MONOCYTES NFR BLD: 7.8 % (ref 4–15)
NEUTROPHILS # BLD AUTO: 2.7 K/UL (ref 1.8–7.7)
NEUTROPHILS NFR BLD: 52 % (ref 38–73)
NRBC BLD-RTO: 0 /100 WBC
NUM UNITS TRANS PACKED RBC: NORMAL
PLATELET # BLD AUTO: 189 K/UL (ref 150–350)
PMV BLD AUTO: 12.1 FL (ref 9.2–12.9)
POTASSIUM SERPL-SCNC: 3.4 MMOL/L (ref 3.5–5.1)
RBC # BLD AUTO: 2.6 M/UL (ref 4–5.4)
SODIUM SERPL-SCNC: 137 MMOL/L (ref 136–145)
WBC # BLD AUTO: 5.15 K/UL (ref 3.9–12.7)

## 2020-05-26 PROCEDURE — 25000003 PHARM REV CODE 250: Mod: HCNC | Performed by: SPECIALIST

## 2020-05-26 PROCEDURE — 80048 BASIC METABOLIC PNL TOTAL CA: CPT | Mod: HCNC

## 2020-05-26 PROCEDURE — 99233 SBSQ HOSP IP/OBS HIGH 50: CPT | Mod: HCNC,,, | Performed by: HOSPITALIST

## 2020-05-26 PROCEDURE — 25000003 PHARM REV CODE 250: Mod: HCNC | Performed by: HOSPITALIST

## 2020-05-26 PROCEDURE — 25000003 PHARM REV CODE 250: Mod: HCNC | Performed by: INTERNAL MEDICINE

## 2020-05-26 PROCEDURE — 36430 TRANSFUSION BLD/BLD COMPNT: CPT | Mod: HCNC

## 2020-05-26 PROCEDURE — 86922 COMPATIBILITY TEST ANTIGLOB: CPT | Mod: HCNC

## 2020-05-26 PROCEDURE — 11000001 HC ACUTE MED/SURG PRIVATE ROOM: Mod: HCNC

## 2020-05-26 PROCEDURE — 36415 COLL VENOUS BLD VENIPUNCTURE: CPT | Mod: HCNC

## 2020-05-26 PROCEDURE — 94761 N-INVAS EAR/PLS OXIMETRY MLT: CPT | Mod: HCNC

## 2020-05-26 PROCEDURE — 99233 PR SUBSEQUENT HOSPITAL CARE,LEVL III: ICD-10-PCS | Mod: HCNC,,, | Performed by: HOSPITALIST

## 2020-05-26 PROCEDURE — 85025 COMPLETE CBC W/AUTO DIFF WBC: CPT | Mod: HCNC

## 2020-05-26 PROCEDURE — 63600175 PHARM REV CODE 636 W HCPCS: Mod: HCNC | Performed by: INTERNAL MEDICINE

## 2020-05-26 PROCEDURE — P9016 RBC LEUKOCYTES REDUCED: HCPCS | Mod: HCNC

## 2020-05-26 PROCEDURE — 86850 RBC ANTIBODY SCREEN: CPT | Mod: HCNC

## 2020-05-26 RX ORDER — POTASSIUM CHLORIDE 750 MG/1
30 TABLET, EXTENDED RELEASE ORAL ONCE
Status: COMPLETED | OUTPATIENT
Start: 2020-05-26 | End: 2020-05-26

## 2020-05-26 RX ORDER — HYDROCODONE BITARTRATE AND ACETAMINOPHEN 500; 5 MG/1; MG/1
TABLET ORAL
Status: DISCONTINUED | OUTPATIENT
Start: 2020-05-26 | End: 2020-05-28 | Stop reason: HOSPADM

## 2020-05-26 RX ADMIN — POLYETHYLENE GLYCOL 3350 17 G: 17 POWDER, FOR SOLUTION ORAL at 09:05

## 2020-05-26 RX ADMIN — TIMOLOL MALEATE 1 DROP: 5 SOLUTION/ DROPS OPHTHALMIC at 09:05

## 2020-05-26 RX ADMIN — OXYCODONE HYDROCHLORIDE 5 MG: 5 TABLET ORAL at 06:05

## 2020-05-26 RX ADMIN — FERROUS SULFATE TAB EC 325 MG (65 MG FE EQUIVALENT) 325 MG: 325 (65 FE) TABLET DELAYED RESPONSE at 09:05

## 2020-05-26 RX ADMIN — LATANOPROST 1 DROP: 50 SOLUTION OPHTHALMIC at 08:05

## 2020-05-26 RX ADMIN — NIFEDIPINE 90 MG: 30 TABLET, FILM COATED, EXTENDED RELEASE ORAL at 09:05

## 2020-05-26 RX ADMIN — DEXTROSE: 5 SOLUTION INTRAVENOUS at 06:05

## 2020-05-26 RX ADMIN — TIMOLOL MALEATE 1 DROP: 5 SOLUTION/ DROPS OPHTHALMIC at 08:05

## 2020-05-26 RX ADMIN — OXYCODONE HYDROCHLORIDE 5 MG: 5 TABLET ORAL at 07:05

## 2020-05-26 RX ADMIN — ENOXAPARIN SODIUM 30 MG: 100 INJECTION SUBCUTANEOUS at 06:05

## 2020-05-26 RX ADMIN — CYANOCOBALAMIN TAB 250 MCG 500 MCG: 250 TAB at 09:05

## 2020-05-26 RX ADMIN — POTASSIUM CHLORIDE 30 MEQ: 750 TABLET, EXTENDED RELEASE ORAL at 09:05

## 2020-05-26 NOTE — PT/OT/SLP PROGRESS
Occupational Therapy      Patient Name:  Home De Dios   MRN:  [id number]    Patient not seen today secondary to declined participation in therapy.  Pt stated she was in pain and did not want to do therapy until tomorrow.  OT provided education on benefits of participating in therapy, but pt politely stated no.  Will follow-up tomorrow.    Barbara Noble, LOTR   5/26/2020

## 2020-05-26 NOTE — PROGRESS NOTES
[facility] Hospital Medicine  Progress Note    Patient Name: Home De Dios  MRN: [id number]  Patient Class: IP- Inpatient   Admission Date: 5/21/2020  Length of Stay: 5 days  Attending Physician: Lewis Garcia MD  Primary Care Provider: Quintin Cerda MD        Subjective:     Principal Problem:Gangrene of right foot        HPI:  Direct admit from podiatry clinic for right BKA.     She states the toes of her right foot began changing color and turning black about 4-5 weeks agoShe states this began after soaking her foot in bleach and Epsom salts and then cutting her toe nails. The right foot subsequently became itchy, and then numb, particularly on the 1st, 2nd and 4th toes.      She had a TMA  3 weeks ago.    Since then she has developed an area of wet gangrene involving the imputation site.    Podiatry feels they have nothing left to offer and  most direct route to healing and safety is to have BKA.    Pt denies fever / chills.    Notes minimal amount of drainage from foot below surgical incision.      Overview/Hospital Course:  S/p bka 5/22.    Interval History: No acute events overnight, but was found on floor beside bed yesterday.  Apparently she tried to go to the bedside toilet without assistance and slipped down to the floor.  She denied hitting her head or loosing consciousness.  Her pain is decently controlled.  She is amenable to post-acute therapy in snf or IPR.    Review of Systems   Constitutional: Negative for chills and fever.   HENT: Negative for sinus pain and trouble swallowing.    Respiratory: Negative for cough and shortness of breath.    Cardiovascular: Negative for chest pain and leg swelling.   Gastrointestinal: Positive for constipation. Negative for abdominal pain, blood in stool, nausea and vomiting.   Genitourinary: Negative for dysuria and hematuria.   Musculoskeletal: Positive for gait problem.   Skin: Negative for rash.   Neurological: Negative for numbness  and headaches.   Psychiatric/Behavioral: Negative for confusion.     Objective:     Vital Signs (Most Recent):  Temp: 98.7 °F (37.1 °C) (05/26/20 1438)  Pulse: 65 (05/26/20 1438)  Resp: 16 (05/26/20 1438)  BP: 126/62 (05/26/20 1438)  SpO2: 99 % (05/26/20 1438) Vital Signs (24h Range):  Temp:  [97.6 °F (36.4 °C)-99.4 °F (37.4 °C)] 98.7 °F (37.1 °C)  Pulse:  [62-82] 65  Resp:  [16-18] 16  SpO2:  [95 %-99 %] 99 %  BP: (110-140)/(58-66) 126/62     Weight: 51.7 kg (113 lb 15.7 oz)  Body mass index is 17.85 kg/m².    Intake/Output Summary (Last 24 hours) at 5/26/2020 1509  Last data filed at 5/26/2020 1359  Gross per 24 hour   Intake 1547.5 ml   Output 300 ml   Net 1247.5 ml      Physical Exam   Constitutional: She is oriented to person, place, and time. She appears well-developed. No distress.   In good spirits   HENT:   Head: Normocephalic and atraumatic.   Eyes: Pupils are equal, round, and reactive to light. EOM are normal.   Neck: Normal range of motion. Neck supple.   Cardiovascular: Normal rate and regular rhythm.   Pulmonary/Chest: Effort normal and breath sounds normal. No respiratory distress.   Abdominal: Soft. Bowel sounds are normal. She exhibits no distension. There is no tenderness.   Musculoskeletal: Normal range of motion. She exhibits no edema.   Right bka stump, dressed   Neurological: She is alert and oriented to person, place, and time. No cranial nerve deficit.   Skin: Skin is warm.   Psychiatric: She has a normal mood and affect.   Vitals reviewed.      Significant Labs: I have reviewed all pertinent labs within the past 24 hours.    Significant Imaging: I have reviewed all pertinent imaging results/findings within the past 24 hours.      Assessment/Plan:      * Gangrene of right foot  -Mrs. De Dios was admitted to inpatient status directly from podiatry clinic  -She was noted to have been 3 weeks s/p TMA, but with obvious infection in clinic  -General surgery was consulted and on 5/22 she underwent  right BKA  -She appears to be doing well overall but is rather debilitated and slipped to the ground when trying to ambulate alone on 5/25.  -PT/OT are consulted and she will definitely need post-acute therapy, hopefully in an inpatient rehab setting.  She is agreeable to this.      MICKEY (acute kidney injury)  -Baseline Cr 1.5.  On admit Cr was 2.2  -She had no recent contrast and is not on ace/arb or diuretic  -Cr continues to improve and today is 1.3  -Renal dose meds, avoid nephrotoxins  -Repeat BMP in AM      PAD (peripheral artery disease)  -At home takes aspirin and plavix  -These were held prior to surgery.  Hb dropping so will continue to hold for now until cleared to resume by surgery.    Debility  -PT/OT consulted  -Son only her for short time and  is in wheelchair so unrealistic that she can go directly home with fresh BKA  -Patient is agreeable to inpatient rehab placement.    Hypernatremia  -Likely due to dehydration  -Na has normalized as of 5/25.  -Repeat BMP in AM    Malnutrition of mild degree  -Monitor caloric intake and weights.  -Po supplements      Anemia  -Hb on admit 8.9  -Iron profile ok, b12 normal  -After surgery Hb has trended down and again today remains below 7.  The drop likely due to expected blood loss of surgery.  -Will transfuse 1 unit PRBC today.  Patient is agreeable.  -Continue b12 supplementation.  -Repeat cbc in AM.  Hold asa and plavix until H/H stable    Hypokalemia  -Replace potassium today.  -Check BMP and Mag in AM.    Essential hypertension  -BP generally well controlled.  -Cont nifedipine and monitor BP        VTE Risk Mitigation (From admission, onward)         Ordered     enoxaparin injection 30 mg  Daily      05/23/20 1323     IP VTE HIGH RISK PATIENT  Once      05/21/20 1231     Place sequential compression device  Until discontinued      05/21/20 1231     Place SHERYL hose  Until discontinued      05/21/20 1231                      Lewis Garcia MD  Department  of [facility] Medicine   [facility]

## 2020-05-26 NOTE — NURSING
Blood transfusion started. Blood consent in chart, pt educated on transfusing. Tolerating well at this time. Will continue to monitor.

## 2020-05-26 NOTE — ASSESSMENT & PLAN NOTE
-Mrs. 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  -PT/OT are consulted and she will definitely need post-acute therapy, hopefully in an inpatient rehab setting.  She is agreeable to this.

## 2020-05-26 NOTE — PT/OT/SLP PROGRESS
Physical Therapy  Not Seen    Patient Name:  Home De Dios   MRN:  [id number]    Patient not seen secondary to Patient unwilling to participate - pt with multiple complaints including had been in chair all AM (per RN, pt was in bed AM), the Dr wished her to take a break today (no bed rest orders from MDs), and that she was in too much pain. Call light noted to be on ground and repositioned at pt's side.     Despite x10 min explanation of role of acute PT and benefits of OOB mobility, patient continued to defer. Nurse notified. Will follow up in PM as schedule allows.     Melody Waters, PT, DPT

## 2020-05-26 NOTE — SUBJECTIVE & OBJECTIVE
Interval History: No acute events overnight, but was found on floor beside bed yesterday.  Apparently she tried to go to the bedside toilet without assistance and slipped down to the floor.  She denied hitting her head or loosing consciousness.  Her pain is decently controlled.  She is amenable to post-acute therapy in snf or IPR.    Review of Systems   Constitutional: Negative for chills and fever.   HENT: Negative for sinus pain and trouble swallowing.    Respiratory: Negative for cough and shortness of breath.    Cardiovascular: Negative for chest pain and leg swelling.   Gastrointestinal: Positive for constipation. Negative for abdominal pain, blood in stool, nausea and vomiting.   Genitourinary: Negative for dysuria and hematuria.   Musculoskeletal: Positive for gait problem.   Skin: Negative for rash.   Neurological: Negative for numbness and headaches.   Psychiatric/Behavioral: Negative for confusion.     Objective:     Vital Signs (Most Recent):  Temp: 98.7 °F (37.1 °C) (05/26/20 1438)  Pulse: 65 (05/26/20 1438)  Resp: 16 (05/26/20 1438)  BP: 126/62 (05/26/20 1438)  SpO2: 99 % (05/26/20 1438) Vital Signs (24h Range):  Temp:  [97.6 °F (36.4 °C)-99.4 °F (37.4 °C)] 98.7 °F (37.1 °C)  Pulse:  [62-82] 65  Resp:  [16-18] 16  SpO2:  [95 %-99 %] 99 %  BP: (110-140)/(58-66) 126/62     Weight: 51.7 kg (113 lb 15.7 oz)  Body mass index is 17.85 kg/m².    Intake/Output Summary (Last 24 hours) at 5/26/2020 1509  Last data filed at 5/26/2020 1359  Gross per 24 hour   Intake 1547.5 ml   Output 300 ml   Net 1247.5 ml      Physical Exam   Constitutional: She is oriented to person, place, and time. She appears well-developed. No distress.   In good spirits   HENT:   Head: Normocephalic and atraumatic.   Eyes: Pupils are equal, round, and reactive to light. EOM are normal.   Neck: Normal range of motion. Neck supple.   Cardiovascular: Normal rate and regular rhythm.   Pulmonary/Chest: Effort normal and breath sounds normal. No  respiratory distress.   Abdominal: Soft. Bowel sounds are normal. She exhibits no distension. There is no tenderness.   Musculoskeletal: Normal range of motion. She exhibits no edema.   Right bka stump, dressed   Neurological: She is alert and oriented to person, place, and time. No cranial nerve deficit.   Skin: Skin is warm.   Psychiatric: She has a normal mood and affect.   Vitals reviewed.      Significant Labs: I have reviewed all pertinent labs within the past 24 hours.    Significant Imaging: I have reviewed all pertinent imaging results/findings within the past 24 hours.

## 2020-05-26 NOTE — ASSESSMENT & PLAN NOTE
-PT/OT consulted  -Son only her for short time and  is in wheelchair so unrealistic that she can go directly home with fresh BKA  -Patient is agreeable to inpatient rehab placement.

## 2020-05-26 NOTE — ASSESSMENT & PLAN NOTE
-Baseline Cr 1.5.  On admit Cr was 2.2  -She had no recent contrast and is not on ace/arb or diuretic  -Cr continues to improve and today is 1.3  -Renal dose meds, avoid nephrotoxins  -Repeat BMP in AM

## 2020-05-26 NOTE — ASSESSMENT & PLAN NOTE
-Hb on admit 8.9  -Iron profile ok, b12 normal  -After surgery Hb has trended down and again today remains below 7.  The drop likely due to expected blood loss of surgery.  -Will transfuse 1 unit PRBC today.  Patient is agreeable.  -Continue b12 supplementation.  -Repeat cbc in AM.  Hold asa and plavix until H/H stable

## 2020-05-26 NOTE — ASSESSMENT & PLAN NOTE
-At home takes aspirin and plavix  -These were held prior to surgery.  Hb dropping so will continue to hold for now until cleared to resume by surgery.

## 2020-05-26 NOTE — PROGRESS NOTES
Postop day 4.  Status post right BKA  No complaints  Tolerating physical therapy  Right lower extremity-incision clean dry intact

## 2020-05-27 LAB
ANION GAP SERPL CALC-SCNC: 8 MMOL/L (ref 8–16)
BASOPHILS # BLD AUTO: 0.01 K/UL (ref 0–0.2)
BASOPHILS NFR BLD: 0.2 % (ref 0–1.9)
BUN SERPL-MCNC: 10 MG/DL (ref 8–23)
CALCIUM SERPL-MCNC: 8 MG/DL (ref 8.7–10.5)
CHLORIDE SERPL-SCNC: 103 MMOL/L (ref 95–110)
CO2 SERPL-SCNC: 22 MMOL/L (ref 23–29)
CREAT SERPL-MCNC: 1.1 MG/DL (ref 0.5–1.4)
DIFFERENTIAL METHOD: ABNORMAL
EOSINOPHIL # BLD AUTO: 0.4 K/UL (ref 0–0.5)
EOSINOPHIL NFR BLD: 8.3 % (ref 0–8)
ERYTHROCYTE [DISTWIDTH] IN BLOOD BY AUTOMATED COUNT: 15.1 % (ref 11.5–14.5)
EST. GFR  (AFRICAN AMERICAN): 56 ML/MIN/1.73 M^2
EST. GFR  (NON AFRICAN AMERICAN): 48 ML/MIN/1.73 M^2
FINAL PATHOLOGIC DIAGNOSIS: NORMAL
GLUCOSE SERPL-MCNC: 109 MG/DL (ref 70–110)
GROSS: NORMAL
HCT VFR BLD AUTO: 24.3 % (ref 37–48.5)
HGB BLD-MCNC: 7.6 G/DL (ref 12–16)
IMM GRANULOCYTES # BLD AUTO: 0.03 K/UL (ref 0–0.04)
IMM GRANULOCYTES NFR BLD AUTO: 0.6 % (ref 0–0.5)
LYMPHOCYTES # BLD AUTO: 1.4 K/UL (ref 1–4.8)
LYMPHOCYTES NFR BLD: 27.9 % (ref 18–48)
MCH RBC QN AUTO: 26 PG (ref 27–31)
MCHC RBC AUTO-ENTMCNC: 31.3 G/DL (ref 32–36)
MCV RBC AUTO: 83 FL (ref 82–98)
MONOCYTES # BLD AUTO: 0.5 K/UL (ref 0.3–1)
MONOCYTES NFR BLD: 9.3 % (ref 4–15)
NEUTROPHILS # BLD AUTO: 2.7 K/UL (ref 1.8–7.7)
NEUTROPHILS NFR BLD: 53.7 % (ref 38–73)
NRBC BLD-RTO: 0 /100 WBC
PLATELET # BLD AUTO: 198 K/UL (ref 150–350)
PMV BLD AUTO: 11.7 FL (ref 9.2–12.9)
POTASSIUM SERPL-SCNC: 4 MMOL/L (ref 3.5–5.1)
RBC # BLD AUTO: 2.92 M/UL (ref 4–5.4)
SODIUM SERPL-SCNC: 133 MMOL/L (ref 136–145)
WBC # BLD AUTO: 5.06 K/UL (ref 3.9–12.7)

## 2020-05-27 PROCEDURE — 25000003 PHARM REV CODE 250: Mod: HCNC | Performed by: INTERNAL MEDICINE

## 2020-05-27 PROCEDURE — 99900035 HC TECH TIME PER 15 MIN (STAT): Mod: HCNC

## 2020-05-27 PROCEDURE — 36415 COLL VENOUS BLD VENIPUNCTURE: CPT | Mod: HCNC

## 2020-05-27 PROCEDURE — 25000003 PHARM REV CODE 250: Mod: HCNC | Performed by: SPECIALIST

## 2020-05-27 PROCEDURE — 97116 GAIT TRAINING THERAPY: CPT | Mod: HCNC

## 2020-05-27 PROCEDURE — 11000001 HC ACUTE MED/SURG PRIVATE ROOM: Mod: HCNC

## 2020-05-27 PROCEDURE — 97542 WHEELCHAIR MNGMENT TRAINING: CPT | Mod: HCNC

## 2020-05-27 PROCEDURE — 97535 SELF CARE MNGMENT TRAINING: CPT | Mod: HCNC

## 2020-05-27 PROCEDURE — 63600175 PHARM REV CODE 636 W HCPCS: Mod: HCNC | Performed by: INTERNAL MEDICINE

## 2020-05-27 PROCEDURE — 94761 N-INVAS EAR/PLS OXIMETRY MLT: CPT | Mod: HCNC

## 2020-05-27 PROCEDURE — 80048 BASIC METABOLIC PNL TOTAL CA: CPT | Mod: HCNC

## 2020-05-27 PROCEDURE — 99233 PR SUBSEQUENT HOSPITAL CARE,LEVL III: ICD-10-PCS | Mod: HCNC,,, | Performed by: HOSPITALIST

## 2020-05-27 PROCEDURE — 99233 SBSQ HOSP IP/OBS HIGH 50: CPT | Mod: HCNC,,, | Performed by: HOSPITALIST

## 2020-05-27 PROCEDURE — 85025 COMPLETE CBC W/AUTO DIFF WBC: CPT | Mod: HCNC

## 2020-05-27 PROCEDURE — 97110 THERAPEUTIC EXERCISES: CPT | Mod: HCNC

## 2020-05-27 RX ADMIN — TIMOLOL MALEATE 1 DROP: 5 SOLUTION/ DROPS OPHTHALMIC at 09:05

## 2020-05-27 RX ADMIN — OXYCODONE HYDROCHLORIDE 5 MG: 5 TABLET ORAL at 11:05

## 2020-05-27 RX ADMIN — OXYCODONE HYDROCHLORIDE 5 MG: 5 TABLET ORAL at 09:05

## 2020-05-27 RX ADMIN — CYANOCOBALAMIN TAB 250 MCG 500 MCG: 250 TAB at 09:05

## 2020-05-27 RX ADMIN — LATANOPROST 1 DROP: 50 SOLUTION OPHTHALMIC at 09:05

## 2020-05-27 RX ADMIN — NIFEDIPINE 90 MG: 30 TABLET, FILM COATED, EXTENDED RELEASE ORAL at 09:05

## 2020-05-27 RX ADMIN — ENOXAPARIN SODIUM 30 MG: 100 INJECTION SUBCUTANEOUS at 05:05

## 2020-05-27 RX ADMIN — POLYETHYLENE GLYCOL 3350 17 G: 17 POWDER, FOR SOLUTION ORAL at 09:05

## 2020-05-27 NOTE — PT/OT/SLP PROGRESS
Occupational Therapy  Not seen Note    Patient Name:  Home De Dios   MRN:  [id number]    OT treatment attempted.  Patient not seen today secondary to Unavailable (PT treatment ending, pt tired). Will follow-up later in the day as time and patient availability permits..    Nery Morocho, ScD, LOTR  5/27/2020

## 2020-05-27 NOTE — PLAN OF CARE
Problem: Occupational Therapy Goal  Goal: Occupational Therapy Goal  Description  Goals to be met by: 6/1/2020     Patient will increase functional independence with ADLs by performing:    UE Dressing with [place].  LE Dressing with Stand-by Assistance.  Grooming while seated with Supervision (set-up) Assistance  Toileting from bedside commode with Contact Guard Assistance for hygiene and clothing management.   Toilet transfer to bedside commode with Contact Guard Assistance.      Outcome: Ongoing, Progressing  The patient continues to have poor awareness of her limitations or need for therapy.  She declined most self-care and exercise tasks offered.  She completed G/H (brush teeth, wash face and hands) SBA and UE yellow theraband exercises seated EOB.  Nery Morocho, ScD, LOTR 5/27/2020

## 2020-05-27 NOTE — ASSESSMENT & PLAN NOTE
-At home takes aspirin and plavix  -These were held prior to surgery.    -Hb dropped after surgery and she received 1 unit PRBC with appropriate improvement.  -If H/H stable tomorrow will resume home aspirin and plavix.

## 2020-05-27 NOTE — ASSESSMENT & PLAN NOTE
-Hb on admit 8.9  -Iron profile ok, b12 normal  -After surgery Hb has trended down and again today remains below 7.  The drop likely due to expected blood loss of surgery.  -On 5/26 I transfused 1 unit PRBC and Hb improved appropriately  -Continue b12 supplementation.  -Repeat cbc in AM.    -If H/H stable will resume asa and plavix in AM

## 2020-05-27 NOTE — NURSING
Pt free of trauma, falls, and injury. Pt VSS and afebrile throughout shift. Pt free of skin breakdown. Pt dressing is clean, dry, and intact. Pt pain has been moderately controlled by PO pain meds and tolerated well. Pt has been eating and voiding adequately throughout shift. Pt has call light in reach, bed alarm on, bed brakes on, side rails up x2, bed in low position, SCDs on, heel boats on, nonskid socks on. Pt lying in bed in no distress. Hourly rounding completed this shift.

## 2020-05-27 NOTE — PT/OT/SLP PROGRESS
Occupational Therapy   Treatment    Name: Home De Dios  MRN: [id number]  Admitting Diagnosis:  Gangrene of right foot  5 Days Post-Op    Recommendations:     Discharge Recommendations: rehabilitation facility  Discharge Equipment Recommendations:  (defer to transfer facility)  Barriers to discharge:  Decreased caregiver support    Assessment:     Home De Dios is a 77 y.o. female with a medical diagnosis of Gangrene of right foot.  She presents with no concerns.. Performance deficits affecting function are impaired self care skills, impaired functional mobilty, impaired balance, impaired cognition, decreased lower extremity function, pain, decreased ROM, edema, decreased safety awareness, decreased upper extremity function, orthopedic precautions, impaired skin.  She declined most self-care and exercise tasks offered.  She completed G/H (brush teeth, wash face and hands) SBA and UE yellow theraband exercises seated EOB.  Continuation of OT treatment is needed to maximize patient funtion while in the hospital.    Rehab Prognosis:  Good; patient would benefit from acute skilled OT services to address these deficits and reach maximum level of function.       Plan:     Patient to be seen 5 x/week to address the above listed problems via self-care/home management, therapeutic exercises, cognitive retraining, therapeutic activities  · Plan of Care Expires: 06/24/20  · Plan of Care Reviewed with: patient    Subjective     Pain/Comfort:  · Pain Rating 1: 4/10  · Location - Side 1: Right  · Location - Orientation 1: generalized  · Location 1: leg  · Pain Addressed 1: Pre-medicate for activity, Reposition, Cessation of Activity  · Pain Rating Post-Intervention 1: 4/10    Objective:     Communicated with: patient and her nurse prior to session.  Patient found sitting EOB with bed alarm, peripheral IV upon OT entry to room.  She was agreeable to OT treatment then declined most activities offered not understanding her own  limitation or need for service.    General Precautions: Standard, fall   Orthopedic Precautions:N/A   Braces: N/A     Occupational Performance:     Bed Mobility:    · None     Functional Mobility/Transfers:       None    Activities of Daily Living:  · SBA G/H (brush teeth, wash face and hands) sitting EOB      [facility] 6 Click ADL: 20    Treatment & Education:  UE Exercise: UE yellow theraband exercises (shoulder flexion and extension, horizontal ABD and ADD, and ER) 10 reps each for BUE (no rest break)    Patient left sitting EOB with call button in reach, bed alarm on and PCT presentEducation:      GOALS:   Multidisciplinary Problems     Occupational Therapy Goals        Problem: Occupational Therapy Goal    Goal Priority Disciplines Outcome Interventions   Occupational Therapy Goal     OT, PT/OT Ongoing, Progressing    Description:  Goals to be met by: 6/1/2020     Patient will increase functional independence with ADLs by performing:    UE Dressing with [place].  LE Dressing with Stand-by Assistance.  Grooming while seated with Supervision (set-up) Assistance  Toileting from bedside commode with Contact Guard Assistance for hygiene and clothing management.   Toilet transfer to bedside commode with Contact Guard Assistance.                       Time Tracking:     OT Date of Treatment: 05/27/20  OT Start Time: 1448  OT Stop Time: 1524  OT Total Time (min): 36 min    Billable Minutes:Self Care/Home Management 20  Therapeutic Exercise 16    Nery Morocho, Ina, LOTR  5/27/2020

## 2020-05-27 NOTE — ASSESSMENT & PLAN NOTE
-Mrs. 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  No trauma suffered fortunately.  -Aspirin and plavix held prior to surgery.  Discussed with Dr. Drummond and if Hb stable will resume these tomorrow.  -PT/OT are consulted and she will definitely need post-acute therapy, hopefully in an inpatient rehab setting.  She is agreeable to this.

## 2020-05-27 NOTE — PT/OT/SLP PROGRESS
Physical Therapy Treatment    Patient Name:  Home De Dios   MRN:  [id number]    Recommendations:     Discharge Recommendations:  rehabilitation facility   Discharge Equipment Recommendations: (TBD at next level of care)   Barriers to discharge: Inaccessible home and Decreased caregiver support    Assessment:     Home De Dios is a 77 y.o. female admitted with a medical diagnosis of Gangrene of right foot s/p BKA. Hospital course significant for unobserved ground level fall without injury. She presents with the following impairments/functional limitations:  weakness, impaired self care skills, impaired functional mobilty, gait instability, impaired balance, impaired cognition, decreased lower extremity function, decreased safety awareness, pain, impaired skin, orthopedic precautions.    Patient agreeable to attempting to get to toilet due to constipation. Performs bed mobility with mod(I) and initial stand/hop to gait with Riana, after x8 ft patient with increased fatigue and required maxA for last 5 ft and stand>sit on commode over toilet. Perform stand pivot transfer with no AD with Riana. Propelled w/c x20 ft in room, however requires maxA for navigating doorways and turns and consistently reaches to pull on furniture to propel despite continual cueing for use of hands on wheels. Rec for d/c to IRF, patient is a high fall risk and requires 24/7 assistance.    Rehab Prognosis: Good; patient would benefit from acute skilled PT services to address these deficits and reach maximum level of function.    Recent Surgery: Procedure(s) (LRB):  AMPUTATION, FOOT - RIGHT (Right) 5 Days Post-Op    Plan:     During this hospitalization, patient to be seen 6 x/week to address the identified rehab impairments via gait training, wheelchair management/training, therapeutic activities, therapeutic exercises and progress toward the following goals:    · Plan of Care Expires:  06/23/20    Subjective     Chief Complaint: Needing to use  bathroom  Patient/Family Comments/goals: Wants to return home; Patient agreeable to PT treatment.  Pain/Comfort:  · Pain Rating 1: (Some pain, does not rate)  · Location - Side 1: Right  · Location 1: leg  · Pain Addressed 1: Pre-medicate for activity, Reposition, Distraction, Cessation of Activity  · Pain Rating Post-Intervention 1: (Unchanged)      Objective:     Communicated with MJ Mari prior to session.  Patient found HOB elevated with bed alarm, peripheral IV, pressure relief boots upon PT entry to room.     General Precautions: Standard, fall   Orthopedic Precautions:N/A(RLE BKA)   Braces: (posterior LE splint on RLE)     Functional Mobility:  · Bed Mobility:     · Supine to Sit: modified independence  · Sit to Supine: modified independence  · Transfers:     · Sit to Stand:  Minimum-moderate assistance with rolling walker  · Toilet Transfer: minimum assistance with no AD  using Stand Pivot  · Gait: x13 ft with RW and Riana for initial 8 ft progressing to maxA for final 5 ft due to L knee buckling/pt attempting to sit on ground  · Performs hop to gait with at times insufficient foot clearance  · Requires manual assistance for safe use of RW  · Wheelchair Propulsion:  Pt propelled Standard wheelchair x 20 feet on Level tile with  Bilateral upper extremity with Maximum Assistance.   · Pt reaching out for furniture to propel herself, requires continual cueing to attempt using hands on wheel  · Requires maxA to navigate doorways and turns in room      AM-PAC 6 CLICK MOBILITY  Turning over in bed (including adjusting bedclothes, sheets and blankets)?: 4  Sitting down on and standing up from a chair with arms (e.g., wheelchair, bedside commode, etc.): 3  Moving from lying on back to sitting on the side of the bed?: 4  Moving to and from a bed to a chair (including a wheelchair)?: 3  Need to walk in hospital room?: 2  Climbing 3-5 steps with a railing?: 1  Basic Mobility Total Score: 17       Therapeutic  Activities and Exercises:  ·  Gait and wheelchair training as above  PT educated patient re:   PT plan of care/role of PT  Safety with OOB mobility - calling for assistance, using BSC  Use of RW and W/C  Discharge disposition    Pt verbalized understanding       Patient left HOB elevated with all lines intact, call button in reach, bed alarm on and RN Jacey notified..    GOALS:   Multidisciplinary Problems     Physical Therapy Goals        Problem: Physical Therapy Goal    Goal Priority Disciplines Outcome Goal Variances Interventions   Physical Therapy Goal     PT, PT/OT Ongoing, Progressing     Description:  Goals to be met by:2020    Patient will increase functional independence with mobility by performin. Sit<>stand with SPV with RW.  2. Gait x 25 feet with RW with SBA.  3. Ascend/descend 7 step(s) with least restrictive assistive device and CGA  4. Transfer EOB to w/c with SBA .  5. Added : Propel W/C x100 ft with BUE and LLE with SBA without reaching out for furniture cruising.                    Time Tracking:     PT Received On: 20  PT Start Time: 1144     PT Stop Time: 1214  PT Total Time (min): 30 min     Billable Minutes: Gait Training 15 and Train/Wheelchair Management 15    Treatment Type: Treatment  PT/PTA: PT     PTA Visit Number: 0     Melody Waters, PT  2020

## 2020-05-27 NOTE — PLAN OF CARE
VSS and afebrile, AAOx4. Dressing is CDI, pain controlled by PO pain meds, tolerated well. Ordered diet tolerated well. No significant events occurred tonight. Plan of care reviewed with patient. Purposeful rounding done, call light at bed side, bed at lowest position, brakes on, non-skid socks on, will continue to monitor.

## 2020-05-27 NOTE — SUBJECTIVE & OBJECTIVE
Interval History: No acute events overnight.  States pain is well controlled.  Denies cp and sob.  She is amenable to post-acute therapy in snf or IPR.    Review of Systems   Constitutional: Negative for chills and fever.   HENT: Negative for sinus pain and trouble swallowing.    Respiratory: Negative for cough and shortness of breath.    Cardiovascular: Negative for chest pain and leg swelling.   Gastrointestinal: Positive for constipation. Negative for abdominal pain, blood in stool, nausea and vomiting.   Genitourinary: Negative for dysuria and hematuria.   Musculoskeletal: Positive for gait problem.   Skin: Negative for rash.   Neurological: Negative for numbness and headaches.   Psychiatric/Behavioral: Negative for confusion.     Objective:     Vital Signs (Most Recent):  Temp: 99.4 °F (37.4 °C) (05/27/20 1629)  Pulse: 80 (05/27/20 1629)  Resp: 18 (05/27/20 1629)  BP: 132/67 (05/27/20 1629)  SpO2: (!) 94 % (05/27/20 1629) Vital Signs (24h Range):  Temp:  [98 °F (36.7 °C)-99.4 °F (37.4 °C)] 99.4 °F (37.4 °C)  Pulse:  [65-80] 80  Resp:  [16-18] 18  SpO2:  [94 %-97 %] 94 %  BP: (119-152)/(57-83) 132/67     Weight: 51.7 kg (113 lb 15.7 oz)  Body mass index is 17.85 kg/m².    Intake/Output Summary (Last 24 hours) at 5/27/2020 1633  Last data filed at 5/26/2020 1741  Gross per 24 hour   Intake 0 ml   Output --   Net 0 ml      Physical Exam   Constitutional: She is oriented to person, place, and time. She appears well-developed. No distress.   In good spirits   HENT:   Head: Normocephalic and atraumatic.   Eyes: Pupils are equal, round, and reactive to light. EOM are normal.   Neck: Normal range of motion. Neck supple.   Cardiovascular: Normal rate and regular rhythm.   Pulmonary/Chest: Effort normal and breath sounds normal. No respiratory distress.   Abdominal: Soft. Bowel sounds are normal. She exhibits no distension. There is no tenderness.   Musculoskeletal: Normal range of motion. She exhibits no edema.   Right  bka stump, dressed   Neurological: She is alert and oriented to person, place, and time. No cranial nerve deficit.   Skin: Skin is warm.   Psychiatric: She has a normal mood and affect.   Vitals reviewed.      Significant Labs: I have reviewed all pertinent labs within the past 24 hours.    Significant Imaging: I have reviewed all pertinent imaging results/findings within the past 24 hours.

## 2020-05-27 NOTE — ASSESSMENT & PLAN NOTE
-PT/OT consulted  -Son only here for short time and  is in wheelchair so unrealistic that she can go directly home with fresh BKA  -Patient is agreeable to inpatient rehab placement.

## 2020-05-27 NOTE — PLAN OF CARE
Problem: Physical Therapy Goal  Goal: Physical Therapy Goal  Description  Goals to be met by:2020    Patient will increase functional independence with mobility by performin. Sit<>stand with SPV with RW.  2. Gait x 25 feet with RW with SBA.  3. Ascend/descend 7 step(s) with least restrictive assistive device and CGA  4. Transfer EOB to w/c with SBA .  5. Added : Propel W/C x100 ft with BUE and LLE with SBA without reaching out for furniture cruising.   Outcome: Ongoing, Progressing     Patient agreeable to attempting to get to toilet due to constipation. Performs bed mobility with mod(I) and initial stand/hop to gait with Riana, after x8 ft patient with increased fatigue and required maxA for last 5 ft and stand>sit on commode over toilet. Perform stand pivot transfer with no AD with Riana. Propelled w/c x20 ft in room, however requires maxA for navigating doorways and turns and consistently reaches to pull on furniture to propel despite continual cueing for use of hands on wheels. Rec for d/c to IRF, patient is a high fall risk and requires 24/7 assistance.

## 2020-05-27 NOTE — PLAN OF CARE
Pt on RA sat's 96-97% with no distress, no significant changes in pt respiratory status this shift.

## 2020-05-27 NOTE — PROGRESS NOTES
[facility] Hospital Medicine  Progress Note    Patient Name: Home De Dios  MRN: [id number]  Patient Class: IP- Inpatient   Admission Date: 5/21/2020  Length of Stay: 6 days  Attending Physician: Lewis Garcia MD  Primary Care Provider: Quintin Cerda MD        Subjective:     Principal Problem:Gangrene of right foot        HPI:  Direct admit from podiatry clinic for right BKA.     She states the toes of her right foot began changing color and turning black about 4-5 weeks agoShe states this began after soaking her foot in bleach and Epsom salts and then cutting her toe nails. The right foot subsequently became itchy, and then numb, particularly on the 1st, 2nd and 4th toes.      She had a TMA  3 weeks ago.    Since then she has developed an area of wet gangrene involving the imputation site.    Podiatry feels they have nothing left to offer and  most direct route to healing and safety is to have BKA.    Pt denies fever / chills.    Notes minimal amount of drainage from foot below surgical incision.      Overview/Hospital Course:  S/p bka 5/22.    Interval History: No acute events overnight.  States pain is well controlled.  Denies cp and sob.  She is amenable to post-acute therapy in snf or IPR.    Review of Systems   Constitutional: Negative for chills and fever.   HENT: Negative for sinus pain and trouble swallowing.    Respiratory: Negative for cough and shortness of breath.    Cardiovascular: Negative for chest pain and leg swelling.   Gastrointestinal: Positive for constipation. Negative for abdominal pain, blood in stool, nausea and vomiting.   Genitourinary: Negative for dysuria and hematuria.   Musculoskeletal: Positive for gait problem.   Skin: Negative for rash.   Neurological: Negative for numbness and headaches.   Psychiatric/Behavioral: Negative for confusion.     Objective:     Vital Signs (Most Recent):  Temp: 99.4 °F (37.4 °C) (05/27/20 1629)  Pulse: 80 (05/27/20  1629)  Resp: 18 (05/27/20 1629)  BP: 132/67 (05/27/20 1629)  SpO2: (!) 94 % (05/27/20 1629) Vital Signs (24h Range):  Temp:  [98 °F (36.7 °C)-99.4 °F (37.4 °C)] 99.4 °F (37.4 °C)  Pulse:  [65-80] 80  Resp:  [16-18] 18  SpO2:  [94 %-97 %] 94 %  BP: (119-152)/(57-83) 132/67     Weight: 51.7 kg (113 lb 15.7 oz)  Body mass index is 17.85 kg/m².    Intake/Output Summary (Last 24 hours) at 5/27/2020 1633  Last data filed at 5/26/2020 1741  Gross per 24 hour   Intake 0 ml   Output --   Net 0 ml      Physical Exam   Constitutional: She is oriented to person, place, and time. She appears well-developed. No distress.   In good spirits   HENT:   Head: Normocephalic and atraumatic.   Eyes: Pupils are equal, round, and reactive to light. EOM are normal.   Neck: Normal range of motion. Neck supple.   Cardiovascular: Normal rate and regular rhythm.   Pulmonary/Chest: Effort normal and breath sounds normal. No respiratory distress.   Abdominal: Soft. Bowel sounds are normal. She exhibits no distension. There is no tenderness.   Musculoskeletal: Normal range of motion. She exhibits no edema.   Right bka stump, dressed   Neurological: She is alert and oriented to person, place, and time. No cranial nerve deficit.   Skin: Skin is warm.   Psychiatric: She has a normal mood and affect.   Vitals reviewed.      Significant Labs: I have reviewed all pertinent labs within the past 24 hours.    Significant Imaging: I have reviewed all pertinent imaging results/findings within the past 24 hours.      Assessment/Plan:      * Gangrene of right foot  -Mrs. 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  No trauma suffered fortunately.  -Aspirin and  plavix held prior to surgery.  Discussed with Dr. Drummond and if Hb stable will resume these tomorrow.  -PT/OT are consulted and she will definitely need post-acute therapy, hopefully in an inpatient rehab setting.  She is agreeable to this.      MICKEY (acute kidney injury)  -Baseline Cr 1.5.  On admit Cr was 2.2  -She had no recent contrast and is not on ace/arb or diuretic  -Cr continues to improve and today is 1.1  -Renal dose meds, avoid nephrotoxins  -Repeat BMP in AM      PAD (peripheral artery disease)  -At home takes aspirin and plavix  -These were held prior to surgery.    -Hb dropped after surgery and she received 1 unit PRBC with appropriate improvement.  -If H/H stable tomorrow will resume home aspirin and plavix.    Debility  -PT/OT consulted  -Son only here for short time and  is in wheelchair so unrealistic that she can go directly home with fresh BKA  -Patient is agreeable to inpatient rehab placement.    Hypernatremia  -Likely due to dehydration  -Na has normalized as of 5/25.  -Stop IV fluids.  -Repeat BMP in AM    Malnutrition of mild degree  -Monitor caloric intake and weights.  -PO supplements      Anemia  -Hb on admit 8.9  -Iron profile ok, b12 normal  -After surgery Hb has trended down and again today remains below 7.  The drop likely due to expected blood loss of surgery.  -On 5/26 I transfused 1 unit PRBC and Hb improved appropriately  -Continue b12 supplementation.  -Repeat cbc in AM.    -If H/H stable will resume asa and plavix in AM    Hypokalemia  -K normal today  -Check BMP and Mag in AM.    Essential hypertension  -BP generally well controlled.  -Cont nifedipine and monitor BP        VTE Risk Mitigation (From admission, onward)         Ordered     enoxaparin injection 30 mg  Daily      05/23/20 1323     IP VTE HIGH RISK PATIENT  Once      05/21/20 1231     Place sequential compression device  Until discontinued      05/21/20 1231     Place SHERYL hose  Until discontinued      05/21/20  6193                      Lewis Garcia MD  Department of Hospital Medicine   [facility]

## 2020-05-27 NOTE — ASSESSMENT & PLAN NOTE
-Baseline Cr 1.5.  On admit Cr was 2.2  -She had no recent contrast and is not on ace/arb or diuretic  -Cr continues to improve and today is 1.1  -Renal dose meds, avoid nephrotoxins  -Repeat BMP in AM

## 2020-05-28 VITALS
WEIGHT: 114 LBS | TEMPERATURE: 99 F | BODY MASS INDEX: 17.89 KG/M2 | OXYGEN SATURATION: 96 % | HEART RATE: 65 BPM | SYSTOLIC BLOOD PRESSURE: 120 MMHG | RESPIRATION RATE: 12 BRPM | HEIGHT: 67 IN | DIASTOLIC BLOOD PRESSURE: 57 MMHG

## 2020-05-28 LAB
ANION GAP SERPL CALC-SCNC: 12 MMOL/L (ref 8–16)
BASOPHILS # BLD AUTO: 0.02 K/UL (ref 0–0.2)
BASOPHILS NFR BLD: 0.4 % (ref 0–1.9)
BUN SERPL-MCNC: 12 MG/DL (ref 8–23)
CALCIUM SERPL-MCNC: 8.7 MG/DL (ref 8.7–10.5)
CHLORIDE SERPL-SCNC: 103 MMOL/L (ref 95–110)
CO2 SERPL-SCNC: 19 MMOL/L (ref 23–29)
CREAT SERPL-MCNC: 1.2 MG/DL (ref 0.5–1.4)
DIFFERENTIAL METHOD: ABNORMAL
EOSINOPHIL # BLD AUTO: 0.3 K/UL (ref 0–0.5)
EOSINOPHIL NFR BLD: 6.1 % (ref 0–8)
ERYTHROCYTE [DISTWIDTH] IN BLOOD BY AUTOMATED COUNT: 15.5 % (ref 11.5–14.5)
EST. GFR  (AFRICAN AMERICAN): 50 ML/MIN/1.73 M^2
EST. GFR  (NON AFRICAN AMERICAN): 43 ML/MIN/1.73 M^2
GLUCOSE SERPL-MCNC: 106 MG/DL (ref 70–110)
HCT VFR BLD AUTO: 29.5 % (ref 37–48.5)
HGB BLD-MCNC: 9.6 G/DL (ref 12–16)
IMM GRANULOCYTES # BLD AUTO: 0.02 K/UL (ref 0–0.04)
IMM GRANULOCYTES NFR BLD AUTO: 0.4 % (ref 0–0.5)
LYMPHOCYTES # BLD AUTO: 1.5 K/UL (ref 1–4.8)
LYMPHOCYTES NFR BLD: 27.5 % (ref 18–48)
MCH RBC QN AUTO: 27.2 PG (ref 27–31)
MCHC RBC AUTO-ENTMCNC: 32.5 G/DL (ref 32–36)
MCV RBC AUTO: 84 FL (ref 82–98)
MONOCYTES # BLD AUTO: 0.5 K/UL (ref 0.3–1)
MONOCYTES NFR BLD: 9.5 % (ref 4–15)
NEUTROPHILS # BLD AUTO: 3.1 K/UL (ref 1.8–7.7)
NEUTROPHILS NFR BLD: 56.1 % (ref 38–73)
NRBC BLD-RTO: 0 /100 WBC
PLATELET # BLD AUTO: 229 K/UL (ref 150–350)
PMV BLD AUTO: 11.6 FL (ref 9.2–12.9)
POTASSIUM SERPL-SCNC: 4.6 MMOL/L (ref 3.5–5.1)
RBC # BLD AUTO: 3.53 M/UL (ref 4–5.4)
SODIUM SERPL-SCNC: 134 MMOL/L (ref 136–145)
WBC # BLD AUTO: 5.57 K/UL (ref 3.9–12.7)

## 2020-05-28 PROCEDURE — 25000003 PHARM REV CODE 250: Mod: HCNC | Performed by: HOSPITALIST

## 2020-05-28 PROCEDURE — 97530 THERAPEUTIC ACTIVITIES: CPT | Mod: HCNC

## 2020-05-28 PROCEDURE — 97116 GAIT TRAINING THERAPY: CPT | Mod: HCNC,CQ

## 2020-05-28 PROCEDURE — 25000003 PHARM REV CODE 250: Mod: HCNC | Performed by: SPECIALIST

## 2020-05-28 PROCEDURE — 36415 COLL VENOUS BLD VENIPUNCTURE: CPT | Mod: HCNC

## 2020-05-28 PROCEDURE — 99239 HOSP IP/OBS DSCHRG MGMT >30: CPT | Mod: HCNC,,, | Performed by: HOSPITALIST

## 2020-05-28 PROCEDURE — 85025 COMPLETE CBC W/AUTO DIFF WBC: CPT | Mod: HCNC

## 2020-05-28 PROCEDURE — 99239 PR HOSPITAL DISCHARGE DAY,>30 MIN: ICD-10-PCS | Mod: HCNC,,, | Performed by: HOSPITALIST

## 2020-05-28 PROCEDURE — 80048 BASIC METABOLIC PNL TOTAL CA: CPT | Mod: HCNC

## 2020-05-28 PROCEDURE — 25000003 PHARM REV CODE 250: Mod: HCNC | Performed by: INTERNAL MEDICINE

## 2020-05-28 RX ORDER — POLYETHYLENE GLYCOL 3350 17 G/17G
17 POWDER, FOR SOLUTION ORAL DAILY
Refills: 0
Start: 2020-05-29 | End: 2022-11-07

## 2020-05-28 RX ORDER — ACETAMINOPHEN 325 MG/1
650 TABLET ORAL EVERY 6 HOURS PRN
Refills: 0
Start: 2020-05-28

## 2020-05-28 RX ORDER — CLOPIDOGREL BISULFATE 75 MG/1
75 TABLET ORAL DAILY
Status: DISCONTINUED | OUTPATIENT
Start: 2020-05-28 | End: 2020-05-28 | Stop reason: HOSPADM

## 2020-05-28 RX ORDER — OXYCODONE HYDROCHLORIDE 5 MG/1
5 TABLET ORAL EVERY 4 HOURS PRN
Refills: 0
Start: 2020-05-28 | End: 2022-11-07

## 2020-05-28 RX ORDER — ASPIRIN 81 MG/1
81 TABLET ORAL DAILY
Status: DISCONTINUED | OUTPATIENT
Start: 2020-05-28 | End: 2020-05-28 | Stop reason: HOSPADM

## 2020-05-28 RX ORDER — FAMOTIDINE 20 MG/1
20 TABLET, FILM COATED ORAL 2 TIMES DAILY PRN
Start: 2020-05-28 | End: 2022-11-07

## 2020-05-28 RX ADMIN — OXYCODONE HYDROCHLORIDE 5 MG: 5 TABLET ORAL at 04:05

## 2020-05-28 RX ADMIN — OXYCODONE HYDROCHLORIDE 5 MG: 5 TABLET ORAL at 10:05

## 2020-05-28 RX ADMIN — FERROUS SULFATE TAB EC 325 MG (65 MG FE EQUIVALENT) 325 MG: 325 (65 FE) TABLET DELAYED RESPONSE at 10:05

## 2020-05-28 RX ADMIN — TIMOLOL MALEATE 1 DROP: 5 SOLUTION/ DROPS OPHTHALMIC at 10:05

## 2020-05-28 RX ADMIN — ASPIRIN 81 MG: 81 TABLET, COATED ORAL at 10:05

## 2020-05-28 RX ADMIN — CLOPIDOGREL BISULFATE 75 MG: 75 TABLET ORAL at 10:05

## 2020-05-28 RX ADMIN — NIFEDIPINE 90 MG: 30 TABLET, FILM COATED, EXTENDED RELEASE ORAL at 10:05

## 2020-05-28 RX ADMIN — CYANOCOBALAMIN TAB 250 MCG 500 MCG: 250 TAB at 10:05

## 2020-05-28 RX ADMIN — POLYETHYLENE GLYCOL 3350 17 G: 17 POWDER, FOR SOLUTION ORAL at 10:05

## 2020-05-28 NOTE — ASSESSMENT & PLAN NOTE
-Baseline Cr 1.5.  On admit Cr was 2.2  -She had no recent contrast and is not on ace/arb or diuretic  -Cr improved and at discharge is 1.2  -Renal dose meds, avoid nephrotoxins  -Repeat BMP weekly on Monday in rehab.

## 2020-05-28 NOTE — PLAN OF CARE
Problem: Occupational Therapy Goal  Goal: Occupational Therapy Goal  Description  Goals to be met by: 6/1/2020     Patient will increase functional independence with ADLs by performing:    UE Dressing with [place].  LE Dressing with Stand-by Assistance.  Grooming while seated with Supervision (set-up) Assistance  Toileting from bedside commode with Contact Guard Assistance for hygiene and clothing management.   Toilet transfer to bedside commode with Contact Guard Assistance.      Outcome: Ongoing, Progressing     Pt is making progress towards goals.  Rehab is recommended upon d/c from acute care to further address deficits and help pt improve overall functional independence.     LOC Bowling  5/28/2020

## 2020-05-28 NOTE — PT/OT/SLP PROGRESS
Physical Therapy Treatment    Patient Name:  Home De Dios   MRN:  [id number]    Recommendations:     Discharge Recommendations:  rehabilitation facility   Discharge Equipment Recommendations: (TBD at next level of care)   Barriers to discharge: Inaccessible home and Decreased caregiver support    Assessment:     Home De Dios is a 77 y.o. female admitted with a medical diagnosis of Gangrene of right foot.  She presents with the following impairments/functional limitations:  impaired self care skills, impaired functional mobilty, impaired balance, impaired cognition, decreased lower extremity function, decreased ROM, decreased safety awareness, orthopedic precautions, gait instability ,   Pt presented supine in bed upon arrival of PT. Pt agreeable to PT. Pt sit <> stand w/ Garrett and RW. Pt stand/hop w/ RW and Garrett. Pt amb'd 10 ' w/ RW and Garrett.    Rehab Prognosis: Good; patient would benefit from acute skilled PT services to address these deficits and reach maximum level of function.    Recent Surgery: Procedure(s) (LRB):  AMPUTATION, FOOT - RIGHT (Right) 6 Days Post-Op    Plan:     During this hospitalization, patient to be seen 6 x/week to address the identified rehab impairments via gait training, therapeutic activities, therapeutic exercises, wheelchair management/training and progress toward the following goals:    · Plan of Care Expires:  06/23/20    Subjective     Chief Complaint: none stated  Patient/Family Comments/goals: none stated  Pain/Comfort:  · Location - Side 1: Right  · Location - Orientation 1: generalized  · Location 1: leg  · Pain Addressed 1: Pre-medicate for activity, Reposition, Cessation of Activity      Objective:     Communicated with Ciro) prior to session.  Patient found supine with bed alarm, peripheral IV upon PT entry to room.     General Precautions: Standard, fall   Orthopedic Precautions:N/A   Braces: N/A     Functional Mobility:  · Transfers:     · Sit to Stand:  minimum  assistance with rolling walker  · Gait: 10' w/ RW and Garrett. Hop to gait pattern      AM-PAC 6 CLICK MOBILITY  Turning over in bed (including adjusting bedclothes, sheets and blankets)?: 4  Sitting down on and standing up from a chair with arms (e.g., wheelchair, bedside commode, etc.): 3  Moving from lying on back to sitting on the side of the bed?: 4  Moving to and from a bed to a chair (including a wheelchair)?: 3  Need to walk in hospital room?: 2  Climbing 3-5 steps with a railing?: 1  Basic Mobility Total Score: 17       Patient left supine with all lines intact, call button in reach, bed alarm on and ns notified..    GOALS:   Multidisciplinary Problems     Physical Therapy Goals        Problem: Physical Therapy Goal    Goal Priority Disciplines Outcome Goal Variances Interventions   Physical Therapy Goal     PT, PT/OT Ongoing, Progressing     Description:  Goals to be met by:2020    Patient will increase functional independence with mobility by performin. Sit<>stand with SPV with RW.  2. Gait x 25 feet with RW with SBA.  3. Ascend/descend 7 step(s) with least restrictive assistive device and CGA  4. Transfer EOB to w/c with SBA .  5. Added : Propel W/C x100 ft with BUE and LLE with SBA without reaching out for furniture cruising.                    Time Tracking:     PT Received On: 20  PT Start Time: 1145     PT Stop Time: 1200  PT Total Time (min): 15 min     Billable Minutes: Gait Training 15    Treatment Type: Treatment  PT/PTA: PTA     PTA Visit Number: 1     Darrick Restrepo PTA  2020

## 2020-05-28 NOTE — PLAN OF CARE
Problem: Physical Therapy Goal  Goal: Physical Therapy Goal  Description  Goals to be met by:2020    Patient will increase functional independence with mobility by performin. Sit<>stand with SPV with RW.  2. Gait x 25 feet with RW with SBA.  3. Ascend/descend 7 step(s) with least restrictive assistive device and CGA  4. Transfer EOB to w/c with SBA .  5. Added : Propel W/C x100 ft with BUE and LLE with SBA without reaching out for furniture cruising.   Outcome: Ongoing, Progressing   Pt presented supine in bed upon arrival of PT. Pt agreeable to PT. Pt sit <> stand w/ Riana and RW. Pt stand/hop w/ RW and Riana. Pt amb'd 10 ' w/ RW and Riana

## 2020-05-28 NOTE — PROGRESS NOTES
"Follow up on sacral "fungal type" rash  Rash improved with use of barrier cream. reordered critic aid antifungal ointment for patients use twice daily. Assisted to the commode. Wrap to right sump falling off. Re wrapped stump with cast padding and ace.  Incision well approximated         05/28/20 1100        Wound 05/25/20 1215 Intertrigo medial Gluteal cleft   Date First Assessed/Time First Assessed: 05/25/20 1215   Pre-existing: No  Primary Wound Type: Intertrigo  Orientation: medial  Location: Gluteal cleft   Wound Image    Wound WDL ex   Dressing Appearance Open to air;No dressing   Drainage Amount None   Drainage Characteristics/Odor No odor   Appearance White;Other (see comments)  (fungal rash presentation)   Periwound Area Intact;Satellite lesion   Care Cleansed with:;Soap and water;Removed:;Skin Barrier     Maritza Murcia RN CWON    "

## 2020-05-28 NOTE — ASSESSMENT & PLAN NOTE
-At home takes aspirin and plavix  -These were held prior to surgery.    -Hb dropped after surgery and she received 1 unit PRBC with appropriate improvement.  -H/H stable and improving so will resume home aspirin and plavix at discharge.

## 2020-05-28 NOTE — ASSESSMENT & PLAN NOTE
-Mrs. 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  No trauma suffered fortunately.  -Aspirin and plavix held prior to surgery.  Discussed with Dr. Drummond and ok to resume at time of discharge.    -Continue current woundcare.  -PT/OT were consulted and she will continue with these in inpatient rehab.  -OK to discharge today.

## 2020-05-28 NOTE — DISCHARGE SUMMARY
[facility] Hospital Medicine  Discharge Summary      Patient Name: Home De Dios  MRN: [id number]  Admission Date: 5/21/2020  Hospital Length of Stay: 7 days  Discharge Date and Time: No discharge date for patient encounter.  Attending Physician: Russell Garcia MD   Discharging Provider: Russell Garcia MD  Primary Care Provider: Quintin Cerda MD      HPI:   Direct admit from podiatry clinic for right BKA.     She states the toes of her right foot began changing color and turning black about 4-5 weeks agoShe states this began after soaking her foot in bleach and Epsom salts and then cutting her toe nails. The right foot subsequently became itchy, and then numb, particularly on the 1st, 2nd and 4th toes.      She had a TMA  3 weeks ago.    Since then she has developed an area of wet gangrene involving the imputation site.    Podiatry feels they have nothing left to offer and  most direct route to healing and safety is to have BKA.    Pt denies fever / chills.    Notes minimal amount of drainage from foot below surgical incision.      Procedure(s) (LRB):  AMPUTATION, FOOT - RIGHT (Right)      Consults:   Consults (From admission, onward)        Status Ordering Provider     Inpatient consult to General Surgery  Once     Provider:  Russell Palmer Jr., MD    Acknowledged RUSSELL PALMER JR        Hospital Course By Problem:   * Gangrene of right foot  -Mrs. 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  No trauma suffered fortunately.  -Aspirin and plavix held prior to surgery.  Discussed with Dr. Palmer and ok to resume at time of discharge.    -Continue current woundcare.  -PT/OT were consulted and  she will continue with these in inpatient rehab.  -OK to discharge today.    MICKEY (acute kidney injury)  -Baseline Cr 1.5.  On admit Cr was 2.2  -She had no recent contrast and is not on ace/arb or diuretic  -Cr improved and at discharge is 1.2  -Renal dose meds, avoid nephrotoxins  -Repeat BMP weekly on Monday in rehab.    PAD (peripheral artery disease)  -At home takes aspirin and plavix  -These were held prior to surgery.    -Hb dropped after surgery and she received 1 unit PRBC with appropriate improvement.  -H/H stable and improving so will resume home aspirin and plavix at discharge.    Debility  -PT/OT consulted  -Son only here for short time and  is in wheelchair so unrealistic that she can go directly home with fresh BKA  -Patient is agreeable to inpatient rehab placement.    Hypernatremia  -Likely due to dehydration  -Na normalized as of 5/25 and subsequently was a tiny bit low.  -IV fluids stopped and Na nearly normal  -Repeat BMP weekly on Mondays in rehab    Malnutrition of mild degree  -Monitor caloric intake and weights.  -PO supplements    Anemia  -Hb on admit 8.9  -Iron profile ok, b12 normal  -After surgery Hb has trended down and again today remains below 7.  The drop likely due to expected blood loss of surgery.  -On 5/26 I transfused 1 unit PRBC and Hb improved appropriately and continues to improve  -Continue b12 supplementation.  -Repeat cbc weekly on Monday in rehab.      Hypokalemia  -K normal at discharge    Essential hypertension  -BP generally well controlled.  -Continue nifedipine     Final Active Diagnoses:    Diagnosis Date Noted POA    PRINCIPAL PROBLEM:  Gangrene of right foot [I96] 05/21/2020 Yes    MICKEY (acute kidney injury) [N17.9] 04/19/2020 Yes    PAD (peripheral artery disease) [I73.9] 05/23/2020 Yes    Debility [R53.81] 05/01/2020 Yes    Hypernatremia [E87.0] 05/22/2020 Yes    Malnutrition of mild degree [E44.1] 04/26/2020 Yes    Anemia [D64.9] 04/26/2020 Yes     Hypokalemia [E87.6] 04/20/2020 Yes    Essential hypertension [I10] 06/18/2013 Yes     Chronic      Problems Resolved During this Admission:    Diagnosis Date Noted Date Resolved POA    Hyperkalemia [E87.5] 05/21/2020 05/23/2020 Yes       Discharged Condition: stable    Disposition: Rehab Facility    Follow Up:  Follow-up Information     Quintin Cerda MD In 1 week.    Specialty:  Internal Medicine  Contact information:  [address]  [facility] [address]  [phone]             Lewis Drummond Jr, MD In 2 weeks.    Specialties:  Vascular Surgery, General Surgery  Contact information:  [address]  [address]  [facility] [address]  [phone]                 Patient Instructions:      Diet Cardiac     Notify your health care provider if you experience any of the following:  increased confusion or weakness     Notify your health care provider if you experience any of the following:  persistent dizziness, light-headedness, or visual disturbances     Notify your health care provider if you experience any of the following:  worsening rash     Notify your health care provider if you experience any of the following:  severe persistent headache     Notify your health care provider if you experience any of the following:  difficulty breathing or increased cough     Notify your health care provider if you experience any of the following:  redness, tenderness, or signs of infection (pain, swelling, redness, odor or green/yellow discharge around incision site)     Notify your health care provider if you experience any of the following:  severe uncontrolled pain     Notify your health care provider if you experience any of the following:  persistent nausea and vomiting or diarrhea     Notify your health care provider if you experience any of the following:  temperature >100.4     Activity as tolerated     Weight bearing restrictions (specify):   Order Comments: NWB RLE       Significant Diagnostic Studies:  Labs:   BMP:   Recent Labs   Lab 05/27/20  0354 05/28/20  0419    106   * 134*   K 4.0 4.6    103   CO2 22* 19*   BUN 10 12   CREATININE 1.1 1.2   CALCIUM 8.0* 8.7   , CMP   Recent Labs   Lab 05/27/20  0354 05/28/20  0419   * 134*   K 4.0 4.6    103   CO2 22* 19*    106   BUN 10 12   CREATININE 1.1 1.2   CALCIUM 8.0* 8.7   ANIONGAP 8 12   ESTGFRAFRICA 56* 50*   EGFRNONAA 48* 43*    and CBC   Recent Labs   Lab 05/27/20  0354 05/28/20  0419   WBC 5.06 5.57   HGB 7.6* 9.6*   HCT 24.3* 29.5*    229       Pending Diagnostic Studies:     Procedure Component Value Units Date/Time    Folate RBC [[id number]] Collected:  05/22/20 0926    Order Status:  Sent Lab Status:  In process Updated:  05/22/20 9249    Specimen:  Blood          Medications:  Reconciled Home Medications:      Medication List      START taking these medications    acetaminophen 325 MG tablet  Commonly known as:  TYLENOL  Take 2 tablets (650 mg total) by mouth every 6 (six) hours as needed.     famotidine 20 MG tablet  Commonly known as:  PEPCID  Take 1 tablet (20 mg total) by mouth 2 (two) times daily as needed.     miconazole nitrate 2% 2 % Oint  Commonly known as:  MICOTIN  Apply topically 2 (two) times daily.     oxyCODONE 5 MG immediate release tablet  Commonly known as:  ROXICODONE  Take 1 tablet (5 mg total) by mouth every 4 (four) hours as needed.     polyethylene glycol 17 gram Pwpk  Commonly known as:  GLYCOLAX  Take 17 g by mouth once daily.  Start taking on:  May 29, 2020        CONTINUE taking these medications    aspirin 81 MG EC tablet  Commonly known as:  ECOTRIN  Take 1 tablet (81 mg total) by mouth once daily.     clopidogreL 75 mg tablet  Commonly known as:  PLAVIX  Take 1 tablet (75 mg total) by mouth once daily.     cyanocobalamin 500 MCG tablet  Take 1 tablet (500 mcg total) by mouth once daily.     ferrous sulfate 325 (65 FE) MG EC tablet  Take 1 tablet (325 mg total) by mouth every other  day.     latanoprost 0.005 % ophthalmic solution  Commonly known as:  XALATAN  Place 1 drop into both eyes every evening.     NIFEdipine 90 MG (OSM) 24 hr tablet  Commonly known as:  PROCARDIA-XL  Take 1 tablet (90 mg total) by mouth once daily.     STOOL SOFTENER 100 MG capsule  Generic drug:  docusate sodium  Take 1 capsule (100 mg total) by mouth daily as needed for Constipation.     timolol maleate 0.5% 0.5 % Drop  Commonly known as:  TIMOPTIC  Place 1 drop into both eyes 2 (two) times daily.        STOP taking these medications    HYDROcodone-acetaminophen  mg per tablet  Commonly known as:  NORCO            Indwelling Lines/Drains at time of discharge:   Lines/Drains/Airways     None                 Time spent on the discharge of patient: 35 minutes  Patient was seen and examined on the date of discharge and determined to be suitable for discharge.         Lewis Garcia MD  Department of Hospital Medicine  [facility]

## 2020-05-28 NOTE — PROGRESS NOTES
Postop day 5.  Status post right BKA  No complaints  Right lower extremity dressing removed, wounds healing without signs of infection    Plan-  Posterior splint removed , will dress with 4x4s and Ace wrap

## 2020-05-28 NOTE — PLAN OF CARE
Pt dischargihg to [facility] today. Transportation arranged with [facility] for wc van.    Note to pts nurse to call report to [phone].    Transpsortation arranged for 1330.    Family, son, Pradeep De Dios called to inform him of arrangements.    No additional CM needs or barriers for discharge.     05/28/20 1251   Final Note   Assessment Type Final Discharge Note   What phone number can be called within the next 1-3 days to see how you are doing after discharge? [phone]   Hospital Follow Up  Appt(s) scheduled? Yes   Discharge plans and expectations educations in teach back method with documentation complete? Yes   Right Care Referral Info   Post Acute Recommendation IRF

## 2020-05-28 NOTE — ASSESSMENT & PLAN NOTE
-Hb on admit 8.9  -Iron profile ok, b12 normal  -After surgery Hb has trended down and again today remains below 7.  The drop likely due to expected blood loss of surgery.  -On 5/26 I transfused 1 unit PRBC and Hb improved appropriately and continues to improve  -Continue b12 supplementation.  -Repeat cbc weekly on Monday in rehab.

## 2020-05-28 NOTE — PLAN OF CARE
[facility]     Department of Hospital Medicine     [address] [place] [address]     [phone] after hours  [phone] fax       INPATIENT REHAB ORDERS    05/28/2020    Admit to Inpatient Rehab                                               Diagnoses:  Active Hospital Problems    Diagnosis  POA    *Gangrene of right foot [I96]  Yes     Priority: 1 - High    MICKEY (acute kidney injury) [N17.9]  Yes     Priority: 2     PAD (peripheral artery disease) [I73.9]  Yes     Priority: 3     Debility [R53.81]  Yes     Priority: 4     Hypernatremia [E87.0]  Yes     Priority: 5     Malnutrition of mild degree [E44.1]  Yes    Anemia [D64.9]  Yes    Hypokalemia [E87.6]  Yes    Essential hypertension [I10]  Yes     Chronic      Resolved Hospital Problems    Diagnosis Date Resolved POA    Hyperkalemia [E87.5] 05/23/2020 Yes       Patient is homebound due to:  Gangrene of right foot    Allergies:Review of patient's allergies indicates:  No Known Allergies    Vitals:  Every shift     Diet: cardiac   Supplement:  1 can every three times a day with meals                         Type:  House        Acitivities:      - Up in a chair each morning as tolerated   - May use walker, cane, or self-propelled wheelchair   - Weight bearing: NWB RLE     LABS:  CBC and BMP weekly on Monday x 2 weeks    Nursing Precautions:     - Fall precautions per protocol   - Decubitus precautions:        -  for positioning   - Pressure reducing foam mattress   - Turn patient every two hours. Use wedge pillows to anchor patient    CONSULTS:      Physical Therapy to evaluate and treat   Occupational Therapy to evaluate and treat   Nutrition to evaluate and recommend diet    MISCELLANEOUS CARE:     Routine Skin for Bedridden Patients:  Apply moisture barrier cream to all    skin folds and wet areas in perineal area daily and after baths and                           all bowel  movements.  -Wound care:  Clean right leg surgical wound with wound cleanser, blot dry, swab with Betadine, cover with wound foam, secure with Kerlix/tape daily.  No compression or elevation.    Medications:    De Dios Alyshashanae ADINA   Home Medication Instructions DARON:[id number]    Printed on:05/28/20 4501   Medication Information                      acetaminophen (TYLENOL) 325 MG tablet  Take 2 tablets (650 mg total) by mouth every 6 (six) hours as needed.             aspirin (ECOTRIN) 81 MG EC tablet  Take 1 tablet (81 mg total) by mouth once daily.             clopidogreL (PLAVIX) 75 mg tablet  Take 1 tablet (75 mg total) by mouth once daily.             cyanocobalamin 500 MCG tablet  Take 1 tablet (500 mcg total) by mouth once daily.             docusate sodium (COLACE) 100 MG capsule  Take 1 capsule (100 mg total) by mouth daily as needed for Constipation.             famotidine (PEPCID) 20 MG tablet  Take 1 tablet (20 mg total) by mouth 2 (two) times daily as needed.             ferrous sulfate 325 (65 FE) MG EC tablet  Take 1 tablet (325 mg total) by mouth every other day.             latanoprost (XALATAN) 0.005 % ophthalmic solution  Place 1 drop into both eyes every evening.             miconazole nitrate 2% (MICOTIN) 2 % Oint  Apply topically 2 (two) times daily.             NIFEdipine (PROCARDIA-XL) 90 MG (OSM) 24 hr tablet  Take 1 tablet (90 mg total) by mouth once daily.             oxyCODONE (ROXICODONE) 5 MG immediate release tablet  Take 1 tablet (5 mg total) by mouth every 4 (four) hours as needed.             polyethylene glycol (GLYCOLAX) 17 gram PwPk  Take 17 g by mouth once daily.             timolol maleate 0.5% (TIMOPTIC) 0.5 % Drop  Place 1 drop into both eyes 2 (two) times daily.                       _________________________________  Lewis Garcia MD  05/28/2020

## 2020-05-28 NOTE — PHYSICIAN QUERY
PT Name: Home De Dios  MR #: [id number]    Pathology Findings Clarification     CDS/: Mildred Carrillo               Contact information: [email or web address]  This form is a permanent document in the medical record.     Query Date: May 28, 2020    By submitting this query, we are merely seeking further clarification of documentation.  Please utilize your independent clinical judgment when addressing the question(s) below.    The medical record contains the following:  Pathology Findings Location in Medical Record   Foot, right, amputation:   - Acute and chronic osteomyelitis near previous amputation site.   - Skin and soft tissue at previous amputation site with abscess formation,   acute and chronic inflammation, and necrosis.   - Previous transmetatarsal amputation of all 5 digits.   - Atherosclerotic disease with calcifications and foci of near complete   occlusion with recanalization.   - Surgical margin viable without significant inflammatory infiltrate on   sections examined. Pathology report 5/22       Please clarify:  [x  ] Pathology findings noted above are ruled in/confirmed as diagnoses   [  ] Pathology findings noted above are not confirmed as diagnoses   [  ] Other diagnosis (please specify): ___________   [  ] Clinically Undetermined       Please document in your progress notes daily for the duration of treatment until resolved and include in your discharge summary.

## 2020-05-28 NOTE — PLAN OF CARE
Pt AAOx4 with some confusion. Afebrile and VSS throughout shift. Poc reviewed with pt and questions answered. Repositions self independently. Pt is voiding up to bedside commode. Clear and yellow drainage noted. Dressing to R. Leg, CDI. Encouraged intake of Boost supplement. Pain is managed with prn p.o medication. Remains free from falls, injury, and skin breakdown. All needs and concerns met. Purposeful rounding done. SCDs and heel boots in place. Bed alarm set, bed is locked and in lowest position, side rails up x2, call light in reach. Pt is resting comfortably at present. Will continue to monitor.

## 2020-05-28 NOTE — PT/OT/SLP PROGRESS
Occupational Therapy   Treatment    Name: Home De Dios  MRN: [id number]  Admitting Diagnosis:  Gangrene of right foot  6 Days Post-Op    Recommendations:     Discharge Recommendations: rehabilitation facility  Discharge Equipment Recommendations:  (TBD at next level of care)  Barriers to discharge:  Decreased caregiver support    Assessment:     Home De Dios is a 77 y.o. female with a medical diagnosis of Gangrene of right foot s/p BKA.  She presents with pain in RLE. Performance deficits affecting function are impaired self care skills, impaired functional mobilty, impaired balance, impaired cognition, decreased lower extremity function, decreased ROM, decreased safety awareness, orthopedic precautions, impaired skin, gait instability.  Pt agreeable to participating in therapy upon arrival, but required continuous cues for activities and their purpose 2* delayed task initiation.  Pt able to perform bed mobility with SBA and sit <> stand transfer from EOB with CGA and RW.  While seated at EOB no LOB observed when leaning forward to adjust sock on left foot and pull upwards.    Pt continues to demonstrate poor safety awareness impacting her ability to perform ADLs and mobilize safely.  She would benefit from skilled OT services to address problems listed above and increase independence with ADLs.  Rehab is recommended upon d/c from acute care to further address deficits, give pt greatest chance of resuming PLOF, and help pt improve overall functional independence prior to returning home.              Rehab Prognosis:  Good; patient would benefit from acute skilled OT services to address these deficits and reach maximum level of function.       Plan:     Patient to be seen 5 x/week to address the above listed problems via self-care/home management, therapeutic activities, therapeutic exercises  · Plan of Care Expires: 06/24/20  · Plan of Care Reviewed with: patient    Subjective     Pain/Comfort:  · Pain Rating 1:  5/10  · Location - Side 1: Right  · Location - Orientation 1: generalized  · Location 1: leg  · Pain Rating Post-Intervention 1: 5/10    Objective:     Communicated with: RN (Meera) prior to session.  Patient found HOB elevated with bed alarm, peripheral IV upon OT entry to room.  Bandage off residual limb of RLE upon arrival to room; RN notified.      General Precautions: Standard, fall   Orthopedic Precautions:N/A   Braces: N/A     Occupational Performance:     Bed Mobility:    · Patient completed Scooting/Bridging with stand by assistance  · Patient completed Supine to Sit with stand by assistance  · Patient completed Sit to Supine with stand by assistance     Functional Mobility/Transfers:  · Sit <> Stand:  CGA and RW x 1 trial from EOB  · Stand <> Sit:  Min A and RW; Pt plopped down onto bed and onto back despite cues to reach back and slowly lower body to EOB.  · Functional Mobility: Pt took one side step to left towards HOB with CGA and RW.  Decreased balance observed.    Activities of Daily Living:  · Lower Body Dressing: stand by assistance for leaning forward and adjusting sock to pull upwards while seated at EOB.       [facility] 6 Click ADL: 20    Treatment & Education:  *Pt performed sit <> stand transfer from EOB and took one side step; cues required for positioning of hands and for safety  *Pt performed theraband exercises to promote increased endurance, strength, and ROM in UB needed for ADLs and RW management. Yellow theraband used; increased cues and with visual model required for pt to understand movements.  -Horizontal shoulder abduction/adduction: 1 set x 10 reps   -Triceps extension: 1 set x 10 reps on each side  -Bicep curls: 1 set x 10 reps on each side  *POC and purpose of therapy reviewed with pt    Other:  *Bandage off residual limb on RLE upon arrival to room.  During session pt touched surgical site and staples.  OT provided education on importance of not touching area; reinforcement needed.   RN notified.    Patient left HOB elevated with call button in reach and clipped to mattress sheet, bed alarm on and RN (Meera) notifiedEducation:  .  Pt educated on utilizing call button for assist and only getting OOB when staff present.  Pt verbalized understanding.    GOALS:   Multidisciplinary Problems     Occupational Therapy Goals        Problem: Occupational Therapy Goal    Goal Priority Disciplines Outcome Interventions   Occupational Therapy Goal     OT, PT/OT Ongoing, Progressing    Description:  Goals to be met by: 6/1/2020     Patient will increase functional independence with ADLs by performing:    UE Dressing with [place].  LE Dressing with Stand-by Assistance.  Grooming while seated with Supervision (set-up) Assistance  Toileting from bedside commode with Contact Guard Assistance for hygiene and clothing management.   Toilet transfer to bedside commode with Contact Guard Assistance.                       Time Tracking:     OT Date of Treatment: 05/28/20  OT Start Time: 1035  OT Stop Time: 1055  OT Total Time (min): 20 min    Billable Minutes:Therapeutic Activity 20    LOC Bowling  5/28/2020

## 2020-05-28 NOTE — PT/OT/SLP DISCHARGE
Occupational Therapy Discharge Summary    Home De Dios  MRN: [id number]   Principal Problem: Gangrene of right foot      Patient Discharged from acute Occupational Therapy on 5/28/2020.  Please refer to prior OT note dated 5/28/2020 for functional status.    Assessment:      Patient appropriate for care in another setting.    Objective:     GOALS:   Multidisciplinary Problems     Occupational Therapy Goals        Problem: Occupational Therapy Goal    Goal Priority Disciplines Outcome Interventions   Occupational Therapy Goal     OT, PT/OT Ongoing, Progressing    Description:  Goals to be met by: 6/1/2020     Patient will increase functional independence with ADLs by performing:    UE Dressing with [place].  LE Dressing with Stand-by Assistance.  Grooming while seated with Supervision (set-up) Assistance  Toileting from bedside commode with Contact Guard Assistance for hygiene and clothing management.   Toilet transfer to bedside commode with Contact Guard Assistance.                       Reasons for Discontinuation of Therapy Services  Transfer to alternate level of care.      Plan:     Patient Discharged to: Inpatient Rehab    LOC Bowling  5/28/2020

## 2020-06-04 ENCOUNTER — NURSE TRIAGE (OUTPATIENT)
Dept: ADMINISTRATIVE | Facility: CLINIC | Age: 80
End: 2020-06-04

## 2020-06-04 NOTE — TELEPHONE ENCOUNTER
Called patient on behalf of [facility]. No answer. Left message to let patient know someone will call back tomorrow.       Reason for Disposition   Message left with person in household    Protocols used: NO CONTACT OR DUPLICATE CONTACT CALL-A-[place]

## 2020-06-05 NOTE — TELEPHONE ENCOUNTER
Attempted to contact patient through post procedure symptom monitoring program. No contact made. No follow up call required.      Reason for Disposition   Second attempt to contact family AND no contact made. Phone number verified.    Protocols used: NO CONTACT OR DUPLICATE CONTACT CALL-A-[place]

## 2020-06-24 ENCOUNTER — TELEPHONE (OUTPATIENT)
Dept: PODIATRY | Facility: CLINIC | Age: 80
End: 2020-06-24

## 2020-06-24 NOTE — TELEPHONE ENCOUNTER
Pt hh nurse would like to know if she could put betadine on the pt's wound. I informed her that pt needs to come in to evaulate the wound. She voiced understanding and help pt get scheduled for an appointment on 6/25/20

## 2020-06-25 ENCOUNTER — PATIENT OUTREACH (OUTPATIENT)
Dept: ADMINISTRATIVE | Facility: OTHER | Age: 80
End: 2020-06-25

## 2020-06-25 NOTE — PROGRESS NOTES
Patient's chart was reviewed for overdue ISACC topics.  Immunizations unable to be reconciled; not in LINKS database.    Orders placed:none  Tasked appts:none  Labs Linked:none

## 2020-07-01 ENCOUNTER — TELEPHONE (OUTPATIENT)
Dept: PODIATRY | Facility: CLINIC | Age: 80
End: 2020-07-01

## 2020-07-01 NOTE — TELEPHONE ENCOUNTER
Spoke to pt and informed her that she needs to see Dr. Drummond instead of Dr. Munguia to follow up on her amputation. Pt voiced understanding and call ended.       Spoke to someone in Dr. Drummond's office and provided them with the pt's information to help her get scheduled for a follow up appointment.

## 2020-07-02 PROCEDURE — G0179 PR HOME HEALTH MD RECERTIFICATION: ICD-10-PCS | Mod: ,,, | Performed by: HOSPITALIST

## 2020-07-02 PROCEDURE — G0179 MD RECERTIFICATION HHA PT: HCPCS | Mod: ,,, | Performed by: HOSPITALIST

## 2020-07-08 ENCOUNTER — EXTERNAL HOME HEALTH (OUTPATIENT)
Dept: HOME HEALTH SERVICES | Facility: HOSPITAL | Age: 80
End: 2020-07-08
Payer: MEDICARE

## 2020-07-08 NOTE — PROGRESS NOTES
60 Day Recert Order # [id number]  New Cert Period: 07/02 to 08/30/2020 with [facility] - Dr. Lewis Garcia

## 2020-07-15 ENCOUNTER — OFFICE VISIT (OUTPATIENT)
Dept: INTERNAL MEDICINE | Facility: CLINIC | Age: 80
End: 2020-07-15
Payer: MEDICARE

## 2020-07-15 DIAGNOSIS — Z89.511 STATUS POST BELOW-KNEE AMPUTATION OF RIGHT LOWER EXTREMITY: ICD-10-CM

## 2020-07-15 DIAGNOSIS — I73.9 PAD (PERIPHERAL ARTERY DISEASE): ICD-10-CM

## 2020-07-15 DIAGNOSIS — N18.30 CKD (CHRONIC KIDNEY DISEASE) STAGE 3, GFR 30-59 ML/MIN: ICD-10-CM

## 2020-07-15 DIAGNOSIS — I10 HYPERTENSION, UNSPECIFIED TYPE: Primary | ICD-10-CM

## 2020-07-15 PROCEDURE — 1101F PT FALLS ASSESS-DOCD LE1/YR: CPT | Mod: HCNC,CPTII,S$GLB, | Performed by: INTERNAL MEDICINE

## 2020-07-15 PROCEDURE — 99999 PR PBB SHADOW E&M-EST. PATIENT-LVL III: ICD-10-PCS | Mod: PBBFAC,HCNC,, | Performed by: INTERNAL MEDICINE

## 2020-07-15 PROCEDURE — 99499 RISK ADDL DX/OHS AUDIT: ICD-10-PCS | Mod: S$GLB,,, | Performed by: INTERNAL MEDICINE

## 2020-07-15 PROCEDURE — 99999 PR PBB SHADOW E&M-EST. PATIENT-LVL III: CPT | Mod: PBBFAC,HCNC,, | Performed by: INTERNAL MEDICINE

## 2020-07-15 PROCEDURE — 1101F PR PT FALLS ASSESS DOC 0-1 FALLS W/OUT INJ PAST YR: ICD-10-PCS | Mod: HCNC,CPTII,S$GLB, | Performed by: INTERNAL MEDICINE

## 2020-07-15 PROCEDURE — 1159F PR MEDICATION LIST DOCUMENTED IN MEDICAL RECORD: ICD-10-PCS | Mod: HCNC,S$GLB,, | Performed by: INTERNAL MEDICINE

## 2020-07-15 PROCEDURE — 3075F SYST BP GE 130 - 139MM HG: CPT | Mod: HCNC,CPTII,S$GLB, | Performed by: INTERNAL MEDICINE

## 2020-07-15 PROCEDURE — 1159F MED LIST DOCD IN RCRD: CPT | Mod: HCNC,S$GLB,, | Performed by: INTERNAL MEDICINE

## 2020-07-15 PROCEDURE — 99499 UNLISTED E&M SERVICE: CPT | Mod: S$GLB,,, | Performed by: INTERNAL MEDICINE

## 2020-07-15 PROCEDURE — 3078F DIAST BP <80 MM HG: CPT | Mod: HCNC,CPTII,S$GLB, | Performed by: INTERNAL MEDICINE

## 2020-07-15 PROCEDURE — 3078F PR MOST RECENT DIASTOLIC BLOOD PRESSURE < 80 MM HG: ICD-10-PCS | Mod: HCNC,CPTII,S$GLB, | Performed by: INTERNAL MEDICINE

## 2020-07-15 PROCEDURE — 3075F PR MOST RECENT SYSTOLIC BLOOD PRESS GE 130-139MM HG: ICD-10-PCS | Mod: HCNC,CPTII,S$GLB, | Performed by: INTERNAL MEDICINE

## 2020-07-15 PROCEDURE — 99214 OFFICE O/P EST MOD 30 MIN: CPT | Mod: HCNC,S$GLB,, | Performed by: INTERNAL MEDICINE

## 2020-07-15 PROCEDURE — 99214 PR OFFICE/OUTPT VISIT, EST, LEVL IV, 30-39 MIN: ICD-10-PCS | Mod: HCNC,S$GLB,, | Performed by: INTERNAL MEDICINE

## 2020-07-15 RX ORDER — NIFEDIPINE 90 MG/1
90 TABLET, EXTENDED RELEASE ORAL DAILY
Qty: 30 TABLET | Refills: 6 | Status: SHIPPED | OUTPATIENT
Start: 2020-07-15 | End: 2021-05-25 | Stop reason: SDUPTHER

## 2020-07-20 VITALS — DIASTOLIC BLOOD PRESSURE: 64 MMHG | TEMPERATURE: 99 F | SYSTOLIC BLOOD PRESSURE: 136 MMHG | HEART RATE: 60 BPM

## 2020-07-20 NOTE — PROGRESS NOTES
Subjective:       Patient ID: Home De Dios is a 78 y.o. female.    Chief Complaint: Follow-up    HPIMijordan Bhat is here today for follow you. She was recently admitted from Podiatry dept for gangrene of her R foot/toes. She underwent a R BKA and has done well since then. She is awaiting fitting for a prosthesis that is currently being processed.She denies any complaints and is please with the results of her surgery. We discussed the events that led up to this situation and the need for close follow up to prevent any problems with her L LE She has evidence of PAD resulting in this procedure and will be followed by Podiatry for monitoring. She has hx of HTN and was dc'd on Nifedpine XL which she is tolerating well. The HCTZ was stopped as she has evidence of CKD on recent blood work. I discussed this issue with her emphasizing on the adequate BP control and other issues. I will make arrangements for her to follow up with Podiatry as  Indicated.  Review of Systems   Respiratory: Negative for cough and shortness of breath.    Cardiovascular: Negative for chest pain, palpitations and leg swelling.   Genitourinary: Negative for difficulty urinating.         Objective:      Physical Exam  Vitals signs and nursing note reviewed.   Constitutional:       General: She is not in acute distress.  Neck:      Musculoskeletal: No neck rigidity or muscular tenderness.      Vascular: No carotid bruit.   Cardiovascular:      Rate and Rhythm: Normal rate and regular rhythm.      Heart sounds: Normal heart sounds.      Comments: Nonpalpable distal pulses L LE.  Pulmonary:      Effort: Pulmonary effort is normal.      Breath sounds: Normal breath sounds.   Abdominal:      General: Bowel sounds are normal.      Tenderness: There is no abdominal tenderness.   Lymphadenopathy:      Cervical: No cervical adenopathy.   Skin:     General: Skin is warm and dry.      Comments: Well healed R BKA incision.  Blackened toe nails L foot; no  tenderness or evidence of gangrene.   Neurological:      Mental Status: She is oriented to person, place, and time.      Cranial Nerves: No cranial nerve deficit.   Psychiatric:         Mood and Affect: Mood normal.         Behavior: Behavior normal.         Thought Content: Thought content normal.         Judgment: Judgment normal.         Assessment:       1. Hypertension, unspecified type    2. PAD (peripheral artery disease)    3. Status post below-knee amputation of right lower extremity    4. CKD (chronic kidney disease) stage 3, GFR 30-59 ml/min        Plan:    1. Refill Nifedipine XL 90 mgs daily.         2. Follow up with Podiatry - appointment made.         3. Monitor BP.         4. RTC 2 months or sooner if needed.

## 2020-08-06 ENCOUNTER — TELEPHONE (OUTPATIENT)
Dept: OPHTHALMOLOGY | Facility: CLINIC | Age: 80
End: 2020-08-06

## 2020-08-06 NOTE — TELEPHONE ENCOUNTER
Pt called to inform us the reason why she hasn't been coming to her eye appointments with Dr. Rojas. Pt states she had to get her right leg removed and is unable to keep appointments at this time. Once she is up and feeling better she will call back to schedule her appointment.

## 2020-08-28 ENCOUNTER — DOCUMENT SCAN (OUTPATIENT)
Dept: HOME HEALTH SERVICES | Facility: HOSPITAL | Age: 80
End: 2020-08-28
Payer: MEDICARE

## 2020-08-28 ENCOUNTER — DOCUMENT SCAN (OUTPATIENT)
Dept: HOME HEALTH SERVICES | Facility: HOSPITAL | Age: 80
End: 2020-08-28

## 2020-09-04 ENCOUNTER — DOCUMENT SCAN (OUTPATIENT)
Dept: HOME HEALTH SERVICES | Facility: HOSPITAL | Age: 80
End: 2020-09-04
Payer: MEDICARE

## 2021-02-04 ENCOUNTER — TELEPHONE (OUTPATIENT)
Dept: PODIATRY | Facility: CLINIC | Age: 81
End: 2021-02-04

## 2021-02-23 ENCOUNTER — DOCUMENT SCAN (OUTPATIENT)
Dept: HOME HEALTH SERVICES | Facility: HOSPITAL | Age: 81
End: 2021-02-23
Payer: MEDICARE

## 2021-02-23 PROCEDURE — G0180 MD CERTIFICATION HHA PATIENT: HCPCS | Mod: ,,, | Performed by: INTERNAL MEDICINE

## 2021-02-23 PROCEDURE — G0180 PR HOME HEALTH MD CERTIFICATION: ICD-10-PCS | Mod: ,,, | Performed by: INTERNAL MEDICINE

## 2021-03-03 ENCOUNTER — DOCUMENT SCAN (OUTPATIENT)
Dept: HOME HEALTH SERVICES | Facility: HOSPITAL | Age: 81
End: 2021-03-03
Payer: MEDICARE

## 2021-03-05 ENCOUNTER — EXTERNAL HOME HEALTH (OUTPATIENT)
Dept: HOME HEALTH SERVICES | Facility: HOSPITAL | Age: 81
End: 2021-03-05
Payer: MEDICARE

## 2021-03-17 ENCOUNTER — PES CALL (OUTPATIENT)
Dept: ADMINISTRATIVE | Facility: CLINIC | Age: 81
End: 2021-03-17

## 2021-03-22 ENCOUNTER — DOCUMENT SCAN (OUTPATIENT)
Dept: HOME HEALTH SERVICES | Facility: HOSPITAL | Age: 81
End: 2021-03-22
Payer: MEDICARE

## 2021-03-24 ENCOUNTER — DOCUMENT SCAN (OUTPATIENT)
Dept: HOME HEALTH SERVICES | Facility: HOSPITAL | Age: 81
End: 2021-03-24
Payer: MEDICARE

## 2021-03-30 ENCOUNTER — PES CALL (OUTPATIENT)
Dept: ADMINISTRATIVE | Facility: CLINIC | Age: 81
End: 2021-03-30

## 2021-04-06 ENCOUNTER — DOCUMENT SCAN (OUTPATIENT)
Dept: HOME HEALTH SERVICES | Facility: HOSPITAL | Age: 81
End: 2021-04-06
Payer: MEDICARE

## 2021-04-22 ENCOUNTER — OFFICE VISIT (OUTPATIENT)
Dept: HOME HEALTH SERVICES | Facility: CLINIC | Age: 81
End: 2021-04-22
Payer: MEDICARE

## 2021-04-22 VITALS
BODY MASS INDEX: 18.05 KG/M2 | DIASTOLIC BLOOD PRESSURE: 87 MMHG | HEART RATE: 72 BPM | SYSTOLIC BLOOD PRESSURE: 150 MMHG | HEIGHT: 67 IN | TEMPERATURE: 97 F | WEIGHT: 115 LBS

## 2021-04-22 DIAGNOSIS — Z74.09 OTHER REDUCED MOBILITY: ICD-10-CM

## 2021-04-22 DIAGNOSIS — I10 ESSENTIAL HYPERTENSION: Chronic | ICD-10-CM

## 2021-04-22 DIAGNOSIS — Z99.89 DEPENDENCE ON OTHER ENABLING MACHINES AND DEVICES: ICD-10-CM

## 2021-04-22 DIAGNOSIS — R26.9 ABNORMALITY OF GAIT AND MOBILITY: ICD-10-CM

## 2021-04-22 DIAGNOSIS — R63.6 UNDERWEIGHT: ICD-10-CM

## 2021-04-22 DIAGNOSIS — Z89.511 STATUS POST BELOW KNEE AMPUTATION OF RIGHT LOWER EXTREMITY: ICD-10-CM

## 2021-04-22 DIAGNOSIS — Z00.00 ENCOUNTER FOR PREVENTIVE HEALTH EXAMINATION: Primary | ICD-10-CM

## 2021-04-22 DIAGNOSIS — E44.1 MALNUTRITION OF MILD DEGREE: ICD-10-CM

## 2021-04-22 DIAGNOSIS — N18.32 STAGE 3B CHRONIC KIDNEY DISEASE: ICD-10-CM

## 2021-04-22 DIAGNOSIS — I73.9 PAD (PERIPHERAL ARTERY DISEASE): ICD-10-CM

## 2021-04-22 PROCEDURE — 3288F PR FALLS RISK ASSESSMENT DOCUMENTED: ICD-10-PCS | Mod: CPTII,S$GLB,, | Performed by: NURSE PRACTITIONER

## 2021-04-22 PROCEDURE — 1101F PT FALLS ASSESS-DOCD LE1/YR: CPT | Mod: CPTII,S$GLB,, | Performed by: NURSE PRACTITIONER

## 2021-04-22 PROCEDURE — 1126F AMNT PAIN NOTED NONE PRSNT: CPT | Mod: S$GLB,,, | Performed by: NURSE PRACTITIONER

## 2021-04-22 PROCEDURE — G0439 PPPS, SUBSEQ VISIT: HCPCS | Mod: S$GLB,,, | Performed by: NURSE PRACTITIONER

## 2021-04-22 PROCEDURE — 1101F PR PT FALLS ASSESS DOC 0-1 FALLS W/OUT INJ PAST YR: ICD-10-PCS | Mod: CPTII,S$GLB,, | Performed by: NURSE PRACTITIONER

## 2021-04-22 PROCEDURE — G0439 PR MEDICARE ANNUAL WELLNESS SUBSEQUENT VISIT: ICD-10-PCS | Mod: S$GLB,,, | Performed by: NURSE PRACTITIONER

## 2021-04-22 PROCEDURE — 1158F PR ADVANCE CARE PLANNING DISCUSS DOCUMENTED IN MEDICAL RECORD: ICD-10-PCS | Mod: S$GLB,,, | Performed by: NURSE PRACTITIONER

## 2021-04-22 PROCEDURE — 1158F ADVNC CARE PLAN TLK DOCD: CPT | Mod: S$GLB,,, | Performed by: NURSE PRACTITIONER

## 2021-04-22 PROCEDURE — 3288F FALL RISK ASSESSMENT DOCD: CPT | Mod: CPTII,S$GLB,, | Performed by: NURSE PRACTITIONER

## 2021-04-22 PROCEDURE — 99499 RISK ADDL DX/OHS AUDIT: ICD-10-PCS | Mod: S$GLB,,, | Performed by: NURSE PRACTITIONER

## 2021-04-22 PROCEDURE — 1126F PR PAIN SEVERITY QUANTIFIED, NO PAIN PRESENT: ICD-10-PCS | Mod: S$GLB,,, | Performed by: NURSE PRACTITIONER

## 2021-04-22 PROCEDURE — 99499 UNLISTED E&M SERVICE: CPT | Mod: S$GLB,,, | Performed by: NURSE PRACTITIONER

## 2021-04-22 RX ORDER — IBUPROFEN 100 MG/5ML
1000 SUSPENSION, ORAL (FINAL DOSE FORM) ORAL DAILY
COMMUNITY

## 2021-04-22 RX ORDER — CYANOCOBALAMIN (VITAMIN B-12) 500 MCG
400 TABLET ORAL DAILY
COMMUNITY

## 2021-04-24 PROBLEM — Z89.511 STATUS POST BELOW KNEE AMPUTATION OF RIGHT LOWER EXTREMITY: Status: ACTIVE | Noted: 2021-04-24

## 2021-04-24 PROBLEM — N18.32 STAGE 3B CHRONIC KIDNEY DISEASE: Status: ACTIVE | Noted: 2021-04-24

## 2021-04-26 ENCOUNTER — DOCUMENT SCAN (OUTPATIENT)
Dept: HOME HEALTH SERVICES | Facility: HOSPITAL | Age: 81
End: 2021-04-26
Payer: MEDICARE

## 2021-04-26 NOTE — SUBJECTIVE & OBJECTIVE
Interval History: Patient doing well today. No specific complaints. Foot pain controlled. Agreeable to try boost plus with meals.    Review of Systems   Constitutional: Negative for chills and fever.   Respiratory: Negative for cough and shortness of breath.    Cardiovascular: Negative for chest pain and leg swelling.   Gastrointestinal: Negative for abdominal pain, nausea and vomiting.   Musculoskeletal:        + right foot pain   Skin:        + black color change to right toes     Objective:     Vital Signs (Most Recent):  Temp: 98.6 °F (37 °C) (04/26/20 0841)  Pulse: 79 (04/26/20 0841)  Resp: 16 (04/26/20 0841)  BP: (!) 152/71 (04/26/20 0841)  SpO2: 96 % (04/26/20 0841) Vital Signs (24h Range):  Temp:  [98 °F (36.7 °C)-99 °F (37.2 °C)] 98.6 °F (37 °C)  Pulse:  [68-94] 79  Resp:  [16-20] 16  SpO2:  [96 %-98 %] 96 %  BP: (134-152)/(64-72) 152/71     Weight: 52 kg (114 lb 10.2 oz)  Body mass index is 17.96 kg/m².    Intake/Output Summary (Last 24 hours) at 4/26/2020 1159  Last data filed at 4/26/2020 0800  Gross per 24 hour   Intake 2233.75 ml   Output 875 ml   Net 1358.75 ml      Physical Exam   Constitutional: She is oriented to person, place, and time. She appears well-developed and well-nourished. No distress.   HENT:   Head: Normocephalic and atraumatic.   Eyes: Conjunctivae are normal. No scleral icterus.   Neck: Normal range of motion. Neck supple.   Cardiovascular: Normal rate, regular rhythm and normal heart sounds.   Pulmonary/Chest: Effort normal and breath sounds normal. No respiratory distress.   Abdominal: Soft. Bowel sounds are normal. There is no tenderness.   Musculoskeletal: She exhibits no edema.   Neurological: She is alert and oriented to person, place, and time.   Skin: Skin is warm and dry.   R foot necrosis at toes 1-4. Foot otherwise warm   Psychiatric: She has a normal mood and affect.   Nursing note and vitals reviewed.      Significant Labs:   BMP:   Recent Labs   Lab 04/26/20  0400   GLU  Please forward this note to our INR clinic and have someone reach out to him to set up an INR sometime in mid to late May 110      K 3.1*      CO2 25   BUN 9   CREATININE 1.5*   CALCIUM 7.7*   MG 1.3*     CBC:   Recent Labs   Lab 04/24/20  2221 04/25/20  0333 04/26/20  0400   WBC 7.30 7.43 7.73   HGB 9.3* 8.7* 8.9*   HCT 28.7* 27.1* 28.3*    289 323     Imaging:  No new imaging today

## 2021-05-18 DIAGNOSIS — H40.1122 PRIMARY OPEN ANGLE GLAUCOMA OF LEFT EYE, MODERATE STAGE: ICD-10-CM

## 2021-05-18 DIAGNOSIS — H40.1113 PRIMARY OPEN ANGLE GLAUCOMA OF RIGHT EYE, SEVERE STAGE: ICD-10-CM

## 2021-05-18 RX ORDER — LATANOPROST 50 UG/ML
1 SOLUTION/ DROPS OPHTHALMIC NIGHTLY
Qty: 15 ML | Refills: 3 | Status: SHIPPED | OUTPATIENT
Start: 2021-05-18 | End: 2021-08-19

## 2021-05-18 RX ORDER — TIMOLOL MALEATE 5 MG/ML
1 SOLUTION/ DROPS OPHTHALMIC 2 TIMES DAILY
Qty: 15 ML | Refills: 3 | Status: SHIPPED | OUTPATIENT
Start: 2021-05-18 | End: 2021-08-19

## 2021-08-05 ENCOUNTER — DOCUMENTATION ONLY (OUTPATIENT)
Dept: INTERNAL MEDICINE | Facility: CLINIC | Age: 81
End: 2021-08-05

## 2021-11-15 ENCOUNTER — OFFICE VISIT (OUTPATIENT)
Dept: SURGERY | Facility: CLINIC | Age: 81
End: 2021-11-15
Attending: SPECIALIST
Payer: MEDICARE

## 2021-11-15 VITALS
DIASTOLIC BLOOD PRESSURE: 83 MMHG | SYSTOLIC BLOOD PRESSURE: 190 MMHG | OXYGEN SATURATION: 100 % | BODY MASS INDEX: 21.97 KG/M2 | WEIGHT: 140 LBS | HEIGHT: 67 IN

## 2021-11-15 DIAGNOSIS — Z89.511 STATUS POST BELOW KNEE AMPUTATION OF RIGHT LOWER EXTREMITY: Primary | ICD-10-CM

## 2021-11-15 PROCEDURE — 1160F RVW MEDS BY RX/DR IN RCRD: CPT | Mod: HCNC,CPTII,S$GLB, | Performed by: SPECIALIST

## 2021-11-15 PROCEDURE — 3077F PR MOST RECENT SYSTOLIC BLOOD PRESSURE >= 140 MM HG: ICD-10-PCS | Mod: HCNC,CPTII,S$GLB, | Performed by: SPECIALIST

## 2021-11-15 PROCEDURE — 99212 PR OFFICE/OUTPT VISIT, EST, LEVL II, 10-19 MIN: ICD-10-PCS | Mod: HCNC,S$GLB,, | Performed by: SPECIALIST

## 2021-11-15 PROCEDURE — 3079F DIAST BP 80-89 MM HG: CPT | Mod: HCNC,CPTII,S$GLB, | Performed by: SPECIALIST

## 2021-11-15 PROCEDURE — 1101F PR PT FALLS ASSESS DOC 0-1 FALLS W/OUT INJ PAST YR: ICD-10-PCS | Mod: HCNC,CPTII,S$GLB, | Performed by: SPECIALIST

## 2021-11-15 PROCEDURE — 1160F PR REVIEW ALL MEDS BY PRESCRIBER/CLIN PHARMACIST DOCUMENTED: ICD-10-PCS | Mod: HCNC,CPTII,S$GLB, | Performed by: SPECIALIST

## 2021-11-15 PROCEDURE — 99999 PR PBB SHADOW E&M-EST. PATIENT-LVL III: CPT | Mod: PBBFAC,HCNC,, | Performed by: SPECIALIST

## 2021-11-15 PROCEDURE — 3288F FALL RISK ASSESSMENT DOCD: CPT | Mod: HCNC,CPTII,S$GLB, | Performed by: SPECIALIST

## 2021-11-15 PROCEDURE — 99499 RISK ADDL DX/OHS AUDIT: ICD-10-PCS | Mod: S$GLB,,, | Performed by: SPECIALIST

## 2021-11-15 PROCEDURE — 1159F PR MEDICATION LIST DOCUMENTED IN MEDICAL RECORD: ICD-10-PCS | Mod: HCNC,CPTII,S$GLB, | Performed by: SPECIALIST

## 2021-11-15 PROCEDURE — 1101F PT FALLS ASSESS-DOCD LE1/YR: CPT | Mod: HCNC,CPTII,S$GLB, | Performed by: SPECIALIST

## 2021-11-15 PROCEDURE — 3079F PR MOST RECENT DIASTOLIC BLOOD PRESSURE 80-89 MM HG: ICD-10-PCS | Mod: HCNC,CPTII,S$GLB, | Performed by: SPECIALIST

## 2021-11-15 PROCEDURE — 3288F PR FALLS RISK ASSESSMENT DOCUMENTED: ICD-10-PCS | Mod: HCNC,CPTII,S$GLB, | Performed by: SPECIALIST

## 2021-11-15 PROCEDURE — 99212 OFFICE O/P EST SF 10 MIN: CPT | Mod: HCNC,S$GLB,, | Performed by: SPECIALIST

## 2021-11-15 PROCEDURE — 3077F SYST BP >= 140 MM HG: CPT | Mod: HCNC,CPTII,S$GLB, | Performed by: SPECIALIST

## 2021-11-15 PROCEDURE — 99999 PR PBB SHADOW E&M-EST. PATIENT-LVL III: ICD-10-PCS | Mod: PBBFAC,HCNC,, | Performed by: SPECIALIST

## 2021-11-15 PROCEDURE — 99499 UNLISTED E&M SERVICE: CPT | Mod: S$GLB,,, | Performed by: SPECIALIST

## 2021-11-15 PROCEDURE — 1159F MED LIST DOCD IN RCRD: CPT | Mod: HCNC,CPTII,S$GLB, | Performed by: SPECIALIST

## 2021-12-15 ENCOUNTER — TELEPHONE (OUTPATIENT)
Dept: OPHTHALMOLOGY | Facility: CLINIC | Age: 81
End: 2021-12-15
Payer: MEDICARE

## 2022-06-22 ENCOUNTER — LAB VISIT (OUTPATIENT)
Dept: LAB | Facility: HOSPITAL | Age: 82
End: 2022-06-22
Attending: INTERNAL MEDICINE
Payer: MEDICARE

## 2022-06-22 ENCOUNTER — OFFICE VISIT (OUTPATIENT)
Dept: INTERNAL MEDICINE | Facility: CLINIC | Age: 82
End: 2022-06-22
Payer: MEDICARE

## 2022-06-22 DIAGNOSIS — E78.5 DYSLIPIDEMIA: ICD-10-CM

## 2022-06-22 DIAGNOSIS — Z00.00 ROUTINE GENERAL MEDICAL EXAMINATION AT A HEALTH CARE FACILITY: Primary | ICD-10-CM

## 2022-06-22 DIAGNOSIS — R53.83 FATIGUE, UNSPECIFIED TYPE: ICD-10-CM

## 2022-06-22 DIAGNOSIS — I73.9 PAD (PERIPHERAL ARTERY DISEASE): ICD-10-CM

## 2022-06-22 DIAGNOSIS — I10 ESSENTIAL HYPERTENSION: Chronic | ICD-10-CM

## 2022-06-22 DIAGNOSIS — Z00.00 ROUTINE GENERAL MEDICAL EXAMINATION AT A HEALTH CARE FACILITY: ICD-10-CM

## 2022-06-22 DIAGNOSIS — E55.9 VITAMIN D DEFICIENCY: ICD-10-CM

## 2022-06-22 DIAGNOSIS — Z89.511 STATUS POST BELOW KNEE AMPUTATION OF RIGHT LOWER EXTREMITY: ICD-10-CM

## 2022-06-22 LAB
25(OH)D3+25(OH)D2 SERPL-MCNC: 43 NG/ML (ref 30–96)
ALBUMIN SERPL BCP-MCNC: 4 G/DL (ref 3.5–5.2)
ALP SERPL-CCNC: 80 U/L (ref 55–135)
ALT SERPL W/O P-5'-P-CCNC: 5 U/L (ref 10–44)
ANION GAP SERPL CALC-SCNC: 9 MMOL/L (ref 8–16)
AST SERPL-CCNC: 14 U/L (ref 10–40)
BASOPHILS # BLD AUTO: 0.02 K/UL (ref 0–0.2)
BASOPHILS NFR BLD: 0.4 % (ref 0–1.9)
BILIRUB SERPL-MCNC: 0.4 MG/DL (ref 0.1–1)
BUN SERPL-MCNC: 11 MG/DL (ref 8–23)
CALCIUM SERPL-MCNC: 9.8 MG/DL (ref 8.7–10.5)
CHLORIDE SERPL-SCNC: 111 MMOL/L (ref 95–110)
CHOLEST SERPL-MCNC: 192 MG/DL (ref 120–199)
CHOLEST/HDLC SERPL: 4.9 {RATIO} (ref 2–5)
CO2 SERPL-SCNC: 25 MMOL/L (ref 23–29)
CREAT SERPL-MCNC: 1.5 MG/DL (ref 0.5–1.4)
DIFFERENTIAL METHOD: ABNORMAL
EOSINOPHIL # BLD AUTO: 0.2 K/UL (ref 0–0.5)
EOSINOPHIL NFR BLD: 4.6 % (ref 0–8)
ERYTHROCYTE [DISTWIDTH] IN BLOOD BY AUTOMATED COUNT: 13.4 % (ref 11.5–14.5)
EST. GFR  (AFRICAN AMERICAN): 37.1 ML/MIN/1.73 M^2
EST. GFR  (NON AFRICAN AMERICAN): 32.2 ML/MIN/1.73 M^2
GLUCOSE SERPL-MCNC: 92 MG/DL (ref 70–110)
HCT VFR BLD AUTO: 38.3 % (ref 37–48.5)
HDLC SERPL-MCNC: 39 MG/DL (ref 40–75)
HDLC SERPL: 20.3 % (ref 20–50)
HGB BLD-MCNC: 11.9 G/DL (ref 12–16)
IMM GRANULOCYTES # BLD AUTO: 0.01 K/UL (ref 0–0.04)
IMM GRANULOCYTES NFR BLD AUTO: 0.2 % (ref 0–0.5)
LDLC SERPL CALC-MCNC: 109.6 MG/DL (ref 63–159)
LYMPHOCYTES # BLD AUTO: 1.4 K/UL (ref 1–4.8)
LYMPHOCYTES NFR BLD: 28.3 % (ref 18–48)
MCH RBC QN AUTO: 26.7 PG (ref 27–31)
MCHC RBC AUTO-ENTMCNC: 31.1 G/DL (ref 32–36)
MCV RBC AUTO: 86 FL (ref 82–98)
MONOCYTES # BLD AUTO: 0.3 K/UL (ref 0.3–1)
MONOCYTES NFR BLD: 6.7 % (ref 4–15)
NEUTROPHILS # BLD AUTO: 2.9 K/UL (ref 1.8–7.7)
NEUTROPHILS NFR BLD: 59.8 % (ref 38–73)
NONHDLC SERPL-MCNC: 153 MG/DL
NRBC BLD-RTO: 0 /100 WBC
PLATELET # BLD AUTO: 285 K/UL (ref 150–450)
PMV BLD AUTO: 10.8 FL (ref 9.2–12.9)
POTASSIUM SERPL-SCNC: 4.4 MMOL/L (ref 3.5–5.1)
PROT SERPL-MCNC: 7.6 G/DL (ref 6–8.4)
RBC # BLD AUTO: 4.46 M/UL (ref 4–5.4)
SODIUM SERPL-SCNC: 145 MMOL/L (ref 136–145)
TRIGL SERPL-MCNC: 217 MG/DL (ref 30–150)
TSH SERPL DL<=0.005 MIU/L-ACNC: 1.86 UIU/ML (ref 0.4–4)
WBC # BLD AUTO: 4.8 K/UL (ref 3.9–12.7)

## 2022-06-22 PROCEDURE — 99499 RISK ADDL DX/OHS AUDIT: ICD-10-PCS | Mod: S$GLB,,, | Performed by: INTERNAL MEDICINE

## 2022-06-22 PROCEDURE — 99999 PR PBB SHADOW E&M-EST. PATIENT-LVL II: CPT | Mod: PBBFAC,,, | Performed by: INTERNAL MEDICINE

## 2022-06-22 PROCEDURE — 1159F MED LIST DOCD IN RCRD: CPT | Mod: CPTII,S$GLB,, | Performed by: INTERNAL MEDICINE

## 2022-06-22 PROCEDURE — 82306 VITAMIN D 25 HYDROXY: CPT | Performed by: INTERNAL MEDICINE

## 2022-06-22 PROCEDURE — 3078F PR MOST RECENT DIASTOLIC BLOOD PRESSURE < 80 MM HG: ICD-10-PCS | Mod: CPTII,S$GLB,, | Performed by: INTERNAL MEDICINE

## 2022-06-22 PROCEDURE — 3077F SYST BP >= 140 MM HG: CPT | Mod: CPTII,S$GLB,, | Performed by: INTERNAL MEDICINE

## 2022-06-22 PROCEDURE — 80053 COMPREHEN METABOLIC PANEL: CPT | Performed by: INTERNAL MEDICINE

## 2022-06-22 PROCEDURE — 1160F RVW MEDS BY RX/DR IN RCRD: CPT | Mod: CPTII,S$GLB,, | Performed by: INTERNAL MEDICINE

## 2022-06-22 PROCEDURE — 85025 COMPLETE CBC W/AUTO DIFF WBC: CPT | Performed by: INTERNAL MEDICINE

## 2022-06-22 PROCEDURE — 99499 UNLISTED E&M SERVICE: CPT | Mod: S$GLB,,, | Performed by: INTERNAL MEDICINE

## 2022-06-22 PROCEDURE — 3078F DIAST BP <80 MM HG: CPT | Mod: CPTII,S$GLB,, | Performed by: INTERNAL MEDICINE

## 2022-06-22 PROCEDURE — 1159F PR MEDICATION LIST DOCUMENTED IN MEDICAL RECORD: ICD-10-PCS | Mod: CPTII,S$GLB,, | Performed by: INTERNAL MEDICINE

## 2022-06-22 PROCEDURE — 84443 ASSAY THYROID STIM HORMONE: CPT | Performed by: INTERNAL MEDICINE

## 2022-06-22 PROCEDURE — 99214 OFFICE O/P EST MOD 30 MIN: CPT | Mod: S$GLB,,, | Performed by: INTERNAL MEDICINE

## 2022-06-22 PROCEDURE — 1160F PR REVIEW ALL MEDS BY PRESCRIBER/CLIN PHARMACIST DOCUMENTED: ICD-10-PCS | Mod: CPTII,S$GLB,, | Performed by: INTERNAL MEDICINE

## 2022-06-22 PROCEDURE — 3077F PR MOST RECENT SYSTOLIC BLOOD PRESSURE >= 140 MM HG: ICD-10-PCS | Mod: CPTII,S$GLB,, | Performed by: INTERNAL MEDICINE

## 2022-06-22 PROCEDURE — 99214 PR OFFICE/OUTPT VISIT, EST, LEVL IV, 30-39 MIN: ICD-10-PCS | Mod: S$GLB,,, | Performed by: INTERNAL MEDICINE

## 2022-06-22 PROCEDURE — 99999 PR PBB SHADOW E&M-EST. PATIENT-LVL II: ICD-10-PCS | Mod: PBBFAC,,, | Performed by: INTERNAL MEDICINE

## 2022-06-22 PROCEDURE — 36415 COLL VENOUS BLD VENIPUNCTURE: CPT | Performed by: INTERNAL MEDICINE

## 2022-06-22 PROCEDURE — 80061 LIPID PANEL: CPT | Performed by: INTERNAL MEDICINE

## 2022-07-15 VITALS — SYSTOLIC BLOOD PRESSURE: 160 MMHG | DIASTOLIC BLOOD PRESSURE: 72 MMHG | HEART RATE: 62 BPM

## 2022-07-15 NOTE — PROGRESS NOTES
"Subjective:       Patient ID: Home De Dios is a 82 y.o. female.    Chief Complaint: Annual Exam and Hypertension    HPIMiss Oliver is a very pleasant lady originally from [place] here for her annual exam. Overall doing well and had no specific health concerns. She is s/p R BKA as a result of PAD and has done well. She has adapted to her prosthesis well and is very mobile with it. She is compliant with her medications although did not take them this morning and, thus, her BP's are elevated. She states her pressures at home are "normal" and I asked her to keep a diary of these and will make adjustments to her meds if indicated. Otherwise doing well.  Review of Systems   Constitutional: Negative for activity change, appetite change, fatigue and unexpected weight change.   Respiratory: Negative for cough, shortness of breath and wheezing.    Cardiovascular: Negative for chest pain, palpitations, leg swelling and claudication.   Gastrointestinal: Negative.    Genitourinary: Negative for difficulty urinating.   Musculoskeletal: Negative.    Neurological: Negative for dizziness, weakness, light-headedness and headaches.         Objective:      Physical Exam  Vitals and nursing note reviewed.   Constitutional:       General: She is not in acute distress.     Appearance: Normal appearance. She is normal weight.   HENT:      Head: Normocephalic and atraumatic.      Right Ear: Tympanic membrane, ear canal and external ear normal.      Left Ear: Tympanic membrane, ear canal and external ear normal. There is no impacted cerumen.      Nose: Nose normal.      Mouth/Throat:      Mouth: Mucous membranes are moist.      Pharynx: Oropharynx is clear.   Eyes:      General: No scleral icterus.        Right eye: No discharge.         Left eye: No discharge.      Extraocular Movements: Extraocular movements intact.      Conjunctiva/sclera: Conjunctivae normal.      Pupils: Pupils are equal, round, and reactive to light.   Neck:      " Vascular: No carotid bruit.   Cardiovascular:      Rate and Rhythm: Normal rate and regular rhythm.      Heart sounds: Normal heart sounds. No murmur heard.     Comments: Decreased L distal pulses.  Pulmonary:      Effort: Pulmonary effort is normal. No respiratory distress.      Breath sounds: Normal breath sounds. No wheezing.   Chest:      Chest wall: No tenderness.   Abdominal:      General: Abdomen is flat. Bowel sounds are normal. There is no distension.      Palpations: Abdomen is soft. There is no mass.      Tenderness: There is no abdominal tenderness.   Musculoskeletal:      Cervical back: Normal range of motion and neck supple. No rigidity or tenderness.      Right lower leg: No edema.      Left lower leg: No edema.      Comments: R BKA.   Lymphadenopathy:      Cervical: No cervical adenopathy.   Skin:     General: Skin is warm and dry.      Findings: No erythema or rash.   Neurological:      General: No focal deficit present.      Mental Status: She is alert and oriented to person, place, and time.      Cranial Nerves: No cranial nerve deficit.      Motor: No weakness.   Psychiatric:         Mood and Affect: Mood normal.         Behavior: Behavior normal.         Thought Content: Thought content normal.         Judgment: Judgment normal.         Assessment:       Problem List Items Addressed This Visit     Essential hypertension (Chronic)    Relevant Orders    Comprehensive Metabolic Panel (Completed)    Lipid Panel (Completed)    TSH (Completed)    PAD (peripheral artery disease)    Relevant Orders    Comprehensive Metabolic Panel (Completed)    Lipid Panel (Completed)    Status post below knee amputation of right lower extremity      Other Visit Diagnoses     Routine general medical examination at a health care facility    -  Primary    Relevant Orders    Comprehensive Metabolic Panel (Completed)    CBC Auto Differential (Completed)    Vitamin D (Completed)    Lipid Panel (Completed)    TSH (Completed)     Dyslipidemia        Relevant Orders    Lipid Panel (Completed)    Vitamin D deficiency        Relevant Orders    Vitamin D (Completed)    Fatigue, unspecified type        Relevant Orders    CBC Auto Differential (Completed)    TSH (Completed)          Plan:    1. Obtain lab work as detailed above.         2. Monitor BP; keep diary.         3. RTC for review.

## 2022-10-31 DIAGNOSIS — I10 ESSENTIAL HYPERTENSION: Primary | ICD-10-CM

## 2022-11-07 ENCOUNTER — LAB VISIT (OUTPATIENT)
Dept: LAB | Facility: HOSPITAL | Age: 82
End: 2022-11-07
Attending: STUDENT IN AN ORGANIZED HEALTH CARE EDUCATION/TRAINING PROGRAM
Payer: MEDICARE

## 2022-11-07 ENCOUNTER — OFFICE VISIT (OUTPATIENT)
Dept: INTERNAL MEDICINE | Facility: CLINIC | Age: 82
End: 2022-11-07
Payer: MEDICARE

## 2022-11-07 VITALS
WEIGHT: 119 LBS | BODY MASS INDEX: 18.68 KG/M2 | HEART RATE: 74 BPM | SYSTOLIC BLOOD PRESSURE: 174 MMHG | HEIGHT: 67 IN | DIASTOLIC BLOOD PRESSURE: 78 MMHG

## 2022-11-07 DIAGNOSIS — I10 HYPERTENSION, UNSPECIFIED TYPE: ICD-10-CM

## 2022-11-07 DIAGNOSIS — Z00.00 HEALTHCARE MAINTENANCE: ICD-10-CM

## 2022-11-07 DIAGNOSIS — Z00.00 ROUTINE GENERAL MEDICAL EXAMINATION AT A HEALTH CARE FACILITY: Primary | ICD-10-CM

## 2022-11-07 DIAGNOSIS — N18.32 STAGE 3B CHRONIC KIDNEY DISEASE: ICD-10-CM

## 2022-11-07 DIAGNOSIS — I73.9 PAD (PERIPHERAL ARTERY DISEASE): ICD-10-CM

## 2022-11-07 DIAGNOSIS — I10 ESSENTIAL HYPERTENSION: ICD-10-CM

## 2022-11-07 DIAGNOSIS — I10 ESSENTIAL HYPERTENSION: Chronic | ICD-10-CM

## 2022-11-07 PROBLEM — S88.119A BELOW-KNEE AMPUTATION: Status: ACTIVE | Noted: 2020-05-28

## 2022-11-07 PROBLEM — Z91.81 HISTORY OF FALLING: Status: ACTIVE | Noted: 2020-01-01

## 2022-11-07 PROBLEM — Z89.9 AMPUTEE: Status: ACTIVE | Noted: 2020-05-28

## 2022-11-07 LAB
ALBUMIN SERPL BCP-MCNC: 3.9 G/DL (ref 3.5–5.2)
ALP SERPL-CCNC: 67 U/L (ref 55–135)
ALT SERPL W/O P-5'-P-CCNC: 6 U/L (ref 10–44)
ANION GAP SERPL CALC-SCNC: 8 MMOL/L (ref 8–16)
AST SERPL-CCNC: 14 U/L (ref 10–40)
BASOPHILS # BLD AUTO: 0.03 K/UL (ref 0–0.2)
BASOPHILS NFR BLD: 0.7 % (ref 0–1.9)
BILIRUB SERPL-MCNC: 0.5 MG/DL (ref 0.1–1)
BUN SERPL-MCNC: 10 MG/DL (ref 8–23)
CALCIUM SERPL-MCNC: 9.7 MG/DL (ref 8.7–10.5)
CHLORIDE SERPL-SCNC: 109 MMOL/L (ref 95–110)
CO2 SERPL-SCNC: 26 MMOL/L (ref 23–29)
CREAT SERPL-MCNC: 1.4 MG/DL (ref 0.5–1.4)
DIFFERENTIAL METHOD: ABNORMAL
EOSINOPHIL # BLD AUTO: 0.2 K/UL (ref 0–0.5)
EOSINOPHIL NFR BLD: 5.7 % (ref 0–8)
ERYTHROCYTE [DISTWIDTH] IN BLOOD BY AUTOMATED COUNT: 13.7 % (ref 11.5–14.5)
EST. GFR  (NO RACE VARIABLE): 37.3 ML/MIN/1.73 M^2
GLUCOSE SERPL-MCNC: 79 MG/DL (ref 70–110)
HCT VFR BLD AUTO: 33.8 % (ref 37–48.5)
HGB BLD-MCNC: 10.9 G/DL (ref 12–16)
IMM GRANULOCYTES # BLD AUTO: 0 K/UL (ref 0–0.04)
IMM GRANULOCYTES NFR BLD AUTO: 0 % (ref 0–0.5)
LYMPHOCYTES # BLD AUTO: 1.7 K/UL (ref 1–4.8)
LYMPHOCYTES NFR BLD: 39 % (ref 18–48)
MCH RBC QN AUTO: 27.5 PG (ref 27–31)
MCHC RBC AUTO-ENTMCNC: 32.2 G/DL (ref 32–36)
MCV RBC AUTO: 85 FL (ref 82–98)
MONOCYTES # BLD AUTO: 0.4 K/UL (ref 0.3–1)
MONOCYTES NFR BLD: 9 % (ref 4–15)
NEUTROPHILS # BLD AUTO: 1.9 K/UL (ref 1.8–7.7)
NEUTROPHILS NFR BLD: 45.6 % (ref 38–73)
NRBC BLD-RTO: 0 /100 WBC
PLATELET # BLD AUTO: 228 K/UL (ref 150–450)
PMV BLD AUTO: 10.1 FL (ref 9.2–12.9)
POTASSIUM SERPL-SCNC: 4.7 MMOL/L (ref 3.5–5.1)
PROT SERPL-MCNC: 6.9 G/DL (ref 6–8.4)
RBC # BLD AUTO: 3.97 M/UL (ref 4–5.4)
SODIUM SERPL-SCNC: 143 MMOL/L (ref 136–145)
WBC # BLD AUTO: 4.23 K/UL (ref 3.9–12.7)

## 2022-11-07 PROCEDURE — 1159F MED LIST DOCD IN RCRD: CPT | Mod: CPTII,S$GLB,, | Performed by: STUDENT IN AN ORGANIZED HEALTH CARE EDUCATION/TRAINING PROGRAM

## 2022-11-07 PROCEDURE — 3078F DIAST BP <80 MM HG: CPT | Mod: CPTII,S$GLB,, | Performed by: STUDENT IN AN ORGANIZED HEALTH CARE EDUCATION/TRAINING PROGRAM

## 2022-11-07 PROCEDURE — 1160F RVW MEDS BY RX/DR IN RCRD: CPT | Mod: CPTII,S$GLB,, | Performed by: STUDENT IN AN ORGANIZED HEALTH CARE EDUCATION/TRAINING PROGRAM

## 2022-11-07 PROCEDURE — 85025 COMPLETE CBC W/AUTO DIFF WBC: CPT | Performed by: STUDENT IN AN ORGANIZED HEALTH CARE EDUCATION/TRAINING PROGRAM

## 2022-11-07 PROCEDURE — 1101F PR PT FALLS ASSESS DOC 0-1 FALLS W/OUT INJ PAST YR: ICD-10-PCS | Mod: CPTII,S$GLB,, | Performed by: STUDENT IN AN ORGANIZED HEALTH CARE EDUCATION/TRAINING PROGRAM

## 2022-11-07 PROCEDURE — 99214 PR OFFICE/OUTPT VISIT, EST, LEVL IV, 30-39 MIN: ICD-10-PCS | Mod: S$GLB,,, | Performed by: STUDENT IN AN ORGANIZED HEALTH CARE EDUCATION/TRAINING PROGRAM

## 2022-11-07 PROCEDURE — 1159F PR MEDICATION LIST DOCUMENTED IN MEDICAL RECORD: ICD-10-PCS | Mod: CPTII,S$GLB,, | Performed by: STUDENT IN AN ORGANIZED HEALTH CARE EDUCATION/TRAINING PROGRAM

## 2022-11-07 PROCEDURE — 1126F AMNT PAIN NOTED NONE PRSNT: CPT | Mod: CPTII,S$GLB,, | Performed by: STUDENT IN AN ORGANIZED HEALTH CARE EDUCATION/TRAINING PROGRAM

## 2022-11-07 PROCEDURE — 1101F PT FALLS ASSESS-DOCD LE1/YR: CPT | Mod: CPTII,S$GLB,, | Performed by: STUDENT IN AN ORGANIZED HEALTH CARE EDUCATION/TRAINING PROGRAM

## 2022-11-07 PROCEDURE — 3077F PR MOST RECENT SYSTOLIC BLOOD PRESSURE >= 140 MM HG: ICD-10-PCS | Mod: CPTII,S$GLB,, | Performed by: STUDENT IN AN ORGANIZED HEALTH CARE EDUCATION/TRAINING PROGRAM

## 2022-11-07 PROCEDURE — 3288F PR FALLS RISK ASSESSMENT DOCUMENTED: ICD-10-PCS | Mod: CPTII,S$GLB,, | Performed by: STUDENT IN AN ORGANIZED HEALTH CARE EDUCATION/TRAINING PROGRAM

## 2022-11-07 PROCEDURE — 1160F PR REVIEW ALL MEDS BY PRESCRIBER/CLIN PHARMACIST DOCUMENTED: ICD-10-PCS | Mod: CPTII,S$GLB,, | Performed by: STUDENT IN AN ORGANIZED HEALTH CARE EDUCATION/TRAINING PROGRAM

## 2022-11-07 PROCEDURE — 3078F PR MOST RECENT DIASTOLIC BLOOD PRESSURE < 80 MM HG: ICD-10-PCS | Mod: CPTII,S$GLB,, | Performed by: STUDENT IN AN ORGANIZED HEALTH CARE EDUCATION/TRAINING PROGRAM

## 2022-11-07 PROCEDURE — 99999 PR PBB SHADOW E&M-EST. PATIENT-LVL III: ICD-10-PCS | Mod: PBBFAC,,, | Performed by: STUDENT IN AN ORGANIZED HEALTH CARE EDUCATION/TRAINING PROGRAM

## 2022-11-07 PROCEDURE — 80053 COMPREHEN METABOLIC PANEL: CPT | Performed by: STUDENT IN AN ORGANIZED HEALTH CARE EDUCATION/TRAINING PROGRAM

## 2022-11-07 PROCEDURE — 3077F SYST BP >= 140 MM HG: CPT | Mod: CPTII,S$GLB,, | Performed by: STUDENT IN AN ORGANIZED HEALTH CARE EDUCATION/TRAINING PROGRAM

## 2022-11-07 PROCEDURE — 99214 OFFICE O/P EST MOD 30 MIN: CPT | Mod: S$GLB,,, | Performed by: STUDENT IN AN ORGANIZED HEALTH CARE EDUCATION/TRAINING PROGRAM

## 2022-11-07 PROCEDURE — 99499 RISK ADDL DX/OHS AUDIT: ICD-10-PCS | Mod: S$GLB,,, | Performed by: STUDENT IN AN ORGANIZED HEALTH CARE EDUCATION/TRAINING PROGRAM

## 2022-11-07 PROCEDURE — 99999 PR PBB SHADOW E&M-EST. PATIENT-LVL III: CPT | Mod: PBBFAC,,, | Performed by: STUDENT IN AN ORGANIZED HEALTH CARE EDUCATION/TRAINING PROGRAM

## 2022-11-07 PROCEDURE — 99499 UNLISTED E&M SERVICE: CPT | Mod: S$GLB,,, | Performed by: STUDENT IN AN ORGANIZED HEALTH CARE EDUCATION/TRAINING PROGRAM

## 2022-11-07 PROCEDURE — 1126F PR PAIN SEVERITY QUANTIFIED, NO PAIN PRESENT: ICD-10-PCS | Mod: CPTII,S$GLB,, | Performed by: STUDENT IN AN ORGANIZED HEALTH CARE EDUCATION/TRAINING PROGRAM

## 2022-11-07 PROCEDURE — 36415 COLL VENOUS BLD VENIPUNCTURE: CPT | Performed by: STUDENT IN AN ORGANIZED HEALTH CARE EDUCATION/TRAINING PROGRAM

## 2022-11-07 PROCEDURE — 3288F FALL RISK ASSESSMENT DOCD: CPT | Mod: CPTII,S$GLB,, | Performed by: STUDENT IN AN ORGANIZED HEALTH CARE EDUCATION/TRAINING PROGRAM

## 2022-11-07 RX ORDER — ASPIRIN 81 MG/1
81 TABLET ORAL DAILY
Qty: 90 TABLET | Refills: 3 | Status: SHIPPED | OUTPATIENT
Start: 2022-11-07 | End: 2023-12-22

## 2022-11-07 RX ORDER — NIFEDIPINE 90 MG/1
90 TABLET, EXTENDED RELEASE ORAL DAILY
Qty: 30 TABLET | Refills: 6 | Status: SHIPPED | OUTPATIENT
Start: 2022-11-07 | End: 2023-06-27 | Stop reason: SDUPTHER

## 2022-11-07 NOTE — ASSESSMENT & PLAN NOTE
Uontrolled likely 2/2 poor medication adherence.  Will continue same management: Nifedipine XL 90 mg qd, refilled. Education provided.  Recommended to monitor blood pressure regularly, eat a well-balanced diet that's low in salt, DASH diet, enjoy regular physical activity, manage stress, maintain a healthy weight, take medications properly.

## 2022-11-07 NOTE — PROGRESS NOTES
Please call Ms. De Dios and thank her for doing her labs.  You can let her know, her hemoglobin is a little low, but this is chronic and nothing to worry about, just recommended to eat well and include food rich in iron. Her kidney numbers are looking better than in June, but this is why it is important to keep on taking her blood pressure medication. Thank you

## 2022-11-07 NOTE — ASSESSMENT & PLAN NOTE
Stable, no new lesions  Patient had discontinued ASA  I am reordering.  I am also sending patient to podiatry for nail care and calluses removal.

## 2022-11-07 NOTE — ASSESSMENT & PLAN NOTE
Health care maintenance and core gaps reviewed and assessed with patient.  Vaccinations recommended:        Tetanus - D       Shingles - D       Influenza - D       Pneumonia - D  Colon cancer screening:   Colonoscopy: D  Lifestyle recommendations given.  Physical activity recommended.    Legend: Ordered (O), Declined (D), Current (C)

## 2022-11-07 NOTE — PROGRESS NOTES
"Subjective:       Patient ID: Home De Dios is a 82 y.o. female.    Chief Complaint: Annual Exam    HPI    Patient is a 82 y.o. female , English speaking, with a history of:  HTN  CKD III3b  PAD  Below the knee Amputation of RLE   Use of prosthetic leg (right)    Patient comes to the clinic for a follow up of BP.    Patient is currently asymptomatic and has no complaints.  Patient denies CV symptoms, CP, SOB, palpitations.  Patient denies constitutional symptoms, fever, changes in his urine or his stool.    No recent labs  Patient said she "doesn't need them"    Patient states she discontinued most of her mediations because she feels well, and she takes her BP medication on and off.    She takes care of grooming her feet herself and she peels and removes "calluses" with a sharp blade when needed.    She states that when she lost her right foot, it might have been because of an infection caused after an injury caused after removing a callus.    Healthcare Maintenance:  Colonoscopy: none, declines  Vaccinations: Pneumonia, Zoster, Tetanus, none, declines  COVID vaccination: none, declines  Depression screening: PHQ2 score = 0    Annual Wellness visit approx. Month: June ROS  11-point review of systems done. Negative except for detailed in the HPI.        Objective:      Vitals:    11/07/22 1048   BP: (!) 174/78   Pulse: 74   Weight: 54 kg (119 lb)   Height: 5' 7" (1.702 m)         Physical Exam  Vitals and nursing note reviewed.   Constitutional:       Appearance: Normal appearance.   HENT:      Head: Normocephalic and atraumatic.      Right Ear: Tympanic membrane normal.      Left Ear: Tympanic membrane normal.      Nose: Nose normal.      Mouth/Throat:      Mouth: Mucous membranes are moist.      Pharynx: Oropharynx is clear.   Eyes:      Extraocular Movements: Extraocular movements intact.      Conjunctiva/sclera: Conjunctivae normal.      Pupils: Pupils are equal, round, and reactive to light. "   Cardiovascular:      Rate and Rhythm: Normal rate and regular rhythm.      Pulses: Normal pulses.      Heart sounds: Normal heart sounds.   Pulmonary:      Effort: Pulmonary effort is normal.      Breath sounds: Normal breath sounds.   Abdominal:      General: Bowel sounds are normal. There is no distension.      Palpations: Abdomen is soft.      Tenderness: There is no abdominal tenderness.   Musculoskeletal:         General: Normal range of motion.      Cervical back: Normal range of motion and neck supple.      Comments: R prostetic leg, below the knee  LLE with hyperkeratosis of the toe nails and multiple calluses. Patient had at least 2 bandages where she tried to removed some calluses, skin is thin but no ulcers were observed.   Skin:     General: Skin is warm.   Neurological:      General: No focal deficit present.      Mental Status: She is alert and oriented to person, place, and time. Mental status is at baseline.   Psychiatric:         Mood and Affect: Mood normal.          Assessment:       Problem List Items Addressed This Visit          Cardiac/Vascular    Essential hypertension (Chronic)     Uontrolled likely 2/2 poor medication adherence.  Will continue same management: Nifedipine XL 90 mg qd, refilled. Education provided.  Recommended to monitor blood pressure regularly, eat a well-balanced diet that's low in salt, DASH diet, enjoy regular physical activity, manage stress, maintain a healthy weight, take medications properly.           Relevant Medications    NIFEdipine (PROCARDIA-XL) 90 MG (OSM) 24 hr tablet    aspirin (ECOTRIN) 81 MG EC tablet    PAD (peripheral artery disease)     Stable, no new lesions  Patient had discontinued ASA  I am reordering.  I am also sending patient to podiatry for nail care and calluses removal.           Relevant Medications    aspirin (ECOTRIN) 81 MG EC tablet    Other Relevant Orders    Ambulatory referral/consult to Podiatry       Renal/    Stage 3b chronic  kidney disease     Last Cr. 1.5 in June of 2022.  Patient is asymptomatic.  CMP ordered         Relevant Orders    Comprehensive Metabolic Panel       Other    Routine general medical examination at a health care facility - Primary     Patient is in overall good general health.  Stable medical conditions listed on the PMH.  Labs reviewed and patient notified.           Healthcare maintenance     Health care maintenance and core gaps reviewed and assessed with patient.  Vaccinations recommended:        Tetanus - D       Shingles - D       Influenza - D       Pneumonia - D  Colon cancer screening:   Colonoscopy: D  Lifestyle recommendations given.  Physical activity recommended.    Legend: Ordered (O), Declined (D), Current (C)          Other Visit Diagnoses       Hypertension, unspecified type        Relevant Medications    NIFEdipine (PROCARDIA-XL) 90 MG (OSM) 24 hr tablet    aspirin (ECOTRIN) 81 MG EC tablet              Plan:         - Resumed and refilled BP medication and ASA  - Referral to podiatrist  - CMP, CBC ordered  - Will see in 3 months.  - Education provided about medication adherence.    I am afraid patient is not taking medications at all, which I discussed with the patient and encouraged her to take her medications as prescribed.    Education provided  Lifestyle recommendations given  AVS printed, explained, and given to the patient.  RTC in : 3 months for BP monitoring with CMP.      ERICKA WYLIE MD, MPH  Internal Medicine  [facility]  [facility]

## 2022-11-07 NOTE — ASSESSMENT & PLAN NOTE
Patient is in overall good general health.  Stable medical conditions listed on the PMH.  Labs reviewed and patient notified.

## 2022-11-23 ENCOUNTER — PES CALL (OUTPATIENT)
Dept: ADMINISTRATIVE | Facility: CLINIC | Age: 82
End: 2022-11-23
Payer: MEDICARE

## 2022-12-01 ENCOUNTER — TELEPHONE (OUTPATIENT)
Dept: ADMINISTRATIVE | Facility: HOSPITAL | Age: 82
End: 2022-12-01
Payer: MEDICARE

## 2023-02-06 PROBLEM — Z00.00 HEALTHCARE MAINTENANCE: Status: RESOLVED | Noted: 2022-11-07 | Resolved: 2023-02-06

## 2023-02-06 PROBLEM — Z00.00 ROUTINE GENERAL MEDICAL EXAMINATION AT A HEALTH CARE FACILITY: Status: RESOLVED | Noted: 2022-11-07 | Resolved: 2023-02-06

## 2023-03-21 ENCOUNTER — PES CALL (OUTPATIENT)
Dept: ADMINISTRATIVE | Facility: CLINIC | Age: 83
End: 2023-03-21
Payer: MEDICARE

## 2023-05-02 ENCOUNTER — OFFICE VISIT (OUTPATIENT)
Dept: INTERNAL MEDICINE | Facility: CLINIC | Age: 83
End: 2023-05-02
Payer: MEDICARE

## 2023-05-02 ENCOUNTER — LAB VISIT (OUTPATIENT)
Dept: LAB | Facility: HOSPITAL | Age: 83
End: 2023-05-02
Attending: STUDENT IN AN ORGANIZED HEALTH CARE EDUCATION/TRAINING PROGRAM
Payer: MEDICARE

## 2023-05-02 VITALS
HEART RATE: 66 BPM | WEIGHT: 118.31 LBS | DIASTOLIC BLOOD PRESSURE: 79 MMHG | BODY MASS INDEX: 18.57 KG/M2 | SYSTOLIC BLOOD PRESSURE: 166 MMHG | HEIGHT: 67 IN

## 2023-05-02 DIAGNOSIS — Z78.0 ASYMPTOMATIC MENOPAUSAL STATE: ICD-10-CM

## 2023-05-02 DIAGNOSIS — Z89.511 STATUS POST BELOW KNEE AMPUTATION OF RIGHT LOWER EXTREMITY: ICD-10-CM

## 2023-05-02 DIAGNOSIS — N18.32 STAGE 3B CHRONIC KIDNEY DISEASE: ICD-10-CM

## 2023-05-02 DIAGNOSIS — R79.9 ABNORMAL FINDING OF BLOOD CHEMISTRY, UNSPECIFIED: ICD-10-CM

## 2023-05-02 DIAGNOSIS — Z00.00 HEALTHCARE MAINTENANCE: ICD-10-CM

## 2023-05-02 DIAGNOSIS — I73.9 PAD (PERIPHERAL ARTERY DISEASE): ICD-10-CM

## 2023-05-02 DIAGNOSIS — I10 ESSENTIAL HYPERTENSION: Primary | Chronic | ICD-10-CM

## 2023-05-02 PROBLEM — E44.1 MALNUTRITION OF MILD DEGREE: Status: RESOLVED | Noted: 2020-04-26 | Resolved: 2023-05-02

## 2023-05-02 PROBLEM — I96 GANGRENE OF TOE OF RIGHT FOOT: Status: RESOLVED | Noted: 2020-04-19 | Resolved: 2023-05-02

## 2023-05-02 PROBLEM — I96 GANGRENE OF RIGHT FOOT: Status: RESOLVED | Noted: 2020-05-21 | Resolved: 2023-05-02

## 2023-05-02 PROBLEM — N17.9 AKI (ACUTE KIDNEY INJURY): Status: RESOLVED | Noted: 2020-04-19 | Resolved: 2023-05-02

## 2023-05-02 LAB
ALBUMIN SERPL BCP-MCNC: 4 G/DL (ref 3.5–5.2)
ALP SERPL-CCNC: 79 U/L (ref 55–135)
ALT SERPL W/O P-5'-P-CCNC: 6 U/L (ref 10–44)
ANION GAP SERPL CALC-SCNC: 7 MMOL/L (ref 8–16)
AST SERPL-CCNC: 15 U/L (ref 10–40)
BILIRUB SERPL-MCNC: 0.5 MG/DL (ref 0.1–1)
BUN SERPL-MCNC: 10 MG/DL (ref 8–23)
CALCIUM SERPL-MCNC: 9.4 MG/DL (ref 8.7–10.5)
CHLORIDE SERPL-SCNC: 109 MMOL/L (ref 95–110)
CO2 SERPL-SCNC: 29 MMOL/L (ref 23–29)
CREAT SERPL-MCNC: 1.5 MG/DL (ref 0.5–1.4)
EST. GFR  (NO RACE VARIABLE): 34.4 ML/MIN/1.73 M^2
GLUCOSE SERPL-MCNC: 93 MG/DL (ref 70–110)
POTASSIUM SERPL-SCNC: 4.5 MMOL/L (ref 3.5–5.1)
PROT SERPL-MCNC: 7.3 G/DL (ref 6–8.4)
SODIUM SERPL-SCNC: 145 MMOL/L (ref 136–145)

## 2023-05-02 PROCEDURE — 3077F PR MOST RECENT SYSTOLIC BLOOD PRESSURE >= 140 MM HG: ICD-10-PCS | Mod: HCNC,CPTII,S$GLB, | Performed by: STUDENT IN AN ORGANIZED HEALTH CARE EDUCATION/TRAINING PROGRAM

## 2023-05-02 PROCEDURE — 99214 PR OFFICE/OUTPT VISIT, EST, LEVL IV, 30-39 MIN: ICD-10-PCS | Mod: HCNC,S$GLB,, | Performed by: STUDENT IN AN ORGANIZED HEALTH CARE EDUCATION/TRAINING PROGRAM

## 2023-05-02 PROCEDURE — 99999 PR PBB SHADOW E&M-EST. PATIENT-LVL III: ICD-10-PCS | Mod: PBBFAC,HCNC,, | Performed by: STUDENT IN AN ORGANIZED HEALTH CARE EDUCATION/TRAINING PROGRAM

## 2023-05-02 PROCEDURE — 1126F AMNT PAIN NOTED NONE PRSNT: CPT | Mod: HCNC,CPTII,S$GLB, | Performed by: STUDENT IN AN ORGANIZED HEALTH CARE EDUCATION/TRAINING PROGRAM

## 2023-05-02 PROCEDURE — 1159F PR MEDICATION LIST DOCUMENTED IN MEDICAL RECORD: ICD-10-PCS | Mod: HCNC,CPTII,S$GLB, | Performed by: STUDENT IN AN ORGANIZED HEALTH CARE EDUCATION/TRAINING PROGRAM

## 2023-05-02 PROCEDURE — 1126F PR PAIN SEVERITY QUANTIFIED, NO PAIN PRESENT: ICD-10-PCS | Mod: HCNC,CPTII,S$GLB, | Performed by: STUDENT IN AN ORGANIZED HEALTH CARE EDUCATION/TRAINING PROGRAM

## 2023-05-02 PROCEDURE — 80053 COMPREHEN METABOLIC PANEL: CPT | Mod: HCNC | Performed by: STUDENT IN AN ORGANIZED HEALTH CARE EDUCATION/TRAINING PROGRAM

## 2023-05-02 PROCEDURE — 99214 OFFICE O/P EST MOD 30 MIN: CPT | Mod: HCNC,S$GLB,, | Performed by: STUDENT IN AN ORGANIZED HEALTH CARE EDUCATION/TRAINING PROGRAM

## 2023-05-02 PROCEDURE — 1101F PR PT FALLS ASSESS DOC 0-1 FALLS W/OUT INJ PAST YR: ICD-10-PCS | Mod: HCNC,CPTII,S$GLB, | Performed by: STUDENT IN AN ORGANIZED HEALTH CARE EDUCATION/TRAINING PROGRAM

## 2023-05-02 PROCEDURE — 3078F DIAST BP <80 MM HG: CPT | Mod: HCNC,CPTII,S$GLB, | Performed by: STUDENT IN AN ORGANIZED HEALTH CARE EDUCATION/TRAINING PROGRAM

## 2023-05-02 PROCEDURE — 1159F MED LIST DOCD IN RCRD: CPT | Mod: HCNC,CPTII,S$GLB, | Performed by: STUDENT IN AN ORGANIZED HEALTH CARE EDUCATION/TRAINING PROGRAM

## 2023-05-02 PROCEDURE — 3288F FALL RISK ASSESSMENT DOCD: CPT | Mod: HCNC,CPTII,S$GLB, | Performed by: STUDENT IN AN ORGANIZED HEALTH CARE EDUCATION/TRAINING PROGRAM

## 2023-05-02 PROCEDURE — 99999 PR PBB SHADOW E&M-EST. PATIENT-LVL III: CPT | Mod: PBBFAC,HCNC,, | Performed by: STUDENT IN AN ORGANIZED HEALTH CARE EDUCATION/TRAINING PROGRAM

## 2023-05-02 PROCEDURE — 1101F PT FALLS ASSESS-DOCD LE1/YR: CPT | Mod: HCNC,CPTII,S$GLB, | Performed by: STUDENT IN AN ORGANIZED HEALTH CARE EDUCATION/TRAINING PROGRAM

## 2023-05-02 PROCEDURE — 3078F PR MOST RECENT DIASTOLIC BLOOD PRESSURE < 80 MM HG: ICD-10-PCS | Mod: HCNC,CPTII,S$GLB, | Performed by: STUDENT IN AN ORGANIZED HEALTH CARE EDUCATION/TRAINING PROGRAM

## 2023-05-02 PROCEDURE — 36415 COLL VENOUS BLD VENIPUNCTURE: CPT | Mod: HCNC | Performed by: STUDENT IN AN ORGANIZED HEALTH CARE EDUCATION/TRAINING PROGRAM

## 2023-05-02 PROCEDURE — 3288F PR FALLS RISK ASSESSMENT DOCUMENTED: ICD-10-PCS | Mod: HCNC,CPTII,S$GLB, | Performed by: STUDENT IN AN ORGANIZED HEALTH CARE EDUCATION/TRAINING PROGRAM

## 2023-05-02 PROCEDURE — 3077F SYST BP >= 140 MM HG: CPT | Mod: HCNC,CPTII,S$GLB, | Performed by: STUDENT IN AN ORGANIZED HEALTH CARE EDUCATION/TRAINING PROGRAM

## 2023-05-02 RX ORDER — LISINOPRIL 10 MG/1
10 TABLET ORAL DAILY
Qty: 90 TABLET | Refills: 3 | Status: SHIPPED | OUTPATIENT
Start: 2023-05-02 | End: 2024-05-01

## 2023-05-02 NOTE — ASSESSMENT & PLAN NOTE
Controlled.  Will continue same management.  Nifedipine XL 90 mg qd.    Recommended to monitor blood pressure regularly, eat a well-balanced diet that's low in salt, DASH diet, enjoy regular physical activity, manage stress, maintain a healthy weight, take medications properly.

## 2023-05-02 NOTE — ASSESSMENT & PLAN NOTE
Cr. 1.5, GFR 37 (at baseline)  2/2 uncontrolled HTN  Will add lisinopril 10 mg qd to current therapy  Will refer to nephrology for evaluation and f/u.

## 2023-05-02 NOTE — PROGRESS NOTES
"Subjective:       Patient ID: Home De Dios is a 82 y.o. female.    Chief Complaint: No chief complaint on file.    HPI    Home De Dios is a 82 y.o. female , English speaking, with a history of:  HTN  CKD III3b  PAD  Below the knee Amputation of RLE   Use of prosthetic leg (right)    Patient comes to the clinic for a general follow up.  This is the second time I am seeing Ms. De Dios who initially had declined all treatments and wasn't taking her blood pressure medications.    Patient has a h/o of CKD IIIb but she is not under the care of nephrology yet, same reason, she had declined referral.    She states she has been doing well and has been asymptomatic and she reports she has been taken her medications as prescribed.    No complaints today. She denies CP, palpitations, leg swelling, wounds.  She doesn't take her blood pressure at home so we don't know what her average BP at home might be.    Patient didn't take her BP medications today.    Today's CMP   Na 145, K 4.5, Cl 109. Cr. 1.5, GFR 37    Since last seen by me:    11/07/2022    Changes in health or medications: No    Specialists visits and recommendations:   Referred to podiatrist but patient didn't show.    H/o ER visits:   NO    H/o Hospitalizations:  NO    H/o falls: None    Life events / lifestyle:   Nothing new  Patient lives with her     Healthcare Maintenance:  Colonoscopy: none, declines  Vaccinations: Pneumonia, Zoster, Tetanus, none, declines  COVID vaccination: none, declines  Depression screening: PHQ2 score = 0     Annual Wellness visit approx. Month: June ROS  11-point review of systems done. Negative except for detailed in the HPI.        Objective:      Vitals:    05/02/23 0839   BP: (!) 166/79   Pulse: 66   Weight: 53.7 kg (118 lb 4.8 oz)   Height: 5' 7" (1.702 m)         Physical Exam  Vitals and nursing note reviewed.   Constitutional:       Appearance: Normal appearance.   HENT:      Head: Normocephalic and atraumatic.      " Right Ear: Tympanic membrane normal.      Left Ear: Tympanic membrane normal.      Nose: Nose normal.      Mouth/Throat:      Mouth: Mucous membranes are moist.      Pharynx: Oropharynx is clear.   Eyes:      Extraocular Movements: Extraocular movements intact.      Conjunctiva/sclera: Conjunctivae normal.      Pupils: Pupils are equal, round, and reactive to light.   Cardiovascular:      Rate and Rhythm: Normal rate and regular rhythm.      Pulses: Normal pulses.      Heart sounds: Normal heart sounds.   Pulmonary:      Effort: Pulmonary effort is normal.      Breath sounds: Normal breath sounds.   Abdominal:      General: Bowel sounds are normal. There is no distension.      Palpations: Abdomen is soft.      Tenderness: There is no abdominal tenderness.   Musculoskeletal:         General: Normal range of motion.      Cervical back: Normal range of motion and neck supple.      Comments: R prostetic leg, below the knee with healthy stump no ulcers or changes in the skin.    LLE with hyperkeratosis of the toe nails and multiple calluses. Skin is intact.   Skin:     General: Skin is warm.   Neurological:      General: No focal deficit present.      Mental Status: She is alert and oriented to person, place, and time. Mental status is at baseline.   Psychiatric:         Mood and Affect: Mood normal.          Assessment:       1. Essential hypertension  Assessment & Plan:  Controlled.  Will continue same management.  Nifedipine XL 90 mg qd.    Recommended to monitor blood pressure regularly, eat a well-balanced diet that's low in salt, DASH diet, enjoy regular physical activity, manage stress, maintain a healthy weight, take medications properly.      Orders:  -     lisinopriL 10 MG tablet; Take 1 tablet (10 mg total) by mouth once daily.  Dispense: 90 tablet; Refill: 3  -     Lipid Panel; Future; Expected date: 06/02/2023    2. Stage 3b chronic kidney disease  Assessment & Plan:  Cr. 1.5, GFR 37 (at baseline)  2/2  uncontrolled HTN  Will add lisinopril 10 mg qd to current therapy  Will refer to nephrology for evaluation and f/u.    Orders:  -     Ambulatory referral/consult to Nephrology; Future; Expected date: 05/09/2023  -     lisinopriL 10 MG tablet; Take 1 tablet (10 mg total) by mouth once daily.  Dispense: 90 tablet; Refill: 3  -     CBC Auto Differential; Future; Expected date: 06/02/2023  -     Comprehensive Metabolic Panel; Future; Expected date: 06/02/2023  -     Hemoglobin A1C; Future; Expected date: 06/02/2023  -     Urinalysis; Future; Expected date: 06/02/2023  -     Microalbumin/Creatinine Ratio, Urine; Future; Expected date: 06/02/2023    3. PAD (peripheral artery disease)  Assessment & Plan:  Stable  Continue ASA, re-schedule prodiatrist      4. Status post below knee amputation of right lower extremity  Assessment & Plan:  Stable, stump in good condition      5. Healthcare maintenance  Assessment & Plan:  Health care maintenance and core gaps reviewed and assessed with patient.  Vaccinations recommended:        Tetanus - D       Shingles - D       Influenza - D       Pneumonia - D  Colon cancer screening:   Colonoscopy: D  Lifestyle recommendations given.  Physical activity recommended.    Legend: Ordered (O), Declined (D), Current (C)      Orders:  -     DXA Bone Density Axial Skeleton 1 or more sites; Future; Expected date: 05/02/2023    6. Asymptomatic menopausal state  -     DXA Bone Density Axial Skeleton 1 or more sites; Future; Expected date: 05/02/2023    7. Abnormal finding of blood chemistry, unspecified  -     Hemoglobin A1C; Future; Expected date: 06/02/2023       Plan:       - Lisinopril 10 mg qd added  - continue niphedipine  - Reschedule appointment with podiatrist  - continue ASA  - Referred to Nephrology  - DEXA scan ordered    Education provided  Lifestyle recommendations given  AVS printed, explained, and given to the patient.  RTC in : 1 month for AWV labs ordered            ERICKA QURESHI  MD DEJAN, MPH  Internal Medicine  [facility]  [facility]

## 2023-05-03 ENCOUNTER — TELEPHONE (OUTPATIENT)
Dept: ADMINISTRATIVE | Facility: HOSPITAL | Age: 83
End: 2023-05-03
Payer: MEDICARE

## 2023-05-20 NOTE — PLAN OF CARE
Problem: Physical Therapy Goal  Goal: Physical Therapy Goal  Description  Goals to be met by: 20    Patient will increase functional independence with mobility by performin. Don/doff DARCO shoe independently without cueing for need for shoe prior to OOB mobility.   2. Gait x 100 feet with mod(I) with RW with PWB RLE with DARCO shoe.  3. Bed <> chair transfer with mod I and least restrictive assistive device with PWB RLE.      Outcome: Ongoing, Progressing   Pt sup to sit Supervision, sit to stand SBA, amb'd with RW and CGA ~20', PWB on R heel in DARCO shoe. Pt limited by 10/10 pain today in R foot. HHPT   Hair pulling decreased compared to last interview; no longer playing with rubber band

## 2023-05-23 ENCOUNTER — PES CALL (OUTPATIENT)
Dept: ADMINISTRATIVE | Facility: CLINIC | Age: 83
End: 2023-05-23
Payer: MEDICARE

## 2023-06-27 ENCOUNTER — TELEPHONE (OUTPATIENT)
Dept: INTERNAL MEDICINE | Facility: CLINIC | Age: 83
End: 2023-06-27
Payer: MEDICARE

## 2023-06-27 DIAGNOSIS — I10 HYPERTENSION, UNSPECIFIED TYPE: ICD-10-CM

## 2023-06-27 RX ORDER — NIFEDIPINE 90 MG/1
90 TABLET, EXTENDED RELEASE ORAL DAILY
Qty: 30 TABLET | Refills: 6 | Status: SHIPPED | OUTPATIENT
Start: 2023-06-27 | End: 2024-02-12

## 2023-08-07 PROBLEM — Z00.00 HEALTHCARE MAINTENANCE: Status: RESOLVED | Noted: 2023-05-02 | Resolved: 2023-08-07

## 2023-11-21 ENCOUNTER — TELEPHONE (OUTPATIENT)
Dept: ADMINISTRATIVE | Facility: HOSPITAL | Age: 83
End: 2023-11-21
Payer: MEDICARE

## 2023-11-21 ENCOUNTER — TELEPHONE (OUTPATIENT)
Dept: ADMINISTRATIVE | Facility: HOSPITAL | Age: 83
End: 2023-11-21

## 2023-12-01 ENCOUNTER — OFFICE VISIT (OUTPATIENT)
Dept: HOME HEALTH SERVICES | Facility: CLINIC | Age: 83
End: 2023-12-01
Payer: MEDICARE

## 2023-12-01 DIAGNOSIS — Z00.00 ENCOUNTER FOR PREVENTIVE HEALTH EXAMINATION: Primary | ICD-10-CM

## 2023-12-01 DIAGNOSIS — I73.9 PAD (PERIPHERAL ARTERY DISEASE): ICD-10-CM

## 2023-12-01 DIAGNOSIS — Z99.3 DEPENDENCE ON WHEELCHAIR: ICD-10-CM

## 2023-12-01 DIAGNOSIS — N18.32 STAGE 3B CHRONIC KIDNEY DISEASE: ICD-10-CM

## 2023-12-01 DIAGNOSIS — Z89.511 STATUS POST BELOW KNEE AMPUTATION OF RIGHT LOWER EXTREMITY: ICD-10-CM

## 2023-12-01 PROCEDURE — 1159F MED LIST DOCD IN RCRD: CPT | Mod: CPTII,S$GLB,, | Performed by: INTERNAL MEDICINE

## 2023-12-01 PROCEDURE — 1170F PR FUNCTIONAL STATUS ASSESSED: ICD-10-PCS | Mod: CPTII,S$GLB,, | Performed by: INTERNAL MEDICINE

## 2023-12-01 PROCEDURE — 1160F PR REVIEW ALL MEDS BY PRESCRIBER/CLIN PHARMACIST DOCUMENTED: ICD-10-PCS | Mod: CPTII,S$GLB,, | Performed by: INTERNAL MEDICINE

## 2023-12-01 PROCEDURE — 1126F PR PAIN SEVERITY QUANTIFIED, NO PAIN PRESENT: ICD-10-PCS | Mod: CPTII,S$GLB,, | Performed by: INTERNAL MEDICINE

## 2023-12-01 PROCEDURE — 1170F FXNL STATUS ASSESSED: CPT | Mod: CPTII,S$GLB,, | Performed by: INTERNAL MEDICINE

## 2023-12-01 PROCEDURE — 1160F RVW MEDS BY RX/DR IN RCRD: CPT | Mod: CPTII,S$GLB,, | Performed by: INTERNAL MEDICINE

## 2023-12-01 PROCEDURE — 1158F PR ADVANCE CARE PLANNING DISCUSS DOCUMENTED IN MEDICAL RECORD: ICD-10-PCS | Mod: CPTII,S$GLB,, | Performed by: INTERNAL MEDICINE

## 2023-12-01 PROCEDURE — 1126F AMNT PAIN NOTED NONE PRSNT: CPT | Mod: CPTII,S$GLB,, | Performed by: INTERNAL MEDICINE

## 2023-12-01 PROCEDURE — 99499 UNLISTED E&M SERVICE: CPT | Mod: S$GLB,,, | Performed by: INTERNAL MEDICINE

## 2023-12-01 PROCEDURE — 1159F PR MEDICATION LIST DOCUMENTED IN MEDICAL RECORD: ICD-10-PCS | Mod: CPTII,S$GLB,, | Performed by: INTERNAL MEDICINE

## 2023-12-01 PROCEDURE — 99499 NO LOS: ICD-10-PCS | Mod: S$GLB,,, | Performed by: INTERNAL MEDICINE

## 2023-12-01 PROCEDURE — 1158F ADVNC CARE PLAN TLK DOCD: CPT | Mod: CPTII,S$GLB,, | Performed by: INTERNAL MEDICINE

## 2023-12-15 VITALS
SYSTOLIC BLOOD PRESSURE: 120 MMHG | DIASTOLIC BLOOD PRESSURE: 70 MMHG | HEART RATE: 71 BPM | OXYGEN SATURATION: 99 % | RESPIRATION RATE: 19 BRPM | BODY MASS INDEX: 18.52 KG/M2 | HEIGHT: 67 IN | WEIGHT: 118 LBS

## 2023-12-15 NOTE — PROGRESS NOTES
"  Home De Dios presented for a  Medicare AWV and comprehensive Health Risk Assessment today. The following components were reviewed and updated:    Medical history  Family History  Social history  Allergies and Current Medications  Health Risk Assessment  Health Maintenance  Care Team         ** See Completed Assessments for Annual Wellness Visit within the encounter summary.**         The following assessments were completed:  Living Situation  CAGE  Depression Screening  Timed Get Up and Go-deferred  Whisper Test  Cognitive Function Screening-deferred  Nutrition Screening  ADL Screening  PAQ Screening        Vitals:    12/01/23 1555   BP: 120/70   Pulse: 71   Resp: 19   SpO2: 99%   Weight: 53.5 kg (118 lb)   Height: 5' 7" (1.702 m)     Body mass index is 18.48 kg/m².  Physical Exam  Vitals reviewed.   Constitutional:       Appearance: Normal appearance.   HENT:      Head: Normocephalic.   Cardiovascular:      Rate and Rhythm: Normal rate.      Pulses: Normal pulses.   Pulmonary:      Effort: Pulmonary effort is normal.   Musculoskeletal:      Cervical back: Normal range of motion.      Comments: R below knee amputee   Skin:     General: Skin is warm and dry.   Neurological:      Mental Status: She is alert and oriented to person, place, and time.   Psychiatric:         Mood and Affect: Mood normal.         Behavior: Behavior normal.         Thought Content: Thought content normal.         Judgment: Judgment normal.               Diagnoses and health risks identified today and associated recommendations/orders:    1. Encounter for preventive health examination  Assessments completed.  HM recommendations reviewed.  F/u with PCP as instructed.        2. Status post below knee amputation of right lower extremity  Chronic;  Followed by PCP      3. Stage 3b chronic kidney disease  Chronic, stable on current regimen. Followed by PCP      4. PAD (peripheral artery disease)  Chronic, stable on current regimen. Followed by " PCP      5. Dependence on wheelchair  Will use prosthesis as needed      Provided Home with a 5-10 year written screening schedule and personal prevention plan. Recommendations were developed using the USPSTF age appropriate recommendations. Education, counseling, and referrals were provided as needed. After Visit Summary printed and given to patient which includes a list of additional screenings\tests needed.    Follow up in about 1 year (around 12/1/2024) for Medicare AWV.    Deann Rogers NP  I offered to discuss advanced care planning, including how to pick a person who would make decisions for you if you were unable to make them for yourself, called a health care power of , and what kind of decisions you might make such as use of life sustaining treatments such as ventilators and tube feeding when faced with a life limiting illness recorded on a living will that they will need to know. (How you want to be cared for as you near the end of your natural life)     X Patient is interested in learning more about how to make advanced directives.  I provided them paperwork and offered to discuss this with them.

## 2023-12-15 NOTE — PATIENT INSTRUCTIONS
Counseling and Referral of Other Preventative  (Italic type indicates deductible and co-insurance are waived)    Patient Name: Home De Dios  Today's Date: 12/15/2023    Health Maintenance       Date Due Completion Date    COVID-19 Vaccine (1) Never done ---    TETANUS VACCINE Never done ---    DEXA Scan Never done ---    Shingles Vaccine (1 of 2) Never done ---    RSV Vaccine (Age 60+ and Pregnant patients) (1 - 1-dose 60+ series) Never done ---    Pneumococcal Vaccines (Age 65+) (1 - PCV) Never done ---    Influenza Vaccine (1) 09/01/2023 11/7/2022 (Declined)    Override on 11/7/2022: Declined    Override on 11/1/2019: Declined    Lipid Panel 06/22/2027 6/22/2022        No orders of the defined types were placed in this encounter.    The following information is provided to all patients.  This information is to help you find resources for any of the problems found today that may be affecting your health:                Living healthy guide: www.[facility].[place].gov      Understanding Diabetes: www.diabetes.org      Eating healthy: www.cdc.gov/healthyweight      CDC home safety checklist: www.cdc.gov/steadi/patient.html      Agency on Aging: www.goea.[place].gov      Alcoholics anonymous (AA): www.aa.org      Physical Activity: www.[place].nih.gov/zx5yfcj      Tobacco use: www.quitwithusla.org

## 2023-12-22 DIAGNOSIS — I73.9 PAD (PERIPHERAL ARTERY DISEASE): ICD-10-CM

## 2023-12-22 DIAGNOSIS — I10 HYPERTENSION, UNSPECIFIED TYPE: ICD-10-CM

## 2023-12-22 RX ORDER — ASPIRIN 81 MG/1
81 TABLET ORAL
Qty: 90 TABLET | Refills: 3 | Status: SHIPPED | OUTPATIENT
Start: 2023-12-22

## 2023-12-25 NOTE — PLAN OF CARE
Problem: Occupational Therapy Goal  Goal: Occupational Therapy Goal  Description  Goals to be met by: 6/1/2020     Patient will increase functional independence with ADLs by performing:    UE Dressing with [place].  LE Dressing with Stand-by Assistance.  Grooming while seated with [place].  Toileting from bedside commode with Contact Guard Assistance for hygiene and clothing management.   Toilet transfer to bedside commode with Contact Guard Assistance.     Outcome: Ongoing, Progressing     OT evaluation complete and POC established.  Rehab is recommended upon d/c from acute care to further address deficits and help pt improve overall functional independence.     LOC [place]ling  5/25/2020     8 (severe pain)

## 2024-02-10 DIAGNOSIS — I10 HYPERTENSION, UNSPECIFIED TYPE: ICD-10-CM

## 2024-02-12 RX ORDER — NIFEDIPINE 90 MG/1
90 TABLET, EXTENDED RELEASE ORAL
Qty: 30 TABLET | Refills: 6 | Status: SHIPPED | OUTPATIENT
Start: 2024-02-12

## 2024-07-29 DIAGNOSIS — N18.32 STAGE 3B CHRONIC KIDNEY DISEASE: ICD-10-CM

## 2024-07-29 DIAGNOSIS — I10 ESSENTIAL HYPERTENSION: Chronic | ICD-10-CM

## 2024-07-29 RX ORDER — LISINOPRIL 10 MG/1
10 TABLET ORAL
Qty: 30 TABLET | Refills: 0 | Status: SHIPPED | OUTPATIENT
Start: 2024-07-29

## 2024-08-14 ENCOUNTER — TELEPHONE (OUTPATIENT)
Dept: INTERNAL MEDICINE | Facility: CLINIC | Age: 84
End: 2024-08-14
Payer: MEDICARE

## 2024-09-09 ENCOUNTER — TELEPHONE (OUTPATIENT)
Dept: INTERNAL MEDICINE | Facility: CLINIC | Age: 84
End: 2024-09-09
Payer: MEDICARE

## 2024-09-10 ENCOUNTER — TELEPHONE (OUTPATIENT)
Dept: INTERNAL MEDICINE | Facility: CLINIC | Age: 84
End: 2024-09-10
Payer: MEDICARE

## 2024-09-13 DIAGNOSIS — I10 HYPERTENSION, UNSPECIFIED TYPE: ICD-10-CM

## 2024-09-13 RX ORDER — NIFEDIPINE 90 MG/1
90 TABLET, EXTENDED RELEASE ORAL
Qty: 30 TABLET | Refills: 6 | Status: SHIPPED | OUTPATIENT
Start: 2024-09-13

## 2024-12-06 ENCOUNTER — OFFICE VISIT (OUTPATIENT)
Dept: INTERNAL MEDICINE | Facility: CLINIC | Age: 84
End: 2024-12-06
Payer: MEDICARE

## 2024-12-06 VITALS
SYSTOLIC BLOOD PRESSURE: 139 MMHG | WEIGHT: 119.94 LBS | DIASTOLIC BLOOD PRESSURE: 89 MMHG | HEART RATE: 67 BPM | HEIGHT: 67 IN | BODY MASS INDEX: 18.82 KG/M2

## 2024-12-06 DIAGNOSIS — Z00.00 ENCOUNTER FOR MEDICAL EXAMINATION TO ESTABLISH CARE: ICD-10-CM

## 2024-12-06 DIAGNOSIS — Z89.511 STATUS POST BELOW KNEE AMPUTATION OF RIGHT LOWER EXTREMITY: ICD-10-CM

## 2024-12-06 DIAGNOSIS — N18.32 STAGE 3B CHRONIC KIDNEY DISEASE: ICD-10-CM

## 2024-12-06 DIAGNOSIS — Z00.00 HEALTHCARE MAINTENANCE: ICD-10-CM

## 2024-12-06 DIAGNOSIS — D64.9 ANEMIA, UNSPECIFIED TYPE: ICD-10-CM

## 2024-12-06 DIAGNOSIS — I10 ESSENTIAL HYPERTENSION: Chronic | ICD-10-CM

## 2024-12-06 DIAGNOSIS — I73.9 PAD (PERIPHERAL ARTERY DISEASE): ICD-10-CM

## 2024-12-06 DIAGNOSIS — Z53.20 MEDICATION THERAPY MANAGEMENT RECOMMENDATION REFUSED BY PATIENT: ICD-10-CM

## 2024-12-06 DIAGNOSIS — I10 HYPERTENSION, UNSPECIFIED TYPE: ICD-10-CM

## 2024-12-06 DIAGNOSIS — Z00.00 ROUTINE GENERAL MEDICAL EXAMINATION AT A HEALTH CARE FACILITY: Primary | ICD-10-CM

## 2024-12-06 PROBLEM — R53.81 DEBILITY: Status: RESOLVED | Noted: 2020-05-01 | Resolved: 2024-12-06

## 2024-12-06 PROCEDURE — 99999 PR PBB SHADOW E&M-EST. PATIENT-LVL V: CPT | Mod: PBBFAC,HCNC,, | Performed by: STUDENT IN AN ORGANIZED HEALTH CARE EDUCATION/TRAINING PROGRAM

## 2024-12-06 RX ORDER — NIFEDIPINE 90 MG/1
90 TABLET, EXTENDED RELEASE ORAL DAILY
Qty: 90 TABLET | Refills: 3 | Status: SHIPPED | OUTPATIENT
Start: 2024-12-06 | End: 2025-12-06

## 2024-12-06 RX ORDER — LISINOPRIL 10 MG/1
10 TABLET ORAL DAILY
Qty: 90 TABLET | Refills: 3 | Status: SHIPPED | OUTPATIENT
Start: 2024-12-06 | End: 2025-12-06

## 2024-12-06 NOTE — PATIENT INSTRUCTIONS
I strongly recommend to resume your blood pressure medications and not to discontinue.    I strongly recommend to do your labs as ordered and return to your follow up visit to discuss the test results.    Try to bring a family member to next visit that can help us support your treatment plan.

## 2024-12-06 NOTE — PROGRESS NOTES
Subjective:       Patient ID: Home De Dios is a 84 y.o. female.    Chief Complaint: Annual Exam ([place], &prosthesis  ight)    HPI    Home De Dios is a 84 y.o. female , English speaking, with a history of:  HTN  CKD III3b  PAD  Below the knee Amputation of RLE   Use of prosthetic leg (right)      [Local Patient]  Originally from [facility]  Lives in: [place] [id number]       Patient comes to the clinic for a general physical exam (Annual Wellness Visit)    Patient has been lost to follow-up.  She has not returned to [facility] because he says she does not need frequent follow-ups, lab work or any other additional medical treatments.    She is coming to get her medications refilled because she ran out 5 months ago and she has not been on blood pressure medication since then.      Patient states she feels strong, she is independent, tends to herself.  She walks assisted with a cane.  She has not had any falls.    Patient denies chest pain, palpitations, shortness for breath, leg swelling, leg pain, claudication.    Patient denies leg ulcers.    Patient states she does not want to see any other specialties, she only wants her blood pressure medication prescriptions, she refuses to do any lab work, she does not want and a half vaccinations, and she says she does not want to come to the doctor frequently.    Patient lives with her , she says she has full support from her family.  Her son lives in [facility].    No other concerns    Latest PCP visits:      05/02/2023 AWV     Changes in health or medications: No    Specialists visits and recommendations:        H/o ER or Urgent care visits:   NO    H/o Hospitalizations:  NO    H/o falls: None     Life events / lifestyle:   Nothing new      Most recent / available laboratories reviewed with the patient:    Recent Labs   Lab 06/22/22  1015 11/07/22  1146   WBC 4.80 4.23   Hemoglobin 11.9 L 10.9 L   Hematocrit 38.3 33.8 L   MCV 86 85   Platelets 285 228       Recent Labs   Lab  "06/22/22  1015 11/07/22  1146 05/02/23  0820   Glucose 92 79 93   Sodium 145 143 145   Potassium 4.4 4.7 4.5   BUN 11 10 10   Creatinine 1.5 H 1.4 1.5 H   eGFR if non  32.2 A  --   --    Total Bilirubin 0.4 0.5 0.5   AST 14 14 15   ALT 5 L 6 L 6 L             Recent Labs   Lab 06/22/22  1015   Cholesterol 192   Triglycerides 217 H   HDL 39 L   LDL Cholesterol 109.6   Non-HDL Cholesterol 153       Recent Labs   Lab 06/22/22  1015   TSH 1.863               Current medications:    Current Outpatient Medications:     acetaminophen (TYLENOL) 325 MG tablet, Take 2 tablets (650 mg total) by mouth every 6 (six) hours as needed., Disp: , Rfl: 0    ascorbic acid, vitamin C, (VITAMIN C) 1000 MG tablet, Take 1,000 mg by mouth once daily., Disp: , Rfl:     aspirin (ECOTRIN) 81 MG EC tablet, TAKE 1 TABLET BY MOUTH EVERY DAY, Disp: 90 tablet, Rfl: 3    cholecalciferol, vitamin D3, (VITAMIN D3) 10 mcg (400 unit) Tab, Take 400 Units by mouth once daily., Disp: , Rfl:     latanoprost 0.005 % ophthalmic solution, Place 1 drop into both eyes every evening., Disp: 15 mL, Rfl: 3    timolol maleate 0.5% (TIMOPTIC) 0.5 % Drop, Place 1 drop into both eyes 2 (two) times daily., Disp: 15 mL, Rfl: 3    lisinopriL 10 MG tablet, Take 1 tablet (10 mg total) by mouth once daily., Disp: 90 tablet, Rfl: 3    NIFEdipine (PROCARDIA-XL) 90 MG (OSM) 24 hr tablet, Take 1 tablet (90 mg total) by mouth once daily., Disp: 90 tablet, Rfl: 3      Healthcare Maintenance:  Colon cancer screening:         Vaccinations:        Tetanus: no       Pneumonia : no       Zoster: no       Influenza: no       COVID vaccination: unvaccinated     Depression screening: PHQ2 score = 0    Annual Wellness visit approx. Month: DECEMBER     ROS  11-point review of systems done. Negative except for detailed in the HPI.        Objective:      /89   Pulse 67   Ht 5' 7" (1.702 m)   Wt 54.4 kg (119 lb 14.9 oz)   LMP  (LMP Unknown)   BMI 18.78 kg/m²  "     Physical Exam   General appearance: Good general aspect, NAD, conversant   Eyes and HEENT: Normal sclerae, moist mucous membranes, no thyromegaly or lymphadenopathy  Respiratory: No work of breathing, clear to auscultation bilaterally. No rales, rhonchi, wheezing, or rubs.  Cardiovascular: PMI not displaced. RRR. Normal S1, S2. No murmurs, rubs or gallops.  Abdomen and : Soft, non-tender, nondistended, BS, no hepatosplenomegaly, no masses.  Extremities: no edemas, no extremity lymphadenopathy, no clubbing, no cyanosis. RLE prosthetics present  Nervous System: Alert and oriented x 3, good concentration, no deficits.  Skin: Normal temperature, turgor and texture; no rash, ulcers or subcutaneous nodules  Psych: Appropriate affect, alert and oriented to person, place and time          Assessment:       1. Routine general medical examination at a health care facility  Assessment & Plan:  Patient is in overall good general health.  Stable medical conditions listed on the PMH.  Labs reviewed and patient notified.        2. Stage 3b chronic kidney disease  Assessment & Plan:  Unknown baseline.    Patient refuses to do lab work.    As per patient she says she does not think she needs it.    I have strongly advised patient to follow medical advice.    I have also asked patient if she wishes to see a different provider and she says no.    Orders:  -     lisinopriL 10 MG tablet; Take 1 tablet (10 mg total) by mouth once daily.  Dispense: 90 tablet; Refill: 3  -     Comprehensive Metabolic Panel; Future; Expected date: 12/06/2024  -     Hemoglobin A1C; Future; Expected date: 12/06/2024  -     Urinalysis, Reflex to Urine Culture Urine, Clean Catch; Future; Expected date: 12/06/2024  -     Microalbumin/creatinine urine ratio; Future; Expected date: 12/06/2024    3. 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    Orders:  -     Lipid Panel; Future; Expected date:  12/06/2024    4. PAD (peripheral artery disease)  Assessment & Plan:  Asymptomatic.  Continue aspirin.      5. Essential hypertension  Assessment & Plan:  Uncontrolled    2/2 medication non-adherence    I HAVE STRONGLY ADVISED PATIENT NOT TO INTERRUPT HER PRESCRIBED medications and to returned to her follow-up appointments..    I have refilled her medications:   Lisinopril 10 mg daily   Nifedipine 90 mg daily.    I have recommended for patient to return to the clinic in 1 month to follow-up but she declines.      Recommended to monitor blood pressure regularly, eat a well-balanced diet that's low in salt, DASH diet, enjoy regular physical activity, manage stress, maintain a healthy weight, take medications properly.      Orders:  -     lisinopriL 10 MG tablet; Take 1 tablet (10 mg total) by mouth once daily.  Dispense: 90 tablet; Refill: 3    6. Hypertension, unspecified type  -     NIFEdipine (PROCARDIA-XL) 90 MG (OSM) 24 hr tablet; Take 1 tablet (90 mg total) by mouth once daily.  Dispense: 90 tablet; Refill: 3    7. Anemia, unspecified type  Assessment & Plan:  Chronic.    Asymptomatic.    Patient refuses to do lab work at this time.    I have recommended for patient to do CBC for follow-up    Orders:  -     CBC Auto Differential; Future; Expected date: 12/06/2024    8. Healthcare maintenance  Assessment & Plan:  Health care maintenance and core gaps reviewed and assessed with patient.  Vaccinations recommended:        Tetanus - D       Shingles - D       Influenza - D       Pneumonia - D  Colon cancer screening:   Colonoscopy: D  Lifestyle recommendations given.  Physical activity recommended.    Legend: Ordered (O), Declined (D), Current (C)        9. Encounter for medical examination to establish care  -     Ambulatory referral/consult to Geriatrics; Future; Expected date: 12/13/2024    10. Medication therapy management recommendation refused by patient  -     Ambulatory referral/consult to Outpatient Case  Management       Summary of orders / plan:       Have extensively counseled patient about the importance of following medical advise, doing lab work to assess her current medical conditions, take her medications as prescribed.    I have explained the dangers of not taking her blood pressure medication including heart attack, stroke.    I have also explained that is important to know her hemoglobin level and kidney function test given her chronic anemia and chronic kidney disease.    Patient refuses to do any lab work despite the explanations and recommendations.      I have also offered to refer patient to the 65+ program by [facility], patient refuses at this time, she says she is going to think about it.      I am referring patient to case management.    I have offered vaccinations.  Patient declined.      Given patient refuses all medical advice provided during this encounter, I am signing off of this case.  Patient may return as needed to our clinic.    I am referring patient to the 65+ program.  She may use this referral to establish care with new provider          Patient Instructions   I strongly recommend to resume your blood pressure medications and not to discontinue.    I strongly recommend to do your labs as ordered and return to your follow up visit to discuss the test results.    Try to bring a family member to next visit that can help us support your treatment plan.       Problem list updated  Medications reconciled  Education provided  Lifestyle recommendations given  AVS generated, explained, and sent to the patient.  RTC as needed.            ERICKA WYLIE MD, MPH  Internal Medicine  [facility]  [facility]

## 2024-12-06 NOTE — ASSESSMENT & PLAN NOTE
Uncontrolled    2/2 medication non-adherence    I HAVE STRONGLY ADVISED PATIENT NOT TO INTERRUPT HER PRESCRIBED medications and to returned to her follow-up appointments..    I have refilled her medications:   Lisinopril 10 mg daily   Nifedipine 90 mg daily.    I have recommended for patient to return to the clinic in 1 month to follow-up but she declines.      Recommended to monitor blood pressure regularly, eat a well-balanced diet that's low in salt, DASH diet, enjoy regular physical activity, manage stress, maintain a healthy weight, take medications properly.

## 2024-12-06 NOTE — ASSESSMENT & PLAN NOTE
Chronic.    Asymptomatic.    Patient refuses to do lab work at this time.    I have recommended for patient to do CBC for follow-up

## 2024-12-06 NOTE — ASSESSMENT & PLAN NOTE
Unknown baseline.    Patient refuses to do lab work.    As per patient she says she does not think she needs it.    I have strongly advised patient to follow medical advice.    I have also asked patient if she wishes to see a different provider and she says no.

## 2024-12-17 ENCOUNTER — OUTPATIENT CASE MANAGEMENT (OUTPATIENT)
Dept: ADMINISTRATIVE | Facility: OTHER | Age: 84
End: 2024-12-17
Payer: MEDICARE

## (undated) DEVICE — SPONGE LAP 18X18 PREWASHED

## (undated) DEVICE — BLADE SAG DUAL 18MMX1.27MMX90M

## (undated) DEVICE — NDL HYPO REG 25G X 1 1/2

## (undated) DEVICE — MARKER SKIN STND TIP BLUE BARR

## (undated) DEVICE — GUIDEWIRE 145CM .035

## (undated) DEVICE — BLADE SAW SM OSC SAGITTAL .39M

## (undated) DEVICE — SYR 10CC LUER LOCK

## (undated) DEVICE — SEE MEDLINE ITEM 152530

## (undated) DEVICE — NDL PERC ENTRY BSDN 18-7.0

## (undated) DEVICE — DRESSING ADAPTIC TOUCH 3X4

## (undated) DEVICE — Device

## (undated) DEVICE — COVER MAYO STAND REINFRCD 30

## (undated) DEVICE — WIRE WHOLEY 260CM

## (undated) DEVICE — SEE MEDLINE ITEM 156930

## (undated) DEVICE — BANDAGE GAUZE 6PLY FLUFF 2X3

## (undated) DEVICE — LINE HIGH PRESSURE TUBING 48

## (undated) DEVICE — CATH BLLN SEEKER .035IN 135CM

## (undated) DEVICE — STAPLER SKIN PROXIMATE WIDE

## (undated) DEVICE — SEE MEDLINE ITEM 152622

## (undated) DEVICE — MANIFOLD PERCEPTOR MP 3P RH ON

## (undated) DEVICE — APPLICATOR CHLORAPREP ORN 26ML

## (undated) DEVICE — COLLECTOR SPECIMEN ANAEROBIC

## (undated) DEVICE — STOCKINETTE DBL PLY ST 4X

## (undated) DEVICE — ELECTRODE REM PLYHSV RETURN 9

## (undated) DEVICE — COVER SANP CLR 36X54

## (undated) DEVICE — SUT VICRYL PLUS 2-0 18IN

## (undated) DEVICE — SEE MEDLINE ITEM 146298

## (undated) DEVICE — SYR 10ML LIDOCAINE

## (undated) DEVICE — INTRODUCER CATH 7F 11CML

## (undated) DEVICE — DRESSING TRANS 4X4 TEGADERM

## (undated) DEVICE — CATH INFINITI 4F PIG 110CM 6SH

## (undated) DEVICE — SEE L#120831

## (undated) DEVICE — GAUZE SPONGE 4'X4 12 PLY

## (undated) DEVICE — GUIDEWIRE VASCULAR STR 300CM

## (undated) DEVICE — SHOE POST-OP DARCO VEL CLS M/S

## (undated) DEVICE — BAG DRAINAGE W/SPIKE

## (undated) DEVICE — PAD CAST SPECIALIST STRL 6

## (undated) DEVICE — SPONGE COTTON TRAY 4X4IN

## (undated) DEVICE — GLOVE PROTEXIS HYDROGEL SZ7.5

## (undated) DEVICE — TRAY CORONARY CUSTOM BAPTIST

## (undated) DEVICE — SUT VICRYL PLUS 3-0 SH 18IN

## (undated) DEVICE — CATH ULTRAVERSE 018 6X100X130

## (undated) DEVICE — SHEATH DESTINATION 6F X 45CM

## (undated) DEVICE — SEE MEDLINE ITEM 154981

## (undated) DEVICE — GLOVE BIOGEL SKINSENSE PI 7.5

## (undated) DEVICE — DRAPE STERI-DRAPE 1000 17X11IN

## (undated) DEVICE — CATH ST GLIDE CATH 4FR

## (undated) DEVICE — DRESSING N ADH OIL EMUL 3X3

## (undated) DEVICE — SEE MEDLINE ITEM 153151

## (undated) DEVICE — BLADE SURG STAINLESS STEEL #15

## (undated) DEVICE — DRESSING XEROFORM FOIL PK 1X8

## (undated) DEVICE — GOWN SMART IMP BREATHABLE XXLG

## (undated) DEVICE — PAD ABD 8X10 STERILE

## (undated) DEVICE — GUIDEWIRE LAUREATE 035IN 260CM

## (undated) DEVICE — PAD CAST SPECIALIST STRL 4

## (undated) DEVICE — SPONGE SUPER KERLIX 6X6.75IN

## (undated) DEVICE — TOURNIQUET SB QC DP 18X4IN

## (undated) DEVICE — SEE MEDLINE ITEM 157131

## (undated) DEVICE — SEE MEDLINE ITEM 157216

## (undated) DEVICE — SWAB CULTURETTE II DUAL

## (undated) DEVICE — GLOVE BIOGEL SKINSENSE PI 6.5

## (undated) DEVICE — CHLORAPREP 10.5 ML APPLICATOR

## (undated) DEVICE — CATH ULTRAVERSE 014 5X4X150

## (undated) DEVICE — INTRODUCER CATH 4F 11CM

## (undated) DEVICE — CATH ULTRAVERSE 014 2X10X150

## (undated) DEVICE — PAD UNDERPAD 30X30

## (undated) DEVICE — SUT SILK 2.0 BLK 18

## (undated) DEVICE — SUT VICRYL PLUS 0 CT1 18IN

## (undated) DEVICE — GLOVE BIOGEL SKINSENSE PI 6.0

## (undated) DEVICE — SUT VICRYL PLUS 4-0 PS2 27

## (undated) DEVICE — BAG SNAP KOVER BAND 36 X 28 IN

## (undated) DEVICE — COVER MAYO STAND 23X54IN

## (undated) DEVICE — OMNIPAQUE 300 SYRINGE 50ML

## (undated) DEVICE — UNDERGLOVES BIOGEL PI SIZE 7.5

## (undated) DEVICE — TUBING SET EXTENSIONS IV 20 SL

## (undated) DEVICE — SUT 0 8-27IN VICRYL PL CT-1

## (undated) DEVICE — SEE MEDLINE ITEM 146270

## (undated) DEVICE — GLOVE BIOGEL SKINSENSE PI 8.0

## (undated) DEVICE — SOL 9P NACL IRR PIC IL

## (undated) DEVICE — GUIDEWIRE LAUREATE 035IN 180CM

## (undated) DEVICE — CATH IMA INFINITI 4FRX100CM

## (undated) DEVICE — SEE MEDLINE ITEM 146231

## (undated) DEVICE — SEALER LIGASURE CRV 18.8CM

## (undated) DEVICE — KIT CUSTOM INFLATION DEV

## (undated) DEVICE — GUIDEWIRE GRAND SLAM.014X300CM